# Patient Record
Sex: FEMALE | Race: BLACK OR AFRICAN AMERICAN | NOT HISPANIC OR LATINO | Employment: OTHER | URBAN - METROPOLITAN AREA
[De-identification: names, ages, dates, MRNs, and addresses within clinical notes are randomized per-mention and may not be internally consistent; named-entity substitution may affect disease eponyms.]

---

## 2018-01-18 ENCOUNTER — HOSPITAL ENCOUNTER (EMERGENCY)
Facility: HOSPITAL | Age: 68
Discharge: HOME | End: 2018-01-18
Payer: MEDICARE

## 2018-01-18 VITALS
HEIGHT: 64 IN | DIASTOLIC BLOOD PRESSURE: 99 MMHG | BODY MASS INDEX: 30.07 KG/M2 | SYSTOLIC BLOOD PRESSURE: 209 MMHG | WEIGHT: 176.13 LBS | HEART RATE: 87 BPM | TEMPERATURE: 98 F | OXYGEN SATURATION: 100 % | RESPIRATION RATE: 17 BRPM

## 2018-01-18 DIAGNOSIS — N18.9 CHRONIC KIDNEY FAILURE, UNSPECIFIED STAGE: Primary | ICD-10-CM

## 2018-01-18 DIAGNOSIS — R51.9 FRONTAL HEADACHE: ICD-10-CM

## 2018-01-18 DIAGNOSIS — R53.1 WEAKNESS: ICD-10-CM

## 2018-01-18 LAB
ALBUMIN SERPL BCP-MCNC: 4 G/DL
ALP SERPL-CCNC: 99 U/L
ALT SERPL W/O P-5'-P-CCNC: 13 U/L
ANION GAP SERPL CALC-SCNC: 16 MMOL/L
AST SERPL-CCNC: 16 U/L
BASOPHILS # BLD AUTO: 0.01 K/UL
BASOPHILS NFR BLD: 0.1 %
BILIRUB SERPL-MCNC: 0.5 MG/DL
BUN SERPL-MCNC: 40 MG/DL
CALCIUM SERPL-MCNC: 9.6 MG/DL
CHLORIDE SERPL-SCNC: 99 MMOL/L
CO2 SERPL-SCNC: 25 MMOL/L
CREAT SERPL-MCNC: 5.3 MG/DL
DIFFERENTIAL METHOD: ABNORMAL
EOSINOPHIL # BLD AUTO: 0.1 K/UL
EOSINOPHIL NFR BLD: 1.3 %
ERYTHROCYTE [DISTWIDTH] IN BLOOD BY AUTOMATED COUNT: 16.1 %
EST. GFR  (AFRICAN AMERICAN): 9 ML/MIN/1.73 M^2
EST. GFR  (NON AFRICAN AMERICAN): 8 ML/MIN/1.73 M^2
GLUCOSE SERPL-MCNC: 88 MG/DL
HCT VFR BLD AUTO: 37.9 %
HGB BLD-MCNC: 12 G/DL
LYMPHOCYTES # BLD AUTO: 1.6 K/UL
LYMPHOCYTES NFR BLD: 17.8 %
MCH RBC QN AUTO: 29.9 PG
MCHC RBC AUTO-ENTMCNC: 31.7 G/DL
MCV RBC AUTO: 95 FL
MONOCYTES # BLD AUTO: 0.7 K/UL
MONOCYTES NFR BLD: 7.6 %
NEUTROPHILS # BLD AUTO: 6.4 K/UL
NEUTROPHILS NFR BLD: 73.2 %
PHOSPHATE SERPL-MCNC: 4 MG/DL
PLATELET # BLD AUTO: 284 K/UL
PMV BLD AUTO: 9.8 FL
POTASSIUM SERPL-SCNC: 4 MMOL/L
PROT SERPL-MCNC: 8.2 G/DL
RBC # BLD AUTO: 4.01 M/UL
SODIUM SERPL-SCNC: 140 MMOL/L
WBC # BLD AUTO: 8.69 K/UL

## 2018-01-18 PROCEDURE — 63600175 PHARM REV CODE 636 W HCPCS: Performed by: NURSE PRACTITIONER

## 2018-01-18 PROCEDURE — 85025 COMPLETE CBC W/AUTO DIFF WBC: CPT

## 2018-01-18 PROCEDURE — 96372 THER/PROPH/DIAG INJ SC/IM: CPT

## 2018-01-18 PROCEDURE — 80053 COMPREHEN METABOLIC PANEL: CPT

## 2018-01-18 PROCEDURE — 84100 ASSAY OF PHOSPHORUS: CPT

## 2018-01-18 PROCEDURE — 99284 EMERGENCY DEPT VISIT MOD MDM: CPT | Mod: 25

## 2018-01-18 PROCEDURE — 25000003 PHARM REV CODE 250: Performed by: NURSE PRACTITIONER

## 2018-01-18 RX ORDER — DEXAMETHASONE SODIUM PHOSPHATE 4 MG/ML
8 INJECTION, SOLUTION INTRA-ARTICULAR; INTRALESIONAL; INTRAMUSCULAR; INTRAVENOUS; SOFT TISSUE
Status: COMPLETED | OUTPATIENT
Start: 2018-01-18 | End: 2018-01-18

## 2018-01-18 RX ORDER — ONDANSETRON 8 MG/1
8 TABLET, ORALLY DISINTEGRATING ORAL
Status: COMPLETED | OUTPATIENT
Start: 2018-01-18 | End: 2018-01-18

## 2018-01-18 RX ORDER — OXYCODONE AND ACETAMINOPHEN 5; 325 MG/1; MG/1
1 TABLET ORAL ONCE
Status: COMPLETED | OUTPATIENT
Start: 2018-01-18 | End: 2018-01-18

## 2018-01-18 RX ADMIN — ONDANSETRON 8 MG: 8 TABLET, ORALLY DISINTEGRATING ORAL at 04:01

## 2018-01-18 RX ADMIN — DEXAMETHASONE SODIUM PHOSPHATE 8 MG: 4 INJECTION, SOLUTION INTRAMUSCULAR; INTRAVENOUS at 04:01

## 2018-01-18 RX ADMIN — OXYCODONE HYDROCHLORIDE AND ACETAMINOPHEN 1 TABLET: 5; 325 TABLET ORAL at 04:01

## 2018-01-18 NOTE — ED PROVIDER NOTES
SCRIBE #1 NOTE: I, Shagufta Hodge, am scribing for, and in the presence of, Asaf Aaron NP. I have scribed the entire note.      History      Chief Complaint   Patient presents with    Dialysis     pt in from out of town, does M, W, F hemodialysis, last HD Monday    Emesis     pt c/o n/v x1 day    Sinusitis     pt c/o sinus pressure x3 days       Review of patient's allergies indicates:   Allergen Reactions    Hydrocodone     Iodinated contrast- oral and iv dye     Nsaids (non-steroidal anti-inflammatory drug)         HPI   HPI    1/18/2018, 4:33 PM   History obtained from the patient      History of Present Illness: Gillian Pitts is a 67 y.o. female patient with PMHx of HTN who presents to the Emergency Department for nausea which onset gradually last night. Patient states she is from [place] and is a hemodialysis patient. Patient reports receiving dialysis on Mondays, Wednesdays, and Fridays. Patient last had dialysis on Monday and missed appointment yesterday secondary to being out of town. Patient states she has no dialysis arranged in [place]. Patient states her dry weight is 77kg. Symptoms are constant and moderate in severity. No mitigating or exacerbating factors reported. Associated sxs include episode of vomitting en route to ED. Pt also c/o ear pain, congestion, and HA onset 3 days ago. Patient denies any fever, chills, dizziness, sinus pain, CP, SOB, leg swelling, abd pain, and all other sxs at this time. No further complaints or concerns at this time.     Arrival mode: Personal vehicle      PCP: Primary Doctor No       Past Medical History:  Past Medical History:   Diagnosis Date    Ovarian cyst     Polycystic kidney disease        Past Surgical History:  Past Surgical History:   Procedure Laterality Date    BACK SURGERY      BLADDER SURGERY      HYSTERECTOMY      PERITONEAL SHUNT           Family History:  Family History   Problem Relation Age of Onset    Hypertension Mother      Hypertension Father        Social History:  Social History     Social History Main Topics    Smoking status: Never Smoker    Smokeless tobacco: Never Used    Alcohol use No    Drug use: No    Sexual activity: Unknown       ROS   Review of Systems   Constitutional: Negative for chills and fever.   HENT: Positive for congestion and ear pain. Negative for sinus pain and sore throat.    Respiratory: Negative for shortness of breath.    Cardiovascular: Negative for chest pain and leg swelling.   Gastrointestinal: Positive for nausea and vomiting. Negative for abdominal pain.   Genitourinary: Negative for dysuria.   Musculoskeletal: Negative for back pain.   Skin: Negative for rash.   Neurological: Positive for headaches. Negative for dizziness and weakness.   Hematological: Does not bruise/bleed easily.   All other systems reviewed and are negative.      Physical Exam      Initial Vitals [01/18/18 1614]   BP Pulse Resp Temp SpO2   (!) 200/99 81 18 98.3 °F (36.8 °C) 100 %      MAP       132.67          Physical Exam  Nursing Notes and Vital Signs Reviewed.  Constitutional: Patient is in no acute distress. Well-developed and well-nourished.  Head: Atraumatic. Normocephalic.  Eyes: PERRL. EOM intact. Conjunctivae are not pale. No scleral icterus.  Ears: Right TM normal. Left TM normal. No erythema. No bulging. No effusion or air-fluid levels. No perforation.   Nose: Patent nares. Turbinates are normal. No drainage.   Throat: Moist mucous membranes. Posterior oropharynx is symmetric without erythema. Tonsillar exudate is not present. No trismus. Normal handling of secretions. No stridor.   Neck: Supple. Full ROM. No lymphadenopathy.  Cardiovascular: Regular rate. Regular rhythm. No murmurs, rubs, or gallops. Distal pulses are 2+ and symmetric.  Pulmonary/Chest: No respiratory distress. Clear to auscultation bilaterally. No wheezing or rales.  Abdominal: Soft and non-distended.  There is no tenderness.  No rebound,  "guarding, or rigidity. Good bowel sounds.  Genitourinary: No CVA tenderness  Musculoskeletal: Moves all extremities. No obvious deformities. No edema. No calf tenderness.  Skin: Warm and dry.  Neurological:  Alert, awake, and appropriate.  Normal speech.  No acute focal neurological deficits are appreciated.  Psychiatric: Normal affect. Good eye contact. Appropriate in content.    ED Course    Procedures  ED Vital Signs:  Vitals:    01/18/18 1614 01/18/18 1904   BP: (!) 200/99 (!) 209/99   Pulse: 81 87   Resp: 18 17   Temp: 98.3 °F (36.8 °C)    TempSrc: Oral    SpO2: 100%    Weight: 79.9 kg (176 lb 2.4 oz)    Height: 5' 4" (1.626 m)        Abnormal Lab Results:  Labs Reviewed   CBC W/ AUTO DIFFERENTIAL - Abnormal; Notable for the following:        Result Value    MCHC 31.7 (*)     RDW 16.1 (*)     Gran% 73.2 (*)     Lymph% 17.8 (*)     All other components within normal limits   COMPREHENSIVE METABOLIC PANEL - Abnormal; Notable for the following:     BUN, Bld 40 (*)     Creatinine 5.3 (*)     eGFR if  9 (*)     eGFR if non  8 (*)     All other components within normal limits   PHOSPHORUS        All Lab Results:  Results for orders placed or performed during the hospital encounter of 01/18/18   CBC auto differential   Result Value Ref Range    WBC 8.69 3.90 - 12.70 K/uL    RBC 4.01 4.00 - 5.40 M/uL    Hemoglobin 12.0 12.0 - 16.0 g/dL    Hematocrit 37.9 37.0 - 48.5 %    MCV 95 82 - 98 fL    MCH 29.9 27.0 - 31.0 pg    MCHC 31.7 (L) 32.0 - 36.0 g/dL    RDW 16.1 (H) 11.5 - 14.5 %    Platelets 284 150 - 350 K/uL    MPV 9.8 9.2 - 12.9 fL    Gran # 6.4 1.8 - 7.7 K/uL    Lymph # 1.6 1.0 - 4.8 K/uL    Mono # 0.7 0.3 - 1.0 K/uL    Eos # 0.1 0.0 - 0.5 K/uL    Baso # 0.01 0.00 - 0.20 K/uL    Gran% 73.2 (H) 38.0 - 73.0 %    Lymph% 17.8 (L) 18.0 - 48.0 %    Mono% 7.6 4.0 - 15.0 %    Eosinophil% 1.3 0.0 - 8.0 %    Basophil% 0.1 0.0 - 1.9 %    Differential Method Automated    Comprehensive metabolic " panel   Result Value Ref Range    Sodium 140 136 - 145 mmol/L    Potassium 4.0 3.5 - 5.1 mmol/L    Chloride 99 95 - 110 mmol/L    CO2 25 23 - 29 mmol/L    Glucose 88 70 - 110 mg/dL    BUN, Bld 40 (H) 8 - 23 mg/dL    Creatinine 5.3 (H) 0.5 - 1.4 mg/dL    Calcium 9.6 8.7 - 10.5 mg/dL    Total Protein 8.2 6.0 - 8.4 g/dL    Albumin 4.0 3.5 - 5.2 g/dL    Total Bilirubin 0.5 0.1 - 1.0 mg/dL    Alkaline Phosphatase 99 55 - 135 U/L    AST 16 10 - 40 U/L    ALT 13 10 - 44 U/L    Anion Gap 16 8 - 16 mmol/L    eGFR if African American 9 (A) >60 mL/min/1.73 m^2    eGFR if non African American 8 (A) >60 mL/min/1.73 m^2   Phosphorus   Result Value Ref Range    Phosphorus 4.0 2.7 - 4.5 mg/dL       Imaging Results:  Imaging Results          X-Ray Chest PA And Lateral (Final result)  Result time 01/18/18 18:07:48    Final result by Delon Calhoun MD (Timothy) (01/18/18 18:07:48)                 Impression:         Normal sized heart. Clear lungs.This process the thoracic aorta.  Intravascular stents are identified in the left rectus cephalic vein and left axillary and brachial vein.  Comparison 10/05/2015.      Electronically signed by: DELON CALHOUN MD  Date:     01/18/18  Time:    18:07              Narrative:    CXR, 2 views    Clinical History:    Weakness                                      The Emergency Provider reviewed the vital signs and test results, which are outlined above.    ED Discussion     4:46 PM: Asaf Aaron NP discussed the pt's case with Dr. Kitchen (Nephrology) who recommends basic blood work and chest x-ray. Dr. Kitchen states he will see patient in clinic tomorrow at 9am.    4:55 PM: Reassessed pt at this time. Discussed with pt all pertinent ED information and results. Discussed pt dx and plan of tx. Gave pt all f/u and return to the ED instructions. All questions and concerns were addressed at this time. Pt expresses understanding of information and instructions, and is comfortable with plan to  discharge. Pt is stable for discharge.  Discussed with patient to f/u with Dr. Kitchen, nephrologist, tomorrow at 9am.     I discussed with patient and/or family/caretaker that evaluation in the ED does not suggest any emergent or life threatening medical conditions requiring immediate intervention beyond what was provided in the ED, and I believe patient is safe for discharge.  Regardless, an unremarkable evaluation in the ED does not preclude the development or presence of a serious of life threatening condition. As such, patient was instructed to return immediately for any worsening or change in current symptoms.      ED Medication(s):  Medications   dexamethasone injection 8 mg (8 mg Intramuscular Given 1/18/18 1651)   oxyCODONE-acetaminophen 5-325 mg per tablet 1 tablet (1 tablet Oral Given 1/18/18 1651)   ondansetron disintegrating tablet 8 mg (8 mg Oral Given 1/18/18 1651)       Discharge Medication List as of 1/18/2018  4:54 PM          Follow-up Information     Ayo Kitchen MD.    Specialty:  Nephrology  Why:  You have an appointment tomorrow at 9 am with Dr. Kitchen  Contact information:  [address]  [facility] [id number] [phone]                     Medical Decision Making    Medical Decision Making:   Clinical Tests:   Lab Tests: Ordered and Reviewed  Radiological Study: Reviewed and Ordered           Scribe Attestation:   Scribe #1: I performed the above scribed service and the documentation accurately describes the services I performed. I attest to the accuracy of the note.    Attending:   Physician Attestation Statement for Scribe #1: I, Asaf Aaron NP, personally performed the services described in this documentation, as scribed by Shagufta Hodge, in my presence, and it is both accurate and complete.          Clinical Impression       ICD-10-CM ICD-9-CM   1. Chronic kidney failure, unspecified stage N18.9 585.9   2. Weakness R53.1 780.79   3. Frontal headache R51 784.0       Disposition:    Disposition: Discharged  Condition: Stable         Asaf Aaron NP  01/20/18 0811

## 2018-01-19 ENCOUNTER — LAB VISIT (OUTPATIENT)
Dept: LAB | Facility: HOSPITAL | Age: 68
End: 2018-01-19
Attending: INTERNAL MEDICINE
Payer: MEDICARE

## 2018-01-19 ENCOUNTER — OFFICE VISIT (OUTPATIENT)
Dept: NEPHROLOGY | Facility: CLINIC | Age: 68
End: 2018-01-19
Payer: MEDICARE

## 2018-01-19 ENCOUNTER — OFFICE VISIT (OUTPATIENT)
Dept: INTERNAL MEDICINE | Facility: CLINIC | Age: 68
End: 2018-01-19
Payer: MEDICARE

## 2018-01-19 VITALS
SYSTOLIC BLOOD PRESSURE: 104 MMHG | WEIGHT: 178 LBS | HEART RATE: 70 BPM | OXYGEN SATURATION: 95 % | BODY MASS INDEX: 30.39 KG/M2 | TEMPERATURE: 98 F | DIASTOLIC BLOOD PRESSURE: 78 MMHG | RESPIRATION RATE: 16 BRPM | HEIGHT: 64 IN

## 2018-01-19 VITALS
HEIGHT: 64 IN | HEART RATE: 70 BPM | BODY MASS INDEX: 30.48 KG/M2 | WEIGHT: 178.56 LBS | DIASTOLIC BLOOD PRESSURE: 78 MMHG | SYSTOLIC BLOOD PRESSURE: 116 MMHG

## 2018-01-19 DIAGNOSIS — J31.0 CHRONIC PURULENT RHINITIS: Primary | ICD-10-CM

## 2018-01-19 DIAGNOSIS — N18.6 ESRD (END STAGE RENAL DISEASE): ICD-10-CM

## 2018-01-19 DIAGNOSIS — I10 ESSENTIAL HYPERTENSION: ICD-10-CM

## 2018-01-19 DIAGNOSIS — N18.6 ESRD (END STAGE RENAL DISEASE): Primary | ICD-10-CM

## 2018-01-19 DIAGNOSIS — E28.2 POLYCYSTIC OVARY: ICD-10-CM

## 2018-01-19 DIAGNOSIS — Q61.3 POLYCYSTIC KIDNEY DISEASE: ICD-10-CM

## 2018-01-19 PROBLEM — Z99.2 ESRD (END STAGE RENAL DISEASE) ON DIALYSIS: Chronic | Status: ACTIVE | Noted: 2018-01-19

## 2018-01-19 PROBLEM — Z99.2 ESRD (END STAGE RENAL DISEASE) ON DIALYSIS: Status: ACTIVE | Noted: 2018-01-19

## 2018-01-19 PROCEDURE — 96372 THER/PROPH/DIAG INJ SC/IM: CPT | Mod: PBBFAC,PO

## 2018-01-19 PROCEDURE — 99999 PR PBB SHADOW E&M-EST. PATIENT-LVL IV: CPT | Mod: PBBFAC,,, | Performed by: INTERNAL MEDICINE

## 2018-01-19 PROCEDURE — 99214 OFFICE O/P EST MOD 30 MIN: CPT | Mod: PBBFAC,27,PO,25 | Performed by: PHYSICIAN ASSISTANT

## 2018-01-19 PROCEDURE — 99205 OFFICE O/P NEW HI 60 MIN: CPT | Mod: S$PBB,,, | Performed by: INTERNAL MEDICINE

## 2018-01-19 PROCEDURE — 86803 HEPATITIS C AB TEST: CPT

## 2018-01-19 PROCEDURE — 99999 PR PBB SHADOW E&M-EST. PATIENT-LVL IV: CPT | Mod: PBBFAC,,, | Performed by: PHYSICIAN ASSISTANT

## 2018-01-19 PROCEDURE — 36415 COLL VENOUS BLD VENIPUNCTURE: CPT | Mod: PO

## 2018-01-19 PROCEDURE — 86706 HEP B SURFACE ANTIBODY: CPT

## 2018-01-19 PROCEDURE — 99214 OFFICE O/P EST MOD 30 MIN: CPT | Mod: PBBFAC,PO,25 | Performed by: INTERNAL MEDICINE

## 2018-01-19 PROCEDURE — 99213 OFFICE O/P EST LOW 20 MIN: CPT | Mod: S$PBB,,, | Performed by: PHYSICIAN ASSISTANT

## 2018-01-19 PROCEDURE — 87340 HEPATITIS B SURFACE AG IA: CPT

## 2018-01-19 PROCEDURE — 86704 HEP B CORE ANTIBODY TOTAL: CPT

## 2018-01-19 RX ORDER — MOMETASONE FUROATE 50 UG/1
2 SPRAY, METERED NASAL DAILY
Qty: 1 EACH | Refills: 2 | Status: SHIPPED | OUTPATIENT
Start: 2018-01-19 | End: 2018-04-30

## 2018-01-19 RX ORDER — AMOXICILLIN 500 MG/1
500 TABLET, FILM COATED ORAL EVERY 12 HOURS
Qty: 20 TABLET | Refills: 0 | Status: SHIPPED | OUTPATIENT
Start: 2018-01-19 | End: 2018-01-29

## 2018-01-19 RX ORDER — SEVELAMER CARBONATE 800 MG/1
2400 TABLET, FILM COATED ORAL 3 TIMES DAILY
COMMUNITY
End: 2019-01-09

## 2018-01-19 RX ORDER — METHYLPREDNISOLONE ACETATE 80 MG/ML
80 INJECTION, SUSPENSION INTRA-ARTICULAR; INTRALESIONAL; INTRAMUSCULAR; SOFT TISSUE
Status: COMPLETED | OUTPATIENT
Start: 2018-01-19 | End: 2018-01-19

## 2018-01-19 RX ORDER — CLONIDINE HYDROCHLORIDE 0.3 MG/1
0.3 TABLET ORAL 3 TIMES DAILY
Qty: 90 TABLET | Refills: 2 | Status: SHIPPED | OUTPATIENT
Start: 2018-01-19 | End: 2018-02-16 | Stop reason: SDUPTHER

## 2018-01-19 RX ADMIN — METHYLPREDNISOLONE ACETATE 80 MG: 80 INJECTION, SUSPENSION INTRALESIONAL; INTRAMUSCULAR; INTRASYNOVIAL; SOFT TISSUE at 12:01

## 2018-01-19 NOTE — PROGRESS NOTES
Subjective:       Patient ID: Gillian Pitts is a 67 y.o.B/ female.    Chief Complaint: Nasal Congestion; Otalgia; and Medication Refill    HPI         She comes in by herself today and has the above problem.  She has been on dialysis now for about 4 years and [place].  She is living and in one of her children's houses in the mother-in-law suite.  She's been having lots of problems with chronic sinusitis and she has been taken Mucinex D for this problem and has at times in the past taken Coricidin HBP.  She's not taken that product in the past couple weeks.  She gets very little rhinorrhea coming from her nose because it's locked and won't move.  She also has been coughing just a little bit.  So far she's had no problems with fever, chills, rigors, dyspnea of any modality, sweats, diaphoresis, CP, pleurisy, night sweats, or near syncope.  She has no GI problems with anorexia, nausea, or vomiting.        She says the only time she gets better is a somebody gives her an injection of a steroid and also put her on amoxicillin.  She has been using Nasonex 3 or 4 times per day which is above and beyond the usual dosage of that medicine.  She needs a refill of it.  She also needs a refill of her Catapres 0.3 mg which she has been taking 3 times a day.  It's originally written to be taken twice a day.    Review of Systems    Otherwise negative concerning the ENT, RESPIRATORY, PULMONARY, CV, VASCULAR, and RENAL system review.    Objective:      Physical Exam    ENT: All essentially WNL without any rhinorrhea or mucopurulence seen inside the nose.  There is also no evidence of PND in the throat.  Throat is non-red and has no swelling in the posterior pharynx or the soft palate.  She has no tender glands or adenopathy present.  CHEST: Clear BS anterior to posterior.  She is not in any respiratory distress.  There is no wheeze, rhonchi, or  rales.    Assessment:       1. Chronic purulent rhinitis        Plan:     1.  She requested and was given a Depo-Medrol 80 mg IM injection.  2.  She needs to start taking Coricidin HBP.  Her Nasonex was refilled but it was cautioned to her that she should only use it once per day and proper instruction was given on how to use this product.  3.  Start Amoxil 500 mg twice a day ×10 days.  4.  Take Mucinex DM 1200 mg as needed for any chest congestion that may develop.  5.  Recheck in 7-14 days a symptoms increase or persist.
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
## 2018-01-19 NOTE — ED NOTES
Pt examined by Asaf Aaron NP and medically cleared for discharge by provider.  Patient has been given discharge instructions and discharged to [facility]. See provider notes for exam.

## 2018-01-21 NOTE — PROGRESS NOTES
Renal on call note:  Responded to pts call regarding her SOB and spoke to Pt directly. Chart was reviewed. Pt is a 68y/o F c ESRD on chronic HD in [place] who just recently moved to  without making preparations to continue chronic HD. Pt was recently seen at [facility] BR clinic and currently out Pt HD is being arranged for her and is supposed to start (resume) chronic HD at [facility] on Mon 1/22/18    Pt, however, started to feel SOB today. She had no other sxs, no CP, no fever. Pt was advised to go to ER and not wait until Mon if the discomfort persists.

## 2018-01-22 LAB
HBV CORE AB SERPL QL IA: POSITIVE
HBV SURFACE AB SER-ACNC: POSITIVE M[IU]/ML
HBV SURFACE AG SERPL QL IA: NEGATIVE
HCV AB SERPL QL IA: POSITIVE

## 2018-01-30 ENCOUNTER — TELEPHONE (OUTPATIENT)
Dept: TRANSPLANT | Facility: CLINIC | Age: 68
End: 2018-01-30

## 2018-02-01 ENCOUNTER — LAB VISIT (OUTPATIENT)
Dept: LAB | Facility: HOSPITAL | Age: 68
End: 2018-02-01
Attending: PHYSICIAN ASSISTANT
Payer: MEDICARE

## 2018-02-01 ENCOUNTER — OFFICE VISIT (OUTPATIENT)
Dept: GASTROENTEROLOGY | Facility: CLINIC | Age: 68
End: 2018-02-01
Payer: MEDICARE

## 2018-02-01 VITALS
HEART RATE: 62 BPM | SYSTOLIC BLOOD PRESSURE: 118 MMHG | HEIGHT: 64 IN | BODY MASS INDEX: 29.99 KG/M2 | WEIGHT: 175.69 LBS | DIASTOLIC BLOOD PRESSURE: 80 MMHG

## 2018-02-01 DIAGNOSIS — R76.8 HEPATITIS B CORE ANTIBODY POSITIVE: ICD-10-CM

## 2018-02-01 DIAGNOSIS — R76.8 HEPATITIS C ANTIBODY TEST POSITIVE: ICD-10-CM

## 2018-02-01 DIAGNOSIS — Z86.010 HISTORY OF COLON POLYPS: ICD-10-CM

## 2018-02-01 DIAGNOSIS — R76.8 HEPATITIS C ANTIBODY TEST POSITIVE: Primary | ICD-10-CM

## 2018-02-01 PROCEDURE — 99213 OFFICE O/P EST LOW 20 MIN: CPT | Mod: PBBFAC,NTX | Performed by: PHYSICIAN ASSISTANT

## 2018-02-01 PROCEDURE — 99214 OFFICE O/P EST MOD 30 MIN: CPT | Mod: S$PBB,NTX,, | Performed by: PHYSICIAN ASSISTANT

## 2018-02-01 PROCEDURE — 1159F MED LIST DOCD IN RCRD: CPT | Mod: NTX,,, | Performed by: PHYSICIAN ASSISTANT

## 2018-02-01 PROCEDURE — 1126F AMNT PAIN NOTED NONE PRSNT: CPT | Mod: NTX,,, | Performed by: PHYSICIAN ASSISTANT

## 2018-02-01 PROCEDURE — 36415 COLL VENOUS BLD VENIPUNCTURE: CPT | Mod: NTX

## 2018-02-01 PROCEDURE — 87522 HEPATITIS C REVRS TRNSCRPJ: CPT | Mod: TXP

## 2018-02-01 PROCEDURE — 99999 PR PBB SHADOW E&M-EST. PATIENT-LVL III: CPT | Mod: PBBFAC,TXP,, | Performed by: PHYSICIAN ASSISTANT

## 2018-02-01 RX ORDER — POLYETHYLENE GLYCOL 3350, SODIUM SULFATE ANHYDROUS, SODIUM BICARBONATE, SODIUM CHLORIDE, POTASSIUM CHLORIDE 236; 22.74; 6.74; 5.86; 2.97 G/4L; G/4L; G/4L; G/4L; G/4L
POWDER, FOR SOLUTION ORAL
Qty: 4000 ML | Refills: 0 | Status: SHIPPED | OUTPATIENT
Start: 2018-02-01 | End: 2018-06-27

## 2018-02-01 NOTE — PATIENT INSTRUCTIONS
Colonoscopy     A camera attached to a flexible tube with a viewing lens is used to take video pictures.     Colonoscopy is a test to view the inside of your lower digestive tract (colon and rectum). Sometimes it can show the last part of the small intestine (ileum). During the test, small pieces of tissue may be removed for testing. This is called a biopsy. Small growths, such as polyps, may also be removed.   Why is colonoscopy done?  The test is done to help look for colon cancer. And it can help find the source of abdominal pain, bleeding, and changes in bowel habits. It may be needed once a year, depending on factors such as your:  · Age  · Health history  · Family health history  · Symptoms  · Results from any prior colonoscopy  Risks and possible complications  These include:  · Bleeding               · A puncture or tear in the colon   · Risks of anesthesia  · A cancer lesion not being seen  Getting ready   To prepare for the test:  · Talk with your healthcare provider about the risks of the test (see below). Also ask your healthcare provider about alternatives to the test.  · Tell your healthcare provider about any medicines you take. Also tell him or her about any health conditions you may have.  · Make sure your rectum and colon are empty for the test. Follow the diet and bowel prep instructions exactly. If you dont, the test may need to be rescheduled.  · Plan for a friend or family member to drive you home after the test.     Colonoscopy provides an inside view of the entire colon.     You may discuss the results with your doctor right away or at a future visit.  During the test   The test is usually done in the hospital on an outpatient basis. This means you go home the same day. The procedure takes about 30 minutes. During that time:  · You are given relaxing (sedating) medicine through an IV line. You may be drowsy, or fully asleep.  · The healthcare provider will first give you a physical exam to  check for anal and rectal problems.  · Then the anus is lubricated and the scope inserted.  · If you are awake, you may have a feeling similar to needing to have a bowel movement. You may also feel pressure as air is pumped into the colon. Its OK to pass gas during the procedure.  · Biopsy, polyp removal, or other treatments may be done during the test.  After the test   You may have gas right after the test. It can help to try to pass it to help prevent later bloating. Your healthcare provider may discuss the results with you right away. Or you may need to schedule a follow-up visit to talk about the results. After the test, you can go back to your normal eating and other activities. You may be tired from the sedation and need to rest for a few hours.  Date Last Reviewed: 11/1/2016 © 2000-2017 [employer]. [address] [place] [address], [place] [address]. All rights reserved. This information is not intended as a substitute for professional medical care. Always follow your healthcare professional's instructions.
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
## 2018-02-05 LAB
HCV GENTYP SERPL NAA+PROBE: NORMAL
HCV QUALITATIVE RESULT: NOT DETECTED
HCV QUANTITATIVE LOG: <1.08 LOG (10) IU/ML
HCV RNA SPEC NAA+PROBE-ACNC: <12 IU/ML

## 2018-02-16 DIAGNOSIS — J31.0 CHRONIC PURULENT RHINITIS: ICD-10-CM

## 2018-02-16 RX ORDER — CLONIDINE HYDROCHLORIDE 0.3 MG/1
0.3 TABLET ORAL 3 TIMES DAILY
Qty: 90 TABLET | Refills: 6 | Status: SHIPPED | OUTPATIENT
Start: 2018-02-16 | End: 2018-08-20 | Stop reason: SDUPTHER

## 2018-02-21 DIAGNOSIS — N83.209 CYST OF OVARY, UNSPECIFIED LATERALITY: ICD-10-CM

## 2018-02-21 DIAGNOSIS — Z76.89 ENCOUNTER TO ESTABLISH CARE: Primary | ICD-10-CM

## 2018-02-27 DIAGNOSIS — Z76.82 ORGAN TRANSPLANT CANDIDATE: ICD-10-CM

## 2018-02-28 ENCOUNTER — TELEPHONE (OUTPATIENT)
Dept: NEPHROLOGY | Facility: CLINIC | Age: 68
End: 2018-02-28

## 2018-02-28 NOTE — TELEPHONE ENCOUNTER
Called patient to schedule her appointment with Ob/gyn. She states that she would like to know who you recommend before she choose a doctor? Please advise

## 2018-03-02 DIAGNOSIS — Z76.89 ENCOUNTER TO ESTABLISH CARE: Primary | ICD-10-CM

## 2018-03-14 ENCOUNTER — HOSPITAL ENCOUNTER (EMERGENCY)
Facility: HOSPITAL | Age: 68
Discharge: HOME | End: 2018-03-14
Attending: EMERGENCY MEDICINE
Payer: MEDICARE

## 2018-03-14 VITALS
SYSTOLIC BLOOD PRESSURE: 197 MMHG | TEMPERATURE: 97 F | WEIGHT: 170 LBS | HEART RATE: 72 BPM | BODY MASS INDEX: 29.02 KG/M2 | HEIGHT: 64 IN | OXYGEN SATURATION: 100 % | DIASTOLIC BLOOD PRESSURE: 97 MMHG | RESPIRATION RATE: 18 BRPM

## 2018-03-14 DIAGNOSIS — R10.9 ACUTE LEFT FLANK PAIN: Primary | ICD-10-CM

## 2018-03-14 DIAGNOSIS — H92.01 RIGHT EAR PAIN: ICD-10-CM

## 2018-03-14 PROCEDURE — 63600175 PHARM REV CODE 636 W HCPCS: Mod: NTX | Performed by: NURSE PRACTITIONER

## 2018-03-14 PROCEDURE — 25000003 PHARM REV CODE 250: Mod: NTX | Performed by: NURSE PRACTITIONER

## 2018-03-14 PROCEDURE — 99284 EMERGENCY DEPT VISIT MOD MDM: CPT | Mod: 25,NTX

## 2018-03-14 PROCEDURE — 96372 THER/PROPH/DIAG INJ SC/IM: CPT | Mod: NTX

## 2018-03-14 RX ORDER — OXYCODONE AND ACETAMINOPHEN 10; 325 MG/1; MG/1
1 TABLET ORAL
Status: COMPLETED | OUTPATIENT
Start: 2018-03-14 | End: 2018-03-14

## 2018-03-14 RX ORDER — MORPHINE SULFATE 4 MG/ML
4 INJECTION, SOLUTION INTRAMUSCULAR; INTRAVENOUS
Status: COMPLETED | OUTPATIENT
Start: 2018-03-14 | End: 2018-03-14

## 2018-03-14 RX ORDER — ONDANSETRON 8 MG/1
8 TABLET, ORALLY DISINTEGRATING ORAL
Status: COMPLETED | OUTPATIENT
Start: 2018-03-14 | End: 2018-03-14

## 2018-03-14 RX ORDER — TRAMADOL HYDROCHLORIDE 50 MG/1
50 TABLET ORAL EVERY 6 HOURS PRN
Qty: 14 TABLET | Refills: 0 | Status: SHIPPED | OUTPATIENT
Start: 2018-03-14 | End: 2018-03-22

## 2018-03-14 RX ADMIN — OXYCODONE HYDROCHLORIDE AND ACETAMINOPHEN 1 TABLET: 10; 325 TABLET ORAL at 11:03

## 2018-03-14 RX ADMIN — ONDANSETRON 8 MG: 8 TABLET, ORALLY DISINTEGRATING ORAL at 01:03

## 2018-03-14 RX ADMIN — MORPHINE SULFATE 4 MG: 4 INJECTION INTRAVENOUS at 01:03
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
## 2018-03-22 ENCOUNTER — OFFICE VISIT (OUTPATIENT)
Dept: TRANSPLANT | Facility: CLINIC | Age: 68
End: 2018-03-22
Payer: MEDICARE

## 2018-03-22 ENCOUNTER — DOCUMENTATION ONLY (OUTPATIENT)
Dept: TRANSPLANT | Facility: CLINIC | Age: 68
End: 2018-03-22

## 2018-03-22 ENCOUNTER — HOSPITAL ENCOUNTER (EMERGENCY)
Facility: HOSPITAL | Age: 68
Discharge: HOME | End: 2018-03-22
Attending: EMERGENCY MEDICINE
Payer: MEDICARE

## 2018-03-22 ENCOUNTER — TELEPHONE (OUTPATIENT)
Dept: TRANSPLANT | Facility: CLINIC | Age: 68
End: 2018-03-22

## 2018-03-22 VITALS
TEMPERATURE: 98 F | RESPIRATION RATE: 17 BRPM | HEIGHT: 65 IN | BODY MASS INDEX: 28.83 KG/M2 | DIASTOLIC BLOOD PRESSURE: 68 MMHG | HEART RATE: 96 BPM | WEIGHT: 173.06 LBS | OXYGEN SATURATION: 100 % | SYSTOLIC BLOOD PRESSURE: 122 MMHG

## 2018-03-22 VITALS
RESPIRATION RATE: 16 BRPM | HEART RATE: 72 BPM | DIASTOLIC BLOOD PRESSURE: 79 MMHG | TEMPERATURE: 97 F | SYSTOLIC BLOOD PRESSURE: 165 MMHG

## 2018-03-22 DIAGNOSIS — N25.81 SECONDARY HYPERPARATHYROIDISM OF RENAL ORIGIN: Chronic | ICD-10-CM

## 2018-03-22 DIAGNOSIS — N18.6 ESRD (END STAGE RENAL DISEASE) ON DIALYSIS: Primary | Chronic | ICD-10-CM

## 2018-03-22 DIAGNOSIS — T30.0 BURN: ICD-10-CM

## 2018-03-22 DIAGNOSIS — Z99.2 ANEMIA IN CHRONIC KIDNEY DISEASE, ON CHRONIC DIALYSIS: ICD-10-CM

## 2018-03-22 DIAGNOSIS — Z99.2 ESRD (END STAGE RENAL DISEASE) ON DIALYSIS: Primary | Chronic | ICD-10-CM

## 2018-03-22 DIAGNOSIS — D63.1 ANEMIA IN CHRONIC KIDNEY DISEASE, ON CHRONIC DIALYSIS: ICD-10-CM

## 2018-03-22 DIAGNOSIS — E87.5 HYPERKALEMIA: Primary | ICD-10-CM

## 2018-03-22 DIAGNOSIS — Q61.3 POLYCYSTIC KIDNEY DISEASE: ICD-10-CM

## 2018-03-22 DIAGNOSIS — N18.6 ANEMIA IN CHRONIC KIDNEY DISEASE, ON CHRONIC DIALYSIS: ICD-10-CM

## 2018-03-22 DIAGNOSIS — Z01.818 PRE-TRANSPLANT EVALUATION FOR CKD (CHRONIC KIDNEY DISEASE): ICD-10-CM

## 2018-03-22 DIAGNOSIS — I10 ESSENTIAL HYPERTENSION: Chronic | ICD-10-CM

## 2018-03-22 LAB
ALBUMIN SERPL BCP-MCNC: 4.4 G/DL
ALP SERPL-CCNC: 158 U/L
ALT SERPL W/O P-5'-P-CCNC: 18 U/L
ANION GAP SERPL CALC-SCNC: 13 MMOL/L
ANION GAP SERPL CALC-SCNC: 15 MMOL/L
AST SERPL-CCNC: 25 U/L
BASOPHILS # BLD AUTO: 0.06 K/UL
BASOPHILS NFR BLD: 0.8 %
BILIRUB SERPL-MCNC: 0.5 MG/DL
BNP SERPL-MCNC: 30 PG/ML
BUN SERPL-MCNC: 51 MG/DL
BUN SERPL-MCNC: 52 MG/DL
BUN SERPL-MCNC: 53 MG/DL (ref 6–30)
CA-I BLDV-SCNC: 1.1 MMOL/L
CALCIUM SERPL-MCNC: 10.2 MG/DL
CALCIUM SERPL-MCNC: ABNORMAL MG/DL
CHLORIDE SERPL-SCNC: 100 MMOL/L
CHLORIDE SERPL-SCNC: 97 MMOL/L
CHLORIDE SERPL-SCNC: 98 MMOL/L (ref 95–110)
CO2 SERPL-SCNC: 21 MMOL/L
CO2 SERPL-SCNC: 22 MMOL/L
CREAT SERPL-MCNC: 8 MG/DL
CREAT SERPL-MCNC: 8.6 MG/DL
CREAT SERPL-MCNC: 9.4 MG/DL (ref 0.5–1.4)
DIFFERENTIAL METHOD: ABNORMAL
EOSINOPHIL # BLD AUTO: 0.1 K/UL
EOSINOPHIL NFR BLD: 1.3 %
ERYTHROCYTE [DISTWIDTH] IN BLOOD BY AUTOMATED COUNT: 17.1 %
EST. GFR  (AFRICAN AMERICAN): 5 ML/MIN/1.73 M^2
EST. GFR  (AFRICAN AMERICAN): 5.5 ML/MIN/1.73 M^2
EST. GFR  (NON AFRICAN AMERICAN): 4.4 ML/MIN/1.73 M^2
EST. GFR  (NON AFRICAN AMERICAN): 4.8 ML/MIN/1.73 M^2
GLUCOSE SERPL-MCNC: 165 MG/DL (ref 70–110)
GLUCOSE SERPL-MCNC: 178 MG/DL
GLUCOSE SERPL-MCNC: 72 MG/DL
HCT VFR BLD AUTO: 44.3 %
HCT VFR BLD CALC: 43 %PCV (ref 36–54)
HGB BLD-MCNC: 13.4 G/DL
IMM GRANULOCYTES # BLD AUTO: 0.02 K/UL
IMM GRANULOCYTES NFR BLD AUTO: 0.3 %
LYMPHOCYTES # BLD AUTO: 4.1 K/UL
LYMPHOCYTES NFR BLD: 52.1 %
MCH RBC QN AUTO: 30.6 PG
MCHC RBC AUTO-ENTMCNC: 30.2 G/DL
MCV RBC AUTO: 101 FL
MONOCYTES # BLD AUTO: 0.7 K/UL
MONOCYTES NFR BLD: 9 %
NEUTROPHILS # BLD AUTO: 2.9 K/UL
NEUTROPHILS NFR BLD: 36.5 %
NRBC BLD-RTO: 0 /100 WBC
PLATELET # BLD AUTO: 485 K/UL
PMV BLD AUTO: 8.8 FL
POC IONIZED CALCIUM: 1.3 MMOL/L (ref 1.06–1.42)
POC TCO2 (MEASURED): 25 MMOL/L (ref 23–29)
POCT GLUCOSE: 98 MG/DL (ref 70–110)
POTASSIUM BLD-SCNC: 5.1 MMOL/L (ref 3.5–5.1)
POTASSIUM SERPL-SCNC: 5.7 MMOL/L
POTASSIUM SERPL-SCNC: 6.1 MMOL/L
PROT SERPL-MCNC: 8.7 G/DL
RBC # BLD AUTO: 4.38 M/UL
SAMPLE: ABNORMAL
SODIUM BLD-SCNC: 132 MMOL/L (ref 136–145)
SODIUM SERPL-SCNC: 134 MMOL/L
SODIUM SERPL-SCNC: 134 MMOL/L
TROPONIN I SERPL DL<=0.01 NG/ML-MCNC: 0.07 NG/ML
TSH SERPL DL<=0.005 MIU/L-ACNC: 1.5 UIU/ML
WBC # BLD AUTO: 7.81 K/UL

## 2018-03-22 PROCEDURE — 82962 GLUCOSE BLOOD TEST: CPT | Mod: NTX

## 2018-03-22 PROCEDURE — 80053 COMPREHEN METABOLIC PANEL: CPT | Mod: NTX

## 2018-03-22 PROCEDURE — 99214 OFFICE O/P EST MOD 30 MIN: CPT | Mod: PBBFAC,25,27,TXP | Performed by: INTERNAL MEDICINE

## 2018-03-22 PROCEDURE — 96365 THER/PROPH/DIAG IV INF INIT: CPT | Mod: NTX

## 2018-03-22 PROCEDURE — 99284 EMERGENCY DEPT VISIT MOD MDM: CPT | Mod: NTX,,, | Performed by: EMERGENCY MEDICINE

## 2018-03-22 PROCEDURE — 82565 ASSAY OF CREATININE: CPT | Mod: NTX

## 2018-03-22 PROCEDURE — 84443 ASSAY THYROID STIM HORMONE: CPT | Mod: NTX

## 2018-03-22 PROCEDURE — 93010 ELECTROCARDIOGRAM REPORT: CPT | Mod: NTX,,, | Performed by: INTERNAL MEDICINE

## 2018-03-22 PROCEDURE — 63600175 PHARM REV CODE 636 W HCPCS: Mod: NTX | Performed by: EMERGENCY MEDICINE

## 2018-03-22 PROCEDURE — 99999 PR PBB SHADOW E&M-EST. PATIENT-LVL IV: CPT | Mod: PBBFAC,TXP,, | Performed by: INTERNAL MEDICINE

## 2018-03-22 PROCEDURE — 99284 EMERGENCY DEPT VISIT MOD MDM: CPT | Mod: 25,NTX

## 2018-03-22 PROCEDURE — 99205 OFFICE O/P NEW HI 60 MIN: CPT | Mod: S$PBB,TXP,, | Performed by: INTERNAL MEDICINE

## 2018-03-22 PROCEDURE — 96375 TX/PRO/DX INJ NEW DRUG ADDON: CPT | Mod: NTX

## 2018-03-22 PROCEDURE — 99244 OFF/OP CNSLTJ NEW/EST MOD 40: CPT | Mod: S$PBB,TXP,, | Performed by: TRANSPLANT SURGERY

## 2018-03-22 PROCEDURE — 25000242 PHARM REV CODE 250 ALT 637 W/ HCPCS: Mod: NTX | Performed by: EMERGENCY MEDICINE

## 2018-03-22 PROCEDURE — 99204 OFFICE O/P NEW MOD 45 MIN: CPT | Mod: S$PBB,TXP,, | Performed by: PHYSICIAN ASSISTANT

## 2018-03-22 PROCEDURE — 94640 AIRWAY INHALATION TREATMENT: CPT | Mod: NTX

## 2018-03-22 PROCEDURE — 82435 ASSAY OF BLOOD CHLORIDE: CPT | Mod: NTX

## 2018-03-22 PROCEDURE — 25000003 PHARM REV CODE 250: Mod: NTX | Performed by: EMERGENCY MEDICINE

## 2018-03-22 PROCEDURE — 85025 COMPLETE CBC W/AUTO DIFF WBC: CPT | Mod: NTX

## 2018-03-22 PROCEDURE — 83880 ASSAY OF NATRIURETIC PEPTIDE: CPT | Mod: NTX

## 2018-03-22 PROCEDURE — 84484 ASSAY OF TROPONIN QUANT: CPT | Mod: NTX

## 2018-03-22 RX ORDER — ALBUTEROL SULFATE 0.83 MG/ML
10 SOLUTION RESPIRATORY (INHALATION)
Status: COMPLETED | OUTPATIENT
Start: 2018-03-22 | End: 2018-03-22

## 2018-03-22 RX ORDER — HYDRALAZINE HYDROCHLORIDE 50 MG/1
50 TABLET, FILM COATED ORAL 3 TIMES DAILY
COMMUNITY
End: 2018-07-02

## 2018-03-22 RX ORDER — DEXTROSE 50 % IN WATER (D50W) INTRAVENOUS SYRINGE
25
Status: COMPLETED | OUTPATIENT
Start: 2018-03-22 | End: 2018-03-22

## 2018-03-22 RX ADMIN — DEXTROSE MONOHYDRATE 25 G: 25 INJECTION, SOLUTION INTRAVENOUS at 03:03

## 2018-03-22 RX ADMIN — CALCIUM GLUCONATE 1 G: 94 INJECTION, SOLUTION INTRAVENOUS at 03:03

## 2018-03-22 RX ADMIN — SODIUM POLYSTYRENE SULFONATE 15 G: 15 SUSPENSION ORAL; RECTAL at 03:03

## 2018-03-22 RX ADMIN — INSULIN HUMAN 5 UNITS: 100 INJECTION, SOLUTION PARENTERAL at 03:03

## 2018-03-22 RX ADMIN — ALBUTEROL SULFATE 10 MG: 2.5 SOLUTION RESPIRATORY (INHALATION) at 02:03

## 2018-03-22 NOTE — ED NOTES
Patient on stretcher, Bed placed in low/locked position, side rails up x 2, call light is within reach of patient or family, Patient placed into position of comfort. Patient in NAD and offers no complaints at this time. Will continue to monitor.

## 2018-03-22 NOTE — ED NOTES
Patient identifiers verified and correct for Gillian Pitts.   LOC: The patient is awake, alert and aware of environment with an appropriate affect, the patient is oriented x 3 and speaking appropriately.   APPEARANCE: Patient appears comfortable and in no acute distress, patient is clean and well groomed.  SKIN: The skin is warm and dry, color consistent with ethnicity, patient has normal skin turgor and moist mucus membranes, skin intact, no breakdown or bruising noted.   MUSCULOSKELETAL: Patient moving all extremities spontaneously, no swelling noted.  RESPIRATORY: Airway is open and patent, respirations are spontaneous, patient has a normal effort and rate, no accessory muscle use noted, pt placed on continuous pulse ox with O2 sats noted at 97% on room air.  CARDIAC: Pt placed on cardiac monitor. Patient has a normal rate and regular rhythm, no edema noted, capillary refill < 3 seconds.   GASTRO: Soft and non tender to palpation, no distention noted, normoactive bowel sounds present in all four quadrants. Pt states bowel movements have been regular.  : Pt denies any pain or frequency with urination.  NEURO: Pt opens eyes spontaneously, behavior appropriate to situation, follows commands, facial expression symmetrical, bilateral hand grasp equal and even, purposeful motor response noted, normal sensation in all extremities when touched with a finger.

## 2018-03-22 NOTE — PROGRESS NOTES
INITIAL PATIENT EDUCATION NOTE    Ms. Gillian Pitts was seen in pre-kidney transplant clinic for evaluation for kidney, kidney/pancreas or pancreas only transplant.  The patient attended a group education session that discussed/reviewed the following aspects of transplantation: evaluation and selection committee process, UNOS waitlist management/multiple listings, types of organs offered (KDPI < 85%, KDPI > 85%, PHS increased risk, DCD), financial aspects, surgical procedures, dietary instruction pre- and post-transplant, health maintenance pre- and post-transplant, post-transplant hospitalization and outpatient follow-up, potential to participate in a research protocol, and medication management and side effects.  A question and answer session was provided after the presentation.    The patient was seen by all members of the multi-disciplinary team to include: Nephrologist/PA, Surgeon, , Transplant Coordinator, , Pharmacist and Dietician (if applicable).    The patient reviewed and signed all consents for evaluation which were witnessed and sent to scanning into the EPIC chart.    The patient was given an education book and plan for further evaluation based on her individual assessment.      The patient was encouraged to call with any questions or concerns.

## 2018-03-22 NOTE — TELEPHONE ENCOUNTER
Reviewed pt transplant labs.  Notified dialysis unit dietitian of the following abnormal labs via fax.     K 6.0

## 2018-03-22 NOTE — NURSING
Labs faxed to patients referring Nephrologist and dialysis unit.    Notes recorded by Virgie Spears NP on 3/22/2018 at 11:45 AM CDT  Results reviewed, action needed.  K+ level elevated- was recommended for pt to go to the ED  ------  Notes recorded by Virgie Spears NP on 3/22/2018 at 11:44 AM CDT  Results reviewed, action needed.  Fax labs to dialysis/nephrologist concerning  Electrolyte mgmt  K+ elevated--addressed in clinic   Macrocytosis noted , elevated PLT count. Get repeat labs from dialysis center. Previously levels were wnl.

## 2018-03-22 NOTE — LETTER
March 25, 2018        Ayo Kitchen  [address]  [place] [address]  Phone: [phone]  Fax: [phone]             [facility]  [address]  [facility] [address]  Phone: [phone]   Patient: Gillian Pitts   MR Number: [id number]   YOB: 1950   Date of Visit: 3/22/2018       Dear Dr. Ayo Kitchen    Thank you for referring Gillian Pitts to me for evaluation. Attached you will find relevant portions of my assessment and plan of care.    If you have questions, please do not hesitate to call me. I look forward to following Gillian Pitts along with you.    Sincerely,    Livier Masterson MD    Enclosure    If you would like to receive this communication electronically, please contact [email or web address] or [phone] to request [employer] access.    [employer] is a tool which provides read-only access to select patient information with whom you have a relationship. Its easy to use and provides real time access to review your patients record including encounter summaries, notes, results, and demographic information.    If you feel you have received this communication in error or would no longer like to receive these types of communications, please e-mail [email or web address]
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
## 2018-03-22 NOTE — PROGRESS NOTES
PHARM.D. PRE-TRANSPLANT NOTE:    This patient's medication therapy was evaluated as part of her pre-transplant evaluation.    The following pharmacologic concerns were noted: Patient is taking prn oxycodone       Current Outpatient Prescriptions   Medication Sig Dispense Refill    cloNIDine (CATAPRES) 0.3 MG tablet Take 1 tablet (0.3 mg total) by mouth 3 (three) times daily. 90 tablet 6    fluticasone (FLONASE) 50 mcg/actuation nasal spray 1 spray by Each Nare route 2 (two) times daily as needed. 15 g 0    furosemide (LASIX) 80 MG tablet Take 80 mg by mouth 2 (two) times daily.      mometasone (NASONEX) 50 mcg/actuation nasal spray 2 sprays by Nasal route once daily. 1 each 2    oxycodone (OXYCONTIN) 30 MG Tb12 Take 30 mg by mouth every 6 (six) hours as needed.      pantoprazole (PROTONIX) 40 MG tablet Take 40 mg by mouth once daily.      polyethylene glycol (GOLYTELY,NULYTELY) 236-22.74-6.74 -5.86 gram suspension Use as directed. 4000 mL 0    sevelamer carbonate (RENVELA) 800 mg Tab Take 2,400 mg by mouth 3 (three) times daily.        No current facility-administered medications for this visit.          Currently she is responsible for preparing / administering this patient's medications on a daily basis.  I am available for consultation and can be contacted, as needed by the other members of the Kidney Transplant team.

## 2018-03-22 NOTE — PATIENT INSTRUCTIONS
1. Try to increase your activity and build up your strength   2. Given your high potassium you should go to the ED
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
## 2018-03-22 NOTE — ED TRIAGE NOTES
Pt went for labs this morning to be placed on kidney transplant list. Pt was advised her potassium was 6.0 and to come to the ED. Pt denies any complaints. Pt on dialysis M, W, F. With successful dialysis on Wednesday.
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
## 2018-04-04 ENCOUNTER — TELEPHONE (OUTPATIENT)
Dept: TRANSPLANT | Facility: CLINIC | Age: 68
End: 2018-04-04

## 2018-04-04 NOTE — TELEPHONE ENCOUNTER
Nutritional assessment from dialysis unit received and reviewed--no nutritional changes to plan needed at this time.  Pt to continue to follow-up with renal dietitians recommendations.      Virgie Leon MS RD LDN

## 2018-04-06 ENCOUNTER — COMMITTEE REVIEW (OUTPATIENT)
Dept: TRANSPLANT | Facility: CLINIC | Age: 68
End: 2018-04-06

## 2018-04-06 NOTE — COMMITTEE REVIEW
Native Organ Dx: Polycystic Kidneys      Not approved for LRD/CAD transplant due to non-compliance with dialysis prescription. Patient may be re-referred after 6 mos of adherence with treatment regimen.    Nurse spoke with patient and informed her of the committee's decision. Patient verbalized understanding of the above information.       Note written by:Yumiko Rich RN    ===============================================    I was present at the meeting and attest to the decision of the committee.    Livier Masterson MD

## 2018-04-06 NOTE — LETTER
April 6, 2018    Gillian Pitts  [address]  [place] [address]      Dear Gillian Pitts:  MRN: [id number]    It is the duty of the [facility] Selection Committee to determine which patients are candidates for a transplant. For this reason, our committee has the difficult task of evaluating patients to determine which ones have the greatest chance of having a successful transplant. We are aware of the magnitude of this responsibility, and we approach it with reverence and humility.    It is with regret I inform you that you are not approved as a transplant candidate due to non-compliance with dialysis treatment. You will need 6 months compliance before being re referred for kidney transplant.  Based on this review, we have determined that at this time, you are not a candidate for a transplant at [facility].      The selection committee carefully considers each patient's transplant candidacy and determines whether it is safe to proceed with transplantation on a case-by-case basis using established selection criteria.  At present, the risk of proceeding with an elective transplant surgery has become too high.                                                                               Although the selection committee believes you are not a suitable transplant candidate, you have the option to be evaluated at other transplant centers who may have different selection criteria.  You may request your [facility] records be sent to any center of your choice by contacting our Medical Records Department at [phone].                                                                               Attached is a letter from the United Network for Organ Sharing (UNOS).  It describes the services and information offered to patients by UNOS and the Organ Procurement and Transplant Network.    The [facility] Selection Committee sincerely wishes you the best and remains available to answer any  questions.  Please do not hesitate to contact our pre-transplant office if we can assist you in any other way.                                                                               Sincerely,      Lauren Tee MD  Medical Director, Kidney & Kidney/Pancreas Transplantation    CC: MD LORETTA Méndez    Encl: UNOS Letter          OPTN/UNOS: Your Resource for Organ Transplant Information        If you have a question regarding your own medical care, you always should call your transplant center first. However, for general organ transplant-related information, you can call the United Network for Organ Sharing (UNOS) toll-free patient services line at 1-994.678.1901.    Anyone, including potential transplant candidates, recipients, family members/friends, living donors, and/or donor family members can call this number to:    · talk about organ donation, living donation, how transplant and donation work, the donation process, transplant policies, and transplant/donor information;  · get a free patient information kit with helpful booklets, waiting list and transplant information, and a list of all transplant centers;  · ask questions about the Organ Procurement and Transplantation Network (OPTN) web site (www.optn.transplant.hrsa.gov); the UNOS Web site (www.unos.org); or the UNOS web site for living donors and transplant recipients (www.transplantliving.org);  · learn how UNOS and the OPTN can help you;  · talk about any concerns that you may have with a transplant center and how they perform    UNOS is a not-for-profit organization that provides all of the administrative services for the national OPTN under federal contract to the Health Resources and Services Administration (HRSA), an agency under the U.S. Department of Health and Human Services (HHS).     UNOS and OPTN responsibilities include:    · writing educational material for patients, the public and  professionals;  · helping to make people aware of the need for donated organs and tissue;  · writing organ transplant policy with help from doctors, nurses, transplant patients/candidates, donor families, living donors, and the public;  · coordinating the organ matching and placement process;  · collecting information about every organ transplant and donation that occurs in the [place].    Remember, you should contact your transplant center directly if you have questions or concerns about your own medical care including medical records, work-up progress and test reports. [facility] is not your transplant center, and staff at [facility] will not be able to transfer you to your transplant center, so keep your transplant centers phone number handy. But, while you research your transplant needs and learn as much as you can about transplantation and donation, we welcome your call to our toll-free patient services line at [phone].

## 2018-04-20 DIAGNOSIS — Z76.89 ENCOUNTER TO ESTABLISH CARE: Primary | ICD-10-CM

## 2018-04-24 ENCOUNTER — TELEPHONE (OUTPATIENT)
Dept: NEPHROLOGY | Facility: CLINIC | Age: 68
End: 2018-04-24

## 2018-04-24 ENCOUNTER — OFFICE VISIT (OUTPATIENT)
Dept: OTOLARYNGOLOGY | Facility: CLINIC | Age: 68
End: 2018-04-24
Payer: MEDICARE

## 2018-04-24 VITALS
TEMPERATURE: 100 F | DIASTOLIC BLOOD PRESSURE: 76 MMHG | BODY MASS INDEX: 29.68 KG/M2 | WEIGHT: 178.13 LBS | HEART RATE: 78 BPM | SYSTOLIC BLOOD PRESSURE: 120 MMHG | HEIGHT: 65 IN

## 2018-04-24 DIAGNOSIS — H66.001 ACUTE SUPPURATIVE OTITIS MEDIA OF RIGHT EAR WITHOUT SPONTANEOUS RUPTURE OF TYMPANIC MEMBRANE, RECURRENCE NOT SPECIFIED: Primary | ICD-10-CM

## 2018-04-24 DIAGNOSIS — R59.0 CERVICAL ADENOPATHY: ICD-10-CM

## 2018-04-24 PROCEDURE — 99213 OFFICE O/P EST LOW 20 MIN: CPT | Mod: PBBFAC,PO | Performed by: PHYSICIAN ASSISTANT

## 2018-04-24 PROCEDURE — 99999 PR PBB SHADOW E&M-EST. PATIENT-LVL III: CPT | Mod: PBBFAC,,, | Performed by: PHYSICIAN ASSISTANT

## 2018-04-24 PROCEDURE — 99214 OFFICE O/P EST MOD 30 MIN: CPT | Mod: S$PBB,ICN,, | Performed by: PHYSICIAN ASSISTANT

## 2018-04-24 RX ORDER — AMOXICILLIN AND CLAVULANATE POTASSIUM 875; 125 MG/1; MG/1
1 TABLET, FILM COATED ORAL 2 TIMES DAILY
Qty: 20 TABLET | Refills: 0 | Status: SHIPPED | OUTPATIENT
Start: 2018-04-24 | End: 2018-05-04

## 2018-04-24 RX ORDER — HYDRALAZINE HYDROCHLORIDE 25 MG/1
25 TABLET, FILM COATED ORAL 3 TIMES DAILY
Refills: 6 | COMMUNITY
Start: 2018-02-15 | End: 2018-07-02

## 2018-04-24 NOTE — PROGRESS NOTES
Subjective:       Patient ID: Gillian Pitts is a 67 y.o. female.    Chief Complaint: Sinus Problem (Pt states that her right ear has been hurting her, glands have been swollen,runny nose, low grade fever)    Ms. Pitts is a very pleasant 66 yo female here to see me today for the first time with complaints of nasal congestion, neck pain, right ear pain, hearing loss, and dry cough. She has been in and out of ER and has been on multiple antibiotics and steroids. She is getting dialysis 3 times a week. She has been having fever as well. She has been using mucinex regularly for several days with no relief.       Review of Systems   Constitutional: Positive for fever. Negative for chills, fatigue and unexpected weight change.   HENT: Positive for congestion, ear pain, hearing loss, sinus pain and sinus pressure. Negative for dental problem, ear discharge, facial swelling, nosebleeds, postnasal drip, rhinorrhea, sneezing, sore throat, tinnitus, trouble swallowing and voice change.         Jaw pain right sided   Eyes: Negative for redness, itching and visual disturbance.   Respiratory: Negative for cough, choking, shortness of breath and wheezing.    Cardiovascular: Negative for chest pain and palpitations.   Gastrointestinal: Negative for abdominal pain.        No reflux.   Musculoskeletal: Negative for gait problem.   Skin: Negative for rash.   Neurological: Negative for dizziness, light-headedness and headaches.       Objective:      Physical Exam   Constitutional: She is oriented to person, place, and time. She appears well-developed and well-nourished. No distress.   HENT:   Head: Normocephalic and atraumatic.   Right Ear: External ear and ear canal normal. There is tenderness. Tympanic membrane is erythematous and retracted. A middle ear effusion is present. Decreased hearing is noted.   Left Ear: Tympanic membrane, external ear and ear canal normal.   Nose: No mucosal edema, rhinorrhea, nasal deformity  or septal deviation. No epistaxis. Right sinus exhibits maxillary sinus tenderness and frontal sinus tenderness. Left sinus exhibits maxillary sinus tenderness and frontal sinus tenderness.   Mouth/Throat: Uvula is midline, oropharynx is clear and moist and mucous membranes are normal. Mucous membranes are not pale and not dry. No dental caries. No oropharyngeal exudate or posterior oropharyngeal erythema.   Eyes: Conjunctivae, EOM and lids are normal. Pupils are equal, round, and reactive to light. Right eye exhibits no chemosis. Left eye exhibits no chemosis. Right conjunctiva is not injected. Left conjunctiva is not injected. No scleral icterus. Right eye exhibits normal extraocular motion and no nystagmus. Left eye exhibits normal extraocular motion and no nystagmus.   Neck: Trachea normal and phonation normal. No tracheal tenderness present. No tracheal deviation present. No thyroid mass and no thyromegaly present.   Cardiovascular: Intact distal pulses.    Pulmonary/Chest: Effort normal. No stridor. No respiratory distress.   Abdominal: She exhibits no distension.   Lymphadenopathy:        Head (right side): No submental, no submandibular, no preauricular, no posterior auricular and no occipital adenopathy present.        Head (left side): No submental, no submandibular, no preauricular, no posterior auricular and no occipital adenopathy present.     She has cervical adenopathy.   Neurological: She is alert and oriented to person, place, and time. No cranial nerve deficit.   Skin: Skin is warm and dry. No rash noted. No erythema.   Psychiatric: She has a normal mood and affect. Her behavior is normal.       Assessment:       1. Acute suppurative otitis media of right ear without spontaneous rupture of tympanic membrane, recurrence not specified    2. Cervical adenopathy        Plan:       AOM: will order levaquin vs augmentin (will contact nephrology to see which they prefer) They will RTC in 2 week. Discussed  that we may need CT of sinuses if not improved. She is currently on flonase.

## 2018-04-24 NOTE — Clinical Note
Dr. Kitchen, This patient is here to see me now for right ear pain with adenopathy. She receives dialysis 3 times a week. I would like to treat her for her acute otitis media but wanted to clear it with you first. I was thinking levaquin or augmentin. Which do you prefer? I was going to have her return for follow up in 2 weeks and she may need imaging of her sinuses. Will you please let me know what you recommend.  Thanks, Yuni Manuel PA-C

## 2018-04-30 ENCOUNTER — OFFICE VISIT (OUTPATIENT)
Dept: INTERNAL MEDICINE | Facility: CLINIC | Age: 68
End: 2018-04-30
Payer: MEDICARE

## 2018-04-30 VITALS
HEIGHT: 65 IN | BODY MASS INDEX: 30.85 KG/M2 | HEART RATE: 81 BPM | DIASTOLIC BLOOD PRESSURE: 58 MMHG | TEMPERATURE: 97 F | OXYGEN SATURATION: 98 % | WEIGHT: 185.19 LBS | SYSTOLIC BLOOD PRESSURE: 120 MMHG

## 2018-04-30 DIAGNOSIS — D63.1 ANEMIA IN CHRONIC KIDNEY DISEASE, ON CHRONIC DIALYSIS: Chronic | ICD-10-CM

## 2018-04-30 DIAGNOSIS — N18.6 ESRD (END STAGE RENAL DISEASE) ON DIALYSIS: Chronic | ICD-10-CM

## 2018-04-30 DIAGNOSIS — Q61.3 POLYCYSTIC KIDNEY DISEASE: Chronic | ICD-10-CM

## 2018-04-30 DIAGNOSIS — I10 ESSENTIAL HYPERTENSION: Chronic | ICD-10-CM

## 2018-04-30 DIAGNOSIS — H66.001 ACUTE SUPPURATIVE OTITIS MEDIA OF RIGHT EAR WITHOUT SPONTANEOUS RUPTURE OF TYMPANIC MEMBRANE, RECURRENCE NOT SPECIFIED: Primary | ICD-10-CM

## 2018-04-30 DIAGNOSIS — N18.6 ANEMIA IN CHRONIC KIDNEY DISEASE, ON CHRONIC DIALYSIS: Chronic | ICD-10-CM

## 2018-04-30 DIAGNOSIS — Z99.2 ESRD (END STAGE RENAL DISEASE) ON DIALYSIS: Chronic | ICD-10-CM

## 2018-04-30 DIAGNOSIS — J32.9 CHRONIC RECURRENT SINUSITIS: ICD-10-CM

## 2018-04-30 DIAGNOSIS — N25.81 SECONDARY HYPERPARATHYROIDISM OF RENAL ORIGIN: Chronic | ICD-10-CM

## 2018-04-30 DIAGNOSIS — Z99.2 ANEMIA IN CHRONIC KIDNEY DISEASE, ON CHRONIC DIALYSIS: Chronic | ICD-10-CM

## 2018-04-30 PROCEDURE — 99214 OFFICE O/P EST MOD 30 MIN: CPT | Mod: S$PBB,,, | Performed by: FAMILY MEDICINE

## 2018-04-30 PROCEDURE — 99213 OFFICE O/P EST LOW 20 MIN: CPT | Mod: PBBFAC,PO | Performed by: FAMILY MEDICINE

## 2018-04-30 PROCEDURE — 99999 PR PBB SHADOW E&M-EST. PATIENT-LVL III: CPT | Mod: PBBFAC,,, | Performed by: FAMILY MEDICINE

## 2018-04-30 NOTE — LETTER
May 1, 2018      Ayo Kitchen MD  [address] [place] [address]           [facility]  [address] [place]  [place] [address]  Phone: [phone]  Fax: [phone]          Patient: Gillian Pitts   MR Number: [id number]   YOB: 1950   Date of Visit: 4/30/2018       Dear Dr. Ayo Kitchen:    Thank you for referring Gillian Pitts to me for evaluation. Attached you will find relevant portions of my assessment and plan of care.    If you have questions, please do not hesitate to call me. I look forward to following Gillian Pitts along with you.    Sincerely,    Mickey Mckee MD    Enclosure  CC:  No Recipients    If you would like to receive this communication electronically, please contact [email or web address] or [phone] to request more information on [employer] Link access.    For providers and/or their staff who would like to refer a patient to [facility], please contact us through our one-stop-shop provider referral line, [facility], [place], at [phone].    If you feel you have received this communication in error or would no longer like to receive these types of communications, please e-mail [email or web address]

## 2018-05-01 NOTE — PROGRESS NOTES
Subjective:   Patient ID: Gillian Pitts is a 67 y.o. female.  Chief Complaint:  Establish Care      No PCP.  Established with renal for end stage renal disease.  Presents requesting earlier appointments regarding her ENT follow-up and GYN visit.  States they need to be done sooner.  Review of chart shows recent ENT visit.  Diagnosed acute suppurative otitis media.  Prescribed Augmentin.  Patient states years better but sinuses still persistent.  Has follow-up on Monday.  Overall poor historian.  CT with abnormal ovarian change.  Appointment scheduled end of May.  No significant or dominant complaint identified.  BP controlled.  Reports compliance medication.  On dialysis.        Current Outpatient Prescriptions:     amoxicillin-clavulanate 875-125mg (AUGMENTIN) 875-125 mg per tablet, Take 1 tablet by mouth 2 (two) times daily., Disp: 20 tablet, Rfl: 0    cloNIDine (CATAPRES) 0.3 MG tablet, Take 1 tablet (0.3 mg total) by mouth 3 (three) times daily., Disp: 90 tablet, Rfl: 6    fluticasone (FLONASE) 50 mcg/actuation nasal spray, 1 spray by Each Nare route 2 (two) times daily as needed., Disp: 15 g, Rfl: 0    hydrALAZINE (APRESOLINE) 50 MG tablet, Take 50 mg by mouth 3 (three) times daily., Disp: , Rfl:     oxycodone (OXYCONTIN) 30 MG Tb12, Take 30 mg by mouth every 6 (six) hours as needed., Disp: , Rfl:     pantoprazole (PROTONIX) 40 MG tablet, Take 40 mg by mouth once daily., Disp: , Rfl:     sevelamer carbonate (RENVELA) 800 mg Tab, Take 2,400 mg by mouth 3 (three) times daily. , Disp: , Rfl:     hydrALAZINE (APRESOLINE) 25 MG tablet, Take 25 mg by mouth 3 (three) times daily., Disp: , Rfl: 6    polyethylene glycol (GOLYTELY,NULYTELY) 236-22.74-6.74 -5.86 gram suspension, Use as directed., Disp: 4000 mL, Rfl: 0     Review of Systems   Constitutional: Negative for chills, fatigue, fever and unexpected weight change.   HENT: Positive for congestion, ear pain, hearing loss, postnasal drip,  "rhinorrhea, sinus pain and sinus pressure. Negative for dental problem, ear discharge, facial swelling, nosebleeds, sneezing, sore throat, tinnitus, trouble swallowing and voice change.    Eyes: Negative for redness, itching and visual disturbance.   Respiratory: Positive for cough. Negative for choking, chest tightness, shortness of breath and wheezing.    Cardiovascular: Negative for chest pain, palpitations and leg swelling.   Gastrointestinal: Positive for abdominal distention and abdominal pain. Negative for constipation, diarrhea, nausea and vomiting.   Genitourinary: Positive for flank pain.   Musculoskeletal: Negative for gait problem and myalgias.   Skin: Negative for rash.   Neurological: Negative for dizziness, syncope, weakness, light-headedness and headaches.   Psychiatric/Behavioral: Negative for sleep disturbance. The patient is not nervous/anxious.      Objective:   BP (!) 120/58 (BP Location: Right arm, Patient Position: Sitting, BP Method: Large (Manual))   Pulse 81   Temp 96.5 °F (35.8 °C) (Tympanic)   Ht 5' 5" (1.651 m)   Wt 84 kg (185 lb 3 oz)   SpO2 98%   BMI 30.82 kg/m²     Physical Exam   Constitutional: Vital signs are normal. She appears well-developed and well-nourished. She appears ill.   HENT:   Right Ear: Hearing, tympanic membrane, external ear and ear canal normal.   Left Ear: Hearing, tympanic membrane, external ear and ear canal normal.   Nose: Mucosal edema and rhinorrhea present. Right sinus exhibits frontal sinus tenderness. Right sinus exhibits no maxillary sinus tenderness. Left sinus exhibits frontal sinus tenderness. Left sinus exhibits no maxillary sinus tenderness.   Mouth/Throat: Uvula is midline and mucous membranes are normal. Posterior oropharyngeal erythema present. No oropharyngeal exudate, posterior oropharyngeal edema or tonsillar abscesses. No tonsillar exudate.   Eyes: Conjunctivae are normal. Right conjunctiva is not injected. Left conjunctiva is not " injected.   Cardiovascular: Normal rate, regular rhythm and normal heart sounds.  Exam reveals no gallop and no friction rub.    No murmur heard.  Pulmonary/Chest: Effort normal and breath sounds normal. She has no wheezes. She has no rhonchi. She has no rales.   Abdominal: Soft. She exhibits no distension. There is no tenderness.   Lymphadenopathy:     She has no cervical adenopathy.   Skin: Skin is warm and dry. No rash noted.   Psychiatric: Thought content normal. Her mood appears anxious. Her affect is inappropriate. Her affect is not angry, not blunt and not labile. Her speech is rapid and/or pressured and tangential. Her speech is not delayed and not slurred. She is agitated. She is not aggressive, not hyperactive, not slowed, not withdrawn, not actively hallucinating and not combative. Thought content is not paranoid and not delusional. Cognition and memory are normal. She expresses impulsivity and inappropriate judgment. She does not exhibit a depressed mood. She expresses no homicidal and no suicidal ideation. She is communicative. She is inattentive.   Nursing note and vitals reviewed.     Recent CT with:  The uterus is absent. The ovaries are not well seen.   There are several hypodense areas in the expected location of the left ovary.   One of the larger ones measures 26 mm and has a Hounsfield measurement of -1.   If additional imaging evaluation is clinically indicated, I recommend consideration of an ultrasound examination of the pelvis.     Assessment:     1. Acute suppurative otitis media of right ear without spontaneous rupture of tympanic membrane, recurrence not specified    2. Chronic recurrent sinusitis    3. ESRD on dialysis since 2/24/16    4. Anemia in chronic kidney disease, on chronic dialysis    5. Polycystic kidney disease    6. Essential hypertension    7. Secondary hyperparathyroidism of renal origin      Plan:   Acute suppurative otitis media of right ear without spontaneous rupture  of tympanic membrane, recurrence not specified  Chronic recurrent sinusitis  -     CT Sinuses without Contrast; Future; Expected date: 04/30/2018  Follow-up with ENT next week as scheduled.  Patient reports no significant improvement despite recent treatment Augmentin.  ENT records say if no improvement, would check CT.  CT ordered today.    ESRD on dialysis since 2/24/16  Anemia in chronic kidney disease, on chronic dialysis  Polycystic kidney disease  Essential hypertension  Secondary hyperparathyroidism of renal origin  Hemodynamically stable.  No acute decompensation today.  Blood pressure controlled.  Continue per nephrology.    Abnormal CT pelvis  Patient has appointment scheduled for end of May with GYN.  Request appointment to be made sooner.  Today reports previously told to have ovary removed in [place].  No old records available.  Overall poor historian with sporadic care between 2 locations.  Contact GYN to see if can be seen sooner.    Return to clinic as needed.

## 2018-05-02 ENCOUNTER — HOSPITAL ENCOUNTER (OUTPATIENT)
Dept: RADIOLOGY | Facility: HOSPITAL | Age: 68
Discharge: HOME | End: 2018-05-02
Attending: FAMILY MEDICINE
Payer: MEDICARE

## 2018-05-02 DIAGNOSIS — J32.9 CHRONIC RECURRENT SINUSITIS: ICD-10-CM

## 2018-05-02 PROCEDURE — 70486 CT MAXILLOFACIAL W/O DYE: CPT | Mod: 26,,, | Performed by: RADIOLOGY

## 2018-05-02 PROCEDURE — 70486 CT MAXILLOFACIAL W/O DYE: CPT | Mod: TC,PO

## 2018-05-03 ENCOUNTER — TELEPHONE (OUTPATIENT)
Dept: INTERNAL MEDICINE | Facility: CLINIC | Age: 68
End: 2018-05-03

## 2018-05-03 NOTE — TELEPHONE ENCOUNTER
----- Message from Mickey Mckee MD sent at 5/3/2018 11:20 AM CDT -----  Mucosal thickening.  No true sinusitis.  No additional antibiotics needed.  Multiple chronic anatomical changes including possible polyp.  Follow-up with ENT as scheduled.

## 2018-05-07 ENCOUNTER — OFFICE VISIT (OUTPATIENT)
Dept: OTOLARYNGOLOGY | Facility: CLINIC | Age: 68
End: 2018-05-07
Payer: MEDICARE

## 2018-05-07 ENCOUNTER — TELEPHONE (OUTPATIENT)
Dept: NEPHROLOGY | Facility: CLINIC | Age: 68
End: 2018-05-07

## 2018-05-07 VITALS
HEART RATE: 79 BPM | DIASTOLIC BLOOD PRESSURE: 63 MMHG | BODY MASS INDEX: 31.22 KG/M2 | SYSTOLIC BLOOD PRESSURE: 113 MMHG | WEIGHT: 187.63 LBS

## 2018-05-07 DIAGNOSIS — G89.29 CHRONIC RIGHT HIP PAIN: Primary | ICD-10-CM

## 2018-05-07 DIAGNOSIS — J30.89 NON-SEASONAL ALLERGIC RHINITIS, UNSPECIFIED TRIGGER: Primary | ICD-10-CM

## 2018-05-07 DIAGNOSIS — M25.551 CHRONIC RIGHT HIP PAIN: Primary | ICD-10-CM

## 2018-05-07 DIAGNOSIS — R09.82 POSTNASAL DRIP: ICD-10-CM

## 2018-05-07 DIAGNOSIS — R93.7 ABNORMAL X-RAY OF LUMBAR SPINE: ICD-10-CM

## 2018-05-07 DIAGNOSIS — G89.29 CHRONIC PAIN OF RIGHT KNEE: ICD-10-CM

## 2018-05-07 DIAGNOSIS — M25.561 CHRONIC PAIN OF RIGHT KNEE: ICD-10-CM

## 2018-05-07 PROCEDURE — 99213 OFFICE O/P EST LOW 20 MIN: CPT | Mod: PBBFAC | Performed by: PHYSICIAN ASSISTANT

## 2018-05-07 PROCEDURE — 99999 PR PBB SHADOW E&M-EST. PATIENT-LVL III: CPT | Mod: PBBFAC,,, | Performed by: PHYSICIAN ASSISTANT

## 2018-05-07 PROCEDURE — 99213 OFFICE O/P EST LOW 20 MIN: CPT | Mod: S$PBB,,, | Performed by: PHYSICIAN ASSISTANT

## 2018-05-07 RX ORDER — AZELASTINE 1 MG/ML
1 SPRAY, METERED NASAL 2 TIMES DAILY
Qty: 7.5 ML | Refills: 0 | Status: SHIPPED | OUTPATIENT
Start: 2018-05-07 | End: 2018-06-27 | Stop reason: SINTOL

## 2018-05-07 RX ORDER — LORATADINE 10 MG/1
10 TABLET ORAL DAILY
Refills: 0 | COMMUNITY
Start: 2018-05-07 | End: 2019-03-06 | Stop reason: SDUPTHER

## 2018-05-07 NOTE — PROGRESS NOTES
Subjective:       Patient ID: Gillian Pitts is a 67 y.o. female.    Chief Complaint: Follow-up    Ms. Pitts is a very pleasant 68 yo female here to see me today for follow up of nasal congestion, neck pain, right ear pain, hearing loss, and dry cough. She has been in and out of ER and has been on multiple antibiotics and steroids. She is getting dialysis 3 times a week. I initially saw her on 4/24/18 and treated her with a course of augmentin for right OM with effusion. She had a CT of her sinuses without evidence of sinusitis. She continues to complain of  Right sided facial pressure, nasal congestion, post nasal drip . She is currently taking flonase and claritin with minimal relief.       Review of Systems   Constitutional: Negative for chills, fatigue, fever and unexpected weight change.   HENT: Positive for congestion, ear pain, facial swelling, postnasal drip and rhinorrhea. Negative for dental problem, ear discharge, hearing loss, nosebleeds, sinus pressure, sneezing, sore throat, tinnitus, trouble swallowing and voice change.    Eyes: Negative for redness, itching and visual disturbance.   Respiratory: Negative for cough, choking, shortness of breath and wheezing.    Cardiovascular: Negative for chest pain and palpitations.   Gastrointestinal: Negative for abdominal pain.        No reflux.   Musculoskeletal: Negative for gait problem.   Skin: Negative for rash.   Neurological: Negative for dizziness, light-headedness and headaches.       Objective:      Physical Exam   Constitutional: She is oriented to person, place, and time. She appears well-developed and well-nourished. No distress.   HENT:   Head: Normocephalic and atraumatic.   Right Ear: Tympanic membrane, external ear and ear canal normal.   Left Ear: Tympanic membrane, external ear and ear canal normal.   Nose: Nose normal. No mucosal edema, rhinorrhea, nasal deformity or septal deviation. No epistaxis. Right sinus exhibits no  maxillary sinus tenderness and no frontal sinus tenderness. Left sinus exhibits no maxillary sinus tenderness and no frontal sinus tenderness.   Mouth/Throat: Uvula is midline, oropharynx is clear and moist and mucous membranes are normal. Mucous membranes are not pale and not dry. No dental caries. No oropharyngeal exudate or posterior oropharyngeal erythema.   Eyes: Conjunctivae, EOM and lids are normal. Pupils are equal, round, and reactive to light. Right eye exhibits no chemosis. Left eye exhibits no chemosis. Right conjunctiva is not injected. Left conjunctiva is not injected. No scleral icterus. Right eye exhibits normal extraocular motion and no nystagmus. Left eye exhibits normal extraocular motion and no nystagmus.   Neck: Trachea normal and phonation normal. No tracheal tenderness present. No tracheal deviation present. No thyroid mass and no thyromegaly present.   Tenderness to postauricular nodes but no enlargement   Cardiovascular: Intact distal pulses.    Pulmonary/Chest: Effort normal. No stridor. No respiratory distress.   Abdominal: She exhibits no distension.   Lymphadenopathy:        Head (right side): No submental, no submandibular, no preauricular, no posterior auricular and no occipital adenopathy present.        Head (left side): No submental, no submandibular, no preauricular, no posterior auricular and no occipital adenopathy present.     She has no cervical adenopathy.   Neurological: She is alert and oriented to person, place, and time. No cranial nerve deficit.   Skin: Skin is warm and dry. No rash noted. No erythema.   Psychiatric: She has a normal mood and affect. Her behavior is normal.       FINDINGS:  There is mild mucosal thickening in the maxillary sinuses bilaterally.  Moderate mucosal thickening present in both sets of ethmoid air cells.  Focal mucosal thickening versus small polyp or retention cyst in the right sphenoid sinus.  Frontal sinuses are clear.  No sinus fluid levels.   Ostiomeatal units are occluded.  There are bilateral jesus bullosa containing viscous fluid and/or debris.  Nasal septum is mildly deviated to the right.   Impression       1. Paranasal sinus mucosal disease as detailed.  2. Bilateral jesus bullosa containing viscous fluid and/or debris.         Assessment:       1. Non-seasonal allergic rhinitis, unspecified trigger    2. Postnasal drip        Plan:       1. AR: discussed that antibiotics will not treat allergy symptoms. I have rec that she continue flonase and I will add astelin and daily claritin. She will rtc in 1 month or sooner if needed.

## 2018-05-07 NOTE — TELEPHONE ENCOUNTER
Returned call to patient who states that she needs to see someone asap to begin steroid injections in her back and knee. She has been having a lot of pain. She also states that her dry weight at the unit has been staying 82. Please give patient a call to discuss.

## 2018-05-07 NOTE — TELEPHONE ENCOUNTER
----- Message from Regina Manning sent at 5/7/2018  9:37 AM CDT -----  Patient state she have questions/concerns regarding her dialysis. Please adv/call [phone].[email or web address]

## 2018-05-09 ENCOUNTER — OFFICE VISIT (OUTPATIENT)
Dept: ORTHOPEDICS | Facility: CLINIC | Age: 68
End: 2018-05-09
Payer: MEDICARE

## 2018-05-09 ENCOUNTER — HOSPITAL ENCOUNTER (OUTPATIENT)
Dept: RADIOLOGY | Facility: HOSPITAL | Age: 68
Discharge: HOME | End: 2018-05-09
Attending: PHYSICIAN ASSISTANT
Payer: MEDICARE

## 2018-05-09 VITALS
BODY MASS INDEX: 30.27 KG/M2 | WEIGHT: 181.69 LBS | HEIGHT: 65 IN | RESPIRATION RATE: 12 BRPM | SYSTOLIC BLOOD PRESSURE: 123 MMHG | HEART RATE: 64 BPM | DIASTOLIC BLOOD PRESSURE: 77 MMHG

## 2018-05-09 DIAGNOSIS — G89.29 CHRONIC RIGHT HIP PAIN: ICD-10-CM

## 2018-05-09 DIAGNOSIS — M25.551 CHRONIC RIGHT HIP PAIN: ICD-10-CM

## 2018-05-09 DIAGNOSIS — M17.11 PRIMARY OSTEOARTHRITIS OF RIGHT KNEE: ICD-10-CM

## 2018-05-09 DIAGNOSIS — M25.561 BILATERAL CHRONIC KNEE PAIN: ICD-10-CM

## 2018-05-09 DIAGNOSIS — M25.561 CHRONIC PAIN OF RIGHT KNEE: ICD-10-CM

## 2018-05-09 DIAGNOSIS — M17.12 PRIMARY OSTEOARTHRITIS OF LEFT KNEE: ICD-10-CM

## 2018-05-09 DIAGNOSIS — R93.7 ABNORMAL X-RAY OF LUMBAR SPINE: ICD-10-CM

## 2018-05-09 DIAGNOSIS — G89.29 BILATERAL CHRONIC KNEE PAIN: ICD-10-CM

## 2018-05-09 DIAGNOSIS — M47.816 LUMBAR FACET ARTHROPATHY: ICD-10-CM

## 2018-05-09 DIAGNOSIS — M21.061 ACQUIRED GENU VALGUM OF RIGHT KNEE: ICD-10-CM

## 2018-05-09 DIAGNOSIS — M25.551 RIGHT HIP PAIN: Primary | ICD-10-CM

## 2018-05-09 DIAGNOSIS — N18.6 ESRD (END STAGE RENAL DISEASE) ON DIALYSIS: Chronic | ICD-10-CM

## 2018-05-09 DIAGNOSIS — Z99.2 ESRD (END STAGE RENAL DISEASE) ON DIALYSIS: Chronic | ICD-10-CM

## 2018-05-09 DIAGNOSIS — M70.61 GREATER TROCHANTERIC BURSITIS OF RIGHT HIP: ICD-10-CM

## 2018-05-09 DIAGNOSIS — M25.562 BILATERAL CHRONIC KNEE PAIN: ICD-10-CM

## 2018-05-09 DIAGNOSIS — G89.29 CHRONIC PAIN OF RIGHT KNEE: ICD-10-CM

## 2018-05-09 DIAGNOSIS — M21.062 GENU VALGUM, LEFT: ICD-10-CM

## 2018-05-09 PROCEDURE — 73502 X-RAY EXAM HIP UNI 2-3 VIEWS: CPT | Mod: 26,RT,, | Performed by: RADIOLOGY

## 2018-05-09 PROCEDURE — 99214 OFFICE O/P EST MOD 30 MIN: CPT | Mod: 25,S$PBB,, | Performed by: PHYSICIAN ASSISTANT

## 2018-05-09 PROCEDURE — 73562 X-RAY EXAM OF KNEE 3: CPT | Mod: TC,FY,PO,LT

## 2018-05-09 PROCEDURE — 20610 DRAIN/INJ JOINT/BURSA W/O US: CPT | Mod: 51,PBBFAC,PO | Performed by: PHYSICIAN ASSISTANT

## 2018-05-09 PROCEDURE — 99214 OFFICE O/P EST MOD 30 MIN: CPT | Mod: PBBFAC,25,PO | Performed by: PHYSICIAN ASSISTANT

## 2018-05-09 PROCEDURE — 73502 X-RAY EXAM HIP UNI 2-3 VIEWS: CPT | Mod: TC,FY,PO,RT

## 2018-05-09 PROCEDURE — 72110 X-RAY EXAM L-2 SPINE 4/>VWS: CPT | Mod: 26,,, | Performed by: RADIOLOGY

## 2018-05-09 PROCEDURE — 99999 PR PBB SHADOW E&M-EST. PATIENT-LVL IV: CPT | Mod: PBBFAC,,, | Performed by: PHYSICIAN ASSISTANT

## 2018-05-09 PROCEDURE — 20610 DRAIN/INJ JOINT/BURSA W/O US: CPT | Mod: S$PBB,RT,, | Performed by: PHYSICIAN ASSISTANT

## 2018-05-09 PROCEDURE — 73564 X-RAY EXAM KNEE 4 OR MORE: CPT | Mod: 26,RT,, | Performed by: RADIOLOGY

## 2018-05-09 PROCEDURE — 72110 X-RAY EXAM L-2 SPINE 4/>VWS: CPT | Mod: TC,FY,PO

## 2018-05-09 PROCEDURE — 73562 X-RAY EXAM OF KNEE 3: CPT | Mod: 26,LT,, | Performed by: RADIOLOGY

## 2018-05-09 RX ORDER — DICLOFENAC SODIUM 10 MG/G
2 GEL TOPICAL 3 TIMES DAILY
Qty: 2 TUBE | Refills: 1 | Status: ON HOLD | OUTPATIENT
Start: 2018-05-09 | End: 2018-07-20 | Stop reason: HOSPADM

## 2018-05-09 RX ORDER — METHYLPREDNISOLONE ACETATE 80 MG/ML
80 INJECTION, SUSPENSION INTRA-ARTICULAR; INTRALESIONAL; INTRAMUSCULAR; SOFT TISSUE ONCE
Status: COMPLETED | OUTPATIENT
Start: 2018-05-09 | End: 2018-05-09

## 2018-05-09 RX ADMIN — METHYLPREDNISOLONE ACETATE 80 MG: 80 INJECTION, SUSPENSION INTRALESIONAL; INTRAMUSCULAR; INTRASYNOVIAL; SOFT TISSUE at 10:05
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
## 2018-05-09 NOTE — LETTER
May 9, 2018      Ayo Kitchen MD  [address] [facility] [address] [place] [address]           [facility]  [address] [place] [address]  Phone: [phone]  Fax: [phone]          Patient: Gillian Pitts   MR Number: [id number]   YOB: 1950   Date of Visit: 5/9/2018       Dear Dr. Ayo Kitchen:    Thank you for referring iGllian Pitts to me for evaluation. Attached you will find relevant portions of my assessment and plan of care.    If you have questions, please do not hesitate to call me. I look forward to following Gillian Pitts along with you.    Sincerely,    Elle Baltazar PA-C    Enclosure  CC:  No Recipients    If you would like to receive this communication electronically, please contact [email or web address] or [phone] to request more information on [facility] access.    For providers and/or their staff who would like to refer a patient to [facility], please contact us through our one-stop-shop provider referral line, [facility] , at [phone].    If you feel you have received this communication in error or would no longer like to receive these types of communications, please e-mail [email or web address]

## 2018-05-11 ENCOUNTER — TELEPHONE (OUTPATIENT)
Dept: ORTHOPEDICS | Facility: CLINIC | Age: 68
End: 2018-05-11

## 2018-05-11 NOTE — TELEPHONE ENCOUNTER
----- Message from Yancy Manning sent at 5/11/2018 12:00 PM CDT -----  Contact: pt  The pt request a return call concerning a cast for her leg, no additional info given, can be reached at [phone]///[email or web address]

## 2018-05-11 NOTE — TELEPHONE ENCOUNTER
Pt inquired about the knee brace. I explained to the patient that they have the order for this special  knee brace and they will be in contact with her. She verbalized understanding.

## 2018-05-14 ENCOUNTER — OFFICE VISIT (OUTPATIENT)
Dept: OPHTHALMOLOGY | Facility: CLINIC | Age: 68
End: 2018-05-14
Payer: MEDICARE

## 2018-05-14 DIAGNOSIS — H52.7 REFRACTIVE ERROR: ICD-10-CM

## 2018-05-14 DIAGNOSIS — H25.013 CATARACT CORTICAL, SENILE, BILATERAL: ICD-10-CM

## 2018-05-14 DIAGNOSIS — Z13.5 GLAUCOMA SCREENING: ICD-10-CM

## 2018-05-14 DIAGNOSIS — H25.13 CATARACT, NUCLEAR SCLEROTIC SENILE, BILATERAL: Primary | ICD-10-CM

## 2018-05-14 PROCEDURE — 99211 OFF/OP EST MAY X REQ PHY/QHP: CPT | Mod: PBBFAC,PO | Performed by: OPTOMETRIST

## 2018-05-14 PROCEDURE — 92015 DETERMINE REFRACTIVE STATE: CPT | Mod: ,,, | Performed by: OPTOMETRIST

## 2018-05-14 PROCEDURE — 92004 COMPRE OPH EXAM NEW PT 1/>: CPT | Mod: S$PBB,,, | Performed by: OPTOMETRIST

## 2018-05-14 PROCEDURE — 99999 PR PBB SHADOW E&M-EST. PATIENT-LVL I: CPT | Mod: PBBFAC,,, | Performed by: OPTOMETRIST

## 2018-06-05 ENCOUNTER — TELEPHONE (OUTPATIENT)
Dept: NEPHROLOGY | Facility: CLINIC | Age: 68
End: 2018-06-05

## 2018-06-05 NOTE — TELEPHONE ENCOUNTER
----- Message from Karen Morejon sent at 6/5/2018  4:18 PM CDT -----  Contact: self/[phone]  Would like to consult with nurse regarding pains in her chest, please call back at [phone]. Thanks/ar

## 2018-06-06 ENCOUNTER — LAB VISIT (OUTPATIENT)
Dept: LAB | Facility: HOSPITAL | Age: 68
End: 2018-06-06
Attending: ORTHOPAEDIC SURGERY
Payer: MEDICARE

## 2018-06-06 ENCOUNTER — OFFICE VISIT (OUTPATIENT)
Dept: OTOLARYNGOLOGY | Facility: CLINIC | Age: 68
End: 2018-06-06
Payer: MEDICARE

## 2018-06-06 ENCOUNTER — OFFICE VISIT (OUTPATIENT)
Dept: ORTHOPEDICS | Facility: CLINIC | Age: 68
End: 2018-06-06
Payer: MEDICARE

## 2018-06-06 VITALS
TEMPERATURE: 97 F | DIASTOLIC BLOOD PRESSURE: 78 MMHG | BODY MASS INDEX: 31.55 KG/M2 | SYSTOLIC BLOOD PRESSURE: 122 MMHG | HEART RATE: 70 BPM | WEIGHT: 189.63 LBS

## 2018-06-06 VITALS — SYSTOLIC BLOOD PRESSURE: 122 MMHG | HEART RATE: 70 BPM | RESPIRATION RATE: 12 BRPM | DIASTOLIC BLOOD PRESSURE: 78 MMHG

## 2018-06-06 DIAGNOSIS — M17.12 PRIMARY OSTEOARTHRITIS OF LEFT KNEE: Primary | ICD-10-CM

## 2018-06-06 DIAGNOSIS — H91.90 PERCEIVED HEARING LOSS: ICD-10-CM

## 2018-06-06 DIAGNOSIS — J30.89 NON-SEASONAL ALLERGIC RHINITIS, UNSPECIFIED TRIGGER: Primary | ICD-10-CM

## 2018-06-06 DIAGNOSIS — M17.11 PRIMARY OSTEOARTHRITIS OF RIGHT KNEE: ICD-10-CM

## 2018-06-06 DIAGNOSIS — J30.89 NON-SEASONAL ALLERGIC RHINITIS, UNSPECIFIED TRIGGER: ICD-10-CM

## 2018-06-06 PROCEDURE — 20610 DRAIN/INJ JOINT/BURSA W/O US: CPT | Mod: 50,PBBFAC,PO | Performed by: PHYSICIAN ASSISTANT

## 2018-06-06 PROCEDURE — 99999 PR PBB SHADOW E&M-EST. PATIENT-LVL III: CPT | Mod: PBBFAC,,, | Performed by: PHYSICIAN ASSISTANT

## 2018-06-06 PROCEDURE — 99999 PR PBB SHADOW E&M-EST. PATIENT-LVL IV: CPT | Mod: PBBFAC,,, | Performed by: ORTHOPAEDIC SURGERY

## 2018-06-06 PROCEDURE — 20610 DRAIN/INJ JOINT/BURSA W/O US: CPT | Mod: 50,S$PBB,, | Performed by: PHYSICIAN ASSISTANT

## 2018-06-06 PROCEDURE — 99214 OFFICE O/P EST MOD 30 MIN: CPT | Mod: PBBFAC,27,PO,25 | Performed by: ORTHOPAEDIC SURGERY

## 2018-06-06 PROCEDURE — 86003 ALLG SPEC IGE CRUDE XTRC EA: CPT

## 2018-06-06 PROCEDURE — 86003 ALLG SPEC IGE CRUDE XTRC EA: CPT | Mod: 59

## 2018-06-06 PROCEDURE — 99214 OFFICE O/P EST MOD 30 MIN: CPT | Mod: S$PBB,,, | Performed by: ORTHOPAEDIC SURGERY

## 2018-06-06 PROCEDURE — 99213 OFFICE O/P EST LOW 20 MIN: CPT | Mod: PBBFAC,PO | Performed by: PHYSICIAN ASSISTANT

## 2018-06-06 PROCEDURE — 99499 UNLISTED E&M SERVICE: CPT | Mod: S$PBB,,, | Performed by: PHYSICIAN ASSISTANT

## 2018-06-06 RX ORDER — FUROSEMIDE 80 MG/1
80 TABLET ORAL
COMMUNITY
End: 2018-06-27

## 2018-06-06 RX ORDER — ACETAMINOPHEN 325 MG/1
650 TABLET ORAL EVERY 4 HOURS PRN
COMMUNITY

## 2018-06-06 RX ORDER — OXYCODONE AND ACETAMINOPHEN 7.5; 325 MG/1; MG/1
TABLET ORAL
COMMUNITY
Start: 2018-06-04 | End: 2018-06-14

## 2018-06-06 RX ADMIN — Medication 20 MG: at 09:06
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
## 2018-06-06 NOTE — PROGRESS NOTES
Ms. French comes in today for her 1st Euflexxa injection in the bilateral knees.  I saw her about a month ago and did a steroid injection in the right knee as well as the right trochanteric bursa.  Her hip is much improved.  She is still having significant pain in the bilateral knees right greater than left.  She has not yet started physical therapy.  She wanted to 1st get her brace.  We ordered an  brace for that right side. Pain level at worst is 10/10 in severity.  Quality is a constant ache.  Alleviating factors include rest.  Aggravating factors include getting up after prolonged inactivity.  We have again discussed risks and benefits of the Euflexxa injections.  She wishes to proceed.  I will see her back next week for her 2nd injection. I have encouraged her to go ahead and get started with physical therapy.  She you will likely not need the brace or use the brace while in therapy and they can still help her with strengthening exercises as well as some manual modalities to improve her pain. She verbalizes understanding and agrees.    Right Knee Euflexxa #1 Injection Report:  After verbal consent was obtained for right knee injection, patient ID, site, and side were verified.  The  right  knee was sterilly prepped in the standard fashion.  A 22-gauge needle was introduced into right knee joint from an amie-lateral site without complication and was then injected with one pre filled syringe of Euflexxa.  A sterile bandaid was applied.  The patient was informed to apply an ice pack approximately 10min once arriving home and not to do anything strenuous for 24hours.  She was instructed to call if there were any problems. The patient was discharged in stable condition.    Left Knee Euflexxa #1 Injection Report:  After verbal consent was obtained for left knee injection, patient ID, site, and side were verified.  The left  knee was sterilly prepped in the standard fashion.  A 22-gauge needle was  introduced into left knee joint from an amie-lateral site without complication and was then injected with one pre filled syringe of Euflexxa.  A sterile bandaid was applied.  The patient was informed to apply an ice pack approximately 10min once arriving home and not to do anything strenuous for 24hours.  She was instructed to call if there were any problems. The patient was discharged in stable condition.

## 2018-06-07 LAB
AMER SYCAMORE IGE QN: <0.35 KU/L
FEATHER PANEL #2: <0.35 KU/L

## 2018-06-08 LAB
A ALTERNATA IGE QN: <0.35 KU/L
A FUMIGATUS IGE QN: <0.35 KU/L
ALLERGEN BOXELDER MAPLE TREE IGE: <0.35 KU/L
ALLERGEN MAPLE (BOX ELDER) CLASS: NORMAL
ALLERGEN MULBERRY CLASS: NORMAL
ALLERGEN MULBERRY TREE IGE: <0.35 KU/L
ALLERGEN PENICILLIUM IGE: <0.35 KU/L
ALLERGEN WHITE ASH TREE IGE: <0.35 KU/L
BALD CYPRESS IGE QN: <0.35 KU/L
BERMUDA GRASS IGE QN: <0.35 KU/L
C HERBARUM IGE QN: <0.35 KU/L
CAT DANDER IGE QN: <0.35 KU/L
CEDAR IGE QN: <0.35 KU/L
CMN PIGWEED IGE QN: <0.35 KU/L
COCKLEBUR IGE QN: <0.35 KU/L
COMMON RAGWEED IGE QN: <0.35 KU/L
D FARINAE IGE QN: <0.35 KU/L
D PTERONYSS IGE QN: <0.35 KU/L
DEPRECATED A ALTERNATA IGE RAST QL: NORMAL
DEPRECATED A FUMIGATUS IGE RAST QL: NORMAL
DEPRECATED BALD CYPRESS IGE RAST QL: NORMAL
DEPRECATED BERMUDA GRASS IGE RAST QL: NORMAL
DEPRECATED C HERBARUM IGE RAST QL: NORMAL
DEPRECATED CAT DANDER IGE RAST QL: NORMAL
DEPRECATED CEDAR IGE RAST QL: NORMAL
DEPRECATED COCKLEBUR IGE RAST QL: NORMAL
DEPRECATED COMMON PIGWEED IGE RAST QL: NORMAL
DEPRECATED COMMON RAGWEED IGE RAST QL: NORMAL
DEPRECATED D FARINAE IGE RAST QL: NORMAL
DEPRECATED D PTERONYSS IGE RAST QL: NORMAL
DEPRECATED DOG DANDER IGE RAST QL: NORMAL
DEPRECATED ENGL PLANTAIN IGE RAST QL: NORMAL
DEPRECATED GOOSEFOOT IGE RAST QL: NORMAL
DEPRECATED JOHNSON GRASS IGE RAST QL: NORMAL
DEPRECATED KENT BLUE GRASS IGE RAST QL: NORMAL
DEPRECATED M RACEMOSUS IGE RAST QL: NORMAL
DEPRECATED MARSH ELDER IGE RAST QL: NORMAL
DEPRECATED MUGWORT IGE RAST QL: NORMAL
DEPRECATED NETTLE IGE RAST QL: NORMAL
DEPRECATED PECAN/HICK TREE IGE RAST QL: NORMAL
DEPRECATED ROACH IGE RAST QL: NORMAL
DEPRECATED SHEEP SORREL IGE RAST QL: NORMAL
DEPRECATED SILVER BIRCH IGE RAST QL: NORMAL
DEPRECATED TIMOTHY IGE RAST QL: NORMAL
DEPRECATED WHITE OAK IGE RAST QL: NORMAL
DOG DANDER IGE QN: <0.35 KU/L
ELM CEDAR CLASS: NORMAL
ELM CEDAR, IGE: <0.35 KU/L
ENGL PLANTAIN IGE QN: <0.35 KU/L
GOOSEFOOT IGE QN: <0.35 KU/L
JOHNSON GRASS IGE QN: <0.35 KU/L
KENT BLUE GRASS IGE QN: <0.35 KU/L
M RACEMOSUS IGE QN: <0.35 KU/L
MARSH ELDER IGE QN: <0.35 KU/L
MUGWORT IGE QN: <0.35 KU/L
NETTLE IGE QN: <0.35 KU/L
PECAN/HICK TREE IGE QN: <0.35 KU/L
PENICILLIUM CLASS: NORMAL
ROACH IGE QN: <0.35 KU/L
SHEEP SORREL IGE QN: <0.35 KU/L
SILVER BIRCH IGE QN: <0.35 KU/L
TIMOTHY IGE QN: <0.35 KU/L
WHITE ASH CLASS: NORMAL
WHITE OAK IGE QN: <0.35 KU/L

## 2018-06-11 ENCOUNTER — TELEPHONE (OUTPATIENT)
Dept: OTOLARYNGOLOGY | Facility: CLINIC | Age: 68
End: 2018-06-11

## 2018-06-11 ENCOUNTER — TELEPHONE (OUTPATIENT)
Dept: NEPHROLOGY | Facility: CLINIC | Age: 68
End: 2018-06-11

## 2018-06-11 NOTE — TELEPHONE ENCOUNTER
----- Message from Jose Huitron sent at 6/11/2018  3:22 PM CDT -----  Contact: pt   Pt is requesting a call back from the nurse in regards to getting more days for her to go to dialysis because she still have a lot of fluid on her   [phone] (home)

## 2018-06-11 NOTE — TELEPHONE ENCOUNTER
Returned call to pt., advised that Dr. Kitchen is doing dialysis rounds this week, advised pt. To speak with him at that time or to the dialysis nurse at the unit. Pt. Verbalized understanding.

## 2018-06-13 ENCOUNTER — TELEPHONE (OUTPATIENT)
Dept: ORTHOPEDICS | Facility: CLINIC | Age: 68
End: 2018-06-13

## 2018-06-13 NOTE — TELEPHONE ENCOUNTER
----- Message from Iker Goldstein sent at 6/13/2018  9:57 AM CDT -----  Contact: pt  Call pt at [phone] regarding rescheduling appt

## 2018-06-19 ENCOUNTER — TELEPHONE (OUTPATIENT)
Dept: RADIOLOGY | Facility: HOSPITAL | Age: 68
End: 2018-06-19

## 2018-06-20 ENCOUNTER — OFFICE VISIT (OUTPATIENT)
Dept: ORTHOPEDICS | Facility: CLINIC | Age: 68
End: 2018-06-20
Payer: MEDICARE

## 2018-06-20 ENCOUNTER — CLINICAL SUPPORT (OUTPATIENT)
Dept: AUDIOLOGY | Facility: CLINIC | Age: 68
End: 2018-06-20
Payer: MEDICARE

## 2018-06-20 ENCOUNTER — HOSPITAL ENCOUNTER (OUTPATIENT)
Dept: RADIOLOGY | Facility: HOSPITAL | Age: 68
Discharge: HOME | End: 2018-06-20
Attending: OBSTETRICS & GYNECOLOGY
Payer: MEDICARE

## 2018-06-20 VITALS — DIASTOLIC BLOOD PRESSURE: 59 MMHG | RESPIRATION RATE: 12 BRPM | SYSTOLIC BLOOD PRESSURE: 88 MMHG | HEART RATE: 88 BPM

## 2018-06-20 VITALS — HEIGHT: 65 IN | BODY MASS INDEX: 31.49 KG/M2 | WEIGHT: 189 LBS

## 2018-06-20 DIAGNOSIS — M17.11 PRIMARY OSTEOARTHRITIS OF RIGHT KNEE: ICD-10-CM

## 2018-06-20 DIAGNOSIS — Z12.39 SCREENING FOR BREAST CANCER: ICD-10-CM

## 2018-06-20 DIAGNOSIS — Z87.42 HISTORY OF POLYCYSTIC OVARIES: ICD-10-CM

## 2018-06-20 DIAGNOSIS — M17.12 PRIMARY OSTEOARTHRITIS OF LEFT KNEE: ICD-10-CM

## 2018-06-20 DIAGNOSIS — H90.8 HEARING LOSS, MIXED, UNILATERAL: Primary | ICD-10-CM

## 2018-06-20 PROCEDURE — 92557 COMPREHENSIVE HEARING TEST: CPT | Mod: PBBFAC,PO | Performed by: AUDIOLOGIST

## 2018-06-20 PROCEDURE — 92567 TYMPANOMETRY: CPT | Mod: PBBFAC,PO | Performed by: AUDIOLOGIST

## 2018-06-20 PROCEDURE — 20610 DRAIN/INJ JOINT/BURSA W/O US: CPT | Mod: 50,PBBFAC,PO | Performed by: PHYSICIAN ASSISTANT

## 2018-06-20 PROCEDURE — 20610 DRAIN/INJ JOINT/BURSA W/O US: CPT | Mod: 50,S$PBB,, | Performed by: PHYSICIAN ASSISTANT

## 2018-06-20 PROCEDURE — 76856 US EXAM PELVIC COMPLETE: CPT | Mod: 26,,, | Performed by: RADIOLOGY

## 2018-06-20 PROCEDURE — 99999 PR PBB SHADOW E&M-EST. PATIENT-LVL III: CPT | Mod: PBBFAC,,, | Performed by: PHYSICIAN ASSISTANT

## 2018-06-20 PROCEDURE — 99499 UNLISTED E&M SERVICE: CPT | Mod: S$PBB,,, | Performed by: PHYSICIAN ASSISTANT

## 2018-06-20 PROCEDURE — 76830 TRANSVAGINAL US NON-OB: CPT | Mod: TC,PO

## 2018-06-20 PROCEDURE — 99213 OFFICE O/P EST LOW 20 MIN: CPT | Mod: PBBFAC,PO | Performed by: PHYSICIAN ASSISTANT

## 2018-06-20 PROCEDURE — 77067 SCR MAMMO BI INCL CAD: CPT | Mod: TC,PO

## 2018-06-20 PROCEDURE — 76830 TRANSVAGINAL US NON-OB: CPT | Mod: 26,,, | Performed by: RADIOLOGY

## 2018-06-20 PROCEDURE — 77067 SCR MAMMO BI INCL CAD: CPT | Mod: 26,,, | Performed by: RADIOLOGY

## 2018-06-20 RX ADMIN — Medication 20 MG: at 12:06

## 2018-06-20 NOTE — PROGRESS NOTES
Ms. Pitts comes in today for her 2nd Euflexxa injection in the bilateral knees.  She tolerated last week's injections well.  Pain is improved from 10/10 to 8/10 today.  She states the cracking and grinding has improved as well.  Quality is aching and sharp pain. She wishes to proceed with the 2nd injection today.  I will see her back in the office next week as scheduled for her 3rd and final injection.  If she has any problems before then, she will notify the office.  She verbalizes understanding and agrees.    Right Knee Euflexxa #2 Injection Report:  After verbal consent was obtained for right knee injection, patient ID, site, and side were verified.  The  right  knee was sterilly prepped in the standard fashion.  A 22-gauge needle was introduced into right knee joint from an amie-lateral site without complication and was then injected with one pre filled syringe of Euflexxa.  A sterile bandaid was applied.  The patient was informed to apply an ice pack approximately 10min once arriving home and not to do anything strenuous for 24hours.  She was instructed to call if there were any problems. The patient was discharged in stable condition.    Left Knee Euflexxa #2 Injection Report:  After verbal consent was obtained for left knee injection, patient ID, site, and side were verified.  The left  knee was sterilly prepped in the standard fashion.  A 22-gauge needle was introduced into left knee joint from an amie-lateral site without complication and was then injected with one pre filled syringe of Euflexxa.  A sterile bandaid was applied.  The patient was informed to apply an ice pack approximately 10min once arriving home and not to do anything strenuous for 24hours.  She was instructed to call if there were any problems. The patient was discharged in stable condition.

## 2018-06-20 NOTE — PROGRESS NOTES
Gillian Pitts was seen 06/20/2018 for an audiological evaluation.  Patient complains of feeling stopped up, constant tinnitus and decreased hearing in the RIGHT ear only.  Onset of symptoms was 3 months ago following a URI.    Results reveal a mild-to-moderately severe mixed hearing loss 250-8000 Hz for the right ear, and  mild sensorineural hearing loss 750-3000 Hz for the left ear.   Speech Reception Thresholds were  40 dBHL for the right ear and 30 dBHL for the left ear.   Word recognition scores were excellent for the right ear and excellent for the left ear.   Tympanograms were Type B, abnormal, flat for the right ear and Type Ad, hypermobile for the left ear.    Patient was counseled on the above findings.    Recommendations include:    1.  ENT followup  2.  Recheck per ENT  3.  Wear hearing protective devices around loud noise  4.  Annual audiograms

## 2018-06-21 ENCOUNTER — TELEPHONE (OUTPATIENT)
Dept: OBSTETRICS AND GYNECOLOGY | Facility: CLINIC | Age: 68
End: 2018-06-21

## 2018-06-21 DIAGNOSIS — N83.201 CYST OF RIGHT OVARY: Primary | ICD-10-CM

## 2018-06-21 DIAGNOSIS — D39.10 NEOPLASM OF UNCERTAIN BEHAVIOR OF OVARY, UNSPECIFIED LATERALITY: ICD-10-CM

## 2018-06-21 NOTE — TELEPHONE ENCOUNTER
Spoke w pt regarding u/s findings. She has pelvic pains - none different from baseline over the years.   On dialysis 3x/wk. Reviewed rx options. Desires to investigate further w ca125. Please schedule this. I will discuss w pt after results.

## 2018-06-27 ENCOUNTER — LAB VISIT (OUTPATIENT)
Dept: LAB | Facility: HOSPITAL | Age: 68
End: 2018-06-27
Attending: OBSTETRICS & GYNECOLOGY
Payer: MEDICARE

## 2018-06-27 ENCOUNTER — OFFICE VISIT (OUTPATIENT)
Dept: OTOLARYNGOLOGY | Facility: CLINIC | Age: 68
End: 2018-06-27
Payer: MEDICARE

## 2018-06-27 ENCOUNTER — OFFICE VISIT (OUTPATIENT)
Dept: ORTHOPEDICS | Facility: CLINIC | Age: 68
End: 2018-06-27
Payer: MEDICARE

## 2018-06-27 VITALS
SYSTOLIC BLOOD PRESSURE: 99 MMHG | HEART RATE: 76 BPM | TEMPERATURE: 98 F | DIASTOLIC BLOOD PRESSURE: 61 MMHG | BODY MASS INDEX: 31.77 KG/M2 | WEIGHT: 190.94 LBS | RESPIRATION RATE: 12 BRPM

## 2018-06-27 DIAGNOSIS — D39.10 NEOPLASM OF UNCERTAIN BEHAVIOR OF OVARY, UNSPECIFIED LATERALITY: ICD-10-CM

## 2018-06-27 DIAGNOSIS — J30.89 NON-SEASONAL ALLERGIC RHINITIS, UNSPECIFIED TRIGGER: ICD-10-CM

## 2018-06-27 DIAGNOSIS — R04.0 EPISTAXIS: ICD-10-CM

## 2018-06-27 DIAGNOSIS — M17.12 PRIMARY OSTEOARTHRITIS OF LEFT KNEE: ICD-10-CM

## 2018-06-27 DIAGNOSIS — H90.A31 MIXED CONDUCTIVE AND SENSORINEURAL HEARING LOSS OF RIGHT EAR WITH RESTRICTED HEARING OF LEFT EAR: ICD-10-CM

## 2018-06-27 DIAGNOSIS — H65.91 FLUID LEVEL BEHIND TYMPANIC MEMBRANE OF RIGHT EAR: Primary | ICD-10-CM

## 2018-06-27 DIAGNOSIS — M17.11 PRIMARY OSTEOARTHRITIS OF RIGHT KNEE: ICD-10-CM

## 2018-06-27 DIAGNOSIS — N83.201 CYST OF RIGHT OVARY: ICD-10-CM

## 2018-06-27 LAB — CANCER AG125 SERPL-ACNC: 24 U/ML

## 2018-06-27 PROCEDURE — 36415 COLL VENOUS BLD VENIPUNCTURE: CPT | Mod: PO

## 2018-06-27 PROCEDURE — 99214 OFFICE O/P EST MOD 30 MIN: CPT | Mod: S$PBB,,, | Performed by: PHYSICIAN ASSISTANT

## 2018-06-27 PROCEDURE — 99213 OFFICE O/P EST LOW 20 MIN: CPT | Mod: PBBFAC,PO,25 | Performed by: PHYSICIAN ASSISTANT

## 2018-06-27 PROCEDURE — 86304 IMMUNOASSAY TUMOR CA 125: CPT

## 2018-06-27 PROCEDURE — 99999 PR PBB SHADOW E&M-EST. PATIENT-LVL III: CPT | Mod: PBBFAC,,, | Performed by: PHYSICIAN ASSISTANT

## 2018-06-27 PROCEDURE — 20610 DRAIN/INJ JOINT/BURSA W/O US: CPT | Mod: 50,PBBFAC,PO | Performed by: PHYSICIAN ASSISTANT

## 2018-06-27 PROCEDURE — 20610 DRAIN/INJ JOINT/BURSA W/O US: CPT | Mod: 50,S$PBB,, | Performed by: PHYSICIAN ASSISTANT

## 2018-06-27 PROCEDURE — 99213 OFFICE O/P EST LOW 20 MIN: CPT | Mod: PBBFAC,27,PO,25 | Performed by: PHYSICIAN ASSISTANT

## 2018-06-27 PROCEDURE — 99499 UNLISTED E&M SERVICE: CPT | Mod: S$PBB,,, | Performed by: PHYSICIAN ASSISTANT

## 2018-06-27 RX ORDER — MUPIROCIN 20 MG/G
OINTMENT TOPICAL 2 TIMES DAILY
Qty: 15 G | Refills: 3 | Status: SHIPPED | OUTPATIENT
Start: 2018-06-27 | End: 2018-07-02

## 2018-06-27 RX ORDER — AMOXICILLIN 875 MG/1
875 TABLET, FILM COATED ORAL 2 TIMES DAILY
Qty: 20 TABLET | Refills: 0 | Status: SHIPPED | OUTPATIENT
Start: 2018-06-27 | End: 2018-07-07

## 2018-06-27 RX ADMIN — Medication 20 MG: at 01:06

## 2018-06-27 NOTE — PROGRESS NOTES
Subjective:       Patient ID: Gillian Pitts is a 67 y.o. female.    Chief Complaint: Consult (rev audio) and Otalgia (right)    Patient is a very pleasant 67 year old female here to see me today for the first time to review recent audiogram.  She was last here with Dr. Arana on 6/6/18 and noted to have scant fluid in her right ear.  She complained of hearing loss at that time.  She continues to have poorer hearing in her right ear.  She says it aches; worse when lying down.  Denies ear drainage.  Denies dizziness.  She had RAST testing after her last visit which was all negative.  She has continued to use Astelin and Claritin.  She's out of Flonase.  Yesterday she had nasal burning and this morning noted blood on tissues when blowing her nose.  No brisk anterior bleeding and denies any posterior bleeding.       Review of Systems   Constitutional: Negative for fever.   HENT: Positive for ear pain, hearing loss, nosebleeds (this AM) and postnasal drip (worse in AM). Negative for ear discharge.    Neurological: Negative for dizziness.       Objective:      Physical Exam   Constitutional: She is oriented to person, place, and time. She appears well-developed and well-nourished. She is cooperative. No distress.   HENT:   Head: Normocephalic and atraumatic.   Right Ear: External ear and ear canal normal. Tympanic membrane is not erythematous and not bulging. A middle ear effusion (serous, air fluid level seen) is present.   Left Ear: Tympanic membrane, external ear and ear canal normal.   Nose: Mucosal edema present. No rhinorrhea, nasal deformity or septal deviation. Epistaxis (scant amt of dried blood noted on right inferior turbinate ) is observed. Right sinus exhibits no maxillary sinus tenderness and no frontal sinus tenderness. Left sinus exhibits no maxillary sinus tenderness and no frontal sinus tenderness.   Mouth/Throat: Uvula is midline, oropharynx is clear and moist and mucous membranes are  normal. Mucous membranes are not pale and not dry. No trismus in the jaw. No uvula swelling or dental caries. No oropharyngeal exudate or posterior oropharyngeal erythema.   Eyes: Conjunctivae, EOM and lids are normal. Pupils are equal, round, and reactive to light. Right eye exhibits no chemosis. Left eye exhibits no chemosis. Right conjunctiva is not injected. Left conjunctiva is not injected. No scleral icterus. Right eye exhibits normal extraocular motion and no nystagmus. Left eye exhibits normal extraocular motion and no nystagmus.   Neck: Trachea normal and phonation normal. No tracheal tenderness present. No tracheal deviation present. No thyroid mass and no thyromegaly present.   Tenderness to postauricular nodes but no enlargement   Cardiovascular: Intact distal pulses.    Pulmonary/Chest: Effort normal. No stridor. No respiratory distress.   Abdominal: She exhibits no distension.   Lymphadenopathy:        Head (right side): No submental, no submandibular, no preauricular, no posterior auricular and no occipital adenopathy present.        Head (left side): No submental, no submandibular, no preauricular, no posterior auricular and no occipital adenopathy present.     She has no cervical adenopathy.   Neurological: She is alert and oriented to person, place, and time. No cranial nerve deficit.   Skin: Skin is warm and dry. No rash noted. No erythema.   Psychiatric: She has a normal mood and affect. Her behavior is normal.         AUDIOGRAM 6/20/18:  Results reveal a mild-to-moderately severe mixed hearing loss 250-8000 Hz for the right ear, and  mild sensorineural hearing loss 750-3000 Hz for the left ear.   Speech Reception Thresholds were  40 dBHL for the right ear and 30 dBHL for the left ear.   Word recognition scores were excellent for the right ear and excellent for the left ear.   Tympanograms were Type B, abnormal, flat for the right ear and Type Ad, hypermobile for the left  ear.             Assessment:       1. Fluid level behind tympanic membrane of right ear    2. Mixed conductive and sensorineural hearing loss of right ear with restricted hearing of left ear    3. Epistaxis    4. Non-seasonal allergic rhinitis, unspecified trigger        Plan:         We discussed that the patient has a unilateral middle ear effusion in the the right ear.  We reviewed the patient's recent audiogram and hearing loss in detail which shows a mixed hearing loss on that side with SNHL on left.   I would recommend treatment with an oral antibiotic as well as a steroid taper.  The patient was given a prescription for amoxicillin for 10 days.  She does not want to use steroids at this time after discussing possible side effects.  I will have her return in 6 weeks with a repeat set of tympanograms.  We discussed that if the fluid does not clear, then I would recommend consideration of tube placement in that ear.  We discussed that tube placement could be done either in clinic or in the OR, and that most adults tolerate tube placement in clinic without difficulty.  Risks and benefits were discussed.  Will schedule as a possible procedure, and if the fluid is persistent at that point will proceed with ear tube placement at that visit.  She will also need scope examination to evaluate for any masses or lesions in the nasopharynx as the cause of the middle ear effusion if it does not clear.    Epistaxis: We discussed different strategies to help increase her nasal moisture, including the use of saline spray throughout the day and a humidifier at night. In addition, I gave the patient a prescription for Bactroban to be used twice daily for two weeks, and she may use Afrin as needed for active bleeding.  Recommend she discontinue Astelin for now as it can worsen nasal dryness and epistaxis.  Would also avoid Flonase until no longer seeing blood on tissues when blowing her nose.  OK to continue Claritin for her  allergy symptoms.

## 2018-06-27 NOTE — LETTER
June 27, 2018      Tarah Koenig, [facility]  [address]  [place] [address]           [facility]  [address]  [place] [address]  Phone: [phone]  Fax: [phone]          Patient: Gillian Pitts   MR Number: [id number]   YOB: 1950   Date of Visit: 6/27/2018       Dear Tarah Koenig:    Thank you for referring Gillian Pitts to me for evaluation. Attached you will find relevant portions of my assessment and plan of care.    If you have questions, please do not hesitate to call me. I look forward to following Gillian Pitts along with you.    Sincerely,    Florecita Manning PA-C    Enclosure  CC:  No Recipients    If you would like to receive this communication electronically, please contact [email or web address] or [phone] to request more information on [employer] Link access.    For providers and/or their staff who would like to refer a patient to [facility], please contact us through our one-stop-shop provider referral line, [facility], at [phone].    If you feel you have received this communication in error or would no longer like to receive these types of communications, please e-mail [email or web address]

## 2018-06-27 NOTE — PATIENT INSTRUCTIONS
Stop Astelin nasal spray (too drying, causing nosebleed).    Start frequent nasal saline spray to help lubricate in your nose.  Topical Bactroban ointment twice a day for next 10 days.  You may use Afrin nasal spray for active nosebleed (get over the counter).

## 2018-06-27 NOTE — PROGRESS NOTES
Ms. French comes in today for her 3rd Euflexxa injection in the bilateral knees.  She tolerated the 1st 2 injections well.  Her pain is improving.  It is 5/10 in severity.  Quality is aching with occasional sharp pain. She wishes to proceed as planned today.  I will see her back in the office in approximately 3 months to re-evaluate her progress.  If she has problems before then, I will be happy to see her sooner.  She verbalizes understanding and agrees with the above plan.    Right Knee Euflexxa #3 Injection Report:  After verbal consent was obtained for right knee injection, patient ID, site, and side were verified.  The  right  knee was sterilly prepped in the standard fashion.  A 22-gauge needle was introduced into right knee joint from an amie-lateral site without complication and was then injected with one pre filled syringe of Euflexxa.  A sterile bandaid was applied.  The patient was informed to apply an ice pack approximately 10min once arriving home and not to do anything strenuous for 24hours.  She was instructed to call if there were any problems. The patient was discharged in stable condition.    Left Knee Euflexxa #3 Injection Report:  After verbal consent was obtained for left knee injection, patient ID, site, and side were verified.  The left  knee was sterilly prepped in the standard fashion.  A 22-gauge needle was introduced into left knee joint from an amie-lateral site without complication and was then injected with one pre filled syringe of Euflexxa.  A sterile bandaid was applied.  The patient was informed to apply an ice pack approximately 10min once arriving home and not to do anything strenuous for 24hours.  She was instructed to call if there were any problems. The patient was discharged in stable condition.

## 2018-07-02 ENCOUNTER — TELEPHONE (OUTPATIENT)
Dept: OTOLARYNGOLOGY | Facility: CLINIC | Age: 68
End: 2018-07-02

## 2018-07-02 ENCOUNTER — OFFICE VISIT (OUTPATIENT)
Dept: OTOLARYNGOLOGY | Facility: CLINIC | Age: 68
End: 2018-07-02
Payer: MEDICARE

## 2018-07-02 ENCOUNTER — HOSPITAL ENCOUNTER (OUTPATIENT)
Dept: RADIOLOGY | Facility: HOSPITAL | Age: 68
Discharge: HOME | End: 2018-07-02
Attending: ORTHOPAEDIC SURGERY
Payer: MEDICARE

## 2018-07-02 ENCOUNTER — OFFICE VISIT (OUTPATIENT)
Dept: OBSTETRICS AND GYNECOLOGY | Facility: CLINIC | Age: 68
End: 2018-07-02
Payer: MEDICARE

## 2018-07-02 VITALS
HEIGHT: 65 IN | WEIGHT: 189.81 LBS | BODY MASS INDEX: 31.63 KG/M2 | TEMPERATURE: 98 F | SYSTOLIC BLOOD PRESSURE: 97 MMHG | DIASTOLIC BLOOD PRESSURE: 53 MMHG | HEART RATE: 78 BPM

## 2018-07-02 VITALS
DIASTOLIC BLOOD PRESSURE: 62 MMHG | WEIGHT: 191.56 LBS | HEIGHT: 65 IN | SYSTOLIC BLOOD PRESSURE: 108 MMHG | BODY MASS INDEX: 31.92 KG/M2

## 2018-07-02 DIAGNOSIS — R10.2 PELVIC PAIN IN FEMALE: ICD-10-CM

## 2018-07-02 DIAGNOSIS — N83.201 CYST OF RIGHT OVARY: Primary | ICD-10-CM

## 2018-07-02 DIAGNOSIS — J32.9 CHRONIC SINUSITIS, UNSPECIFIED LOCATION: ICD-10-CM

## 2018-07-02 DIAGNOSIS — R51.9 WORSENING HEADACHES: ICD-10-CM

## 2018-07-02 DIAGNOSIS — R50.9 FEVER, UNSPECIFIED FEVER CAUSE: ICD-10-CM

## 2018-07-02 DIAGNOSIS — J32.9 CHRONIC SINUSITIS, UNSPECIFIED LOCATION: Primary | ICD-10-CM

## 2018-07-02 PROCEDURE — 70486 CT MAXILLOFACIAL W/O DYE: CPT | Mod: TC

## 2018-07-02 PROCEDURE — 99202 OFFICE O/P NEW SF 15 MIN: CPT | Mod: S$PBB,,, | Performed by: OBSTETRICS & GYNECOLOGY

## 2018-07-02 PROCEDURE — 99999 PR PBB SHADOW E&M-EST. PATIENT-LVL II: CPT | Mod: PBBFAC,,, | Performed by: OBSTETRICS & GYNECOLOGY

## 2018-07-02 PROCEDURE — 99214 OFFICE O/P EST MOD 30 MIN: CPT | Mod: S$PBB,,, | Performed by: ORTHOPAEDIC SURGERY

## 2018-07-02 PROCEDURE — 99212 OFFICE O/P EST SF 10 MIN: CPT | Mod: PBBFAC,25,27 | Performed by: OBSTETRICS & GYNECOLOGY

## 2018-07-02 PROCEDURE — 99999 PR PBB SHADOW E&M-EST. PATIENT-LVL III: CPT | Mod: PBBFAC,,, | Performed by: ORTHOPAEDIC SURGERY

## 2018-07-02 PROCEDURE — 99213 OFFICE O/P EST LOW 20 MIN: CPT | Mod: PBBFAC,25 | Performed by: ORTHOPAEDIC SURGERY

## 2018-07-02 RX ORDER — FLUTICASONE PROPIONATE 50 MCG
2 SPRAY, SUSPENSION (ML) NASAL DAILY
Qty: 1 BOTTLE | Refills: 12 | Status: SHIPPED | OUTPATIENT
Start: 2018-07-02 | End: 2019-01-04 | Stop reason: SDUPTHER

## 2018-07-02 NOTE — LETTER
July 3, 2018      No Recipients           [facility]  [address] [facility] [address]  Phone: [phone]  Fax: [phone]          Patient: Gillian Pitts   MR Number: [id number]   YOB: 1950   Date of Visit: 7/2/2018       Dear :    Thank you for referring Gillian Pitts to me for evaluation. Attached you will find relevant portions of my assessment and plan of care.    If you have questions, please do not hesitate to call me. I look forward to following Gillian Pitts along with you.    Sincerely,    Nohemi Mccann MD    Enclosure  CC:  No Recipients    If you would like to receive this communication electronically, please contact [email or web address] or [phone] to request more information on [employer] Link access.    For providers and/or their staff who would like to refer a patient to [facility], please contact us through our one-stop-shop provider referral line, [facility], at [phone].    If you feel you have received this communication in error or would no longer like to receive these types of communications, please e-mail [email or web address]

## 2018-07-02 NOTE — TELEPHONE ENCOUNTER
1st attempt to contact.  After 5min waiting on hold I terminated call.  Will call back at a later time.
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
## 2018-07-02 NOTE — PROGRESS NOTES
Subjective:       Patient ID: Gillian Pitts is a 67 y.o. female.    Chief Complaint: Sinus Problem    Patient is a very pleasant 67 year old female here to see me today in followup for evaluation of sinusitis and allergies.  She was last seen here last week with our PA, Florecita.  She complained of hearing loss at that time.  She continues to have poorer hearing in her right ear.  She says it aches; worse when lying down.  Denies ear drainage.  Denies dizziness.  She had RAST testing after her last visit which was all negative.  She is out of Flonase, but remains on Claritin.  Yesterday she had nasal burning and this morning noted blood on tissues when blowing her nose.  No brisk anterior bleeding and denies any posterior bleeding.  She says that in the past, she has required IV antibiotics.  She was given a prescription for Amoxil, but she has not yet started.  She sees Dr. Kitchen for her nephrology, and is on dialysis T-Th-Sat.  Her main concern today is headache and pressure in her head and face.  She says she has noted a fever at night, 101 to 102.  She also says she has previously had a splenectomy.      Review of Systems   Constitutional: Negative for chills, fatigue, fever and unexpected weight change.   HENT: Positive for congestion, ear pain, hearing loss, postnasal drip, rhinorrhea and sinus pressure. Negative for dental problem, ear discharge, facial swelling, nosebleeds, sneezing, sore throat, tinnitus, trouble swallowing and voice change.    Eyes: Positive for discharge and redness. Negative for itching and visual disturbance.   Respiratory: Negative for cough, choking, shortness of breath and wheezing.    Cardiovascular: Negative for chest pain and palpitations.   Gastrointestinal: Negative for abdominal pain.        No reflux.   Musculoskeletal: Negative for gait problem.   Skin: Negative for rash.   Allergic/Immunologic: Positive for environmental allergies.   Neurological: Positive for  light-headedness and headaches. Negative for dizziness.       Objective:      Physical Exam   Constitutional: She is oriented to person, place, and time. She appears well-developed and well-nourished. She is cooperative. No distress.   HENT:   Head: Normocephalic and atraumatic.   Right Ear: External ear and ear canal normal. Tympanic membrane is not erythematous and not bulging. No middle ear effusion.   Left Ear: Tympanic membrane, external ear and ear canal normal.  No middle ear effusion.   Nose: Mucosal edema present. No rhinorrhea, nasal deformity or septal deviation. No epistaxis. Right sinus exhibits no maxillary sinus tenderness and no frontal sinus tenderness. Left sinus exhibits no maxillary sinus tenderness and no frontal sinus tenderness.   Mouth/Throat: Uvula is midline, oropharynx is clear and moist and mucous membranes are normal. Mucous membranes are not pale and not dry. No trismus in the jaw. No uvula swelling or dental caries. No oropharyngeal exudate or posterior oropharyngeal erythema.   Eyes: Conjunctivae, EOM and lids are normal. Pupils are equal, round, and reactive to light. Right eye exhibits no chemosis. Left eye exhibits no chemosis. Right conjunctiva is not injected. Left conjunctiva is not injected. No scleral icterus. Right eye exhibits normal extraocular motion and no nystagmus. Left eye exhibits normal extraocular motion and no nystagmus.   Mild scleral icterus bilaterally   Neck: Trachea normal and phonation normal. No tracheal tenderness present. No tracheal deviation present. No thyroid mass and no thyromegaly present.   Tenderness to postauricular nodes but no enlargement   Cardiovascular: Intact distal pulses.    Pulmonary/Chest: Effort normal. No stridor. No respiratory distress.   Abdominal: She exhibits no distension.   Lymphadenopathy:        Head (right side): No submental, no submandibular, no preauricular, no posterior auricular and no occipital adenopathy present.         Head (left side): No submental, no submandibular, no preauricular, no posterior auricular and no occipital adenopathy present.     She has no cervical adenopathy.   Neurological: She is alert and oriented to person, place, and time. No cranial nerve deficit.   Skin: Skin is warm and dry. No rash noted. No erythema.   Psychiatric: She has a normal mood and affect. Her behavior is normal.       Assessment:       1. Chronic sinusitis, unspecified location    2. Worsening headaches    3. Fever, unspecified fever cause        Plan:       1.  Chronic sinusitis:  She does have a history of previous sinusitis, and is in a somewhat immunocompromised state due to her history of splenectomy as well as renal disease. I would recommend proceeding with a CT scan of the sinuses, as she certainly has had signs and symptoms of infection with her recent worsening headaches and fevers.  However, if her CT scan is negative, then she may need further evaluation as to the source of her current symptoms.  We will schedule CT scan, and will call her with results.  Previously, she has noted that she did not respond well to oral antibiotics, though she has not yet started oral antibiotics with this episode.  She is scheduled for dialysis Tuesday Thursday Saturday, and would need to coordinate any potential IV antibiotics with them.   Her right middle ear effusion is improved, and she also feels that her hearing has returned to normal.        Dialysis phone number- [phone] (fax)  [phone] (phone)    Dr. Kitchen

## 2018-07-02 NOTE — TELEPHONE ENCOUNTER
----- Message from Anna Nowak sent at 7/2/2018 11:37 AM CDT -----  Contact: Gisella Vo-[phone]#[id number]  Would like to consult with nurse regarding antibiotic medication. Please call back at [phone]#[id number].  Md Valerie

## 2018-07-03 ENCOUNTER — TELEPHONE (OUTPATIENT)
Dept: OTOLARYNGOLOGY | Facility: CLINIC | Age: 68
End: 2018-07-03

## 2018-07-03 NOTE — TELEPHONE ENCOUNTER
I conveyed the information below to German with [employer].  He verbalized understanding and will cancel the prescription for the antibiotic you had sent in.

## 2018-07-03 NOTE — TELEPHONE ENCOUNTER
Questions about strength of antibiotic.  Based on CT results does pt still need to be on the abx?  Please advise before I call kia joshi back.

## 2018-07-03 NOTE — TELEPHONE ENCOUNTER
"I called the patient and conveyed the message below.  The patient verbalized understanding but is concerned with her eye.  She wants to know "how is my eye going to get better if she doesn't prescribe an antibiotic which is not as strong as the one that was called in?"  I conveyed to patient that you recommended that she see her PCP.  She states that you and she discussed her eye and she wants to know how you plan on treating it.  I told patient that I would send a message to you and we could call her back when it is answered.  Please advise.  Thanks.  "

## 2018-07-03 NOTE — TELEPHONE ENCOUNTER
CT is perfectly clear, and does not show any sinusitis.  From an ENT standpoint, there is no sinus infection and she does not need any additional antibiotic therapy.    I would recommend she discuss her fevers and headaches with her PCP or nephrologist to see if any further workup of those symptoms is needed at this time, as she does not have a sinus infection as the cause.

## 2018-07-03 NOTE — TELEPHONE ENCOUNTER
----- Message from Benjamin Fontenot sent at 7/3/2018 11:04 AM CDT -----  Contact: Karin from [facility]   States she's calling rg pt amoxicillan 875mg / states it's high dose for kidney dialysis pt and a direct line [phone] ref # [id number]//thanks/dbmiah

## 2018-07-03 NOTE — TELEPHONE ENCOUNTER
Sometimes, eye swelling is related to underlying sinus disease, and if that is the case then we can certainly treat it.  However, in her case, there is no sinus infection to cause her eye pain and swelling.  She follows with optometry here, and she last saw them in May.  At that time she was not having any swelling or pain of her eye.  I would like for her to see someone in optometry/ophthalmology- we need to figure out why her eye is swollen and painful before it can be treated.

## 2018-07-09 ENCOUNTER — TELEPHONE (OUTPATIENT)
Dept: OBSTETRICS AND GYNECOLOGY | Facility: CLINIC | Age: 68
End: 2018-07-09

## 2018-07-09 NOTE — TELEPHONE ENCOUNTER
Spoke with pt. Notified pt that we have not received records from Dr. Helms. Pt states she will call his office.

## 2018-07-09 NOTE — TELEPHONE ENCOUNTER
----- Message from Regina Manning sent at 7/9/2018 10:52 AM CDT -----  Patient state she is in a lot of pain and would like to discuss her surgery with the nurse. Please adv/call [phone].//cw

## 2018-07-19 ENCOUNTER — HOSPITAL ENCOUNTER (OUTPATIENT)
Facility: HOSPITAL | Age: 68
Discharge: HOME | End: 2018-07-20
Attending: EMERGENCY MEDICINE | Admitting: INTERNAL MEDICINE
Payer: MEDICARE

## 2018-07-19 DIAGNOSIS — R51.9 HEADACHE: ICD-10-CM

## 2018-07-19 DIAGNOSIS — R11.10 VOMITING, INTRACTABILITY OF VOMITING NOT SPECIFIED, PRESENCE OF NAUSEA NOT SPECIFIED, UNSPECIFIED VOMITING TYPE: Primary | ICD-10-CM

## 2018-07-19 DIAGNOSIS — B34.9 VIRAL SYNDROME: ICD-10-CM

## 2018-07-19 DIAGNOSIS — K52.9 ACUTE GASTROENTERITIS: ICD-10-CM

## 2018-07-19 DIAGNOSIS — R11.10 VOMITING: ICD-10-CM

## 2018-07-19 DIAGNOSIS — N18.6 ESRD (END STAGE RENAL DISEASE) ON DIALYSIS: Chronic | ICD-10-CM

## 2018-07-19 DIAGNOSIS — N18.6 ESRD (END STAGE RENAL DISEASE): ICD-10-CM

## 2018-07-19 DIAGNOSIS — Z99.2 ESRD (END STAGE RENAL DISEASE) ON DIALYSIS: Chronic | ICD-10-CM

## 2018-07-19 DIAGNOSIS — R06.00 DYSPNEA: ICD-10-CM

## 2018-07-19 LAB
ALBUMIN SERPL BCP-MCNC: 4 G/DL
ALP SERPL-CCNC: 147 U/L
ALT SERPL W/O P-5'-P-CCNC: 17 U/L
ANION GAP SERPL CALC-SCNC: 14 MMOL/L
AST SERPL-CCNC: 20 U/L
BASOPHILS # BLD AUTO: 0.02 K/UL
BASOPHILS NFR BLD: 0.4 %
BILIRUB SERPL-MCNC: 0.6 MG/DL
BUN SERPL-MCNC: 38 MG/DL
CALCIUM SERPL-MCNC: 9.4 MG/DL
CHLORIDE SERPL-SCNC: 99 MMOL/L
CO2 SERPL-SCNC: 17 MMOL/L
CREAT SERPL-MCNC: 6 MG/DL
DIFFERENTIAL METHOD: ABNORMAL
EOSINOPHIL # BLD AUTO: 0 K/UL
EOSINOPHIL NFR BLD: 0.5 %
ERYTHROCYTE [DISTWIDTH] IN BLOOD BY AUTOMATED COUNT: 16 %
EST. GFR  (AFRICAN AMERICAN): 8 ML/MIN/1.73 M^2
EST. GFR  (NON AFRICAN AMERICAN): 7 ML/MIN/1.73 M^2
GLUCOSE SERPL-MCNC: 108 MG/DL
HCT VFR BLD AUTO: 45.3 %
HGB BLD-MCNC: 14.7 G/DL
LYMPHOCYTES # BLD AUTO: 1.4 K/UL
LYMPHOCYTES NFR BLD: 24.7 %
MCH RBC QN AUTO: 31.7 PG
MCHC RBC AUTO-ENTMCNC: 32.5 G/DL
MCV RBC AUTO: 98 FL
MONOCYTES # BLD AUTO: 0.4 K/UL
MONOCYTES NFR BLD: 6.4 %
NEUTROPHILS # BLD AUTO: 3.7 K/UL
NEUTROPHILS NFR BLD: 68 %
PLATELET # BLD AUTO: 201 K/UL
PMV BLD AUTO: 9.4 FL
POCT GLUCOSE: 136 MG/DL (ref 70–110)
POTASSIUM SERPL-SCNC: 4.9 MMOL/L
PROT SERPL-MCNC: 8.2 G/DL
RBC # BLD AUTO: 4.64 M/UL
SODIUM SERPL-SCNC: 130 MMOL/L
TROPONIN I SERPL DL<=0.01 NG/ML-MCNC: 0.03 NG/ML
TROPONIN I SERPL DL<=0.01 NG/ML-MCNC: 0.03 NG/ML
WBC # BLD AUTO: 5.46 K/UL

## 2018-07-19 PROCEDURE — 25000003 PHARM REV CODE 250: Performed by: NURSE PRACTITIONER

## 2018-07-19 PROCEDURE — 85025 COMPLETE CBC W/AUTO DIFF WBC: CPT

## 2018-07-19 PROCEDURE — 63600175 PHARM REV CODE 636 W HCPCS: Performed by: EMERGENCY MEDICINE

## 2018-07-19 PROCEDURE — G0378 HOSPITAL OBSERVATION PER HR: HCPCS

## 2018-07-19 PROCEDURE — 99285 EMERGENCY DEPT VISIT HI MDM: CPT | Mod: 25

## 2018-07-19 PROCEDURE — 93010 ELECTROCARDIOGRAM REPORT: CPT | Mod: ,,, | Performed by: INTERNAL MEDICINE

## 2018-07-19 PROCEDURE — 96374 THER/PROPH/DIAG INJ IV PUSH: CPT

## 2018-07-19 PROCEDURE — 99214 OFFICE O/P EST MOD 30 MIN: CPT | Mod: ,,, | Performed by: INTERNAL MEDICINE

## 2018-07-19 PROCEDURE — 25000003 PHARM REV CODE 250: Performed by: EMERGENCY MEDICINE

## 2018-07-19 PROCEDURE — 84484 ASSAY OF TROPONIN QUANT: CPT

## 2018-07-19 PROCEDURE — 96376 TX/PRO/DX INJ SAME DRUG ADON: CPT

## 2018-07-19 PROCEDURE — 82962 GLUCOSE BLOOD TEST: CPT

## 2018-07-19 PROCEDURE — 80053 COMPREHEN METABOLIC PANEL: CPT

## 2018-07-19 PROCEDURE — 25000003 PHARM REV CODE 250: Performed by: INTERNAL MEDICINE

## 2018-07-19 RX ORDER — FAMOTIDINE 20 MG/1
20 TABLET, FILM COATED ORAL DAILY
Status: DISCONTINUED | OUTPATIENT
Start: 2018-07-20 | End: 2018-07-20 | Stop reason: HOSPADM

## 2018-07-19 RX ORDER — FAMOTIDINE 20 MG/1
20 TABLET, FILM COATED ORAL 2 TIMES DAILY
Status: DISCONTINUED | OUTPATIENT
Start: 2018-07-19 | End: 2018-07-19

## 2018-07-19 RX ORDER — SODIUM CHLORIDE 9 MG/ML
INJECTION, SOLUTION INTRAVENOUS CONTINUOUS
Status: DISCONTINUED | OUTPATIENT
Start: 2018-07-19 | End: 2018-07-20

## 2018-07-19 RX ORDER — ONDANSETRON 2 MG/ML
4 INJECTION INTRAMUSCULAR; INTRAVENOUS
Status: COMPLETED | OUTPATIENT
Start: 2018-07-19 | End: 2018-07-19

## 2018-07-19 RX ORDER — ACETAMINOPHEN 500 MG
500 TABLET ORAL
Status: COMPLETED | OUTPATIENT
Start: 2018-07-19 | End: 2018-07-19

## 2018-07-19 RX ORDER — ACETAMINOPHEN 325 MG/1
650 TABLET ORAL EVERY 6 HOURS PRN
Status: DISCONTINUED | OUTPATIENT
Start: 2018-07-19 | End: 2018-07-20 | Stop reason: HOSPADM

## 2018-07-19 RX ORDER — ONDANSETRON 2 MG/ML
4 INJECTION INTRAMUSCULAR; INTRAVENOUS EVERY 8 HOURS PRN
Status: DISCONTINUED | OUTPATIENT
Start: 2018-07-19 | End: 2018-07-20 | Stop reason: HOSPADM

## 2018-07-19 RX ADMIN — FAMOTIDINE 20 MG: 20 TABLET ORAL at 08:07

## 2018-07-19 RX ADMIN — ONDANSETRON 4 MG: 2 INJECTION, SOLUTION INTRAMUSCULAR; INTRAVENOUS at 12:07

## 2018-07-19 RX ADMIN — ACETAMINOPHEN 500 MG: 500 TABLET, FILM COATED ORAL at 01:07

## 2018-07-19 RX ADMIN — SODIUM CHLORIDE: 0.9 INJECTION, SOLUTION INTRAVENOUS at 05:07

## 2018-07-19 RX ADMIN — ACETAMINOPHEN 650 MG: 325 TABLET, FILM COATED ORAL at 08:07
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
## 2018-07-19 NOTE — ED NOTES
Crackers and apple juice given to pt, pt was offered water and said water makes her throw up, and asked for apple juice and crackers.

## 2018-07-19 NOTE — HPI
Gillian Pitts is a 68 yo female with PMhx of ESRD on HD and HTN who experienced hypotension with several episodes of vomiting and tremors while on dialysis today.  During dialysis, a new potassium dialysate was used and pt's blood pressure dropped to 60/40, she vomited multiple times over an hour and pt described a frontal headache with body tremors. She was given 1 L saline at the kidney center and antiemetics. V/S 97.5, pulse 70, B/P 179/84. CBC is unremarkable, CMP shows sodium 130, BUN 38, creatinine 6.0 & potassium 4.9. Nephrology recommended gentle IV fluids overnight with antiemetics.
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
## 2018-07-19 NOTE — ASSESSMENT & PLAN NOTE
Patient on TTS schedule as outpatient. She developed hypotension at end of dialysis today. Hypotension resolved after saline infusion. However, patient continues to complain of intermittent nausea/vomiting and generalized malaise.    Will admit patient for observation. Initiation of mild hydration with IVF at 50 cc/hr and will start patient on dialysis diet.    Next scheduled HD Sat. 7/21/18.    Access: Left arm AVF.

## 2018-07-19 NOTE — ED NOTES
Patient identifiers verified and correct for Gillian Pitts.    Patient states they changes her dialysis medication and during her dialysis treatment she became dizzy, with N/V.     LOC: The patient is awake, alert and aware of environment with an appropriate affect, the patient is oriented x 3 and speaking appropriately, dizzy/light headed  APPEARANCE: Patient resting comfortably and in no acute distress, patient is clean and well groomed, patient's clothing is properly fastened.  SKIN: The skin is warm and dry, color consistent with ethnicity, patient has normal skin turgor and moist mucus membranes, skin intact, no breakdown or bruising noted.  MUSCULOSKELETAL: Patient moving all extremities spontaneously.  RESPIRATORY: Airway is open and patent, respirations are spontaneous.  CARDIAC: Patient has a normal rate, no periphreal edema noted, capillary refill < 3 seconds, elevated BP  ABDOMEN: Soft and non tender to palpation.

## 2018-07-19 NOTE — ASSESSMENT & PLAN NOTE
Pt on dialysis T, Th, Sat  Nephrology saw the patient and recommended gentle IV hydration and antiemetic  Fluid and food challenge

## 2018-07-19 NOTE — ASSESSMENT & PLAN NOTE
Pt has tolerated crackers and apple juice in the ED  Renal diet  Prn antiemetics  Gentle IV fluids

## 2018-07-19 NOTE — HPI
"67 year old female with ESRD anemia, HTN, PCKD, secondary hyperparathyroidism presents to [facility] ER with hypotension, nausea/vomiting.    Nephrology was contacted to help with patient's renal care while she is admitted at [facility]. I saw and examined patient in her hospital room. Patient was sent today to [facility] ER from HD unit after developing hypotension at the end of dialysis. SBP recovered from 70s/80s to 130s after saline infusion and patient was transferred to [facility] for further evaluation/treatment. Patient still feeling not right. Reports intermittent nausea and vomiting and having pain "all over". No other complaints.    Patient dialyzes at [facility] on TTS schedule under my care. Access is left AVF.   "
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
## 2018-07-20 VITALS
SYSTOLIC BLOOD PRESSURE: 134 MMHG | HEART RATE: 79 BPM | DIASTOLIC BLOOD PRESSURE: 83 MMHG | TEMPERATURE: 98 F | WEIGHT: 191.81 LBS | HEIGHT: 64 IN | BODY MASS INDEX: 32.74 KG/M2 | OXYGEN SATURATION: 97 % | RESPIRATION RATE: 20 BRPM

## 2018-07-20 LAB
ALBUMIN SERPL BCP-MCNC: 3.2 G/DL
ANION GAP SERPL CALC-SCNC: 13 MMOL/L
ANION GAP SERPL CALC-SCNC: 15 MMOL/L
BUN SERPL-MCNC: 52 MG/DL
BUN SERPL-MCNC: 55 MG/DL
CALCIUM SERPL-MCNC: 8.8 MG/DL
CALCIUM SERPL-MCNC: 9.2 MG/DL
CHLORIDE SERPL-SCNC: 98 MMOL/L
CHLORIDE SERPL-SCNC: 98 MMOL/L
CO2 SERPL-SCNC: 18 MMOL/L
CO2 SERPL-SCNC: 18 MMOL/L
CREAT SERPL-MCNC: 7.5 MG/DL
CREAT SERPL-MCNC: 7.9 MG/DL
EST. GFR  (AFRICAN AMERICAN): 6 ML/MIN/1.73 M^2
EST. GFR  (AFRICAN AMERICAN): 6 ML/MIN/1.73 M^2
EST. GFR  (NON AFRICAN AMERICAN): 5 ML/MIN/1.73 M^2
EST. GFR  (NON AFRICAN AMERICAN): 5 ML/MIN/1.73 M^2
GLUCOSE SERPL-MCNC: 70 MG/DL
GLUCOSE SERPL-MCNC: 92 MG/DL
PHOSPHATE SERPL-MCNC: 6.8 MG/DL
POTASSIUM SERPL-SCNC: 6 MMOL/L
POTASSIUM SERPL-SCNC: 6.1 MMOL/L
SODIUM SERPL-SCNC: 129 MMOL/L
SODIUM SERPL-SCNC: 131 MMOL/L

## 2018-07-20 PROCEDURE — 36415 COLL VENOUS BLD VENIPUNCTURE: CPT

## 2018-07-20 PROCEDURE — 25000003 PHARM REV CODE 250: Performed by: INTERNAL MEDICINE

## 2018-07-20 PROCEDURE — 25000003 PHARM REV CODE 250: Performed by: NURSE PRACTITIONER

## 2018-07-20 PROCEDURE — 99214 OFFICE O/P EST MOD 30 MIN: CPT | Mod: ,,, | Performed by: INTERNAL MEDICINE

## 2018-07-20 PROCEDURE — G0378 HOSPITAL OBSERVATION PER HR: HCPCS

## 2018-07-20 PROCEDURE — 63600175 PHARM REV CODE 636 W HCPCS: Performed by: NURSE PRACTITIONER

## 2018-07-20 PROCEDURE — 80069 RENAL FUNCTION PANEL: CPT

## 2018-07-20 PROCEDURE — G0257 UNSCHED DIALYSIS ESRD PT HOS: HCPCS

## 2018-07-20 PROCEDURE — 80048 BASIC METABOLIC PNL TOTAL CA: CPT

## 2018-07-20 RX ORDER — ONDANSETRON 4 MG/1
4 TABLET, ORALLY DISINTEGRATING ORAL EVERY 8 HOURS PRN
Qty: 15 TABLET | Refills: 0 | Status: ON HOLD | OUTPATIENT
Start: 2018-07-20 | End: 2019-01-26 | Stop reason: SDUPTHER

## 2018-07-20 RX ORDER — SEVELAMER CARBONATE 800 MG/1
2400 TABLET, FILM COATED ORAL 3 TIMES DAILY
Status: DISCONTINUED | OUTPATIENT
Start: 2018-07-20 | End: 2018-07-20 | Stop reason: HOSPADM

## 2018-07-20 RX ADMIN — CLONIDINE HYDROCHLORIDE 0.3 MG: 0.2 TABLET ORAL at 11:07

## 2018-07-20 RX ADMIN — SEVELAMER CARBONATE 2400 MG: 800 TABLET, FILM COATED ORAL at 11:07

## 2018-07-20 RX ADMIN — CLONIDINE HYDROCHLORIDE 0.3 MG: 0.2 TABLET ORAL at 02:07

## 2018-07-20 RX ADMIN — ONDANSETRON 4 MG: 2 INJECTION, SOLUTION INTRAMUSCULAR; INTRAVENOUS at 08:07

## 2018-07-20 RX ADMIN — FAMOTIDINE 20 MG: 20 TABLET ORAL at 08:07

## 2018-07-20 RX ADMIN — SODIUM POLYSTYRENE SULFONATE 30 G: 15 SUSPENSION ORAL; RECTAL at 11:07

## 2018-07-20 NOTE — DISCHARGE SUMMARY
[facility] Hospital Medicine  Discharge Summary      Patient Name: Gillian Pitts  MRN: [id number]  Admission Date: 7/19/2018  Hospital Length of Stay: 0 days  Discharge Date and Time:  07/20/2018 4:49 PM  Attending Physician: Dre Hayward, *   Discharging Provider: Florecita Lake NP  Primary Care Provider: Mickey Mckee MD      HPI:   Gillian Pitts is a 66 yo female with PMhx of ESRD on HD and HTN who experienced hypotension with several episodes of vomiting and tremors while on dialysis today.  During dialysis, a new potassium dialysate was used and pt's blood pressure dropped to 60/40, she vomited multiple times over an hour and pt described a frontal headache with body tremors. She was given 1 L saline at the kidney center and antiemetics. V/S 97.5, pulse 70, B/P 179/84. CBC is unremarkable, CMP shows sodium 130, BUN 38, creatinine 6.0 & potassium 4.9. Nephrology recommended gentle IV fluids overnight with antiemetics.     * No surgery found *      Hospital Course:   Pt was placed in observation after having an hour long vomiting episode at HD. VSS, afebrile. WBC normal. Pt reportedly was hypotensive at the HD clinic but B/P 179/84 on arrival to ED. Pt monitored overnight. Today, VSS. Serum K 6.1 despite kayexalate. Care discussed with Nephrology who recommended a short dialysis treatment today. Pt has mild nausea but tolerated diet. The pt likely had a gastroenteritis. Pt will be discharged on Zofran ODT.      Consults:   Consults         Status Ordering Provider     Inpatient consult to Nephrology  Once     Provider:  MD Roxanne Méndez LISA L.            Final Active Diagnoses:    Diagnosis Date Noted POA    PRINCIPAL PROBLEM:  Vomiting [R11.10] 07/19/2018 Yes    Acute gastroenteritis [K52.9]  Unknown    ESRD (end stage renal disease) [N18.6] 07/19/2018 Unknown    Viral syndrome [B34.9]  Unknown      Problems Resolved During this  Admission:    Diagnosis Date Noted Date Resolved POA       Discharged Condition: stable    Disposition: Home or Self Care    Follow Up:  Follow-up Information     Mickey Mckee MD In 3 days.    Specialty:  Family Medicine  Contact information:  [address]  [place] [address] [phone]             Please follow up.    Why:  HD as scheduled                Patient Instructions:     Diet Cardiac   Order Comments: 1500ml, 2 gm     Activity as tolerated         Significant Diagnostic Studies:   Imaging Results          X-Ray Chest AP Portable (Final result)  Result time 07/19/18 13:43:48    Final result by Junaid Decker MD (07/19/18 13:43:48)                 Impression:      No acute process seen.      Electronically signed by: Junaid Decker MD  Date:    07/19/2018  Time:    13:43             Narrative:    EXAMINATION:  XR CHEST AP PORTABLE    CLINICAL HISTORY:  Dyspnea, unspecified    FINDINGS:  Single view of the chest.  Aorta demonstrates atherosclerotic disease.  Left subclavian brachiocephalic stent.    Cardiac silhouette is normal.  The lungs demonstrate no evidence of active disease.  No evidence of pleural effusion or pneumothorax.  Bones demonstrate degenerative changes.                               CT Head Without Contrast (Final result)  Result time 07/19/18 13:04:53    Final result by FALGUNI Henderson Sr., MD (07/19/18 13:04:53)                 Impression:      1. There are chronic appearing ischemic changes in the deep white matter of the right cerebral hemisphere. There is no evidence of an acute ischemic event.  2. There is no intracranial hemorrhage.  3. There is nearly complete opacification of the right mastoid air cells.  4. There are large jesus bullosa in the middle nasal turbinates bilaterally.  All CT scans at this facility use dose modulation, iterative reconstruction, and/or weight base dosing when appropriate to reduce radiation dose when appropriate to reduce radiation dose to  as low as reasonably achievable.      Electronically signed by: Adilson Henderson MD  Date:    07/19/2018  Time:    13:04             Narrative:    EXAMINATION:  CT HEAD WITHOUT CONTRAST    CLINICAL HISTORY:  Headachegeneralized weakness followed by vomiting during dialysis;    TECHNIQUE:  Standard brain CT protocol without IV contrast was performed.    COMPARISON:  None    FINDINGS:  There are chronic appearing ischemic changes in the deep white matter of the right cerebral hemisphere.  There is no evidence of an acute ischemic event.  There is no intracranial hemorrhage.  There is no skull fracture. The paranasal sinuses are normal in appearance.  There is nearly complete opacification of the right mastoid air cells.  There are large jesus bullosa in the middle nasal turbinates bilaterally.                                Pending Diagnostic Studies:     None         Medications:  Reconciled Home Medications:      Medication List      START taking these medications    ondansetron 4 MG Tbdl  Commonly known as:  ZOFRAN-ODT  Take 1 tablet (4 mg total) by mouth every 8 (eight) hours as needed.        CONTINUE taking these medications    acetaminophen 325 MG tablet  Commonly known as:  TYLENOL  Take 650 mg by mouth.     cloNIDine 0.3 MG tablet  Commonly known as:  CATAPRES  Take 1 tablet (0.3 mg total) by mouth 3 (three) times daily.     * fluticasone 50 mcg/actuation nasal spray  Commonly known as:  FLONASE  1 spray by Each Nare route 2 (two) times daily as needed.     * fluticasone 50 mcg/actuation nasal spray  Commonly known as:  FLONASE  2 sprays (100 mcg total) by Each Nare route once daily.     loratadine 10 mg tablet  Commonly known as:  CLARITIN  Take 1 tablet (10 mg total) by mouth once daily.     sevelamer carbonate 800 mg Tab  Commonly known as:  RENVELA  Take 2,400 mg by mouth 3 (three) times daily.        * This list has 2 medication(s) that are the same as other medications prescribed for you. Read the  directions carefully, and ask your doctor or other care provider to review them with you.            STOP taking these medications    diclofenac sodium 1 % Gel            Indwelling Lines/Drains at time of discharge:   Lines/Drains/Airways          No matching active lines, drains, or airways          Time spent on the discharge of patient: 41 minutes  Patient was seen and examined on the date of discharge and determined to be suitable for discharge.         Florecita Lake NP  Department of Hospital Medicine  [facility]

## 2018-07-20 NOTE — PLAN OF CARE
Problem: Patient Care Overview  Goal: Plan of Care Review  Outcome: Outcome(s) achieved Date Met: 07/20/18  Goals adequately for discharge.

## 2018-07-20 NOTE — PLAN OF CARE
07/20/18 1313   NICOLE Message   Medicare Outpatient and Observation Notification regarding financial responsibility Given to patient/caregiver;Explained to patient/caregiver;Signed/date by patient/caregiver   Date NICOLE was signed 07/20/18   Time NICOLE was signed 1319

## 2018-07-20 NOTE — PLAN OF CARE
Problem: Patient Care Overview  Goal: Plan of Care Review  Outcome: Ongoing (interventions implemented as appropriate)  Patient remained free from injury. No c/o pain at this time. No nausea nor emesis. Resting with eyes closed.  No distress noted. Oriented x3. Respirations even and non labored. IV patent and infusing. Bed locked and low. Call light in reach. Safety measures in place. Will continue to monitor. Reviewed plan of care. Patient verbalized understanding and teach back.    12hr chart check complete.

## 2018-07-20 NOTE — PLAN OF CARE
Patient received hd as ordered. Net removal 2 liters. No access issues. Dr. Acñua visited during during hd.

## 2018-07-20 NOTE — PROGRESS NOTES
"[facility]  Nephrology  Progress Note    Patient Name: Gillian Pitts  MRN: [id number]  Admission Date: 7/19/2018  Hospital Length of Stay: 0 days  Attending Provider: Dre Hayward, *   Primary Care Physician: Mickey Mckee MD  Principal Problem:Vomiting. ESRD on HD    Subjective:     HPI: 67 year old female with ESRD anemia, HTN, PCKD, secondary hyperparathyroidism presents to [facility] ER with hypotension, nausea/vomiting.    Nephrology was contacted to help with patient's renal care while she is admitted at [facility]. I saw and examined patient in her hospital room. Patient was sent today to [facility] ER from HD unit after developing hypotension at the end of dialysis. SBP recovered from 70s/80s to 130s after saline infusion and patient was transferred to [facility] for further evaluation/treatment. Patient still feeling not right. Reports intermittent nausea and vomiting and having pain "all over". No other complaints.    Patient dialyzes at [facility] on TTS schedule under my care. Access is left AVF.     Interval History: Pt was seen and examined. H/o was reviewed. Pt is a 68 y/o female with ESRD on chronic HD q TTS who was admitted after having n/v during HD treatment yesterday and not receiving full HD> K is 6.0 today. Pt was given kayexelate and K did not improve. Pt has no palpitation, no CP, no SOB. Has some overall muscle pain and feels tired.    Review of patient's allergies indicates:  No Known Allergies  Current Facility-Administered Medications   Medication Frequency    acetaminophen tablet 650 mg Q6H PRN    cloNIDine tablet 0.3 mg TID    famotidine tablet 20 mg Daily    ondansetron injection 4 mg Q8H PRN    promethazine (PHENERGAN) 12.5 mg in dextrose 5 % 50 mL IVPB Q6H PRN    sevelamer carbonate tablet 2,400 mg TID       Objective:     Vital Signs (Most Recent):  Temp: 98.7 °F (37.1 °C) (07/20/18 1530)  Pulse: 66 (07/20/18 1530)  Resp: 18 (07/20/18 1530)  BP: (!) 126/58 " (07/20/18 1530)  SpO2: 97 % (07/20/18 1530)  O2 Device (Oxygen Therapy): room air (07/20/18 0820) Vital Signs (24h Range):  Temp:  [97.8 °F (36.6 °C)-98.9 °F (37.2 °C)] 98.7 °F (37.1 °C)  Pulse:  [60-76] 66  Resp:  [18-20] 18  SpO2:  [94 %-98 %] 97 %  BP: (126-188)/(58-95) 126/58     Weight: 87 kg (191 lb 12.8 oz) (07/19/18 1850)  Body mass index is 32.92 kg/m².  Body surface area is 1.98 meters squared.    I/O last 3 completed shifts:  In: 591.7 [I.V.:591.7]  Out: -     Physical Exam   Constitutional: She is oriented to person, place, and time. She appears well-developed and well-nourished. No distress.   HENT:   Head: Normocephalic and atraumatic.   Neck: No JVD present.   Cardiovascular: Normal rate, regular rhythm and normal heart sounds.  Exam reveals no friction rub.    Pulmonary/Chest: Effort normal and breath sounds normal. No respiratory distress. She has no rales.   Abdominal: Soft. There is no tenderness.   Musculoskeletal: Normal range of motion. She exhibits no edema.   Neurological: She is alert and oriented to person, place, and time.   Skin: Skin is warm and dry. She is not diaphoretic.   Psychiatric: She has a normal mood and affect. Her behavior is normal.   Nursing note and vitals reviewed.      Significant Labs: reviewed  BMP  Lab Results   Component Value Date     (L) 07/20/2018    K 6.1 (H) 07/20/2018    CL 98 07/20/2018    CO2 18 (L) 07/20/2018    BUN 55 (H) 07/20/2018    CREATININE 7.9 (H) 07/20/2018    CALCIUM 9.2 07/20/2018    ANIONGAP 15 07/20/2018    ESTGFRAFRICA 6 (A) 07/20/2018    EGFRNONAA 5 (A) 07/20/2018     Lab Results   Component Value Date    WBC 5.46 07/19/2018    HGB 14.7 07/19/2018    HCT 45.3 07/19/2018    MCV 98 07/19/2018     07/19/2018       Significant Imaging: reviewed    Assessment/Plan:   66 y/o female with ESRD on chronic HD presented with n/v and hypotension that occurred during out pt HD treatment:              ESRD (end stage renal disease)    Renal: HD  q TTS schedule   Hyperkalemia  Did not receive full HD yesterday due to having n/v  No further vomiting today  May have has acute gastroenteritis or other viral syndrome (generalized tiredness and myalgia)  Hypotension resolved after saline infusion. BP not somewhat high  Will provide HD today  Expect HD will correct K and high BP  Access: Left arm AVF.      * Vomiting    Resolved  Suspect viral syndrome, acute gastroenteritis          Plans and recommendations:  As discussed above.    Nikunj Acuña MD  Nephrology  [facility]

## 2018-07-20 NOTE — PROGRESS NOTES
Pharmacist Renal Dose Adjustment Note    Gillian Pitts is a 67 y.o. female being treated with the medication pepcid.    Patient Data:    Vital Signs (Most Recent):  Temp: 97.8 °F (36.6 °C) (07/19/18 2026)  Pulse: 60 (07/19/18 2026)  Resp: 18 (07/19/18 2026)  BP: (!) 142/67 (07/19/18 2026)  SpO2: 96 % (07/19/18 2026)   Vital Signs (72h Range):  Temp:  [97.5 °F (36.4 °C)-98 °F (36.7 °C)]   Pulse:  [60-72]   Resp:  [18-20]   BP: (142-179)/(67-95)   SpO2:  [95 %-98 %]        Recent Labs     Lab 07/19/18  1228   CREATININE 6.0*     Serum creatinine: 6 mg/dL (H) 07/19/18 1228  Estimated creatinine clearance: 9.8 mL/min (A)    Medication: pepcid 20 mg po bid will be changed to medication: pepcid 20 mg po daily for crcl of 9.8 ml/min    Pharmacist's Name: Audra Lemus formerly [facility]  Pharmacist's Extension: [phone]

## 2018-07-20 NOTE — PLAN OF CARE
CM met with patient at bedside. Patient was independent prior to admit and has no needs at this time. DC plan: home. Pt agrees with d/c plan. CM assessment and MOON completed, placed in chart, and copy given to patient.        07/20/18 1313   Discharge Assessment   Assessment Type Discharge Planning Assessment   Confirmed/corrected address and phone number on facesheet? Yes   Assessment information obtained from? Patient   Prior to hospitilization cognitive status: Alert/Oriented   Prior to hospitalization functional status: Independent   Current cognitive status: Alert/Oriented   Current Functional Status: Independent   Lives With child(clifford), adult  (Daughter, Kentrell Salazar [phone])   Able to Return to Prior Arrangements yes   Is patient able to care for self after discharge? Yes   Who are your caregiver(s) and their phone number(s)? (Kentrell Salazar, daughter, [phone] and Anna Reynolds, daughter, [phone])   Patient's perception of discharge disposition home or selfcare   Readmission Within The Last 30 Days no previous admission in last 30 days   Patient currently being followed by outpatient case management? No   Patient currently receives any other outside agency services? No   Equipment Currently Used at Home none   Do you have any problems affording any of your prescribed medications? No   Is the patient taking medications as prescribed? yes   Does the patient have transportation home? Yes   Transportation Available family or friend will provide   Dialysis Name and Scheduled days ([facility])   Does the patient receive services at the Coumadin Clinic? No   Discharge Plan A Home   Patient/Family In Agreement With Plan yes

## 2018-07-20 NOTE — SUBJECTIVE & OBJECTIVE
Interval History: Pt was seen and examined. H/o was reviewed. Pt is a 68 y/o female with ESRD on chronic HD q TTS who was admitted after having n/v during HD treatment yesterday and not receiving full HD> K is 6.0 today. Pt was given kayexelate and K did not improve. Pt has no palpitation, no CP, no SOB. Has some overall muscle pain and feels tired.    Review of patient's allergies indicates:  No Known Allergies  Current Facility-Administered Medications   Medication Frequency    acetaminophen tablet 650 mg Q6H PRN    cloNIDine tablet 0.3 mg TID    famotidine tablet 20 mg Daily    ondansetron injection 4 mg Q8H PRN    promethazine (PHENERGAN) 12.5 mg in dextrose 5 % 50 mL IVPB Q6H PRN    sevelamer carbonate tablet 2,400 mg TID       Objective:     Vital Signs (Most Recent):  Temp: 98.7 °F (37.1 °C) (07/20/18 1530)  Pulse: 66 (07/20/18 1530)  Resp: 18 (07/20/18 1530)  BP: (!) 126/58 (07/20/18 1530)  SpO2: 97 % (07/20/18 1530)  O2 Device (Oxygen Therapy): room air (07/20/18 0820) Vital Signs (24h Range):  Temp:  [97.8 °F (36.6 °C)-98.9 °F (37.2 °C)] 98.7 °F (37.1 °C)  Pulse:  [60-76] 66  Resp:  [18-20] 18  SpO2:  [94 %-98 %] 97 %  BP: (126-188)/(58-95) 126/58     Weight: 87 kg (191 lb 12.8 oz) (07/19/18 1850)  Body mass index is 32.92 kg/m².  Body surface area is 1.98 meters squared.    I/O last 3 completed shifts:  In: 591.7 [I.V.:591.7]  Out: -     Physical Exam   Constitutional: She is oriented to person, place, and time. She appears well-developed and well-nourished. No distress.   HENT:   Head: Normocephalic and atraumatic.   Neck: No JVD present.   Cardiovascular: Normal rate, regular rhythm and normal heart sounds.  Exam reveals no friction rub.    Pulmonary/Chest: Effort normal and breath sounds normal. No respiratory distress. She has no rales.   Abdominal: Soft. There is no tenderness.   Musculoskeletal: Normal range of motion. She exhibits no edema.   Neurological: She is alert and oriented to person,  place, and time.   Skin: Skin is warm and dry. She is not diaphoretic.   Psychiatric: She has a normal mood and affect. Her behavior is normal.   Nursing note and vitals reviewed.      Significant Labs: reviewed  BMP  Lab Results   Component Value Date     (L) 07/20/2018    K 6.1 (H) 07/20/2018    CL 98 07/20/2018    CO2 18 (L) 07/20/2018    BUN 55 (H) 07/20/2018    CREATININE 7.9 (H) 07/20/2018    CALCIUM 9.2 07/20/2018    ANIONGAP 15 07/20/2018    ESTGFRAFRICA 6 (A) 07/20/2018    EGFRNONAA 5 (A) 07/20/2018     Lab Results   Component Value Date    WBC 5.46 07/19/2018    HGB 14.7 07/19/2018    HCT 45.3 07/19/2018    MCV 98 07/19/2018     07/19/2018       Significant Imaging: reviewed

## 2018-07-20 NOTE — HOSPITAL COURSE
Pt was placed in observation after having an hour long vomiting episode at HD. VSS, afebrile. WBC normal. Pt reportedly was hypotensive at the HD clinic but B/P 179/84 on arrival to ED. Pt monitored overnight. Today, VSS. Serum K 6.1 despite kayexalate. Care discussed with Nephrology who recommended a short dialysis treatment today. Pt has mild nausea but tolerated diet. The pt likely had a gastroenteritis. Pt will be discharged on Zofran ODT.

## 2018-07-20 NOTE — ASSESSMENT & PLAN NOTE
Renal: HD q TTS schedule   Hyperkalemia  Did not receive full HD yesterday due to having n/v  No further vomiting today  May have has acute gastroenteritis or other viral syndrome (generalized tiredness and myalgia)  Hypotension resolved after saline infusion. BP not somewhat high  Will provide HD today  Expect HD will correct K and high BP  Access: Left arm AVF.

## 2018-07-21 PROBLEM — N18.6 ESRD (END STAGE RENAL DISEASE): Status: ACTIVE | Noted: 2018-07-21

## 2018-07-21 NOTE — NURSING
Pt given discharge instruction and paperwork and verbalized understanding. IV removed and in stable condition. Pt will be wheeled down in wheel chair.

## 2018-07-23 ENCOUNTER — TELEPHONE (OUTPATIENT)
Dept: OBSTETRICS AND GYNECOLOGY | Facility: CLINIC | Age: 68
End: 2018-07-23

## 2018-07-23 ENCOUNTER — TELEPHONE (OUTPATIENT)
Dept: PHARMACY | Facility: CLINIC | Age: 68
End: 2018-07-23

## 2018-07-23 NOTE — TELEPHONE ENCOUNTER
----- Message from Alia Chu sent at 7/23/2018  9:46 AM CDT -----  Contact: patient  Calling concerning scheduling her procedure. Please call patient today @ [phone]. Thanks, lorna

## 2018-07-23 NOTE — TELEPHONE ENCOUNTER
Spoke with pt. Notified pt she would be notified once Dr. Mccann reviews her records. Pt verbalized understanding.

## 2018-07-30 NOTE — TELEPHONE ENCOUNTER
Documentation Only:  Faxed prior authorization for Veltassa to insurance company for review on 07.30.2018 [place] 9:55am

## 2018-07-30 NOTE — TELEPHONE ENCOUNTER
Documentation Only:    Prior Authorization for Veltassa has been approved until further notice    Approval dates: 06.12.2018 until further notice    Case ID#: [id number]    Patient co-pay: $3.70    Patient Assistance IS NOT required.    Forwarding to clinical pharmacist for consult and shipment.    LATIA 3:47pm

## 2018-08-20 DIAGNOSIS — J31.0 CHRONIC PURULENT RHINITIS: ICD-10-CM

## 2018-08-20 RX ORDER — CLONIDINE HYDROCHLORIDE 0.3 MG/1
0.3 TABLET ORAL 3 TIMES DAILY
Qty: 90 TABLET | Refills: 11 | Status: SHIPPED | OUTPATIENT
Start: 2018-08-20 | End: 2018-11-14

## 2018-08-21 ENCOUNTER — HOSPITAL ENCOUNTER (EMERGENCY)
Facility: HOSPITAL | Age: 68
Discharge: HOME | End: 2018-08-21
Attending: EMERGENCY MEDICINE
Payer: MEDICARE

## 2018-08-21 VITALS
HEART RATE: 60 BPM | RESPIRATION RATE: 23 BRPM | OXYGEN SATURATION: 99 % | WEIGHT: 213.19 LBS | DIASTOLIC BLOOD PRESSURE: 73 MMHG | HEIGHT: 64 IN | SYSTOLIC BLOOD PRESSURE: 156 MMHG | TEMPERATURE: 98 F | BODY MASS INDEX: 36.4 KG/M2

## 2018-08-21 DIAGNOSIS — N18.6 ESRD (END STAGE RENAL DISEASE) ON DIALYSIS: ICD-10-CM

## 2018-08-21 DIAGNOSIS — Z99.2 ESRD (END STAGE RENAL DISEASE) ON DIALYSIS: ICD-10-CM

## 2018-08-21 DIAGNOSIS — I10 ESSENTIAL HYPERTENSION: Primary | ICD-10-CM

## 2018-08-21 LAB
ALBUMIN SERPL BCP-MCNC: 3.7 G/DL
ALP SERPL-CCNC: 107 U/L
ALT SERPL W/O P-5'-P-CCNC: 11 U/L
ANION GAP SERPL CALC-SCNC: 16 MMOL/L
AST SERPL-CCNC: 23 U/L
BASOPHILS # BLD AUTO: 0.03 K/UL
BASOPHILS NFR BLD: 0.4 %
BILIRUB SERPL-MCNC: 0.4 MG/DL
BUN SERPL-MCNC: 55 MG/DL
CALCIUM SERPL-MCNC: 9.7 MG/DL
CHLORIDE SERPL-SCNC: 103 MMOL/L
CO2 SERPL-SCNC: 12 MMOL/L
CREAT SERPL-MCNC: 7.8 MG/DL
DIFFERENTIAL METHOD: ABNORMAL
EOSINOPHIL # BLD AUTO: 0.1 K/UL
EOSINOPHIL NFR BLD: 1.5 %
ERYTHROCYTE [DISTWIDTH] IN BLOOD BY AUTOMATED COUNT: 15.4 %
EST. GFR  (AFRICAN AMERICAN): 6 ML/MIN/1.73 M^2
EST. GFR  (NON AFRICAN AMERICAN): 5 ML/MIN/1.73 M^2
GLUCOSE SERPL-MCNC: 92 MG/DL
HCT VFR BLD AUTO: 35 %
HGB BLD-MCNC: 11.6 G/DL
LYMPHOCYTES # BLD AUTO: 1.8 K/UL
LYMPHOCYTES NFR BLD: 26 %
MCH RBC QN AUTO: 31.5 PG
MCHC RBC AUTO-ENTMCNC: 33.1 G/DL
MCV RBC AUTO: 95 FL
MONOCYTES # BLD AUTO: 0.8 K/UL
MONOCYTES NFR BLD: 12 %
NEUTROPHILS # BLD AUTO: 4.1 K/UL
NEUTROPHILS NFR BLD: 60.1 %
PLATELET # BLD AUTO: 244 K/UL
PMV BLD AUTO: 9 FL
POTASSIUM SERPL-SCNC: 5.2 MMOL/L
PROT SERPL-MCNC: 7.7 G/DL
RBC # BLD AUTO: 3.68 M/UL
SODIUM SERPL-SCNC: 131 MMOL/L
WBC # BLD AUTO: 6.74 K/UL

## 2018-08-21 PROCEDURE — 99284 EMERGENCY DEPT VISIT MOD MDM: CPT | Mod: 25

## 2018-08-21 PROCEDURE — 63600175 PHARM REV CODE 636 W HCPCS: Performed by: EMERGENCY MEDICINE

## 2018-08-21 PROCEDURE — 96374 THER/PROPH/DIAG INJ IV PUSH: CPT

## 2018-08-21 PROCEDURE — 80053 COMPREHEN METABOLIC PANEL: CPT

## 2018-08-21 PROCEDURE — 85025 COMPLETE CBC W/AUTO DIFF WBC: CPT

## 2018-08-21 PROCEDURE — 25000003 PHARM REV CODE 250: Performed by: EMERGENCY MEDICINE

## 2018-08-21 RX ORDER — CLONIDINE HYDROCHLORIDE 0.3 MG/1
0.3 TABLET ORAL
Status: COMPLETED | OUTPATIENT
Start: 2018-08-21 | End: 2018-08-21

## 2018-08-21 RX ORDER — CLONIDINE HYDROCHLORIDE 0.3 MG/1
0.3 TABLET ORAL 3 TIMES DAILY
Qty: 90 TABLET | Refills: 1 | Status: SHIPPED | OUTPATIENT
Start: 2018-08-21 | End: 2019-01-08 | Stop reason: SDUPTHER

## 2018-08-21 RX ORDER — HYDRALAZINE HYDROCHLORIDE 20 MG/ML
20 INJECTION INTRAMUSCULAR; INTRAVENOUS
Status: COMPLETED | OUTPATIENT
Start: 2018-08-21 | End: 2018-08-21

## 2018-08-21 RX ADMIN — CLONIDINE HYDROCHLORIDE 0.3 MG: 0.3 TABLET ORAL at 12:08

## 2018-08-21 RX ADMIN — HYDRALAZINE HYDROCHLORIDE 20 MG: 20 INJECTION INTRAMUSCULAR; INTRAVENOUS at 11:08
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
## 2018-08-21 NOTE — ED NOTES
Pt c/o not feeling well all over d/t having to stop taking her bp med before dialysis. Ibuprofen usually works
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
## 2018-08-22 ENCOUNTER — TELEPHONE (OUTPATIENT)
Dept: PHARMACY | Facility: CLINIC | Age: 68
End: 2018-08-22

## 2018-08-22 NOTE — TELEPHONE ENCOUNTER
Initial Veltassa consult completed on . Veltassa will be shipped on  to arrive at patient's home on  via [employer]. $3.70 copay. Patient will start Veltassa on . Address confirmed, CC on file. Confirmed 2 patient identifiers - name and . Therapy Appropriate.    Veltassa 8.4 grams:  Take 1 packet by mouth daily     -VELTASSA should be taken once daily mixed with a  Minimum of 1/3 cup of water, preferably at the same time each day. Do NOT heat or mix with warm or hot drinks.   -VELTASSA should be taken with food.  - Store refrigerated for best stability. If stored at room temperature, must be discarded after 90 days.     DDIs: Medication list reviewed. No DDIs or allergies.  Separate other oral medications by 3 hours.   Common side effects:  Diarrhea/constipation, nausea, upset stomach, gas.   More serious side effects should be reported to MD: signs of allergic reaction, like rash; hives; itching; red, swollen, blistered, or peeling skin with or without fever; wheezing; tightness in the chest or throat; trouble breathing or talking; unusual hoarseness; or swelling of the mouth, face, lips, tongue, or throat OR Signs of low magnesium levels like mood changes, muscle pain or weakness, muscle cramps or spasms, seizures, shakiness, not hungry, very bad upset stomach or throwing up, or a heartbeat that does not feel normal    Discussed the importance of staying well hydrated while on therapy. Compliance stressed - patient to take missed doses as soon as remembered, but NOT to take 2 doses in one day. Patient will report questions or concerns to myself or practitioner. Patient verbalizes understanding. Patient plans to start Veltassa on . Consultation included: indication; goals of treatment; administration; storage and handling; side effects; how to handle side effects; the importance of compliance; how to handle missed doses; the importance of laboratory monitoring; the importance of keeping all follow  up appointments.  Patient understands to report any medication changes to [facility] and provider. All questions answered and addressed to patients satisfaction.  I will f/u with patient in 1-2 weeks from start, and [facility] will contact patient in 3 weeks to coordinate next refill.    MAURICE Porter.Ph.  Clinical Pharmacist  [facility]  Phone: [phone]

## 2018-08-23 ENCOUNTER — HOSPITAL ENCOUNTER (EMERGENCY)
Facility: HOSPITAL | Age: 68
Discharge: HOME | End: 2018-08-23
Attending: FAMILY MEDICINE
Payer: MEDICARE

## 2018-08-23 VITALS
TEMPERATURE: 99 F | WEIGHT: 213.19 LBS | BODY MASS INDEX: 36.4 KG/M2 | RESPIRATION RATE: 20 BRPM | DIASTOLIC BLOOD PRESSURE: 67 MMHG | HEART RATE: 46 BPM | HEIGHT: 64 IN | OXYGEN SATURATION: 99 % | SYSTOLIC BLOOD PRESSURE: 136 MMHG

## 2018-08-23 DIAGNOSIS — I10 HYPERTENSION, UNSPECIFIED TYPE: Primary | ICD-10-CM

## 2018-08-23 LAB
ALBUMIN SERPL BCP-MCNC: 3.3 G/DL
ALP SERPL-CCNC: 122 U/L
ALT SERPL W/O P-5'-P-CCNC: 7 U/L
ANION GAP SERPL CALC-SCNC: 11 MMOL/L
AST SERPL-CCNC: 12 U/L
BASOPHILS # BLD AUTO: 0.03 K/UL
BASOPHILS NFR BLD: 0.4 %
BILIRUB SERPL-MCNC: 0.3 MG/DL
BUN SERPL-MCNC: 48 MG/DL
CALCIUM SERPL-MCNC: 8.8 MG/DL
CHLORIDE SERPL-SCNC: 102 MMOL/L
CO2 SERPL-SCNC: 15 MMOL/L
CREAT SERPL-MCNC: 7.3 MG/DL
DIFFERENTIAL METHOD: ABNORMAL
EOSINOPHIL # BLD AUTO: 0.1 K/UL
EOSINOPHIL NFR BLD: 1.1 %
ERYTHROCYTE [DISTWIDTH] IN BLOOD BY AUTOMATED COUNT: 15 %
EST. GFR  (AFRICAN AMERICAN): 6 ML/MIN/1.73 M^2
EST. GFR  (NON AFRICAN AMERICAN): 5 ML/MIN/1.73 M^2
GLUCOSE SERPL-MCNC: 97 MG/DL
HCT VFR BLD AUTO: 33.9 %
HGB BLD-MCNC: 10.8 G/DL
LACTATE SERPL-SCNC: 0.7 MMOL/L
LYMPHOCYTES # BLD AUTO: 3.2 K/UL
LYMPHOCYTES NFR BLD: 44.1 %
MCH RBC QN AUTO: 30.4 PG
MCHC RBC AUTO-ENTMCNC: 31.9 G/DL
MCV RBC AUTO: 96 FL
MONOCYTES # BLD AUTO: 0.6 K/UL
MONOCYTES NFR BLD: 7.8 %
NEUTROPHILS # BLD AUTO: 3.3 K/UL
NEUTROPHILS NFR BLD: 46.6 %
PLATELET # BLD AUTO: 229 K/UL
PMV BLD AUTO: 9 FL
POTASSIUM SERPL-SCNC: 4.7 MMOL/L
PROT SERPL-MCNC: 6.6 G/DL
RBC # BLD AUTO: 3.55 M/UL
SODIUM SERPL-SCNC: 128 MMOL/L
WBC # BLD AUTO: 7.17 K/UL

## 2018-08-23 PROCEDURE — 80053 COMPREHEN METABOLIC PANEL: CPT

## 2018-08-23 PROCEDURE — 87040 BLOOD CULTURE FOR BACTERIA: CPT

## 2018-08-23 PROCEDURE — 85025 COMPLETE CBC W/AUTO DIFF WBC: CPT

## 2018-08-23 PROCEDURE — 99284 EMERGENCY DEPT VISIT MOD MDM: CPT | Mod: 25

## 2018-08-23 PROCEDURE — 83605 ASSAY OF LACTIC ACID: CPT
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
## 2018-08-23 NOTE — ED NOTES
Was sent from dialysis. States had approx 1 hour left to infuse, but began having a HA and generalized pain everywhere, as well as C/C/C. Pt afebrile, denies CP, breath sounds clear and equal bilat. Pt also states was here two days ago for same symptoms. Pt placed in gown, on bedside cardiac monitor. Pt calm and cooperative. Resting in bed.

## 2018-08-28 LAB — BACTERIA BLD CULT: NORMAL

## 2018-09-14 ENCOUNTER — TELEPHONE (OUTPATIENT)
Dept: PHARMACY | Facility: CLINIC | Age: 68
End: 2018-09-14

## 2018-09-25 ENCOUNTER — TELEPHONE (OUTPATIENT)
Dept: PHARMACY | Facility: CLINIC | Age: 68
End: 2018-09-25

## 2018-09-27 NOTE — TELEPHONE ENCOUNTER
Veltassa follow up:  Patient reached for follow up of Veltassa. She called for refill on 9/25/18 with 0 doses remaining.  Medication should be arriving at her home today, so she will miss 1-2 doses due to delay in reaching patient for refill.  She mentioned beginning Ibuprofen 800mg doses for pain as needed, but says she is taking it mostly at night and remembers to separate it from the Veltassa by at least 3 hours.  She feels pretty well, despite having some pain.  She says the high dose ibuprofen is helpful and she is looking forward to starting PT on Monday 10/1/18 to help with her pain and mobility.  She does experience some constipation, but confirms her provider warned her of this.  Discussed using the stool softeners and increasing her fluid intake as much as possible to help relieve constipation.  She reports appropriate storage of the medication in the refrigerator and does not have any trouble remembering to take each dose out daily for administration.  She appreciated the call and us checking up on her.  OSP will reach back out in a few months for follow up and in 3 weeks for refill.

## 2018-09-28 ENCOUNTER — TELEPHONE (OUTPATIENT)
Dept: OTOLARYNGOLOGY | Facility: CLINIC | Age: 68
End: 2018-09-28

## 2018-09-28 NOTE — TELEPHONE ENCOUNTER
Spoke with pt and the pharmacy and informed that I would be calling in flonase nasal spray to [facility]. Pt verbalized understanding. Pharmacy received order and will be filling the medication.      ----- Message from Maday Driver sent at 9/28/2018  2:40 PM CDT -----  Contact: [facility]  Requesting a refill on Astelin nasal spray-[phone]. Thank you

## 2018-10-09 ENCOUNTER — HOSPITAL ENCOUNTER (EMERGENCY)
Facility: HOSPITAL | Age: 68
Discharge: HOME | End: 2018-10-09
Attending: EMERGENCY MEDICINE
Payer: MEDICARE

## 2018-10-09 VITALS
RESPIRATION RATE: 18 BRPM | DIASTOLIC BLOOD PRESSURE: 63 MMHG | HEART RATE: 70 BPM | SYSTOLIC BLOOD PRESSURE: 148 MMHG | BODY MASS INDEX: 37.62 KG/M2 | OXYGEN SATURATION: 100 % | TEMPERATURE: 98 F | HEIGHT: 64 IN | WEIGHT: 220.38 LBS

## 2018-10-09 DIAGNOSIS — T82.868A THROMBOSIS OF DIALYSIS VASCULAR ACCESS, INITIAL ENCOUNTER: Primary | ICD-10-CM

## 2018-10-09 LAB
ALBUMIN SERPL BCP-MCNC: 3.3 G/DL
ALP SERPL-CCNC: 121 U/L
ALT SERPL W/O P-5'-P-CCNC: 13 U/L
ANION GAP SERPL CALC-SCNC: 21 MMOL/L
AST SERPL-CCNC: 16 U/L
BASOPHILS # BLD AUTO: 0.02 K/UL
BASOPHILS NFR BLD: 0.2 %
BILIRUB SERPL-MCNC: 0.3 MG/DL
BNP SERPL-MCNC: 1991 PG/ML
BUN SERPL-MCNC: 111 MG/DL
CALCIUM SERPL-MCNC: 7.7 MG/DL
CHLORIDE SERPL-SCNC: 105 MMOL/L
CK SERPL-CCNC: 119 U/L
CO2 SERPL-SCNC: 13 MMOL/L
CREAT SERPL-MCNC: 11.2 MG/DL
DIFFERENTIAL METHOD: ABNORMAL
EOSINOPHIL # BLD AUTO: 0.2 K/UL
EOSINOPHIL NFR BLD: 1.8 %
ERYTHROCYTE [DISTWIDTH] IN BLOOD BY AUTOMATED COUNT: 15.7 %
EST. GFR  (AFRICAN AMERICAN): 4 ML/MIN/1.73 M^2
EST. GFR  (NON AFRICAN AMERICAN): 3 ML/MIN/1.73 M^2
GLUCOSE SERPL-MCNC: 93 MG/DL
HCT VFR BLD AUTO: 32.6 %
HGB BLD-MCNC: 10.7 G/DL
LYMPHOCYTES # BLD AUTO: 2.5 K/UL
LYMPHOCYTES NFR BLD: 30.6 %
MCH RBC QN AUTO: 30.7 PG
MCHC RBC AUTO-ENTMCNC: 32.8 G/DL
MCV RBC AUTO: 94 FL
MONOCYTES # BLD AUTO: 0.6 K/UL
MONOCYTES NFR BLD: 7.6 %
NEUTROPHILS # BLD AUTO: 4.9 K/UL
NEUTROPHILS NFR BLD: 59.8 %
PLATELET # BLD AUTO: 280 K/UL
PMV BLD AUTO: 9.9 FL
POTASSIUM SERPL-SCNC: 4.9 MMOL/L
PROT SERPL-MCNC: 6.3 G/DL
RBC # BLD AUTO: 3.48 M/UL
SODIUM SERPL-SCNC: 139 MMOL/L
TROPONIN I SERPL DL<=0.01 NG/ML-MCNC: 0.03 NG/ML
WBC # BLD AUTO: 8.14 K/UL

## 2018-10-09 PROCEDURE — 99285 EMERGENCY DEPT VISIT HI MDM: CPT | Mod: 25

## 2018-10-09 PROCEDURE — 93010 ELECTROCARDIOGRAM REPORT: CPT | Mod: ,,, | Performed by: INTERNAL MEDICINE

## 2018-10-09 PROCEDURE — 36000 PLACE NEEDLE IN VEIN: CPT

## 2018-10-09 PROCEDURE — 80053 COMPREHEN METABOLIC PANEL: CPT

## 2018-10-09 PROCEDURE — 82550 ASSAY OF CK (CPK): CPT

## 2018-10-09 PROCEDURE — 93005 ELECTROCARDIOGRAM TRACING: CPT

## 2018-10-09 PROCEDURE — 84484 ASSAY OF TROPONIN QUANT: CPT

## 2018-10-09 PROCEDURE — 85025 COMPLETE CBC W/AUTO DIFF WBC: CPT

## 2018-10-09 PROCEDURE — 83880 ASSAY OF NATRIURETIC PEPTIDE: CPT

## 2018-10-09 RX ORDER — HYDRALAZINE HYDROCHLORIDE 50 MG/1
50 TABLET, FILM COATED ORAL 3 TIMES DAILY
Status: ON HOLD | COMMUNITY
End: 2019-01-26 | Stop reason: SDUPTHER

## 2018-10-09 NOTE — ED NOTES
Pt c/o SOB and racing heart this morning at 0300 - Pt states she sat at the side of her bed and took a clondine tablet. Pt went to her dialysis this morning and shunt is clotted, therefore they are not able to remove the fluid she has removed Tues, Thurs ,and Sat.    Patient moved to ED room 10, patient assisted onto stretcher and changed into a gown. Patient placed on cardiac monitor, continuous pulse oximetry and automatic blood pressure cuff. Bed placed in low locked position, side rails up x 2, call light is within reach of patient or family, orientation to room and explanation of wait provided to family and patient, alarms set and turned on for monitor and pulse ox, awaiting MD evaluation and orders, will continue to monitor.    Patient identifies self as Gillian Rich      Pt dialysis shunt located to left upper arm - thrill not felt, bruit not heard.    LOC: The patient is awake, alert and aware of environment with an appropriate affect, the patient is oriented x 3 and speaking appropriately.  APPEARANCE: Patient resting comfortably and in no acute distress, patient is clean and well groomed, patient's clothing is properly fastened.  SKIN: The skin is warm and dry, color consistent with ethnicity, patient has normal skin turgor and moist mucus membranes, skin intact, no breakdown or bruising noted.  MUSCULOSKELETAL: Patient moving all extremities well, no obvious swelling or deformities noted.  RESPIRATORY: Airway is open and patent, respirations are spontaneous, patient has a normal effort and rate, no accessory muscle use noted. Lung sounds are diminished in all lobes  CARDIAC: Patient has a normal rate and rhythm, +1 bilateral periphreal edema noted, capillary refill < 3 seconds.  ABDOMEN: Soft and non tender to palpation, no distention noted.  NEUROLOGIC: PERRL, eyes open spontaneously, behavior appropriate to situation, follows commands, facial expression symmetrical, bilateral hand grasp equal  and even, purposeful motor response noted, normal sensation in all extremities when touched with a finger.
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
## 2018-10-17 ENCOUNTER — TELEPHONE (OUTPATIENT)
Dept: PHARMACY | Facility: CLINIC | Age: 68
End: 2018-10-17

## 2018-10-17 NOTE — TELEPHONE ENCOUNTER
Documentation Only:    Prior Authorization for [employer] has been approved for 1 year    Approval dates: 10.17.18 until 10.17.19    Case ID#: [id number]    Patient co-pay: $0    Patient Assistance IS NOT required.    Forwarding to clinical pharmacist for consult and shipment.    LATIA 6:02pm

## 2018-10-24 ENCOUNTER — TELEPHONE (OUTPATIENT)
Dept: PHARMACY | Facility: CLINIC | Age: 68
End: 2018-10-24

## 2018-11-02 ENCOUNTER — HOSPITAL ENCOUNTER (EMERGENCY)
Facility: HOSPITAL | Age: 68
Discharge: HOME | End: 2018-11-02
Attending: FAMILY MEDICINE
Payer: MEDICARE

## 2018-11-02 VITALS
TEMPERATURE: 98 F | WEIGHT: 204 LBS | RESPIRATION RATE: 16 BRPM | HEART RATE: 89 BPM | SYSTOLIC BLOOD PRESSURE: 137 MMHG | DIASTOLIC BLOOD PRESSURE: 88 MMHG | OXYGEN SATURATION: 97 % | BODY MASS INDEX: 35.02 KG/M2

## 2018-11-02 DIAGNOSIS — I10 HYPERTENSION, UNSPECIFIED TYPE: ICD-10-CM

## 2018-11-02 DIAGNOSIS — S39.012A LUMBOSACRAL STRAIN, INITIAL ENCOUNTER: Primary | ICD-10-CM

## 2018-11-02 DIAGNOSIS — M54.9 BACK PAIN: ICD-10-CM

## 2018-11-02 DIAGNOSIS — M54.30 SCIATICA, UNSPECIFIED LATERALITY: ICD-10-CM

## 2018-11-02 PROCEDURE — 99283 EMERGENCY DEPT VISIT LOW MDM: CPT

## 2018-11-02 PROCEDURE — 25000003 PHARM REV CODE 250: Performed by: PHYSICIAN ASSISTANT

## 2018-11-02 RX ORDER — HYDROCODONE BITARTRATE AND ACETAMINOPHEN 5; 325 MG/1; MG/1
1 TABLET ORAL
Status: COMPLETED | OUTPATIENT
Start: 2018-11-02 | End: 2018-11-02

## 2018-11-02 RX ORDER — HYDROCODONE BITARTRATE AND ACETAMINOPHEN 5; 325 MG/1; MG/1
1 TABLET ORAL EVERY 4 HOURS PRN
Qty: 12 TABLET | Refills: 0 | OUTPATIENT
Start: 2018-11-02 | End: 2018-11-05

## 2018-11-02 RX ADMIN — HYDROCODONE BITARTRATE AND ACETAMINOPHEN 1 TABLET: 5; 325 TABLET ORAL at 03:11
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
## 2018-11-03 ENCOUNTER — TELEPHONE (OUTPATIENT)
Dept: OBSTETRICS AND GYNECOLOGY | Facility: HOSPITAL | Age: 68
End: 2018-11-03

## 2018-11-03 DIAGNOSIS — N83.209 CYST OF OVARY, UNSPECIFIED LATERALITY: Primary | ICD-10-CM

## 2018-11-03 NOTE — TELEPHONE ENCOUNTER
Please schedule a repeat u/s to look at her ov cyst again.   Please tell pt we got some records but they did not include her surgical operative note with Devan. Please have her go to request a paper copy to bring to her f/u appt w me (Pls make one for a week or 2 after u/s)

## 2018-11-05 ENCOUNTER — HOSPITAL ENCOUNTER (EMERGENCY)
Facility: HOSPITAL | Age: 68
Discharge: HOME | End: 2018-11-05
Attending: EMERGENCY MEDICINE
Payer: MEDICARE

## 2018-11-05 ENCOUNTER — TELEPHONE (OUTPATIENT)
Dept: NEPHROLOGY | Facility: CLINIC | Age: 68
End: 2018-11-05

## 2018-11-05 VITALS
RESPIRATION RATE: 16 BRPM | TEMPERATURE: 98 F | WEIGHT: 212.5 LBS | BODY MASS INDEX: 36.28 KG/M2 | OXYGEN SATURATION: 98 % | HEART RATE: 59 BPM | DIASTOLIC BLOOD PRESSURE: 67 MMHG | SYSTOLIC BLOOD PRESSURE: 163 MMHG | HEIGHT: 64 IN

## 2018-11-05 DIAGNOSIS — N18.6 ESRD (END STAGE RENAL DISEASE) ON DIALYSIS: ICD-10-CM

## 2018-11-05 DIAGNOSIS — M47.26 OSTEOARTHRITIS OF SPINE WITH RADICULOPATHY, LUMBAR REGION: Primary | ICD-10-CM

## 2018-11-05 DIAGNOSIS — Z99.2 ESRD (END STAGE RENAL DISEASE) ON DIALYSIS: ICD-10-CM

## 2018-11-05 DIAGNOSIS — M25.551 RIGHT HIP PAIN: ICD-10-CM

## 2018-11-05 PROCEDURE — 96372 THER/PROPH/DIAG INJ SC/IM: CPT

## 2018-11-05 PROCEDURE — 63600175 PHARM REV CODE 636 W HCPCS: Performed by: EMERGENCY MEDICINE

## 2018-11-05 PROCEDURE — 99284 EMERGENCY DEPT VISIT MOD MDM: CPT | Mod: 25

## 2018-11-05 RX ORDER — MORPHINE SULFATE 4 MG/ML
6 INJECTION, SOLUTION INTRAMUSCULAR; INTRAVENOUS
Status: COMPLETED | OUTPATIENT
Start: 2018-11-05 | End: 2018-11-05

## 2018-11-05 RX ORDER — HYDROCODONE BITARTRATE AND ACETAMINOPHEN 10; 325 MG/1; MG/1
1 TABLET ORAL EVERY 4 HOURS PRN
Qty: 18 TABLET | Refills: 0 | Status: SHIPPED | OUTPATIENT
Start: 2018-11-05 | End: 2018-11-14

## 2018-11-05 RX ORDER — METHYLPREDNISOLONE 4 MG/1
TABLET ORAL
Qty: 1 PACKAGE | Refills: 0 | Status: SHIPPED | OUTPATIENT
Start: 2018-11-05 | End: 2018-11-26

## 2018-11-05 RX ORDER — ORPHENADRINE CITRATE 30 MG/ML
30 INJECTION INTRAMUSCULAR; INTRAVENOUS
Status: COMPLETED | OUTPATIENT
Start: 2018-11-05 | End: 2018-11-05

## 2018-11-05 RX ADMIN — ORPHENADRINE CITRATE 30 MG: 30 INJECTION INTRAMUSCULAR; INTRAVENOUS at 09:11

## 2018-11-05 RX ADMIN — MORPHINE SULFATE 6 MG: 4 INJECTION, SOLUTION INTRAMUSCULAR; INTRAVENOUS at 09:11

## 2018-11-05 NOTE — TELEPHONE ENCOUNTER
----- Message from Zenaida Pineda sent at 11/5/2018  2:13 PM CST -----  Patient needs call back, will elaborate..[phone] (home)
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
## 2018-11-06 ENCOUNTER — TELEPHONE (OUTPATIENT)
Dept: OBSTETRICS AND GYNECOLOGY | Facility: CLINIC | Age: 68
End: 2018-11-06

## 2018-11-07 ENCOUNTER — TELEPHONE (OUTPATIENT)
Dept: OBSTETRICS AND GYNECOLOGY | Facility: CLINIC | Age: 68
End: 2018-11-07

## 2018-11-07 NOTE — LETTER
November 7, 2018      [facility] - OB/ GYN  [address]  [place] [address]  Phone: [phone]  Fax: [phone]       Patient: Gillian Rich   YOB: 1950  Date of Visit: 11/07/2018    To Whom It May Concern:    We have been trying to reach you unsuccessfully by phone. Dr. Mccann would like for you to give us a call to schedule a repeat ultrasound. Please give our clinic a call at your earliest convenience at [phone].    Sincerely,    Eloisa Coto LPN

## 2018-11-09 ENCOUNTER — TELEPHONE (OUTPATIENT)
Dept: NEPHROLOGY | Facility: CLINIC | Age: 68
End: 2018-11-09

## 2018-11-09 NOTE — TELEPHONE ENCOUNTER
S/W pt states she had a fall at [facility] a week ago.  Pt states she needs an order for MRI; advised pt to contact PCP; pt states Dr Kitchen serves as her PCP as well.  Please advise.

## 2018-11-12 ENCOUNTER — TELEPHONE (OUTPATIENT)
Dept: ORTHOPEDICS | Facility: CLINIC | Age: 68
End: 2018-11-12

## 2018-11-12 DIAGNOSIS — M25.561 ACUTE PAIN OF RIGHT KNEE: Primary | ICD-10-CM

## 2018-11-14 ENCOUNTER — HOSPITAL ENCOUNTER (OUTPATIENT)
Dept: RADIOLOGY | Facility: HOSPITAL | Age: 68
Discharge: HOME | End: 2018-11-14
Attending: PHYSICIAN ASSISTANT
Payer: MEDICARE

## 2018-11-14 ENCOUNTER — OFFICE VISIT (OUTPATIENT)
Dept: ORTHOPEDICS | Facility: CLINIC | Age: 68
End: 2018-11-14
Payer: MEDICARE

## 2018-11-14 VITALS
BODY MASS INDEX: 36.28 KG/M2 | HEART RATE: 58 BPM | SYSTOLIC BLOOD PRESSURE: 177 MMHG | WEIGHT: 212.5 LBS | HEIGHT: 64 IN | RESPIRATION RATE: 12 BRPM | DIASTOLIC BLOOD PRESSURE: 83 MMHG

## 2018-11-14 DIAGNOSIS — M17.11 PRIMARY OSTEOARTHRITIS OF RIGHT KNEE: Primary | ICD-10-CM

## 2018-11-14 DIAGNOSIS — M25.551 RIGHT HIP PAIN: ICD-10-CM

## 2018-11-14 DIAGNOSIS — R93.7 ABNORMAL X-RAY OF LUMBAR SPINE: ICD-10-CM

## 2018-11-14 DIAGNOSIS — N18.6 ESRD (END STAGE RENAL DISEASE): ICD-10-CM

## 2018-11-14 DIAGNOSIS — M70.61 GREATER TROCHANTERIC BURSITIS OF RIGHT HIP: ICD-10-CM

## 2018-11-14 DIAGNOSIS — I73.9 PERIPHERAL VASCULAR DISEASE: ICD-10-CM

## 2018-11-14 DIAGNOSIS — M47.816 LUMBAR FACET ARTHROPATHY: ICD-10-CM

## 2018-11-14 DIAGNOSIS — M51.36 DDD (DEGENERATIVE DISC DISEASE), LUMBAR: ICD-10-CM

## 2018-11-14 DIAGNOSIS — M25.561 ACUTE PAIN OF RIGHT KNEE: ICD-10-CM

## 2018-11-14 PROCEDURE — 73564 X-RAY EXAM KNEE 4 OR MORE: CPT | Mod: 26,RT,, | Performed by: RADIOLOGY

## 2018-11-14 PROCEDURE — 73562 X-RAY EXAM OF KNEE 3: CPT | Mod: 26,XS,LT, | Performed by: RADIOLOGY

## 2018-11-14 PROCEDURE — 99999 PR PBB SHADOW E&M-EST. PATIENT-LVL V: CPT | Mod: PBBFAC,,, | Performed by: PHYSICIAN ASSISTANT

## 2018-11-14 PROCEDURE — 73564 X-RAY EXAM KNEE 4 OR MORE: CPT | Mod: TC,FY,PO,RT

## 2018-11-14 PROCEDURE — 99215 OFFICE O/P EST HI 40 MIN: CPT | Mod: PBBFAC,25,PO | Performed by: PHYSICIAN ASSISTANT

## 2018-11-14 PROCEDURE — 99214 OFFICE O/P EST MOD 30 MIN: CPT | Mod: S$PBB,,, | Performed by: PHYSICIAN ASSISTANT

## 2018-11-14 RX ORDER — CYCLOBENZAPRINE HCL 5 MG
5 TABLET ORAL NIGHTLY PRN
Qty: 10 TABLET | Refills: 0 | Status: SHIPPED | OUTPATIENT
Start: 2018-11-14 | End: 2020-12-29 | Stop reason: SDUPTHER

## 2018-11-14 RX ORDER — METHOCARBAMOL 500 MG/1
500 TABLET, FILM COATED ORAL 3 TIMES DAILY PRN
Qty: 30 TABLET | Refills: 0 | Status: ON HOLD | OUTPATIENT
Start: 2018-11-14 | End: 2020-07-26 | Stop reason: HOSPADM

## 2018-11-14 RX ORDER — METOPROLOL SUCCINATE 25 MG/1
TABLET, EXTENDED RELEASE ORAL
Refills: 3 | Status: ON HOLD | COMMUNITY
Start: 2018-10-12 | End: 2019-01-26 | Stop reason: SDUPTHER

## 2018-11-14 NOTE — Clinical Note
This lady not looking for pain meds but likely needs L5 NAWAF.  I had staff schedule her with you so Misty could get her injected.  THanks!
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
## 2018-11-15 PROBLEM — I73.9 PERIPHERAL VASCULAR DISEASE: Status: ACTIVE | Noted: 2018-11-15

## 2018-11-21 ENCOUNTER — OFFICE VISIT (OUTPATIENT)
Dept: PAIN MEDICINE | Facility: CLINIC | Age: 68
End: 2018-11-21
Payer: MEDICARE

## 2018-11-21 VITALS
HEART RATE: 70 BPM | WEIGHT: 212 LBS | RESPIRATION RATE: 18 BRPM | BODY MASS INDEX: 36.19 KG/M2 | HEIGHT: 64 IN | DIASTOLIC BLOOD PRESSURE: 84 MMHG | SYSTOLIC BLOOD PRESSURE: 158 MMHG

## 2018-11-21 DIAGNOSIS — M51.36 BULGING OF LUMBAR INTERVERTEBRAL DISC: ICD-10-CM

## 2018-11-21 DIAGNOSIS — M43.10 RETROLISTHESIS OF VERTEBRAE: ICD-10-CM

## 2018-11-21 DIAGNOSIS — M51.36 DDD (DEGENERATIVE DISC DISEASE), LUMBAR: ICD-10-CM

## 2018-11-21 DIAGNOSIS — M47.817 LUMBOSACRAL SPONDYLOSIS WITHOUT MYELOPATHY: ICD-10-CM

## 2018-11-21 DIAGNOSIS — M54.16 LUMBAR RADICULOPATHY: Primary | ICD-10-CM

## 2018-11-21 PROCEDURE — 99999 PR PBB SHADOW E&M-EST. PATIENT-LVL IV: CPT | Mod: PBBFAC,,, | Performed by: PHYSICIAN ASSISTANT

## 2018-11-21 PROCEDURE — 99214 OFFICE O/P EST MOD 30 MIN: CPT | Mod: S$PBB,,, | Performed by: PHYSICIAN ASSISTANT

## 2018-11-21 PROCEDURE — 99214 OFFICE O/P EST MOD 30 MIN: CPT | Mod: PBBFAC | Performed by: PHYSICIAN ASSISTANT

## 2018-11-21 RX ORDER — GABAPENTIN 300 MG/1
CAPSULE ORAL
Qty: 60 CAPSULE | Refills: 0 | Status: SHIPPED | OUTPATIENT
Start: 2018-11-21 | End: 2021-02-25 | Stop reason: ALTCHOICE

## 2018-11-21 NOTE — LETTER
November 21, 2018      Elle Baltazar PA-C  [address] [facility] [address]           [facility]  [id number] [place] [id number]  Phone: [phone]  Fax: [phone]          Patient: Gillian Rich   MR Number: [id number]   YOB: 1950   Date of Visit: 11/21/2018       Dear Elle Baltazar:    Thank you for referring Gillian Rich to me for evaluation. Attached you will find relevant portions of my assessment and plan of care.    If you have questions, please do not hesitate to call me. I look forward to following Gillian Rich along with you.    Sincerely,    Triny Mejia PA-C    Enclosure  CC:  No Recipients    If you would like to receive this communication electronically, please contact [email or web address] or [phone] to request more information on [employer] access.    For providers and/or their staff who would like to refer a patient to [facility], please contact us through our one-stop-shop provider referral line, [facility], at [phone].    If you feel you have received this communication in error or would no longer like to receive these types of communications, please e-mail [email or web address]

## 2018-11-21 NOTE — PROGRESS NOTES
Chief Pain Complaint:  Low-back Pain        History of Present Illness:  This patient is a 68 y.o. female who presents today complaining of the above noted pain/s. The patient describes this pain as follows.    - duration of pain: ~ 4 weeks   - timing: constant   - character: aching, sharp  - radiating, dermatomal: extends into right leg posteriorly then wraps around laterally  - antecedent trauma, prior spinal surgery: patient reports prior trauma (fall at [facility] ~10/20/18), prior cervical surgery years ago  - pertinent negatives: No fever, No chills, No weight loss, No bladder dysfunction, No bowel dysfunction, No saddle anesthesia  - pertinent positives: right leg weakness    - medications, other therapies tried (physical therapy, injections):     >> Tylenol, flexeril, oxycodone    >> Has NOT previously undergone Physical Therapy    >> Has NOT previously undergone spinal injection/s    _________________________________________________________________________________________________________________________________________________________________________________________________________________________      IMAGING / Labs / Studies (reviewed on 11/21/2018):    Results for orders placed during the hospital encounter of 11/05/18   CT Lumbar Spine Without Contrast    Narrative TECHNIQUE:  Standard lumbar spine CT protocol was performed without IV contrast.    COMPARISON:  A plain film examination of the lumbar spine performed on 11/02/2018.    FINDINGS:  There are 5 lumbar-type vertebral bodies.  There is grade 1 retrolisthesis of L4 on L5.  There is grade 1 retrolisthesis of L5 on S1.  There are moderate degenerative changes between L1 and L2.  There are mild degenerative changes between L5 and S1.  There is mild concentric bulging of the intervertebral disc between L4 and L5.  There is no fracture or scoliosis.  There is normal lumbar lordosis.  There is a marked amount of atherosclerosis.  There are multiple cysts  scattered throughout the visualized portion of the kidneys.  The uterus is absent.  There is a cystic-appearing mass on the left side of the pelvis.      Impression 1. There is grade 1 retrolisthesis of L4 on L5. There is grade 1 retrolisthesis of L5 on S1.  2. There are moderate degenerative changes between L1 and L2. There are mild degenerative changes between L5 and S1.  3. There is mild concentric bulging of the intervertebral disc between L4 and L5.  4. There are multiple cysts scattered throughout the visualized portion of the kidneys.  5. There is a cystic-appearing mass on the left side of the pelvis.  This may be secondary to cysts or follicles associated with the left ovary.  6. There is a marked amount of atherosclerosis.  All CT scans at this facility use dose modulation, iterative reconstruction, and/or weight base dosing when appropriate to reduce radiation dose when appropriate to reduce radiation dose to as low as reasonably achievable.       Results for orders placed during the hospital encounter of 05/09/18   X-Ray Lumbar Spine Complete 5 View    Narrative EXAMINATION:  XR LUMBAR SPINE COMPLETE 5 VIEW    CLINICAL HISTORY:  Abnormal xray, lumbar spine, DJD;Abnormal findings on diagnostic imaging of other parts of musculoskeletal system    TECHNIQUE:  AP, lateral, spot and bilateral oblique views of the lumbar spine were performed.    COMPARISON:  None    FINDINGS:  There is Mild levoscoliosis of the thoracolumbar spine with the apex located at approximately the L1 level.  Single surgical clip noted in the right upper quadrant.  There is severe disc space narrowing spondylosis present at the L5-S1 level and moderate-to-severe disc space narrowing noted at the L1-2 level as well.  The remaining disc spaces are mildly to moderately narrowed.  Vascular calcification seen involving the aorta.  Facet arthropathy noted within the lower lumbar spine.         Results for orders placed during the hospital  encounter of 11/02/18   X-Ray Lumbar Spine Ap And Lateral    Narrative EXAMINATION:  XR LUMBAR SPINE AP AND LATERAL    CLINICAL HISTORY:  Dorsalgia, unspecifiedT/L-spine trauma, minor-mod, low back pain;    COMPARISON:  05/09/2018    FINDINGS:  There are 5 lumbar type vertebral bodies. There is no fracture, spondylolisthesis, or scoliosis. There is normal lumbar lordosis.  There are mild degenerative changes between L1 and L2.  There are mild degenerative changes between L5 and S1.  There is a moderate amount of atherosclerosis.  There is a surgical clip in the right upper quadrant of the abdomen.  There is a prominent amount of fecal material within the colon.      Impression 1. There are mild degenerative changes between L1 and L2. There are mild degenerative changes between L5 and S1.  2. There is a prominent amount of fecal material within the colon.  3. There is a moderate amount of atherosclerosis.  4. There is a surgical clip in the right upper quadrant of the abdomen.  This is characteristic of a prior cholecystectomy.     _________________________________________________________________________________________________________________________________________________________________________________________________________________________      Review of Systems:  CONSTITUTIONAL: patient reports fever  SKIN: patient denies any rash or itching  RESPIRATORY: patient reports dyspnea with exertion  GASTROINTESTINAL: patient reports constipation  GENITOURINARY: patient denies having any abnormal bladder function    MUSCULOSKELETAL:  - patient complains of the above noted pain/s (see chief pain complaint)    NEUROLOGICAL:   - pain as above  - strength in Lower extremities is decreased, on the RIGHT  - sensation in Lower extremities is abnormal, on the RIGHT  - patient denies any loss of bowel or bladder control      PSYCHIATRIC: patient denies any suicidal or homicidal  "ideations    _________________________________________________________________________________________________________________________________________________________________________________________________________________________      Physical Exam:  Ortho/SPM Exam    Vitals:  BP (!) 158/84 (BP Location: Right arm, Patient Position: Sitting, BP Method: Medium (Automatic))   Pulse 70   Resp 18   Ht 5' 4" (1.626 m)   Wt 96.2 kg (212 lb)   BMI 36.39 kg/m²   (reviewed on 11/21/2018)    General: alert and oriented, in no apparent distress.  Gait: normal gait.  Skin: no rashes, no discoloration, no obvious lesions  HEENT: normocephalic, atraumatic. Pupils equal and round.  Cardiovascular: no significant peripheral edema present.  Respiratory: without use of accessory muscles of respiration.    Musculoskeletal - Lumbar Spine:  - Pain on flexion of lumbar spine: Present  - Pain on extension of lumbar spine: Present  - Lumbar facet loading: Present  - TTP over the lumbar facet joints: Absent  - TTP over the lumbar paraspinals: Present on right  - TTP over the SI joints:  Present on right  - Straight Leg Raise: Positive on right    Right Knee:  - Crepitus present  - TTP over joint diffusely  - Pain with extension    Neuro - Lower Extremities:  - RLE Strength:     >> 5/5 strength in right ankle with plantar and dorsiflexion    >> 4/5 strength with right knee flexion/ extension    >> 5/5 strength with right hip flexion/ extension  - LLE Strength:     >> 5/5 strength in left ankle with plantar and dorsiflexion    >> 5/5 strength with knee flexion extension on the left     >> 5/5 strength with left hip flexion/ extension  - Extremity Reflexes: unable to obtain  - Sensory: Sensation to light touch intact bilaterally      Psych:  Mood and affect is appropriate "     _________________________________________________________________________________________________________________________________________________________________________________________________________________________      Assessment:  Gillian Rich is a 68 y.o. female who presents with    ICD-10-CM ICD-9-CM   1. Lumbar radiculopathy M54.16 724.4   2. DDD (degenerative disc disease), lumbar M51.36 722.52   3. Lumbosacral spondylosis without myelopathy M47.817 721.3   4. Bulging of lumbar intervertebral disc M51.26 722.10   5. Retrolisthesis of vertebrae M43.10 738.4         Plan:  1. Interventional: Consider NAWAF based on MRI.     2. Pharmacologic:   - Will start gabapentin 600mg QHS (with titration instructions, take 1 tablet QHS x 1 week, then increase to 2 capsules at night thereafter, increasing as tolerated).   - Continue Robaxin 500mg TID PRN and Tylenol 650mg PRN.     3. Rehabilitative: Encouraged regular exercise. Consider formal PT.     4. Diagnostic: Order open lumbar MRI. Sent to [facility].  (Previously scheduled at  Im[facility], but machines were down.)    5. Follow up: 3-4 weeks for MRI review    - I discussed the risks, benefits, and alternatives to potential treatment options. All questions and concerns were fully addressed today in clinic. Dr. Jay was consulted regarding the patient plan and agrees.

## 2018-11-24 RX ORDER — SEVELAMER HYDROCHLORIDE 800 MG/1
800 TABLET, FILM COATED ORAL
Qty: 390 TABLET | Refills: 5 | Status: SHIPPED | OUTPATIENT
Start: 2018-11-24 | End: 2019-01-09

## 2018-11-24 NOTE — PROGRESS NOTES
Patient does not like the Renvela that Lin switched her to. Will D/c renvela and restart RenaGel as patient reports she can tolerate that better.

## 2018-11-28 ENCOUNTER — TELEPHONE (OUTPATIENT)
Dept: ORTHOPEDICS | Facility: CLINIC | Age: 68
End: 2018-11-28

## 2018-11-28 NOTE — TELEPHONE ENCOUNTER
----- Message from Melvi Sims sent at 11/28/2018 10:20 AM CST -----  Contact: self  [phone]  Would like to consult with the nurse.  about  scheduling appt for MRI please call back. At [phone]. Thanks cherry.

## 2018-11-29 ENCOUNTER — TELEPHONE (OUTPATIENT)
Dept: PHARMACY | Facility: CLINIC | Age: 68
End: 2018-11-29

## 2018-12-03 ENCOUNTER — TELEPHONE (OUTPATIENT)
Dept: PAIN MEDICINE | Facility: CLINIC | Age: 68
End: 2018-12-03

## 2018-12-03 NOTE — TELEPHONE ENCOUNTER
----- Message from Adilson Llanos sent at 12/3/2018 12:23 PM CST -----  The pt will like to be seen for an f/u appt with Dr. Mejia.  The pt has pain in her back and side.  The pt would like to be seen on 12/7/18.  The pt can be reached at [phone]

## 2018-12-05 ENCOUNTER — DOCUMENTATION ONLY (OUTPATIENT)
Dept: NEPHROLOGY | Facility: CLINIC | Age: 68
End: 2018-12-05

## 2018-12-05 NOTE — H&P
History & Physical      Chief Complaint:  H&P    HPI:        68 y.o. AAF  ESRD on HD on TTS at [facility]. She is tolerating HD well. She receives HD via left AVF.          ROS:        Constitutional: Negative for fever, chills, weight loss, malaise/fatigue and diaphoresis.   HENT: Negative for hearing loss, ear pain, nosebleeds, congestion, sore throat, neck pain, tinnitus and ear discharge.    Eyes: Negative for blurred vision, double vision, photophobia, pain, discharge and redness.   Respiratory: Negative for cough, hemoptysis, sputum production, shortness of breath, wheezing and stridor.    Cardiovascular: Negative for chest pain, palpitations, orthopnea, claudication, leg swelling and PND.   Gastrointestinal: Negative for heartburn, nausea, vomiting, abdominal pain, diarrhea, constipation, blood in stool and melena.   Genitourinary: Negative for dysuria, urgency, frequency, hematuria and flank pain.   Musculoskeletal: Negative for myalgias, back pain, joint pain and falls.   Skin: Negative for itching and rash.   Neurological: Negative for dizziness, tingling, tremors, sensory change, speech change, focal weakness, seizures, loss of consciousness, weakness and headaches.   Endo/Heme/Allergies: Negative for environmental allergies and polydipsia. Does not bruise/bleed easily.   Psychiatric/Behavioral: Negative for depression, suicidal ideas, hallucinations, memory loss and substance abuse. The patient is not nervous/anxious and does not have insomnia.    All 14 systems reviewed and negative except as noted above.      PMHx:      Past Medical History:   Diagnosis Date    A-fib     On Xarelto 20 qd since 3/2017    Anxiety     CHF (congestive heart failure) 03/24/2018    Dx'd at [facility]    ESRD (end stage renal disease) on dialysis     HCV antibody positive 04/13/2018    Hypertension     Kidney disease     Pancytopenia     Recurrent pleural effusion on right     Started  following catheter placement c/b pneumothorax        PMSx:      Past Surgical History:   Procedure Laterality Date    ARTERIAL BYPASS SURGRY      bilateral breast cyst excisions      bilateral kidney cancer      s/p bilateral nephrectomy    CHOLECYSTECTOMY      COLONOSCOPY W/ POLYPECTOMY  02/04/2016    DR. KALIA ROMAN / [facility]. TUBALR ADENOMA MID TRANSVERSE AND DISTAL SIGMOID COLON, EARLY HYPERPLASTIC SPLENIC FLEXURE . REPEAT 3 YRS    left shoulder repair      NEPHRECTOMY Bilateral 2005    PARATHYROIDECTOMY          Social Hx:      Social History     Social History    Marital status: Single     Spouse name: N/A    Number of children: N/A    Years of education: N/A     Occupational History    Not on file.     Social History Main Topics    Smoking status: Light Tobacco Smoker    Smokeless tobacco: Never Used    Alcohol use No    Drug use: Unknown    Sexual activity: Not on file     Other Topics Concern    Not on file     Social History Narrative    No narrative on file        Family Hx:      No family history on file.     VITALS:          Physical Exam   Nursing Notes and Vital Signs Reviewed.     Constitutional: Well developed, well nourished. AAOx3, NAD, speech/ comprehension clear   Head: Atraumatic. Normocephalic.   Eyes: PERRL. EOMI. Conjunctivae are not pale. No scleral icterus.   ENT: Mucous membranes are dry. No tongue tremors. Throat clear.  Neck: Supple. No JVD or LN or Carotid Bruits noted B.  Cardiovascular: S1S2 RRR, no murmurs, rubs, or gallops. Distal pulses are 2+ and symmetric.   Pulmonary/Chest: No evidence of respiratory distress. Clear to auscultation bilaterally. No wheezing, rales or rhonchi. No chest wall TTP.   Abdominal: Soft and non-distended. There is no tenderness. No rebound, guarding, or rigidity. No organomegaly. No mass or viscera palpable  Musculoskeletal: FROM in all extremities. No deformities, no TTP, no edema. No midline spinal TTP. No step-offs. Pelvis is stable to  compression. No cyanosis. Moves all four extremities.   Skin: Skin is warm and dry.   Neurological: No gross neurological deficits, Strength 5/5 B, is equal in the upper and lower extremities bilaterally. No sensory deficits to light touch. No pronator drift.  DTRs are 2+ and equal throughout.   Psychiatric: Good eye contact. Normal Affect.      Laboratory Data:  Reviewed and noted in plan where applicable- Please see chart for full laboratory data.         Lab Results   Component Value Date    INR 1.1 01/02/2013    INR 1.1 11/06/2012       Lab Results   Component Value Date    WBC 3.83 (L) 04/11/2018    HGB 10.8 (L) 04/11/2018    HCT 32.8 (L) 04/11/2018     (H) 04/11/2018    PLT 90 (L) 04/11/2018       BMP  @[id number](GLU,NA,K,Cl,CO2,BUN,Creatinine,Calcium,MG)@      Radiology:  Reviewed and noted in plan where applicable- Please see chart for full radiology data.    Medications:  Current Outpatient Prescriptions   Medication Sig    atenolol (TENORMIN) 100 MG tablet take 1 tablet by mouth once daily    AURYXIA 210 mg iron Tab TAKE (2) TABLETS THREE TIMES DAILY WITH MEALS.    calcitRIOL (ROCALTROL) 0.5 MCG Cap TAKE  (1)  CAPSULE  TWICE DAILY.    clobetasol 0.05% (TEMOVATE) 0.05 % Oint Apply topically 2 (two) times daily.    cloNIDine (CATAPRES) 0.1 MG tablet TAKE ONE TABLET BY MOUTH THREE TIMES DAILY    hydrALAZINE (APRESOLINE) 100 MG tablet TAKE ONE TABLET BY MOUTH TWICE DAILY    hydrOXYzine pamoate (VISTARIL) 50 MG Cap Take 1 capsule (50 mg total) by mouth nightly as needed (insomnia, anxiety, itchiness).    morphine (MSIR) 15 MG tablet Take 15 mg by mouth 2 (two) times daily.    NIFEDIAC CC 90 mg TbSR TAKE ONE TABLET BY MOUTH TWICE DAILY    ondansetron (ZOFRAN) 4 MG tablet Take 1 tablet (4 mg total) by mouth every 8 (eight) hours as needed for Nausea.    oxymorphone (OPANA) 10 MG tablet Take 10 mg by mouth 4 (four) times daily.    RENVELA 800 mg Tab TAKE 4 TABLETS THREE TIMES DAILY WITH MEALS  AND 3 WITH SNACKS    XARELTO 20 mg Tab      No current facility-administered medications for this visit.          ASSESSMENT/PLAN:     ACTIVE PROBLEMS:    Patient Active Problem List   Diagnosis    Hypertension    Anemia in ESRD (end-stage renal disease)    Chronic hepatitis C without hepatic coma    Thrombocytopenia    History of colon polyps           PLAN:      Assessment and plan:    1.  ESRD: Doing very well on  dialysis    2.  Anemia continue Epogen and iron per protocol    3.  Hypertension much better controlled    4.  Hyperparathyroidism treat with vitamin D therapy.      Kaleigh Ragsdale, FNP-C

## 2018-12-07 ENCOUNTER — TELEPHONE (OUTPATIENT)
Dept: PHARMACY | Facility: CLINIC | Age: 68
End: 2018-12-07

## 2018-12-10 ENCOUNTER — OFFICE VISIT (OUTPATIENT)
Dept: PAIN MEDICINE | Facility: CLINIC | Age: 68
End: 2018-12-10
Payer: MEDICARE

## 2018-12-10 VITALS
DIASTOLIC BLOOD PRESSURE: 58 MMHG | HEIGHT: 64 IN | BODY MASS INDEX: 36.19 KG/M2 | WEIGHT: 212 LBS | RESPIRATION RATE: 18 BRPM | SYSTOLIC BLOOD PRESSURE: 120 MMHG | HEART RATE: 59 BPM

## 2018-12-10 DIAGNOSIS — M54.16 LUMBAR RADICULOPATHY: Primary | ICD-10-CM

## 2018-12-10 DIAGNOSIS — M48.061 LUMBAR FORAMINAL STENOSIS: ICD-10-CM

## 2018-12-10 DIAGNOSIS — M51.36 BULGING OF LUMBAR INTERVERTEBRAL DISC: ICD-10-CM

## 2018-12-10 DIAGNOSIS — M43.10 RETROLISTHESIS OF VERTEBRAE: ICD-10-CM

## 2018-12-10 DIAGNOSIS — M47.817 LUMBOSACRAL SPONDYLOSIS WITHOUT MYELOPATHY: ICD-10-CM

## 2018-12-10 DIAGNOSIS — M51.36 DDD (DEGENERATIVE DISC DISEASE), LUMBAR: ICD-10-CM

## 2018-12-10 PROCEDURE — 99214 OFFICE O/P EST MOD 30 MIN: CPT | Mod: S$PBB,,, | Performed by: PHYSICIAN ASSISTANT

## 2018-12-10 PROCEDURE — 99999 PR PBB SHADOW E&M-EST. PATIENT-LVL IV: CPT | Mod: PBBFAC,,, | Performed by: PHYSICIAN ASSISTANT

## 2018-12-10 PROCEDURE — 99214 OFFICE O/P EST MOD 30 MIN: CPT | Mod: PBBFAC,PO | Performed by: PHYSICIAN ASSISTANT

## 2018-12-10 RX ORDER — TRAMADOL HYDROCHLORIDE 50 MG/1
TABLET ORAL
Qty: 30 TABLET | Refills: 0 | Status: ON HOLD | OUTPATIENT
Start: 2018-12-10 | End: 2019-01-26 | Stop reason: SDUPTHER

## 2018-12-10 NOTE — PROGRESS NOTES
Chief Pain Complaint:  Low-back Pain        History of Present Illness:  This patient is a 68 y.o. female who presents today complaining of the above noted pain/s. The patient describes this pain as follows.    - duration of pain: since fall in 10/2018  - timing: constant   - character: aching, sharp  - radiating, dermatomal: extends into right leg posteriorly then wraps around laterally  - antecedent trauma, prior spinal surgery: patient reports prior trauma (fall at [facility] ~10/20/18), prior cervical surgery years ago  - pertinent negatives: No fever, No chills, No weight loss, No bladder dysfunction, No bowel dysfunction, No saddle anesthesia  - pertinent positives: right leg weakness    - medications, other therapies tried (physical therapy, injections):     >> Tylenol, flexeril, oxycodone    >> Has NOT previously undergone Physical Therapy    >> Has NOT previously undergone spinal injection/s    _________________________________________________________________________________________________________________________________________________________________________________________________________________________      IMAGING / Labs / Studies (reviewed on 12/10/2018):    12/2018 Lumbar MRI (scanned into media) - reviewed      Results for orders placed during the hospital encounter of 11/05/18   CT Lumbar Spine Without Contrast    Narrative TECHNIQUE:  Standard lumbar spine CT protocol was performed without IV contrast.    COMPARISON:  A plain film examination of the lumbar spine performed on 11/02/2018.    FINDINGS:  There are 5 lumbar-type vertebral bodies.  There is grade 1 retrolisthesis of L4 on L5.  There is grade 1 retrolisthesis of L5 on S1.  There are moderate degenerative changes between L1 and L2.  There are mild degenerative changes between L5 and S1.  There is mild concentric bulging of the intervertebral disc between L4 and L5.  There is no fracture or scoliosis.  There is normal lumbar lordosis.  There  is a marked amount of atherosclerosis.  There are multiple cysts scattered throughout the visualized portion of the kidneys.  The uterus is absent.  There is a cystic-appearing mass on the left side of the pelvis.      Impression 1. There is grade 1 retrolisthesis of L4 on L5. There is grade 1 retrolisthesis of L5 on S1.  2. There are moderate degenerative changes between L1 and L2. There are mild degenerative changes between L5 and S1.  3. There is mild concentric bulging of the intervertebral disc between L4 and L5.  4. There are multiple cysts scattered throughout the visualized portion of the kidneys.  5. There is a cystic-appearing mass on the left side of the pelvis.  This may be secondary to cysts or follicles associated with the left ovary.  6. There is a marked amount of atherosclerosis.  All CT scans at this facility use dose modulation, iterative reconstruction, and/or weight base dosing when appropriate to reduce radiation dose when appropriate to reduce radiation dose to as low as reasonably achievable.       Results for orders placed during the hospital encounter of 05/09/18   X-Ray Lumbar Spine Complete 5 View    Narrative EXAMINATION:  XR LUMBAR SPINE COMPLETE 5 VIEW    CLINICAL HISTORY:  Abnormal xray, lumbar spine, DJD;Abnormal findings on diagnostic imaging of other parts of musculoskeletal system    TECHNIQUE:  AP, lateral, spot and bilateral oblique views of the lumbar spine were performed.    COMPARISON:  None    FINDINGS:  There is Mild levoscoliosis of the thoracolumbar spine with the apex located at approximately the L1 level.  Single surgical clip noted in the right upper quadrant.  There is severe disc space narrowing spondylosis present at the L5-S1 level and moderate-to-severe disc space narrowing noted at the L1-2 level as well.  The remaining disc spaces are mildly to moderately narrowed.  Vascular calcification seen involving the aorta.  Facet arthropathy noted within the lower lumbar  spine.         Results for orders placed during the hospital encounter of 11/02/18   X-Ray Lumbar Spine Ap And Lateral    Narrative EXAMINATION:  XR LUMBAR SPINE AP AND LATERAL    CLINICAL HISTORY:  Dorsalgia, unspecifiedT/L-spine trauma, minor-mod, low back pain;    COMPARISON:  05/09/2018    FINDINGS:  There are 5 lumbar type vertebral bodies. There is no fracture, spondylolisthesis, or scoliosis. There is normal lumbar lordosis.  There are mild degenerative changes between L1 and L2.  There are mild degenerative changes between L5 and S1.  There is a moderate amount of atherosclerosis.  There is a surgical clip in the right upper quadrant of the abdomen.  There is a prominent amount of fecal material within the colon.      Impression 1. There are mild degenerative changes between L1 and L2. There are mild degenerative changes between L5 and S1.  2. There is a prominent amount of fecal material within the colon.  3. There is a moderate amount of atherosclerosis.  4. There is a surgical clip in the right upper quadrant of the abdomen.  This is characteristic of a prior cholecystectomy.     _________________________________________________________________________________________________________________________________________________________________________________________________________________________      Review of Systems:  CONSTITUTIONAL: patient reports fever  SKIN: patient denies any rash or itching  RESPIRATORY: patient reports dyspnea with exertion  GASTROINTESTINAL: patient reports constipation  GENITOURINARY: patient denies having any abnormal bladder function    MUSCULOSKELETAL:  - patient complains of the above noted pain/s (see chief pain complaint)    NEUROLOGICAL:   - pain as above  - strength in Lower extremities is decreased, on the RIGHT  - sensation in Lower extremities is abnormal, on the RIGHT  - patient denies any loss of bowel or bladder control      PSYCHIATRIC: patient denies any suicidal  "or homicidal ideations    _________________________________________________________________________________________________________________________________________________________________________________________________________________________      Physical Exam:  Vitals:  BP (!) 120/58 (BP Location: Right arm, Patient Position: Sitting, BP Method: Medium (Automatic))   Pulse (!) 59   Resp 18   Ht 5' 4" (1.626 m)   Wt 96.2 kg (212 lb)   BMI 36.39 kg/m²   (reviewed on 12/10/2018)    General: alert and oriented, in no apparent distress.  Gait: normal gait.  Skin: no rashes, no discoloration, no obvious lesions  HEENT: normocephalic, atraumatic. Pupils equal and round.  Cardiovascular: no significant peripheral edema present.  Respiratory: without use of accessory muscles of respiration.    Musculoskeletal - Lumbar Spine:  - Pain on flexion of lumbar spine: Present  - Pain on extension of lumbar spine: Present  - Lumbar facet loading: Present  - TTP over the lumbar facet joints: Absent  - TTP over the lumbar paraspinals: Present on right  - TTP over the SI joints:  Present on right  - Straight Leg Raise: Positive on right    Bilateral Knee:  - Crepitus present  - TTP over joint diffusely, worse on right  - Pain with extension    Neuro - Lower Extremities:  - RLE Strength:     >> 5/5 strength in right ankle with plantar and dorsiflexion    >> 4/5 strength with right knee flexion/ extension    >> 5/5 strength with right hip flexion/ extension  - LLE Strength:     >> 5/5 strength in left ankle with plantar and dorsiflexion    >> 5/5 strength with knee flexion extension on the left     >> 5/5 strength with left hip flexion/ extension  - Extremity Reflexes: unable to obtain  - Sensory: Sensation to light touch intact bilaterally      Psych:  Mood and affect is appropriate "     _________________________________________________________________________________________________________________________________________________________________________________________________________________________      Assessment:  Gillian Rich is a 68 y.o. female who presents with    ICD-10-CM ICD-9-CM   1. Lumbar radiculopathy M54.16 724.4   2. DDD (degenerative disc disease), lumbar M51.36 722.52   3. Lumbosacral spondylosis without myelopathy M47.817 721.3   4. Retrolisthesis of vertebrae M43.10 738.4   5. Bulging of lumbar intervertebral disc M51.26 722.10   6. Lumbar foraminal stenosis M99.83 724.02         Plan:  1. Interventional: Schedule right L2/3 + L3/4 TF NAWAF with local. Patient is not taking prescription blood thinners or ASA.     2. Pharmacologic:   - Will give tramadol 50mg #30 tablets (to take 1/2 to 1 tab up to BID PRN) as one time bridge to injection.   - Will d/c gabapentin 600mg QHS - as she states it causes weakness.  - Continue Tylenol 650mg PRN.     3. Rehabilitative: Encouraged regular exercise. Consider formal PT.     4. Diagnostic: Lumbar MRI reviewed, detailed above.    5. Follow up: 3-4 weeks for injection f/u    - I discussed the risks, benefits, and alternatives to potential treatment options. All questions and concerns were fully addressed today in clinic. Dr. Jay was consulted regarding the patient plan and agrees.

## 2018-12-11 ENCOUNTER — TELEPHONE (OUTPATIENT)
Dept: PAIN MEDICINE | Facility: CLINIC | Age: 68
End: 2018-12-11

## 2018-12-11 NOTE — TELEPHONE ENCOUNTER
Contacted patient; injection scheduled on 12/21/18 with Dr. Fierro. Instructions given verbally. Pt verbalized understanding.

## 2018-12-13 ENCOUNTER — TELEPHONE (OUTPATIENT)
Dept: PHARMACY | Facility: CLINIC | Age: 68
End: 2018-12-13

## 2018-12-21 ENCOUNTER — HOSPITAL ENCOUNTER (OUTPATIENT)
Facility: HOSPITAL | Age: 68
Discharge: HOME | End: 2018-12-21
Attending: PAIN MEDICINE | Admitting: PAIN MEDICINE
Payer: MEDICARE

## 2018-12-21 VITALS
HEART RATE: 90 BPM | DIASTOLIC BLOOD PRESSURE: 64 MMHG | TEMPERATURE: 98 F | SYSTOLIC BLOOD PRESSURE: 146 MMHG | OXYGEN SATURATION: 100 % | RESPIRATION RATE: 18 BRPM

## 2018-12-21 DIAGNOSIS — M54.16 LUMBAR RADICULOPATHY: ICD-10-CM

## 2018-12-21 PROCEDURE — 64483 NJX AA&/STRD TFRM EPI L/S 1: CPT | Mod: RT,,, | Performed by: PAIN MEDICINE

## 2018-12-21 PROCEDURE — 63600175 PHARM REV CODE 636 W HCPCS: Performed by: PAIN MEDICINE

## 2018-12-21 PROCEDURE — 64484 NJX AA&/STRD TFRM EPI L/S EA: CPT | Mod: RT,,, | Performed by: PAIN MEDICINE

## 2018-12-21 PROCEDURE — 25000003 PHARM REV CODE 250

## 2018-12-21 PROCEDURE — 63600175 PHARM REV CODE 636 W HCPCS

## 2018-12-21 RX ORDER — GADOBUTROL 604.72 MG/ML
4 INJECTION INTRAVENOUS
Status: COMPLETED | OUTPATIENT
Start: 2018-12-21 | End: 2018-12-21

## 2018-12-21 RX ORDER — DEXAMETHASONE SODIUM PHOSPHATE 10 MG/ML
INJECTION INTRAMUSCULAR; INTRAVENOUS
Status: DISCONTINUED | OUTPATIENT
Start: 2018-12-21 | End: 2018-12-21 | Stop reason: HOSPADM

## 2018-12-21 RX ADMIN — GADOBUTROL 4 ML: 604.72 INJECTION INTRAVENOUS at 01:12

## 2018-12-21 NOTE — OP NOTE
"Procedure: Lumbar Transforaminal Epidural Steroid Injection under Fluoroscopic Guidance (supraneural approach)    Level: L2/3, L3/4    Side: Right    PROCEDURE DATE: 12/21/2018    Pre-operative Diagnosis: Lumbar Radiculopathy  Post-operative Diagnosis: Lumbar Radiculopathy    Provider: KEENAN Fierro MD  Assistant(s): None    Anesthesia: Local, No Sedation    >> 0 mg of VERSED    >> 0 mcg of FENTANYL     Indication: Low back pain with radiculopathy consistent with distribution of targeted nerve. Symptoms unresponsive to conservative treatments. Fluoroscopy was used to optimize visualization of needle placement and to maximize safety.     Procedure Description / Technique:  The patient was seen and identified in the preoperative area. Risks, benefits, complications, and alternatives were discussed with the patient. The patient agreed to proceed with the procedure and signed the consent. The site and side of the procedure was identified and marked. An IV was not placed for this procedure. The patient was taken to the procedural suite.    The patient was positioned in prone orientation on procedure table and a pillow was placed under the abdomen to reduce lumbar lordosis. A time out was performed prior to any intervention. The procedure, site, side, and allergies were stated and agreed to by all present. The lumbosacral area was widely prepped with ChloraPrep. The procedural site was draped in usual sterile fashion. Vital signs were closely monitored throughout this procedure. Conscious sedation was not used for this procedure.    The target area was visualized under fluoroscopy. The cephalocaudal angle of the fluoroscope was adjusted as to align the vertebral end plates. The fluoroscopic arm was rotated ipsilaterally to an angle of approximately 30 degrees until the "charlotte dog" outline came into view and the tip of the inferior superior articular process pointed towards the midline, 6:00 position of the above " "pedicle. A 25 gauge 3.5 inch spinal needle was directed towards the "chin" of the "charlotte dog" (adjacent to the pars interarticularis and inferior to the pedicle). The needle was advanced until OS was met at the inferior border of the pedicle / pars interface. The needle was adjusted so that it would pass inferior to the osseous border. The fluoroscope was then placed in the lateral position and the needle was slowly advanced until it rested in the posterior 1/3rd of the vertebral foramen. AP fluoroscopy was checked and the needle tip rested at the 6:00 position under the pedicle. No paresthesia was elicited during needle placement. With the needle tip in its final position, gentle aspiration was negative for blood and CSF. MultiHance (gadobenate) 529 mg/mL (1 to 2 mL) was injected under live fluoroscopy. Microbore tubing was used for injection. There was no pain or paresthesia on injection. The contrast clearly delineated the targeted nerve root on AP fluoroscopy. No vascular uptake was seen. A solution containing 2 mL of 1% PF Lidocaine and 2 mL of Dexamethasone (10 mg/mL) was mixed and 2 mL was injected slowly at each level targeted. There was minimal resistance on injection. No pain or paresthesia was elicited on injection. The stylet was replaced and the needle was withdrawn intact. This procedure was performed for each of the above indicated levels.     Description of Findings: Not applicable    Prosthetic devices, grafts, tissues, or devices implanted: None    Specimen Removed: No    Estimated Blood Loss: minimal    COMPLICATIONS: None    DISPOSITION / PLANS: The patient was transferred to the recovery area in a stable condition for observation. The patient was reexamined prior to discharge. There was no evidence of acute neurologic injury following the procedure.  Patient was discharged from the recovery room after meeting discharge criteria. Home discharge instructions were given to the patient by the " staff.

## 2018-12-21 NOTE — PLAN OF CARE
Problem: Adult Inpatient Plan of Care  Goal: Plan of Care Review  Outcome: Outcome(s) achieved Date Met: 12/21/18  Patient discharged home in stable condition via wheelchair with ride. Verbalized understanding of discharge instructions. Patient voiced no complaints at this time. Patient stood at side of bed, walked steps with no new motor or sensory deficits. Neurologically intact.

## 2019-01-04 DIAGNOSIS — J32.9 OTHER SINUSITIS, UNSPECIFIED CHRONICITY: Primary | ICD-10-CM

## 2019-01-04 RX ORDER — AZITHROMYCIN 250 MG/1
250 TABLET, FILM COATED ORAL DAILY
Qty: 6 TABLET | Refills: 0 | Status: ON HOLD | OUTPATIENT
Start: 2019-01-04 | End: 2019-01-26 | Stop reason: HOSPADM

## 2019-01-04 RX ORDER — FLUTICASONE PROPIONATE 50 MCG
2 SPRAY, SUSPENSION (ML) NASAL DAILY
Qty: 1 BOTTLE | Refills: 5 | Status: SHIPPED | OUTPATIENT
Start: 2019-01-04 | End: 2019-03-06 | Stop reason: SDUPTHER

## 2019-01-08 ENCOUNTER — TELEPHONE (OUTPATIENT)
Dept: PHARMACY | Facility: CLINIC | Age: 69
End: 2019-01-08

## 2019-01-08 ENCOUNTER — OFFICE VISIT (OUTPATIENT)
Dept: PAIN MEDICINE | Facility: CLINIC | Age: 69
End: 2019-01-08
Payer: MEDICARE

## 2019-01-08 VITALS
DIASTOLIC BLOOD PRESSURE: 85 MMHG | SYSTOLIC BLOOD PRESSURE: 139 MMHG | BODY MASS INDEX: 36.19 KG/M2 | HEIGHT: 64 IN | RESPIRATION RATE: 18 BRPM | WEIGHT: 212 LBS | HEART RATE: 58 BPM

## 2019-01-08 DIAGNOSIS — M43.10 RETROLISTHESIS OF VERTEBRAE: ICD-10-CM

## 2019-01-08 DIAGNOSIS — M51.36 BULGING OF LUMBAR INTERVERTEBRAL DISC: ICD-10-CM

## 2019-01-08 DIAGNOSIS — M54.16 LUMBAR RADICULOPATHY: Primary | ICD-10-CM

## 2019-01-08 DIAGNOSIS — M47.817 LUMBOSACRAL SPONDYLOSIS WITHOUT MYELOPATHY: ICD-10-CM

## 2019-01-08 DIAGNOSIS — M51.36 DDD (DEGENERATIVE DISC DISEASE), LUMBAR: ICD-10-CM

## 2019-01-08 PROCEDURE — 99999 PR PBB SHADOW E&M-EST. PATIENT-LVL IV: ICD-10-PCS | Mod: PBBFAC,,, | Performed by: PHYSICIAN ASSISTANT

## 2019-01-08 PROCEDURE — 99214 OFFICE O/P EST MOD 30 MIN: CPT | Mod: S$PBB,,, | Performed by: PHYSICIAN ASSISTANT

## 2019-01-08 PROCEDURE — 99999 PR PBB SHADOW E&M-EST. PATIENT-LVL IV: CPT | Mod: PBBFAC,,, | Performed by: PHYSICIAN ASSISTANT

## 2019-01-08 PROCEDURE — 99214 PR OFFICE/OUTPT VISIT, EST, LEVL IV, 30-39 MIN: ICD-10-PCS | Mod: S$PBB,,, | Performed by: PHYSICIAN ASSISTANT

## 2019-01-08 PROCEDURE — 99214 OFFICE O/P EST MOD 30 MIN: CPT | Mod: PBBFAC | Performed by: PHYSICIAN ASSISTANT

## 2019-01-08 RX ORDER — CLONIDINE HYDROCHLORIDE 0.3 MG/1
0.3 TABLET ORAL 3 TIMES DAILY
Qty: 90 TABLET | Refills: 3 | Status: ON HOLD | OUTPATIENT
Start: 2019-01-08 | End: 2019-01-26 | Stop reason: SDUPTHER

## 2019-01-08 NOTE — TELEPHONE ENCOUNTER
[facility] confirmed with patient; name/ confirmed; no missed doses; no new medications; no side effects noted; address confirmed for  shipment and  delivery.  Patient states they have 0 doses remaining.     Patient was rushed to get off the phone and was unable to complete clinical f/u.  Will re-attempt next refill.     MAURICE Porter.Ph., AAHIVP  Clinical Pharmacist, HIV/HCV  [facility]  Phone: [phone]

## 2019-01-08 NOTE — TELEPHONE ENCOUNTER
----- Message from Anna Nowak sent at 1/8/2019  8:59 AM CST -----  Contact: self [phone]  Would like to consult with nurse regarding changing dosage on Clonidine medication. Please calll back at [phone].  Md Valerie

## 2019-01-08 NOTE — PROGRESS NOTES
Chief Pain Complaint:  Low-back Pain      Interval History: Patient was seen on 12/21/18. At that time she underwent right L2/3 + L3/4 TF NAWAF with local.  The patient reports that she is/was better following the procedure.  she reports 75% pain relief.  The changes lasted >2 weeks so far.  The changes have continued through this visit, but she reports the pain comes and goes.    She states that when she had the injection that Dr. Fierro mentioned to her that she needed a series of 3 injections for both sides.  She states that both of her legs bother her and that she mentioned this last visit, but this is the first I have heard about bilateral leg pain.  Also, she was referred to us for right leg pain by orthopedics.  It seems that her pain has improved, but she believes that both sides of her back need to be treated.       History of Present Illness:  This patient is a 68 y.o. female who presents today complaining of the above noted pain/s. The patient describes this pain as follows.    - duration of pain: since fall in 10/2018  - timing: constant   - character: aching, sharp  - radiating, dermatomal: historically only extends into right leg posteriorly then wraps around laterally, now reporting into BLE  - antecedent trauma, prior spinal surgery: patient reports prior trauma (fall at [facility] ~10/20/18), prior cervical surgery years ago  - pertinent negatives: No fever, No chills, No weight loss, No bladder dysfunction, No bowel dysfunction, No saddle anesthesia  - pertinent positives: right leg weakness    - medications, other therapies tried (physical therapy, injections):     >> Tylenol, flexeril, oxycodone    >> Has NOT previously undergone Physical Therapy    >> Has previously undergone spinal injection/s:   - right L2/3 + L3/4 TF NAWAF with local on 12/21/18 with 75% pain relief w/   Fierro    _________________________________________________________________________________________________________________________________________________________________________________________________________________________      IMAGING / Labs / Studies (reviewed on 1/8/2019):    12/2018 Lumbar MRI (scanned into media)         Results for orders placed during the hospital encounter of 11/05/18   CT Lumbar Spine Without Contrast    Narrative TECHNIQUE:  Standard lumbar spine CT protocol was performed without IV contrast.  COMPARISON:  A plain film examination of the lumbar spine performed on 11/02/2018.  FINDINGS:  There are 5 lumbar-type vertebral bodies.  There is grade 1 retrolisthesis of L4 on L5.  There is grade 1 retrolisthesis of L5 on S1.  There are moderate degenerative changes between L1 and L2.  There are mild degenerative changes between L5 and S1.  There is mild concentric bulging of the intervertebral disc between L4 and L5.  There is no fracture or scoliosis.  There is normal lumbar lordosis.  There is a marked amount of atherosclerosis.  There are multiple cysts scattered throughout the visualized portion of the kidneys.  The uterus is absent.  There is a cystic-appearing mass on the left side of the pelvis.      Impression 1. There is grade 1 retrolisthesis of L4 on L5. There is grade 1 retrolisthesis of L5 on S1.  2. There are moderate degenerative changes between L1 and L2. There are mild degenerative changes between L5 and S1.  3. There is mild concentric bulging of the intervertebral disc between L4 and L5.  4. There are multiple cysts scattered throughout the visualized portion of the kidneys.  5. There is a cystic-appearing mass on the left side of the pelvis.  This may be secondary to cysts or follicles associated with the left ovary.  6. There is a marked amount of atherosclerosis.  All CT scans at this facility use dose modulation, iterative reconstruction, and/or weight base dosing when  appropriate to reduce radiation dose when appropriate to reduce radiation dose to as low as reasonably achievable.       Results for orders placed during the hospital encounter of 05/09/18   X-Ray Lumbar Spine Complete 5 View    Narrative FINDINGS:  There is Mild levoscoliosis of the thoracolumbar spine with the apex located at approximately the L1 level.  Single surgical clip noted in the right upper quadrant.  There is severe disc space narrowing spondylosis present at the L5-S1 level and moderate-to-severe disc space narrowing noted at the L1-2 level as well.  The remaining disc spaces are mildly to moderately narrowed.  Vascular calcification seen involving the aorta.  Facet arthropathy noted within the lower lumbar spine.       Results for orders placed during the hospital encounter of 11/02/18   X-Ray Lumbar Spine Ap And Lateral    Narrative FINDINGS:  There are 5 lumbar type vertebral bodies. There is no fracture, spondylolisthesis, or scoliosis. There is normal lumbar lordosis.  There are mild degenerative changes between L1 and L2.  There are mild degenerative changes between L5 and S1.  There is a moderate amount of atherosclerosis.  There is a surgical clip in the right upper quadrant of the abdomen.  There is a prominent amount of fecal material within the colon.      Impression 1. There are mild degenerative changes between L1 and L2. There are mild degenerative changes between L5 and S1.  2. There is a prominent amount of fecal material within the colon.  3. There is a moderate amount of atherosclerosis.  4. There is a surgical clip in the right upper quadrant of the abdomen.  This is characteristic of a prior cholecystectomy.     _________________________________________________________________________________________________________________________________________________________________________________________________________________________      Review of Systems:  CONSTITUTIONAL: patient reports  "fever  SKIN: patient denies any rash or itching  RESPIRATORY: patient reports dyspnea with exertion  GASTROINTESTINAL: patient reports constipation  GENITOURINARY: patient denies having any abnormal bladder function    MUSCULOSKELETAL:  - patient complains of the above noted pain/s (see chief pain complaint)    NEUROLOGICAL:   - pain as above  - strength in Lower extremities is decreased, on the RIGHT  - sensation in Lower extremities is abnormal, on the RIGHT  - patient denies any loss of bowel or bladder control      PSYCHIATRIC: patient denies any suicidal or homicidal ideations    _________________________________________________________________________________________________________________________________________________________________________________________________________________________      Physical Exam:  Vitals:  /85 (BP Location: Right arm, Patient Position: Sitting, BP Method: Medium (Automatic))   Pulse (!) 58   Resp 18   Ht 5' 4" (1.626 m)   Wt 96.2 kg (212 lb)   BMI 36.39 kg/m²   (reviewed on 1/8/2019)    General: alert and oriented, in no apparent distress.  Gait: normal gait.  Skin: no rashes, no discoloration, no obvious lesions  HEENT: normocephalic, atraumatic. Pupils equal and round.  Cardiovascular: no significant peripheral edema present.  Respiratory: without use of accessory muscles of respiration.    Musculoskeletal - Lumbar Spine:  - Pain on flexion of lumbar spine: Present  - Pain on extension of lumbar spine: Present  - Lumbar facet loading: Present  - TTP over the lumbar facet joints: Absent  - TTP over the lumbar paraspinals: Present on right  - TTP over the SI joints:  Present on right  - Straight Leg Raise: Positive on right    Bilateral Knee:  - Crepitus present  - TTP over joint diffusely, worse on right  - Pain with extension    Neuro - Lower Extremities:  - RLE Strength:     >> 5/5 strength in right ankle with plantar and dorsiflexion    >> 4/5 strength with right " knee flexion/ extension    >> 5/5 strength with right hip flexion/ extension  - LLE Strength:     >> 5/5 strength in left ankle with plantar and dorsiflexion    >> 5/5 strength with knee flexion extension on the left     >> 5/5 strength with left hip flexion/ extension  - Extremity Reflexes: unable to obtain  - Sensory: Sensation to light touch intact bilaterally      Psych:  Mood and affect is appropriate     _________________________________________________________________________________________________________________________________________________________________________________________________________________________      Assessment:  Gillian Rich is a 68 y.o. female who presents with    ICD-10-CM ICD-9-CM   1. Lumbar radiculopathy M54.16 724.4   2. DDD (degenerative disc disease), lumbar M51.36 722.52   3. Lumbosacral spondylosis without myelopathy M47.817 721.3   4. Retrolisthesis of vertebrae M43.10 738.4   5. Bulging of lumbar intervertebral disc M51.26 722.10       Plan:  1. Interventional:   - S/p right L2/3 + L3/4 TF NAWAF with local on 12/21/18 with 75% pain relief w/ Dr. Fierro.  - Schedule bilateral L5/S1 TF NAWAF with local. Patient is not taking prescription blood thinners or ASA.     2. Pharmacologic:   - Continue tramadol 50mg #30 tablets (to take 1/2 to 1 tab up to BID PRN) as one time bridge to injection.   - She could not tolerate gabapentin 600mg QHS - as she states it causes weakness.  - Continue Tylenol 650mg PRN.     3. Rehabilitative: Encouraged regular exercise. Consider formal PT.     4. Diagnostic: None.    5. Follow up: 3-4 weeks for injection f/u    - I discussed the risks, benefits, and alternatives to potential treatment options. All questions and concerns were fully addressed today in clinic. Dr. Jay was consulted regarding the patient plan and agrees.

## 2019-01-09 ENCOUNTER — HOSPITAL ENCOUNTER (EMERGENCY)
Facility: HOSPITAL | Age: 69
Discharge: HOME | End: 2019-01-09
Attending: FAMILY MEDICINE
Payer: MEDICARE

## 2019-01-09 VITALS
SYSTOLIC BLOOD PRESSURE: 176 MMHG | HEART RATE: 78 BPM | DIASTOLIC BLOOD PRESSURE: 73 MMHG | HEIGHT: 64 IN | OXYGEN SATURATION: 99 % | RESPIRATION RATE: 15 BRPM | WEIGHT: 204.56 LBS | TEMPERATURE: 98 F | BODY MASS INDEX: 34.92 KG/M2

## 2019-01-09 DIAGNOSIS — Z99.2 ESRD (END STAGE RENAL DISEASE) ON DIALYSIS: ICD-10-CM

## 2019-01-09 DIAGNOSIS — E87.6 HYPOKALEMIA: Primary | ICD-10-CM

## 2019-01-09 DIAGNOSIS — R00.1 BRADYCARDIA: ICD-10-CM

## 2019-01-09 DIAGNOSIS — N18.6 ESRD (END STAGE RENAL DISEASE) ON DIALYSIS: ICD-10-CM

## 2019-01-09 DIAGNOSIS — I10 HYPERTENSION: ICD-10-CM

## 2019-01-09 DIAGNOSIS — N18.6 ESRD (END STAGE RENAL DISEASE): Primary | ICD-10-CM

## 2019-01-09 LAB
ALBUMIN SERPL BCP-MCNC: 3.5 G/DL
ALP SERPL-CCNC: 104 U/L
ALT SERPL W/O P-5'-P-CCNC: 11 U/L
ANION GAP SERPL CALC-SCNC: 12 MMOL/L
AST SERPL-CCNC: 19 U/L
BASOPHILS # BLD AUTO: 0.05 K/UL
BASOPHILS NFR BLD: 0.6 %
BILIRUB SERPL-MCNC: 0.3 MG/DL
BUN SERPL-MCNC: 20 MG/DL
CALCIUM SERPL-MCNC: 9.3 MG/DL
CHLORIDE SERPL-SCNC: 97 MMOL/L
CO2 SERPL-SCNC: 25 MMOL/L
CREAT SERPL-MCNC: 4.8 MG/DL
DIFFERENTIAL METHOD: ABNORMAL
EOSINOPHIL # BLD AUTO: 0.2 K/UL
EOSINOPHIL NFR BLD: 1.9 %
ERYTHROCYTE [DISTWIDTH] IN BLOOD BY AUTOMATED COUNT: 15.7 %
EST. GFR  (AFRICAN AMERICAN): 10 ML/MIN/1.73 M^2
EST. GFR  (NON AFRICAN AMERICAN): 9 ML/MIN/1.73 M^2
GLUCOSE SERPL-MCNC: 132 MG/DL
HCT VFR BLD AUTO: 36.1 %
HGB BLD-MCNC: 11.8 G/DL
INR PPP: 0.9
LYMPHOCYTES # BLD AUTO: 3.1 K/UL
LYMPHOCYTES NFR BLD: 36.7 %
MAGNESIUM SERPL-MCNC: 2.3 MG/DL
MCH RBC QN AUTO: 31.1 PG
MCHC RBC AUTO-ENTMCNC: 32.7 G/DL
MCV RBC AUTO: 95 FL
MONOCYTES # BLD AUTO: 0.9 K/UL
MONOCYTES NFR BLD: 10.8 %
NEUTROPHILS # BLD AUTO: 4.2 K/UL
NEUTROPHILS NFR BLD: 50 %
PLATELET # BLD AUTO: 314 K/UL
PMV BLD AUTO: 9.4 FL
POTASSIUM SERPL-SCNC: 2.5 MMOL/L
PROT SERPL-MCNC: 7.2 G/DL
PROTHROMBIN TIME: 9.8 SEC
RBC # BLD AUTO: 3.79 M/UL
SODIUM SERPL-SCNC: 134 MMOL/L
WBC # BLD AUTO: 8.43 K/UL

## 2019-01-09 PROCEDURE — 96375 TX/PRO/DX INJ NEW DRUG ADDON: CPT

## 2019-01-09 PROCEDURE — 85610 PROTHROMBIN TIME: CPT

## 2019-01-09 PROCEDURE — 25000003 PHARM REV CODE 250: Performed by: FAMILY MEDICINE

## 2019-01-09 PROCEDURE — 93010 ELECTROCARDIOGRAM REPORT: CPT | Mod: ,,, | Performed by: INTERNAL MEDICINE

## 2019-01-09 PROCEDURE — 99291 CRITICAL CARE FIRST HOUR: CPT | Mod: 25

## 2019-01-09 PROCEDURE — 63600175 PHARM REV CODE 636 W HCPCS: Performed by: EMERGENCY MEDICINE

## 2019-01-09 PROCEDURE — 25000003 PHARM REV CODE 250: Performed by: EMERGENCY MEDICINE

## 2019-01-09 PROCEDURE — 80053 COMPREHEN METABOLIC PANEL: CPT

## 2019-01-09 PROCEDURE — 85025 COMPLETE CBC W/AUTO DIFF WBC: CPT

## 2019-01-09 PROCEDURE — 63600175 PHARM REV CODE 636 W HCPCS: Performed by: FAMILY MEDICINE

## 2019-01-09 PROCEDURE — 96376 TX/PRO/DX INJ SAME DRUG ADON: CPT

## 2019-01-09 PROCEDURE — 96374 THER/PROPH/DIAG INJ IV PUSH: CPT

## 2019-01-09 PROCEDURE — 83735 ASSAY OF MAGNESIUM: CPT

## 2019-01-09 PROCEDURE — 93010 EKG 12-LEAD: ICD-10-PCS | Mod: ,,, | Performed by: INTERNAL MEDICINE

## 2019-01-09 RX ORDER — MORPHINE SULFATE 10 MG/ML
4 INJECTION INTRAMUSCULAR; INTRAVENOUS; SUBCUTANEOUS
Status: COMPLETED | OUTPATIENT
Start: 2019-01-09 | End: 2019-01-09

## 2019-01-09 RX ORDER — HYDROCODONE BITARTRATE AND ACETAMINOPHEN 5; 325 MG/1; MG/1
1 TABLET ORAL
Status: COMPLETED | OUTPATIENT
Start: 2019-01-09 | End: 2019-01-09

## 2019-01-09 RX ORDER — HYDRALAZINE HYDROCHLORIDE 20 MG/ML
20 INJECTION INTRAMUSCULAR; INTRAVENOUS
Status: COMPLETED | OUTPATIENT
Start: 2019-01-09 | End: 2019-01-09

## 2019-01-09 RX ORDER — POTASSIUM CHLORIDE 20 MEQ/1
40 TABLET, EXTENDED RELEASE ORAL ONCE
Status: COMPLETED | OUTPATIENT
Start: 2019-01-09 | End: 2019-01-09

## 2019-01-09 RX ORDER — CLONIDINE 0.2 MG/24H
1 PATCH, EXTENDED RELEASE TRANSDERMAL
Status: DISCONTINUED | OUTPATIENT
Start: 2019-01-09 | End: 2019-01-10 | Stop reason: HOSPADM

## 2019-01-09 RX ADMIN — HYDRALAZINE HYDROCHLORIDE 20 MG: 20 INJECTION INTRAMUSCULAR; INTRAVENOUS at 08:01

## 2019-01-09 RX ADMIN — HYDRALAZINE HYDROCHLORIDE 20 MG: 20 INJECTION INTRAMUSCULAR; INTRAVENOUS at 11:01

## 2019-01-09 RX ADMIN — MORPHINE SULFATE 4 MG: 10 INJECTION INTRAVENOUS at 06:01

## 2019-01-09 RX ADMIN — HYDROCODONE BITARTRATE AND ACETAMINOPHEN 1 TABLET: 5; 325 TABLET ORAL at 10:01

## 2019-01-09 RX ADMIN — POTASSIUM CHLORIDE 40 MEQ: 1500 TABLET, EXTENDED RELEASE ORAL at 07:01

## 2019-01-09 RX ADMIN — CLONIDINE 1 PATCH: 0.2 PATCH TRANSDERMAL at 10:01

## 2019-01-09 NOTE — ED PROVIDER NOTES
68 year old female compaining of increasing blood pressure. She was sent over here from Dialysis. Patient states she has been is severe pain after falling 1.5 months ago at [facility]. Blood pressure has been elevated ever since.    1724 Pt undersatands that a workup will begin in the treatment lounge/results waiting areas due to there being no available beds. Pt also undertsants they will be placed in the next available bed where they will be seen and dispositioned by a physician. I am removing myself from the care of pt. Pt will be assigned to next available physician.         William Jackson NP  01/09/19 6471

## 2019-01-10 ENCOUNTER — TELEPHONE (OUTPATIENT)
Dept: OBSTETRICS AND GYNECOLOGY | Facility: CLINIC | Age: 69
End: 2019-01-10

## 2019-01-10 ENCOUNTER — TELEPHONE (OUTPATIENT)
Dept: NEPHROLOGY | Facility: CLINIC | Age: 69
End: 2019-01-10

## 2019-01-10 RX ORDER — CLONIDINE 0.3 MG/24H
1 PATCH, EXTENDED RELEASE TRANSDERMAL
Qty: 4 PATCH | Refills: 11 | Status: ON HOLD | OUTPATIENT
Start: 2019-01-10 | End: 2019-01-26 | Stop reason: HOSPADM

## 2019-01-10 NOTE — TELEPHONE ENCOUNTER
----- Message from April Shaffer sent at 1/10/2019  2:01 PM CST -----  Contact: [phone]  Pt would like to schedule a consultation appt. Before march 2019. Please call back at [phone].  x-

## 2019-01-10 NOTE — TELEPHONE ENCOUNTER
----- Message from Peewee Joshi sent at 1/10/2019  1:23 PM CST -----  Contact: pt   Pt calling wants to roque procedure to remove cysts from ovaries. .         ..[phone] (home)

## 2019-01-10 NOTE — TELEPHONE ENCOUNTER
Returned call to patient. Advised that Dr. Kitchen is out of the office and wont return until the 16th. Advised her call back on the 16th to confirm with Dr. Kitchen if she can be seen. She expressed understanding.

## 2019-01-10 NOTE — TELEPHONE ENCOUNTER
Spoke with pt. Notified pt that Dr. Mccann wanted her to a have a pelvic ultrasound and follow up visit before deciding on surgery. Scheduled pelvic ultrasound for 1/17/19 and follow up for 2/7/19. Pt verbalized understanding.

## 2019-01-15 ENCOUNTER — TELEPHONE (OUTPATIENT)
Dept: PAIN MEDICINE | Facility: CLINIC | Age: 69
End: 2019-01-15

## 2019-01-15 NOTE — TELEPHONE ENCOUNTER
----- Message from Karen Morejon sent at 1/15/2019 11:27 AM CST -----  Contact: self/[phone]  Would like to consult with nurse regarding order for injections, please call back at [phone]. Thanks/ar

## 2019-01-15 NOTE — TELEPHONE ENCOUNTER
Contacted patient; procedure scheduled. Instructions given verbally and also mailed to listed address. Patient verbalized understanding.     ----- Message from Yancy Manning sent at 1/15/2019  1:38 PM CST -----  Contact: pt  The pt states she is returning a missed call, can be reached at [phone]///thxMW

## 2019-01-17 ENCOUNTER — HOSPITAL ENCOUNTER (OUTPATIENT)
Dept: RADIOLOGY | Facility: HOSPITAL | Age: 69
Discharge: HOME | End: 2019-01-17
Attending: OBSTETRICS & GYNECOLOGY
Payer: MEDICARE

## 2019-01-17 ENCOUNTER — TELEPHONE (OUTPATIENT)
Dept: NEUROLOGY | Facility: CLINIC | Age: 69
End: 2019-01-17

## 2019-01-17 DIAGNOSIS — N83.209 CYST OF OVARY, UNSPECIFIED LATERALITY: ICD-10-CM

## 2019-01-17 PROCEDURE — 76830 US PELVIS COMP WITH TRANSVAG NON-OB (XPD): ICD-10-PCS | Mod: 26,,, | Performed by: RADIOLOGY

## 2019-01-17 PROCEDURE — 76830 TRANSVAGINAL US NON-OB: CPT | Mod: TC

## 2019-01-17 PROCEDURE — 76856 US EXAM PELVIC COMPLETE: CPT | Mod: 26,,, | Performed by: RADIOLOGY

## 2019-01-17 PROCEDURE — 76830 TRANSVAGINAL US NON-OB: CPT | Mod: 26,,, | Performed by: RADIOLOGY

## 2019-01-17 PROCEDURE — 76856 US PELVIS COMP WITH TRANSVAG NON-OB (XPD): ICD-10-PCS | Mod: 26,,, | Performed by: RADIOLOGY

## 2019-01-17 NOTE — TELEPHONE ENCOUNTER
----- Message from Sharla Dai sent at 1/17/2019 10:10 AM CST -----  Contact: pt  She is calling in regards to being fit into the schedule for a earlier appt if possible,please advise [phone] (home)

## 2019-01-21 NOTE — PROGRESS NOTES
Notify pt her u/s shows no change - still same mass by the left ovary that has not changed any since June of last year.  We will discuss more at her f/u visit in Feb.

## 2019-01-25 ENCOUNTER — HOSPITAL ENCOUNTER (OUTPATIENT)
Facility: HOSPITAL | Age: 69
Discharge: HOME | End: 2019-01-26
Attending: FAMILY MEDICINE | Admitting: INTERNAL MEDICINE
Payer: MEDICARE

## 2019-01-25 DIAGNOSIS — M54.16 LUMBAR RADICULOPATHY: ICD-10-CM

## 2019-01-25 DIAGNOSIS — I10 HYPERTENSION, UNSPECIFIED TYPE: ICD-10-CM

## 2019-01-25 DIAGNOSIS — M54.9 OTHER CHRONIC BACK PAIN: ICD-10-CM

## 2019-01-25 DIAGNOSIS — I10 ACCELERATED HYPERTENSION: ICD-10-CM

## 2019-01-25 DIAGNOSIS — Z99.2 ESRD (END STAGE RENAL DISEASE) ON DIALYSIS: Chronic | ICD-10-CM

## 2019-01-25 DIAGNOSIS — G89.29 OTHER CHRONIC BACK PAIN: ICD-10-CM

## 2019-01-25 DIAGNOSIS — N18.6 ESRD (END STAGE RENAL DISEASE) ON DIALYSIS: Chronic | ICD-10-CM

## 2019-01-25 DIAGNOSIS — Z99.2 DIALYSIS PATIENT: Primary | ICD-10-CM

## 2019-01-25 LAB
ALBUMIN SERPL BCP-MCNC: 3.7 G/DL
ALP SERPL-CCNC: 114 U/L
ALT SERPL W/O P-5'-P-CCNC: 17 U/L
ANION GAP SERPL CALC-SCNC: 11 MMOL/L
AST SERPL-CCNC: 20 U/L
BASOPHILS # BLD AUTO: 0.03 K/UL
BASOPHILS NFR BLD: 0.3 %
BILIRUB SERPL-MCNC: 0.4 MG/DL
BUN SERPL-MCNC: 42 MG/DL
CALCIUM SERPL-MCNC: 9.8 MG/DL
CHLORIDE SERPL-SCNC: 103 MMOL/L
CO2 SERPL-SCNC: 23 MMOL/L
CREAT SERPL-MCNC: 6.7 MG/DL
DIFFERENTIAL METHOD: ABNORMAL
EOSINOPHIL # BLD AUTO: 0.1 K/UL
EOSINOPHIL NFR BLD: 1.3 %
ERYTHROCYTE [DISTWIDTH] IN BLOOD BY AUTOMATED COUNT: 16.3 %
EST. GFR  (AFRICAN AMERICAN): 7 ML/MIN/1.73 M^2
EST. GFR  (NON AFRICAN AMERICAN): 6 ML/MIN/1.73 M^2
GLUCOSE SERPL-MCNC: 120 MG/DL
HCT VFR BLD AUTO: 44.2 %
HGB BLD-MCNC: 14.1 G/DL
LYMPHOCYTES # BLD AUTO: 3.3 K/UL
LYMPHOCYTES NFR BLD: 38.9 %
MCH RBC QN AUTO: 31 PG
MCHC RBC AUTO-ENTMCNC: 31.9 G/DL
MCV RBC AUTO: 97 FL
MONOCYTES # BLD AUTO: 1 K/UL
MONOCYTES NFR BLD: 12 %
NEUTROPHILS # BLD AUTO: 4.1 K/UL
NEUTROPHILS NFR BLD: 47.5 %
PLATELET # BLD AUTO: 313 K/UL
PMV BLD AUTO: 9.9 FL
POTASSIUM SERPL-SCNC: 3.4 MMOL/L
PROT SERPL-MCNC: 7.6 G/DL
RBC # BLD AUTO: 4.55 M/UL
SODIUM SERPL-SCNC: 137 MMOL/L
WBC # BLD AUTO: 8.59 K/UL

## 2019-01-25 PROCEDURE — 93010 ELECTROCARDIOGRAM REPORT: CPT | Mod: ,,, | Performed by: INTERNAL MEDICINE

## 2019-01-25 PROCEDURE — 99285 EMERGENCY DEPT VISIT HI MDM: CPT | Mod: 25

## 2019-01-25 PROCEDURE — 25000003 PHARM REV CODE 250: Performed by: FAMILY MEDICINE

## 2019-01-25 PROCEDURE — 80053 COMPREHEN METABOLIC PANEL: CPT

## 2019-01-25 PROCEDURE — 93010 EKG 12-LEAD: ICD-10-PCS | Mod: ,,, | Performed by: INTERNAL MEDICINE

## 2019-01-25 PROCEDURE — 85025 COMPLETE CBC W/AUTO DIFF WBC: CPT

## 2019-01-25 PROCEDURE — 36415 COLL VENOUS BLD VENIPUNCTURE: CPT

## 2019-01-25 PROCEDURE — G0378 HOSPITAL OBSERVATION PER HR: HCPCS

## 2019-01-25 PROCEDURE — 63600175 PHARM REV CODE 636 W HCPCS: Performed by: FAMILY MEDICINE

## 2019-01-25 PROCEDURE — 93005 ELECTROCARDIOGRAM TRACING: CPT

## 2019-01-25 PROCEDURE — 96372 THER/PROPH/DIAG INJ SC/IM: CPT

## 2019-01-25 RX ORDER — KETOROLAC TROMETHAMINE 30 MG/ML
60 INJECTION, SOLUTION INTRAMUSCULAR; INTRAVENOUS
Status: COMPLETED | OUTPATIENT
Start: 2019-01-25 | End: 2019-01-25

## 2019-01-25 RX ORDER — POTASSIUM CHLORIDE 20 MEQ/15ML
40 SOLUTION ORAL ONCE
Status: DISCONTINUED | OUTPATIENT
Start: 2019-01-25 | End: 2019-01-25

## 2019-01-25 RX ORDER — HYDRALAZINE HYDROCHLORIDE 20 MG/ML
10 INJECTION INTRAMUSCULAR; INTRAVENOUS EVERY 6 HOURS PRN
Status: DISCONTINUED | OUTPATIENT
Start: 2019-01-25 | End: 2019-01-26 | Stop reason: HOSPADM

## 2019-01-25 RX ORDER — METOPROLOL TARTRATE 50 MG/1
100 TABLET ORAL
Status: DISCONTINUED | OUTPATIENT
Start: 2019-01-25 | End: 2019-01-26

## 2019-01-25 RX ORDER — CLONIDINE HYDROCHLORIDE 0.2 MG/1
0.2 TABLET ORAL
Status: COMPLETED | OUTPATIENT
Start: 2019-01-25 | End: 2019-01-25

## 2019-01-25 RX ADMIN — CLONIDINE HYDROCHLORIDE 0.2 MG: 0.2 TABLET ORAL at 04:01

## 2019-01-25 RX ADMIN — KETOROLAC TROMETHAMINE 60 MG: 30 INJECTION, SOLUTION INTRAMUSCULAR at 04:01
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
## 2019-01-25 NOTE — HPI
Gillian Rich is a 68 year old female with ESRD on dialysis. She left dialysis yesterday after only receiving a partial treatment due to complaints of left hip and back pain. The patient reports that these were making her blood pressure elevate. She is being seen by Pain Management for these issues and has an injection scheduled 1/29/19. The patient reports being out of her home medications of Clonidine and Hydralazine. She denies other symptoms including fever, chills, SOB, focal weakness, numbness and incontinence. In the ED, the patient's blood pressures were as high as 190/91. Labs were unremarkable including a potassium of 3.4.

## 2019-01-25 NOTE — ED NOTES
Patient updated on plan of care and admission as well as need fr IV access, patient verbalized understanding. Patient resting in bed, able to make needs known. No acute distress noted. Cart in low position, side rails up x 2 and call bell in reach

## 2019-01-25 NOTE — ED NOTES
Patient resting in bed, able to make needs known. No acute distress noted. Cart in low position, side rails up x 2 and call bell in reach, given pillow to reposition.

## 2019-01-25 NOTE — ED NOTES
Dr Mejia made aware of no cardiac monitor available at this time and patient's request for pain medication.

## 2019-01-26 VITALS
SYSTOLIC BLOOD PRESSURE: 124 MMHG | WEIGHT: 205 LBS | RESPIRATION RATE: 20 BRPM | TEMPERATURE: 97 F | DIASTOLIC BLOOD PRESSURE: 58 MMHG | HEART RATE: 70 BPM | OXYGEN SATURATION: 98 % | BODY MASS INDEX: 35.19 KG/M2

## 2019-01-26 PROBLEM — I10 ACCELERATED HYPERTENSION: Status: ACTIVE | Noted: 2019-01-26

## 2019-01-26 PROBLEM — R00.1 BRADYCARDIA: Status: ACTIVE | Noted: 2019-01-26

## 2019-01-26 PROBLEM — G89.29 CHRONIC BACK PAIN: Status: ACTIVE | Noted: 2019-01-26

## 2019-01-26 PROBLEM — M54.9 CHRONIC BACK PAIN: Status: ACTIVE | Noted: 2019-01-26

## 2019-01-26 PROBLEM — N18.6 ESRD ON DIALYSIS: Status: ACTIVE | Noted: 2019-01-25

## 2019-01-26 LAB
ANION GAP SERPL CALC-SCNC: 11 MMOL/L
BASOPHILS # BLD AUTO: 0.02 K/UL
BASOPHILS NFR BLD: 0.3 %
BUN SERPL-MCNC: 55 MG/DL
CALCIUM SERPL-MCNC: 8.8 MG/DL
CHLORIDE SERPL-SCNC: 105 MMOL/L
CO2 SERPL-SCNC: 22 MMOL/L
CREAT SERPL-MCNC: 8.1 MG/DL
DIFFERENTIAL METHOD: ABNORMAL
EOSINOPHIL # BLD AUTO: 0.2 K/UL
EOSINOPHIL NFR BLD: 2.2 %
ERYTHROCYTE [DISTWIDTH] IN BLOOD BY AUTOMATED COUNT: 17 %
EST. GFR  (AFRICAN AMERICAN): 5 ML/MIN/1.73 M^2
EST. GFR  (NON AFRICAN AMERICAN): 5 ML/MIN/1.73 M^2
GLUCOSE SERPL-MCNC: 94 MG/DL
HCT VFR BLD AUTO: 37.1 %
HGB BLD-MCNC: 11.5 G/DL
LYMPHOCYTES # BLD AUTO: 3.1 K/UL
LYMPHOCYTES NFR BLD: 43 %
MCH RBC QN AUTO: 30.3 PG
MCHC RBC AUTO-ENTMCNC: 31 G/DL
MCV RBC AUTO: 98 FL
MONOCYTES # BLD AUTO: 1 K/UL
MONOCYTES NFR BLD: 14.4 %
NEUTROPHILS # BLD AUTO: 2.9 K/UL
NEUTROPHILS NFR BLD: 40.1 %
PLATELET # BLD AUTO: 310 K/UL
PMV BLD AUTO: 9.8 FL
POTASSIUM SERPL-SCNC: 3.9 MMOL/L
RBC # BLD AUTO: 3.8 M/UL
SODIUM SERPL-SCNC: 138 MMOL/L
WBC # BLD AUTO: 7.16 K/UL

## 2019-01-26 PROCEDURE — 25000003 PHARM REV CODE 250: Performed by: NURSE PRACTITIONER

## 2019-01-26 PROCEDURE — G0378 HOSPITAL OBSERVATION PER HR: HCPCS

## 2019-01-26 PROCEDURE — 36415 COLL VENOUS BLD VENIPUNCTURE: CPT

## 2019-01-26 PROCEDURE — 99214 PR OFFICE/OUTPT VISIT, EST, LEVL IV, 30-39 MIN: ICD-10-PCS | Mod: ,,, | Performed by: INTERNAL MEDICINE

## 2019-01-26 PROCEDURE — 99214 OFFICE O/P EST MOD 30 MIN: CPT | Mod: ,,, | Performed by: INTERNAL MEDICINE

## 2019-01-26 PROCEDURE — 85025 COMPLETE CBC W/AUTO DIFF WBC: CPT

## 2019-01-26 PROCEDURE — 25000003 PHARM REV CODE 250: Performed by: INTERNAL MEDICINE

## 2019-01-26 PROCEDURE — 80048 BASIC METABOLIC PNL TOTAL CA: CPT

## 2019-01-26 PROCEDURE — 63600175 PHARM REV CODE 636 W HCPCS: Performed by: INTERNAL MEDICINE

## 2019-01-26 RX ORDER — CLONIDINE 0.3 MG/24H
1 PATCH, EXTENDED RELEASE TRANSDERMAL
Status: DISCONTINUED | OUTPATIENT
Start: 2019-01-26 | End: 2019-01-26

## 2019-01-26 RX ORDER — METOPROLOL SUCCINATE 25 MG/1
25 TABLET, EXTENDED RELEASE ORAL 2 TIMES DAILY
Status: DISCONTINUED | OUTPATIENT
Start: 2019-01-26 | End: 2019-01-26

## 2019-01-26 RX ORDER — CLONIDINE HYDROCHLORIDE 0.3 MG/1
0.3 TABLET ORAL 2 TIMES DAILY
Status: DISCONTINUED | OUTPATIENT
Start: 2019-01-26 | End: 2019-01-26

## 2019-01-26 RX ORDER — TRAMADOL HYDROCHLORIDE 50 MG/1
50 TABLET ORAL EVERY 6 HOURS PRN
Status: DISCONTINUED | OUTPATIENT
Start: 2019-01-26 | End: 2019-01-26 | Stop reason: HOSPADM

## 2019-01-26 RX ORDER — CLONIDINE HYDROCHLORIDE 0.3 MG/1
0.3 TABLET ORAL 3 TIMES DAILY
Qty: 90 TABLET | Refills: 11 | Status: SHIPPED | OUTPATIENT
Start: 2019-01-26 | End: 2019-01-26

## 2019-01-26 RX ORDER — CLONIDINE HYDROCHLORIDE 0.3 MG/1
0.3 TABLET ORAL 3 TIMES DAILY
Qty: 90 TABLET | Refills: 0 | Status: SHIPPED | OUTPATIENT
Start: 2019-01-26 | End: 2019-02-18 | Stop reason: SDUPTHER

## 2019-01-26 RX ORDER — ACETAMINOPHEN 325 MG/1
650 TABLET ORAL EVERY 6 HOURS PRN
Status: DISCONTINUED | OUTPATIENT
Start: 2019-01-26 | End: 2019-01-26 | Stop reason: HOSPADM

## 2019-01-26 RX ORDER — HYDRALAZINE HYDROCHLORIDE 50 MG/1
50 TABLET, FILM COATED ORAL 3 TIMES DAILY
Status: DISCONTINUED | OUTPATIENT
Start: 2019-01-26 | End: 2019-01-26 | Stop reason: HOSPADM

## 2019-01-26 RX ORDER — METOPROLOL SUCCINATE 25 MG/1
25 TABLET, EXTENDED RELEASE ORAL 2 TIMES DAILY
Refills: 3
Start: 2019-01-26 | End: 2019-12-31 | Stop reason: SDUPTHER

## 2019-01-26 RX ORDER — CLONIDINE HYDROCHLORIDE 0.3 MG/1
0.3 TABLET ORAL 3 TIMES DAILY
Status: DISCONTINUED | OUTPATIENT
Start: 2019-01-26 | End: 2019-01-26 | Stop reason: HOSPADM

## 2019-01-26 RX ORDER — GABAPENTIN 300 MG/1
300 CAPSULE ORAL NIGHTLY
Status: DISCONTINUED | OUTPATIENT
Start: 2019-01-26 | End: 2019-01-26 | Stop reason: HOSPADM

## 2019-01-26 RX ORDER — TRAMADOL HYDROCHLORIDE 50 MG/1
TABLET ORAL
Qty: 10 TABLET | Refills: 0 | Status: ON HOLD | OUTPATIENT
Start: 2019-01-26 | End: 2020-10-15 | Stop reason: HOSPADM

## 2019-01-26 RX ORDER — ONDANSETRON 4 MG/1
4 TABLET, ORALLY DISINTEGRATING ORAL EVERY 8 HOURS PRN
Status: DISCONTINUED | OUTPATIENT
Start: 2019-01-26 | End: 2019-01-26 | Stop reason: HOSPADM

## 2019-01-26 RX ORDER — CLONIDINE HYDROCHLORIDE 0.3 MG/1
0.3 TABLET ORAL 3 TIMES DAILY
Qty: 90 TABLET | Refills: 0 | Status: SHIPPED | OUTPATIENT
Start: 2019-01-26 | End: 2019-01-26 | Stop reason: HOSPADM

## 2019-01-26 RX ORDER — ONDANSETRON 4 MG/1
4 TABLET, ORALLY DISINTEGRATING ORAL EVERY 8 HOURS PRN
Qty: 15 TABLET | Refills: 0 | Status: ON HOLD | OUTPATIENT
Start: 2019-01-26 | End: 2023-05-20 | Stop reason: HOSPADM

## 2019-01-26 RX ORDER — HEPARIN SODIUM 5000 [USP'U]/ML
5000 INJECTION, SOLUTION INTRAVENOUS; SUBCUTANEOUS EVERY 8 HOURS
Status: DISCONTINUED | OUTPATIENT
Start: 2019-01-26 | End: 2019-01-26 | Stop reason: HOSPADM

## 2019-01-26 RX ORDER — FAMOTIDINE 20 MG/1
20 TABLET, FILM COATED ORAL DAILY
Status: DISCONTINUED | OUTPATIENT
Start: 2019-01-26 | End: 2019-01-26 | Stop reason: HOSPADM

## 2019-01-26 RX ORDER — HYDROCODONE BITARTRATE AND ACETAMINOPHEN 7.5; 325 MG/1; MG/1
1 TABLET ORAL EVERY 12 HOURS PRN
Qty: 10 TABLET | Refills: 0 | Status: SHIPPED | OUTPATIENT
Start: 2019-01-26 | End: 2019-01-26 | Stop reason: HOSPADM

## 2019-01-26 RX ORDER — HYDRALAZINE HYDROCHLORIDE 50 MG/1
50 TABLET, FILM COATED ORAL 3 TIMES DAILY
Qty: 90 TABLET | Refills: 0 | Status: SHIPPED | OUTPATIENT
Start: 2019-01-26 | End: 2019-03-04 | Stop reason: SDUPTHER

## 2019-01-26 RX ADMIN — METOPROLOL SUCCINATE 25 MG: 25 TABLET, EXTENDED RELEASE ORAL at 03:01

## 2019-01-26 RX ADMIN — GABAPENTIN 300 MG: 300 CAPSULE ORAL at 03:01

## 2019-01-26 RX ADMIN — HEPARIN SODIUM 5000 UNITS: 5000 INJECTION, SOLUTION INTRAVENOUS; SUBCUTANEOUS at 06:01

## 2019-01-26 RX ADMIN — HYDRALAZINE HYDROCHLORIDE 50 MG: 50 TABLET ORAL at 03:01

## 2019-01-26 RX ADMIN — TRAMADOL HYDROCHLORIDE 50 MG: 50 TABLET, COATED ORAL at 03:01

## 2019-01-26 RX ADMIN — TRAMADOL HYDROCHLORIDE 50 MG: 50 TABLET, COATED ORAL at 10:01

## 2019-01-26 RX ADMIN — FAMOTIDINE 20 MG: 20 TABLET, FILM COATED ORAL at 09:01

## 2019-01-26 RX ADMIN — CLONIDINE HYDROCHLORIDE 0.3 MG: 0.3 TABLET ORAL at 03:01

## 2019-01-26 RX ADMIN — CLONIDINE HYDROCHLORIDE 0.3 MG: 0.3 TABLET ORAL at 09:01

## 2019-01-26 RX ADMIN — HYDRALAZINE HYDROCHLORIDE 50 MG: 50 TABLET ORAL at 09:01

## 2019-01-26 NOTE — HPI
"Pt was seen and examined. H/o reviewed. Pt is a 67 y/o female who has ESRD on chronic HD at [facility] who was receiving HD uneventfully yesterday, who requested to be taken off HD half way through because of back pain. Pt requested to be sent to ER. I discussed the issues yesterday with Dr. Kitchen who was present in the HD out pt unit. Pt has a h/o of chronic back pain and narcotic seeking behavior. I spoke with pt today who came to the ER, was supposed to have been discharged form ER, but instead was admitted for observation. Pt has not has any more serious sx's. Denies SOB, fever, CP. Pt did not have any indications for repeat HD yesterday, nor does she have today. Specifically, denies SOB, has no leg swelling, and K is normal. Dr. Kitchen alerted us and the ER about pt's drug seeking behavior, and she did not receive any narcotics. She is feeling "OK", no new sx's since admission. She received tramadol and ketorolac IV. She has no back pain at this point. She feels ready to go home.  "
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
## 2019-01-26 NOTE — PLAN OF CARE
01/26/19 1041   NICOLE Message   Medicare Outpatient and Observation Notification regarding financial responsibility Given to patient/caregiver;Explained to patient/caregiver;Signed/date by patient/caregiver   Date NICOLE was signed 01/26/19   Time NICOLE was signed 1017

## 2019-01-26 NOTE — ASSESSMENT & PLAN NOTE
-Likely due to patient being out of medications at home, but pain is contributing.   -Resume home medications and hydralazine as needed.

## 2019-01-26 NOTE — PLAN OF CARE
Problem: Adult Inpatient Plan of Care  Goal: Plan of Care Review  Outcome: Ongoing (interventions implemented as appropriate)  POC reviewed, verbalized understanding. Pt remained free from falls, fall precautions in place. Pt is SR SB  on monitor,  VSS b/p elevated high as 190's  started on home antihypertensive meds Complaints of pin to hip and back . PIV intact. Call bell and personal belongings within reach. Hourly rounding complete. Reminded to call for assistance. Will continue to monitor.

## 2019-01-26 NOTE — SIGNIFICANT EVENT
"Case d/w Nephrologist Dr. Acuña who reviewed patient's labs and VS and stated that "patient does not require HD".  He is aware that her HD was terminated today after approximately 90 minutes and states that the patient can f/u with HD as routinely scheduled on Monday morning.  He also recommended that the patient take her BP meds as prescribed and to avoid giving patient narcotic pain medications.  BP noted to improve after Clonidine given in the ER.  Secure chat message sent to Dr. Mejia to DC patient from the ER with the above instructions as she will not be placed in OBS.    "

## 2019-01-26 NOTE — DISCHARGE SUMMARY
[facility] Hospital Medicine  Discharge Summary      Patient Name: Gillian Rich  MRN: [id number]  Admission Date: 1/25/2019  Hospital Length of Stay: 0 days  Discharge Date and Time:  01/26/2019 10:52 AM  Attending Physician: Vincent Alejandra MD   Discharging Provider: GIUSEPPE Santos  Primary Care Provider: Primary Doctor No      HPI:   Gillian Rich is a 68 year old female with ESRD on dialysis. She left dialysis yesterday after only receiving a partial treatment due to complaints of left hip and back pain. The patient reports that these were making her blood pressure elevate. She is being seen by Pain Management for these issues and has an injection scheduled 1/29/19. The patient reports being out of her home medications of Clonidine and Hydralazine. She denies other symptoms including fever, chills, SOB, focal weakness, numbness and incontinence. In the ED, the patient's blood pressures were as high as 190/91. Labs were unremarkable including a potassium of 3.4.               * No surgery found *      Hospital Course:   The patient was placed in Observation due to accelerated hypertension. She was out of her home medications of Clonidine and Hydralazine. Additionally, the patient reports that her chronic back pain was causing pain which she feels increased her blood pressure. The patient chose to come to the ED without completing her OP dialysis treatment Friday due to pain. Her blood pressure improved with resumption of her home medications and IV hydralazine. She will be given prescriptions for hydralazine and Clonidine. Her care was discussed with Nephrology who did not feel she needed dialysis at this time and can follow up with her OP dialysis on Monday. The patient was noted to have asymptomatic bradycardia. This was likely due to her BID dosing of Toprol XL and multiple doses of Clonidine. The patient's metoprolol dosing was changed to daily. She was given a short course of  Tramadol and agrees to follow up with Pain Management for continued management of her chronic back pain.      Final Active Diagnoses:    Diagnosis Date Noted POA    PRINCIPAL PROBLEM:  Accelerated hypertension [I10] 01/26/2019 Yes    Chronic back pain [M54.9, G89.29] 01/26/2019 Yes    Bradycardia [R00.1] 01/26/2019 No    ESRD on dialysis since 2/24/16 [N18.6, Z99.2]  Not Applicable     Chronic    Anemia in CKD (chronic kidney disease) [N18.9, D63.1]  Yes     Chronic      Problems Resolved During this Admission:       Discharged Condition: stable    Disposition: Home or Self Care    Follow Up:  Follow-up Information     Primary Doctor No In 3 days.           Dialysis On 1/28/2019.               Patient Instructions:      Diet renal     Activity as tolerated       Significant Diagnostic Studies: Labs:   CMP   Recent Labs   Lab 01/25/19  1606 01/26/19  0435    138   K 3.4* 3.9    105   CO2 23 22*   * 94   BUN 42* 55*   CREATININE 6.7* 8.1*   CALCIUM 9.8 8.8   PROT 7.6  --    ALBUMIN 3.7  --    BILITOT 0.4  --    ALKPHOS 114  --    AST 20  --    ALT 17  --    ANIONGAP 11 11   ESTGFRAFRICA 7* 5*   EGFRNONAA 6* 5*   , CBC   Recent Labs   Lab 01/25/19  1606 01/26/19  0435   WBC 8.59 7.16   HGB 14.1 11.5*   HCT 44.2 37.1    310    and All labs within the past 24 hours have been reviewed    Pending Diagnostic Studies:     None         Medications:  Reconciled Home Medications:      Medication List      START taking these medications    cloNIDine 0.3 MG tablet  Commonly known as:  CATAPRES  Take 1 tablet (0.3 mg total) by mouth 3 (three) times daily.        CHANGE how you take these medications    metoprolol succinate 25 MG 24 hr tablet  Commonly known as:  TOPROL-XL  Take 1 tablet (25 mg total) by mouth 2 (two) times daily.  What changed:  See the new instructions.        CONTINUE taking these medications    acetaminophen 325 MG tablet  Commonly known as:  TYLENOL  Take 650 mg by mouth.      cyclobenzaprine 5 MG tablet  Commonly known as:  FLEXERIL  Take 1 tablet (5 mg total) by mouth nightly as needed for Muscle spasms.     ferric citrate 210 mg iron Tab  Commonly known as:  AURYXIA  Take 2 tablets by mouth 3 (three) times daily. Take 2 tablets PO TID with meals and 1 tab PO with snacks     fluticasone 50 mcg/actuation nasal spray  Commonly known as:  FLONASE  2 sprays (100 mcg total) by Each Nare route once daily.     gabapentin 300 MG capsule  Commonly known as:  NEURONTIN  Take 1 capsule (300mg) by mouth every night X 1 week then increase to 2 capsules (600mg) QHS thereafter. Increase as tolerated.     hydrALAZINE 50 MG tablet  Commonly known as:  APRESOLINE  Take 1 tablet (50 mg total) by mouth 3 (three) times daily.     loratadine 10 mg tablet  Commonly known as:  CLARITIN  Take 1 tablet (10 mg total) by mouth once daily.     methocarbamol 500 MG Tab  Commonly known as:  ROBAXIN  Take 1 tablet (500 mg total) by mouth 3 (three) times daily as needed (muscle spasms).     ondansetron 4 MG Tbdl  Commonly known as:  ZOFRAN-ODT  Take 1 tablet (4 mg total) by mouth every 8 (eight) hours as needed.     traMADol 50 mg tablet  Commonly known as:  ULTRAM  Take 1/2 to 1 tab PO QD to BID PRN pain.     VELTASSA 8.4 gram Pwpk  Generic drug:  patiromer calcium sorbitex  Take 1 each (8.4 g total) by mouth once daily.        STOP taking these medications    azithromycin 250 MG tablet  Commonly known as:  Z-BLAYNE     cloNIDine 0.3 mg/24 hr td ptwk 0.3 mg/24 hr  Commonly known as:  CATAPRES            Indwelling Lines/Drains at time of discharge:   Lines/Drains/Airways     Drain                 Hemodialysis AV Fistula Left upper arm -- days                Time spent on the discharge of patient: Greater than 30 minutes. Patient was seen and examined on the date of discharge and determined to be suitable for discharge.         GIUSEPPE Santos  Department of Hospital Medicine  [facility]

## 2019-01-26 NOTE — ASSESSMENT & PLAN NOTE
66 y/o female with ESRD on chronic HD presented with n/v and hypotension that occurred during out pt HD treatment:                       ESRD (end stage renal disease)     Renal: HD q TTS schedule   Hyperkalemia  Did not receive full HD yesterday due to having n/v  No further vomiting today  May have has acute gastroenteritis or other viral syndrome (generalized tiredness and myalgia)  Hypotension resolved after saline infusion. BP not somewhat high  Will provide HD today  Expect HD will correct K and high BP  Access: Left arm AVF.           * Vomiting     Resolved  Suspect viral syndrome, acute gastroenteritis             Plans and recommendations:  As discussed above.

## 2019-01-26 NOTE — ED NOTES
Patient sleeping, easily aroused. Patient resting in bed, able to make needs known. No acute distress noted. Cart in low position, side rails up x 2 and call bell in reach
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
## 2019-01-26 NOTE — HOSPITAL COURSE
The patient was placed in Observation due to accelerated hypertension. She was out of her home medications of Clonidine and Hydralazine. Additionally, the patient reports that her chronic back pain was causing pain which she feels increased her blood pressure. The patient chose to come to the ED without completing her OP dialysis treatment Friday due to pain. Her blood pressure improved with resumption of her home medications and IV hydralazine. She will be given prescriptions for hydralazine and Clonidine. Her care was discussed with Nephrology who did not feel she needed dialysis at this time and can follow up with her OP dialysis on Monday. The patient was noted to have asymptomatic bradycardia. This was likely due to her BID dosing of Toprol XL and multiple doses of Clonidine. The patient's metoprolol dosing was changed to daily. She was given a short course of Tramadol and agrees to follow up with Pain Management for continued management of her chronic back pain.
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
## 2019-01-29 ENCOUNTER — TELEPHONE (OUTPATIENT)
Dept: PAIN MEDICINE | Facility: CLINIC | Age: 69
End: 2019-01-29

## 2019-01-29 NOTE — TELEPHONE ENCOUNTER
----- Message from Jose Huitron sent at 1/29/2019 10:16 AM CST -----  Contact: pt   Pt is requesting a call back from the nurse in regards to the pt rescheduling her apt for today because she is sick  [phone] (home)

## 2019-01-29 NOTE — TELEPHONE ENCOUNTER
Contacted patient; procedure rescheduled to Feb. 5th. Pt verbalized understanding.     ----- Message from Dori Man MA sent at 1/29/2019 11:40 AM CST -----  Contact: Patient      ----- Message -----  From: Maday Driver  Sent: 1/29/2019  10:50 AM  To: Roberto DOCKERY Staff    Patient would like to cancel and reschedule her injection, please call her back at [phone]. Thank you

## 2019-02-05 ENCOUNTER — HOSPITAL ENCOUNTER (OUTPATIENT)
Facility: HOSPITAL | Age: 69
Discharge: HOME | End: 2019-02-05
Attending: PAIN MEDICINE | Admitting: PAIN MEDICINE
Payer: MEDICARE

## 2019-02-05 VITALS
OXYGEN SATURATION: 100 % | SYSTOLIC BLOOD PRESSURE: 158 MMHG | TEMPERATURE: 99 F | DIASTOLIC BLOOD PRESSURE: 78 MMHG | RESPIRATION RATE: 16 BRPM | HEART RATE: 72 BPM

## 2019-02-05 DIAGNOSIS — M54.16 LUMBAR RADICULOPATHY: ICD-10-CM

## 2019-02-05 PROCEDURE — 64483 NJX AA&/STRD TFRM EPI L/S 1: CPT | Mod: 50,,, | Performed by: PAIN MEDICINE

## 2019-02-05 PROCEDURE — 25000003 PHARM REV CODE 250: Performed by: PAIN MEDICINE

## 2019-02-05 PROCEDURE — 63600175 PHARM REV CODE 636 W HCPCS

## 2019-02-05 PROCEDURE — 64483 PR EPIDURAL INJ, ANES/STEROID, TRANSFORAMINAL, LUMB/SACR, SNGL LEVL: ICD-10-PCS | Mod: 50,,, | Performed by: PAIN MEDICINE

## 2019-02-05 PROCEDURE — 63600175 PHARM REV CODE 636 W HCPCS: Performed by: PAIN MEDICINE

## 2019-02-05 RX ORDER — LIDOCAINE HYDROCHLORIDE 10 MG/ML
INJECTION, SOLUTION EPIDURAL; INFILTRATION; INTRACAUDAL; PERINEURAL
Status: DISCONTINUED | OUTPATIENT
Start: 2019-02-05 | End: 2019-02-05 | Stop reason: HOSPADM

## 2019-02-05 RX ORDER — GADOBUTROL 604.72 MG/ML
4 INJECTION INTRAVENOUS
Status: COMPLETED | OUTPATIENT
Start: 2019-02-05 | End: 2019-02-05

## 2019-02-05 RX ORDER — DEXAMETHASONE SODIUM PHOSPHATE 10 MG/ML
INJECTION INTRAMUSCULAR; INTRAVENOUS
Status: DISCONTINUED | OUTPATIENT
Start: 2019-02-05 | End: 2019-02-05 | Stop reason: HOSPADM

## 2019-02-05 RX ADMIN — GADOBUTROL 4 ML: 604.72 INJECTION INTRAVENOUS at 09:02

## 2019-02-05 NOTE — PLAN OF CARE
D/C'd home to car via W/C W/family. AA&OX3, VSS W/O S/S of distress nor cardiopulmonary instability. Dr. Fierro specific D/C instruction sheet given to Mrs. Rich who verbalizes understanding and can teach back.

## 2019-02-05 NOTE — OP NOTE
"Procedure: Lumbar Transforaminal Epidural Steroid Injection under Fluoroscopic Guidance (supraneural approach)    Level: L5/S1     Side: Bilateral    PROCEDURE DATE: 2/5/2019    Pre-operative Diagnosis: Lumbar Radiculopathy  Post-operative Diagnosis: Lumbar Radiculopathy    Provider: KEENAN Fierro MD  Assistant(s): None    Anesthesia: Local, No Sedation    >> 0 mg of VERSED    >> 0 mcg of FENTANYL     Indication: Low back pain with radiculopathy consistent with distribution of targeted nerve. Symptoms unresponsive to conservative treatments. Fluoroscopy was used to optimize visualization of needle placement and to maximize safety.     Procedure Description / Technique:  The patient was seen and identified in the preoperative area. Risks, benefits, complications, and alternatives were discussed with the patient. The patient agreed to proceed with the procedure and signed the consent. The site and side of the procedure was identified and marked. An IV was not placed for this procedure. The patient was taken to the procedural suite.    The patient was positioned in prone orientation on procedure table and a pillow was placed under the abdomen to reduce lumbar lordosis. A time out was performed prior to any intervention. The procedure, site, side, and allergies were stated and agreed to by all present. The lumbosacral area was widely prepped with ChloraPrep. The procedural site was draped in usual sterile fashion. Vital signs were closely monitored throughout this procedure. Conscious sedation was not used for this procedure.    The target area was visualized under fluoroscopy. The cephalocaudal angle of the fluoroscope was adjusted as to align the vertebral end plates. The fluoroscopic arm was rotated ipsilaterally to an angle of approximately 30 degrees until the "charlotte dog" outline came into view and the tip of the inferior superior articular process pointed towards the midline, 6:00 position of the above pedicle. " "A 25 gauge 3.5 inch spinal needle was directed towards the "chin" of the "charlotte dog" (adjacent to the pars interarticularis and inferior to the pedicle). The needle was advanced until OS was met at the inferior border of the pedicle / pars interface. The needle was adjusted so that it would pass inferior to the osseous border. The fluoroscope was then placed in the lateral position and the needle was slowly advanced until it rested in the posterior 1/3rd of the vertebral foramen. AP fluoroscopy was checked and the needle tip rested at the 6:00 position under the pedicle. No paresthesia was elicited during needle placement. With the needle tip in its final position, gentle aspiration was negative for blood and CSF. MultiHance (gadobenate) 529 mg/mL (1 to 2 mL) was injected under live fluoroscopy. Microbore tubing was used for injection. There was no pain or paresthesia on injection. The contrast clearly delineated the targeted nerve root on AP fluoroscopy. No vascular uptake was seen. A solution containing 2 mL of 1% PF Lidocaine and 2 mL of Dexamethasone (10 mg/mL) was mixed and 2 mL was injected slowly at each level targeted. There was minimal resistance on injection. No pain or paresthesia was elicited on injection. The stylet was replaced and the needle was withdrawn intact. This procedure was performed for each of the above indicated levels.     Description of Findings: Not applicable    Prosthetic devices, grafts, tissues, or devices implanted: None    Specimen Removed: No    Estimated Blood Loss: minimal    COMPLICATIONS: None    DISPOSITION / PLANS: The patient was transferred to the recovery area in a stable condition for observation. The patient was reexamined prior to discharge. There was no evidence of acute neurologic injury following the procedure.  Patient was discharged from the recovery room after meeting discharge criteria. Home discharge instructions were given to the patient by the staff.    "
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
## 2019-02-07 ENCOUNTER — OFFICE VISIT (OUTPATIENT)
Dept: OBSTETRICS AND GYNECOLOGY | Facility: CLINIC | Age: 69
End: 2019-02-07
Payer: MEDICARE

## 2019-02-07 VITALS
BODY MASS INDEX: 35.9 KG/M2 | DIASTOLIC BLOOD PRESSURE: 82 MMHG | SYSTOLIC BLOOD PRESSURE: 130 MMHG | WEIGHT: 210.31 LBS | HEIGHT: 64 IN

## 2019-02-07 DIAGNOSIS — N83.202 LEFT OVARIAN CYST: Primary | ICD-10-CM

## 2019-02-07 PROCEDURE — 99212 PR OFFICE/OUTPT VISIT, EST, LEVL II, 10-19 MIN: ICD-10-PCS | Mod: S$PBB,,, | Performed by: OBSTETRICS & GYNECOLOGY

## 2019-02-07 PROCEDURE — 99212 OFFICE O/P EST SF 10 MIN: CPT | Mod: S$PBB,,, | Performed by: OBSTETRICS & GYNECOLOGY

## 2019-02-07 PROCEDURE — 99999 PR PBB SHADOW E&M-EST. PATIENT-LVL III: CPT | Mod: PBBFAC,,, | Performed by: OBSTETRICS & GYNECOLOGY

## 2019-02-07 PROCEDURE — 99213 OFFICE O/P EST LOW 20 MIN: CPT | Mod: PBBFAC,PN | Performed by: OBSTETRICS & GYNECOLOGY

## 2019-02-07 PROCEDURE — 99999 PR PBB SHADOW E&M-EST. PATIENT-LVL III: ICD-10-PCS | Mod: PBBFAC,,, | Performed by: OBSTETRICS & GYNECOLOGY
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
## 2019-02-18 DIAGNOSIS — Z76.0 MEDICATION REFILL: ICD-10-CM

## 2019-02-18 DIAGNOSIS — N18.6 ESRD (END STAGE RENAL DISEASE): Primary | ICD-10-CM

## 2019-02-18 RX ORDER — CLONIDINE HYDROCHLORIDE 0.3 MG/1
0.3 TABLET ORAL 3 TIMES DAILY
Qty: 90 TABLET | Refills: 2 | Status: SHIPPED | OUTPATIENT
Start: 2019-02-18 | End: 2019-03-06 | Stop reason: SDUPTHER

## 2019-02-21 ENCOUNTER — HOSPITAL ENCOUNTER (EMERGENCY)
Facility: HOSPITAL | Age: 69
Discharge: HOME | End: 2019-02-21
Attending: EMERGENCY MEDICINE
Payer: MEDICARE

## 2019-02-21 ENCOUNTER — TELEPHONE (OUTPATIENT)
Dept: ORTHOPEDICS | Facility: CLINIC | Age: 69
End: 2019-02-21

## 2019-02-21 VITALS
WEIGHT: 207.25 LBS | BODY MASS INDEX: 35.38 KG/M2 | TEMPERATURE: 98 F | HEART RATE: 66 BPM | HEIGHT: 64 IN | SYSTOLIC BLOOD PRESSURE: 143 MMHG | RESPIRATION RATE: 18 BRPM | OXYGEN SATURATION: 97 % | DIASTOLIC BLOOD PRESSURE: 71 MMHG

## 2019-02-21 DIAGNOSIS — J01.11 ACUTE RECURRENT FRONTAL SINUSITIS: Primary | ICD-10-CM

## 2019-02-21 PROCEDURE — 99284 EMERGENCY DEPT VISIT MOD MDM: CPT | Mod: 25

## 2019-02-21 PROCEDURE — 63600175 PHARM REV CODE 636 W HCPCS: Performed by: EMERGENCY MEDICINE

## 2019-02-21 PROCEDURE — 96372 THER/PROPH/DIAG INJ SC/IM: CPT

## 2019-02-21 RX ORDER — AMOXICILLIN AND CLAVULANATE POTASSIUM 875; 125 MG/1; MG/1
1 TABLET, FILM COATED ORAL 2 TIMES DAILY
Qty: 14 TABLET | Refills: 0 | Status: SHIPPED | OUTPATIENT
Start: 2019-02-21 | End: 2019-02-28

## 2019-02-21 RX ORDER — DEXAMETHASONE SODIUM PHOSPHATE 4 MG/ML
8 INJECTION, SOLUTION INTRA-ARTICULAR; INTRALESIONAL; INTRAMUSCULAR; INTRAVENOUS; SOFT TISSUE
Status: COMPLETED | OUTPATIENT
Start: 2019-02-21 | End: 2019-02-21

## 2019-02-21 RX ADMIN — DEXAMETHASONE SODIUM PHOSPHATE 8 MG: 4 INJECTION, SOLUTION INTRAMUSCULAR; INTRAVENOUS at 03:02

## 2019-02-21 NOTE — TELEPHONE ENCOUNTER
----- Message from Daniela Nino sent at 2/21/2019 10:28 AM CST -----  Contact: Pt.   Pt is calling regarding requesting. .Type:  Patient Returning Call    Who Called:Pt Is requesting to have nurse call Pt back to reschedule appt. Pt states that she is sick and needs to be reschedule for next Thursday.    Would the patient rather a call back or a response via [facility]? Call Back   Best Call Back Number:[phone]         .Thank You  Sadia Nino

## 2019-02-21 NOTE — TELEPHONE ENCOUNTER
Pt just wanted to know where she can get medical records for the MDs treating her back. I informed her that she could get the records from the Medical Records dept at the hospital at [facility]. She is headed over to [facility] to be seen now because she has been sick for a week and thinks she has the flu. She did not need an appt. She only needed records.
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
## 2019-02-26 ENCOUNTER — OFFICE VISIT (OUTPATIENT)
Dept: PAIN MEDICINE | Facility: CLINIC | Age: 69
End: 2019-02-26
Payer: MEDICARE

## 2019-02-26 VITALS
BODY MASS INDEX: 35.34 KG/M2 | WEIGHT: 207 LBS | SYSTOLIC BLOOD PRESSURE: 91 MMHG | DIASTOLIC BLOOD PRESSURE: 56 MMHG | HEIGHT: 64 IN | HEART RATE: 66 BPM | RESPIRATION RATE: 18 BRPM

## 2019-02-26 DIAGNOSIS — M43.10 RETROLISTHESIS OF VERTEBRAE: ICD-10-CM

## 2019-02-26 DIAGNOSIS — M51.36 DDD (DEGENERATIVE DISC DISEASE), LUMBAR: ICD-10-CM

## 2019-02-26 DIAGNOSIS — M47.817 LUMBOSACRAL SPONDYLOSIS WITHOUT MYELOPATHY: ICD-10-CM

## 2019-02-26 DIAGNOSIS — M54.16 LUMBAR RADICULOPATHY: Primary | ICD-10-CM

## 2019-02-26 DIAGNOSIS — M48.061 LUMBAR FORAMINAL STENOSIS: ICD-10-CM

## 2019-02-26 DIAGNOSIS — M51.36 BULGING OF LUMBAR INTERVERTEBRAL DISC: ICD-10-CM

## 2019-02-26 PROCEDURE — 99999 PR PBB SHADOW E&M-EST. PATIENT-LVL IV: CPT | Mod: PBBFAC,,, | Performed by: PHYSICIAN ASSISTANT

## 2019-02-26 PROCEDURE — 99214 OFFICE O/P EST MOD 30 MIN: CPT | Mod: PBBFAC | Performed by: PHYSICIAN ASSISTANT

## 2019-02-26 PROCEDURE — 99214 OFFICE O/P EST MOD 30 MIN: CPT | Mod: S$PBB,,, | Performed by: PHYSICIAN ASSISTANT

## 2019-02-26 PROCEDURE — 99214 PR OFFICE/OUTPT VISIT, EST, LEVL IV, 30-39 MIN: ICD-10-PCS | Mod: S$PBB,,, | Performed by: PHYSICIAN ASSISTANT

## 2019-02-26 PROCEDURE — 99999 PR PBB SHADOW E&M-EST. PATIENT-LVL IV: ICD-10-PCS | Mod: PBBFAC,,, | Performed by: PHYSICIAN ASSISTANT
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
## 2019-03-04 DIAGNOSIS — Z76.0 MEDICATION REFILL: Primary | ICD-10-CM

## 2019-03-04 DIAGNOSIS — N18.6 ESRD (END STAGE RENAL DISEASE): ICD-10-CM

## 2019-03-04 RX ORDER — HYDRALAZINE HYDROCHLORIDE 50 MG/1
50 TABLET, FILM COATED ORAL 3 TIMES DAILY
Qty: 90 TABLET | Refills: 2 | Status: SHIPPED | OUTPATIENT
Start: 2019-03-04 | End: 2019-03-06 | Stop reason: SDUPTHER

## 2019-03-06 ENCOUNTER — TELEPHONE (OUTPATIENT)
Dept: PAIN MEDICINE | Facility: CLINIC | Age: 69
End: 2019-03-06

## 2019-03-06 DIAGNOSIS — J32.9 OTHER SINUSITIS, UNSPECIFIED CHRONICITY: ICD-10-CM

## 2019-03-06 DIAGNOSIS — Z76.0 MEDICATION REFILL: ICD-10-CM

## 2019-03-06 DIAGNOSIS — N18.6 ESRD (END STAGE RENAL DISEASE): ICD-10-CM

## 2019-03-06 RX ORDER — LORATADINE 10 MG/1
10 TABLET ORAL DAILY
Refills: 0 | Status: ON HOLD | COMMUNITY
Start: 2019-03-06 | End: 2019-03-29

## 2019-03-06 RX ORDER — HYDRALAZINE HYDROCHLORIDE 50 MG/1
50 TABLET, FILM COATED ORAL 3 TIMES DAILY
Qty: 90 TABLET | Refills: 2 | Status: SHIPPED | OUTPATIENT
Start: 2019-03-06 | End: 2019-05-28 | Stop reason: SDUPTHER

## 2019-03-06 RX ORDER — FLUTICASONE PROPIONATE 50 MCG
2 SPRAY, SUSPENSION (ML) NASAL DAILY
Qty: 1 BOTTLE | Refills: 5 | Status: SHIPPED | OUTPATIENT
Start: 2019-03-06 | End: 2020-04-14 | Stop reason: SDUPTHER

## 2019-03-06 RX ORDER — CLONIDINE HYDROCHLORIDE 0.3 MG/1
0.3 TABLET ORAL 3 TIMES DAILY
Qty: 90 TABLET | Refills: 2 | Status: SHIPPED | OUTPATIENT
Start: 2019-03-06 | End: 2020-07-27 | Stop reason: SDUPTHER

## 2019-03-06 NOTE — TELEPHONE ENCOUNTER
Patient was contacted to inform about location change for her upcoming appointment in Interventional Pain Management with Triny Mejia PA-C.  she was originally scheduled at [facility] due to construction at [facility].  Patient informed of location change that has been moved back to [facility] clinic. Patient understands and accepts location change.

## 2019-03-06 NOTE — TELEPHONE ENCOUNTER
Edit previous phone note:    Left message for patient with following info:  Patient was contacted to inform about location change for her upcoming appointment in Interventional Pain Management with Triny Mejia PA-C.  she was originally scheduled at [facility] due to construction at [facility].  Patient informed of location change that has been moved back to [facility] clinic. Will call patient again later to confirm.

## 2019-03-06 NOTE — TELEPHONE ENCOUNTER
----- Message from Yancy Manning sent at 3/6/2019 11:14 AM CST -----  Contact: pt  The pt states her pharmacy doesn't accept any script from a PA or NP, the pt want to be advised and can be reached at [phone]///thxMW

## 2019-03-06 NOTE — TELEPHONE ENCOUNTER
----- Message from Yancy Manning sent at 3/6/2019 11:14 AM CST -----  Contact: pt  The pt states her pharmacy doesn't accept any script from a PA or NP, the pt want to be advised and can be reached at [phone]///thxMW

## 2019-03-07 ENCOUNTER — TELEPHONE (OUTPATIENT)
Dept: NEPHROLOGY | Facility: CLINIC | Age: 69
End: 2019-03-07

## 2019-03-07 NOTE — TELEPHONE ENCOUNTER
----- Message from Sonia Li sent at 3/7/2019 11:23 AM CST -----  Contact: pt  Please call pt @ [phone], pt have some questions, will discuss with nurse.

## 2019-03-07 NOTE — TELEPHONE ENCOUNTER
Patient states that her Auryxia needs a PA and would like to know if there are samples at her dialysis unit. Advised patient to contact her unit as we don't have any samples here.

## 2019-03-10 ENCOUNTER — NURSE TRIAGE (OUTPATIENT)
Dept: ADMINISTRATIVE | Facility: CLINIC | Age: 69
End: 2019-03-10

## 2019-03-11 ENCOUNTER — HOSPITAL ENCOUNTER (EMERGENCY)
Facility: HOSPITAL | Age: 69
Discharge: HOME | End: 2019-03-11
Attending: EMERGENCY MEDICINE
Payer: MEDICARE

## 2019-03-11 ENCOUNTER — TELEPHONE (OUTPATIENT)
Dept: NEPHROLOGY | Facility: CLINIC | Age: 69
End: 2019-03-11

## 2019-03-11 VITALS
TEMPERATURE: 98 F | SYSTOLIC BLOOD PRESSURE: 156 MMHG | OXYGEN SATURATION: 98 % | HEART RATE: 65 BPM | WEIGHT: 216.06 LBS | DIASTOLIC BLOOD PRESSURE: 72 MMHG | BODY MASS INDEX: 36.89 KG/M2 | HEIGHT: 64 IN | RESPIRATION RATE: 18 BRPM

## 2019-03-11 DIAGNOSIS — N18.6 ESRD (END STAGE RENAL DISEASE): ICD-10-CM

## 2019-03-11 DIAGNOSIS — B37.31 CANDIDIASIS OF VULVA AND VAGINA: Primary | ICD-10-CM

## 2019-03-11 DIAGNOSIS — Z76.0 MEDICATION REFILL: ICD-10-CM

## 2019-03-11 PROCEDURE — 99284 EMERGENCY DEPT VISIT MOD MDM: CPT

## 2019-03-11 RX ORDER — NYSTATIN AND TRIAMCINOLONE ACETONIDE 100000; 1 [USP'U]/G; MG/G
CREAM TOPICAL 3 TIMES DAILY
Qty: 30 G | Refills: 0 | Status: ON HOLD | OUTPATIENT
Start: 2019-03-11 | End: 2020-10-15 | Stop reason: HOSPADM

## 2019-03-11 RX ORDER — FLUCONAZOLE 200 MG/1
200 TABLET ORAL DAILY
Qty: 3 TABLET | Refills: 0 | Status: SHIPPED | OUTPATIENT
Start: 2019-03-11 | End: 2019-03-14

## 2019-03-11 NOTE — ED NOTES
Urine specimen cup provided and informed need for sample. Pt reports she is on dialysis and is unsure if she can get a sample. Provider informed.
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
## 2019-03-11 NOTE — TELEPHONE ENCOUNTER
Reason for Disposition   MODERATE-SEVERE itching (i.e., interferes with school, work, or sleep)    Protocols used: ST VULVAR SYMPTOMS-A-AH    Patient has an itchy vulvar rash and upper thigh rash. She states it burns when she urinates or cleans it. She was advised to use lukewarm water and baking soda in a spray bottle to cleanse the area and dry gently. She verbalized understanding.

## 2019-03-11 NOTE — TELEPHONE ENCOUNTER
----- Message from Elizabeth Tolbert sent at 3/11/2019 12:16 PM CDT -----  Contact: pt   Pt states that she is currently at the pharmacy trying to get her med and been told that she need a waver. Pt states that she has been out of her med for a month and need her med. Pt can be reached at [phone] (asvr)

## 2019-03-12 ENCOUNTER — TELEPHONE (OUTPATIENT)
Dept: NEPHROLOGY | Facility: CLINIC | Age: 69
End: 2019-03-12

## 2019-03-12 DIAGNOSIS — E83.39 HYPERPHOSPHATEMIA: Primary | ICD-10-CM

## 2019-03-12 RX ORDER — SEVELAMER CARBONATE 800 MG/1
2400 TABLET, FILM COATED ORAL
Qty: 270 TABLET | Refills: 11 | Status: SHIPPED | OUTPATIENT
Start: 2019-03-12 | End: 2019-04-10 | Stop reason: SDUPTHER

## 2019-03-12 NOTE — TELEPHONE ENCOUNTER
Incoming call from patient's pharmacy. They are stating that patient needs a refill on Renvela. The other medication is not covered. Please advise

## 2019-03-14 ENCOUNTER — HOSPITAL ENCOUNTER (EMERGENCY)
Facility: HOSPITAL | Age: 69
Discharge: HOME | End: 2019-03-14
Attending: EMERGENCY MEDICINE
Payer: MEDICARE

## 2019-03-14 VITALS
TEMPERATURE: 98 F | DIASTOLIC BLOOD PRESSURE: 68 MMHG | SYSTOLIC BLOOD PRESSURE: 151 MMHG | BODY MASS INDEX: 35.26 KG/M2 | WEIGHT: 206.56 LBS | HEIGHT: 64 IN | HEART RATE: 67 BPM | OXYGEN SATURATION: 97 % | RESPIRATION RATE: 16 BRPM

## 2019-03-14 DIAGNOSIS — N93.9 VAGINAL BLEEDING: Primary | ICD-10-CM

## 2019-03-14 PROCEDURE — 99284 EMERGENCY DEPT VISIT MOD MDM: CPT
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
## 2019-03-15 NOTE — ED NOTES
Patient states she began vaginally bleeding 3 hours ago at home and she is currently being treated for a yeast infection. Patient states she finished her Diflucan yesterday and is still using topical ointment. Also noted, patient had a hysterectomy 30 years ago and is a dialysis patient who does not produce urine.

## 2019-03-15 NOTE — ED NOTES
Spoke with Dr. Noble who agreed patient could be moved to room 6 for pelvic examination after the current patient in 6 is discharged.

## 2019-03-25 ENCOUNTER — TELEPHONE (OUTPATIENT)
Dept: PAIN MEDICINE | Facility: CLINIC | Age: 69
End: 2019-03-25

## 2019-03-25 NOTE — TELEPHONE ENCOUNTER
Contacted patient; spoke to patient's grandchild. He stated that she was at dialysis and won't return until 2PM. Will try to reach out later to patient to confirm appointment for procedure.

## 2019-03-26 ENCOUNTER — TELEPHONE (OUTPATIENT)
Dept: PAIN MEDICINE | Facility: CLINIC | Age: 69
End: 2019-03-26

## 2019-03-26 ENCOUNTER — HOSPITAL ENCOUNTER (OUTPATIENT)
Facility: HOSPITAL | Age: 69
Discharge: HOME | End: 2019-03-26
Attending: PAIN MEDICINE | Admitting: PAIN MEDICINE
Payer: MEDICARE

## 2019-03-26 DIAGNOSIS — M54.16 LUMBAR RADICULOPATHY: ICD-10-CM

## 2019-03-26 PROCEDURE — 63600175 PHARM REV CODE 636 W HCPCS

## 2019-03-26 PROCEDURE — 25000003 PHARM REV CODE 250

## 2019-03-26 NOTE — PROGRESS NOTES
"Patient arrived without present family member. Patient stated her ride was out in the car, that they were running to [id number] to grab lunch. Patient was re-educated on MD pre-procedure instruction to have ride present. Nurse was given cell phone number to daughter (kristin) that would be providing transportation home for patient.( [phone]) 3 attempts were made, call was sent to [facility]. Patient was informed nurse was not able to reach the family member, another phone number was provided by patient. Again, multiple attempts were made to call the number [phone]. A young boy would answer, state he was "playing a game", and hang up. After 3 attempts nurse was able to ask for Kristin who began shouting "I am already here!!!!" I attempted to explain the requirement of a person being present with the patient. Kristin began shouting profanity and stated that if she came inside, she "would make a scene!"  Even asking where the nurse was located.   Kristin stated she would be staying at the front entrance of the hospital parked for the duration of our patients procedure even after being informed that was not a parking area.  Patient was informed of conversation with Kristin and the lack of cooperation with procedure instruction. Patient was spoken to by Nurse Manager Sabrina Cano RN and made aware that this procedure would have to be cancelled. Patient stated understanding and will reschedule with proper arrangements in place. Dr. Fierro notified.  "

## 2019-03-26 NOTE — TELEPHONE ENCOUNTER
Contacted patient; patient states that her grandson has an appt. Procedure time moved to 12:30PM, arrive at 11:30AM. Pt verbalized understanding.

## 2019-03-27 NOTE — H&P
Ms. Rich had an issue with her ride and did not have her procedure performed.  She left before I spoke with her regarding her procedure.  We will get her rescheduled for another day.    KEENAN Fierro MD  Interventional Pain Medicine  [facility] [place]

## 2019-03-28 ENCOUNTER — TELEPHONE (OUTPATIENT)
Dept: PAIN MEDICINE | Facility: CLINIC | Age: 69
End: 2019-03-28

## 2019-03-28 DIAGNOSIS — M54.16 LUMBAR RADICULOPATHY: Primary | ICD-10-CM

## 2019-03-29 ENCOUNTER — HOSPITAL ENCOUNTER (OUTPATIENT)
Facility: HOSPITAL | Age: 69
Discharge: HOME | End: 2019-03-29
Attending: PAIN MEDICINE | Admitting: PAIN MEDICINE
Payer: MEDICARE

## 2019-03-29 VITALS
BODY MASS INDEX: 35.17 KG/M2 | OXYGEN SATURATION: 100 % | SYSTOLIC BLOOD PRESSURE: 159 MMHG | HEART RATE: 71 BPM | RESPIRATION RATE: 18 BRPM | WEIGHT: 206 LBS | HEIGHT: 64 IN | DIASTOLIC BLOOD PRESSURE: 71 MMHG

## 2019-03-29 DIAGNOSIS — M54.16 LUMBAR RADICULOPATHY: ICD-10-CM

## 2019-03-29 PROCEDURE — 64483 NJX AA&/STRD TFRM EPI L/S 1: CPT | Mod: 50,,, | Performed by: PAIN MEDICINE

## 2019-03-29 PROCEDURE — 63600175 PHARM REV CODE 636 W HCPCS: Performed by: PAIN MEDICINE

## 2019-03-29 PROCEDURE — 64483 PR EPIDURAL INJ, ANES/STEROID, TRANSFORAMINAL, LUMB/SACR, SNGL LEVL: ICD-10-PCS | Mod: 50,,, | Performed by: PAIN MEDICINE

## 2019-03-29 PROCEDURE — 63600175 PHARM REV CODE 636 W HCPCS

## 2019-03-29 RX ORDER — DEXAMETHASONE SODIUM PHOSPHATE 10 MG/ML
INJECTION INTRAMUSCULAR; INTRAVENOUS
Status: DISCONTINUED | OUTPATIENT
Start: 2019-03-29 | End: 2019-03-29 | Stop reason: HOSPADM

## 2019-03-29 RX ORDER — GADOBUTROL 604.72 MG/ML
50 INJECTION INTRAVENOUS
Status: COMPLETED | OUTPATIENT
Start: 2019-03-29 | End: 2019-03-29

## 2019-03-29 RX ADMIN — GADOBUTROL 7.5 ML: 604.72 INJECTION INTRAVENOUS at 12:03

## 2019-03-29 NOTE — PLAN OF CARE
Problem: Adult Inpatient Plan of Care  Goal: Plan of Care Review  Outcome: Outcome(s) achieved Date Met: 03/29/19  Patient discharged home in stable condition via wheelchair with ride. Verbalized understanding of discharge instructions. Patient voiced no complaints at this time. Patient stood at side of bed, walked steps with no new motor or sensory deficits. Neurologically intact.

## 2019-04-10 ENCOUNTER — TELEPHONE (OUTPATIENT)
Dept: PAIN MEDICINE | Facility: CLINIC | Age: 69
End: 2019-04-10

## 2019-04-10 DIAGNOSIS — E83.39 HYPERPHOSPHATEMIA: ICD-10-CM

## 2019-04-10 RX ORDER — SEVELAMER CARBONATE 800 MG/1
2400 TABLET, FILM COATED ORAL
Qty: 270 TABLET | Refills: 11 | Status: SHIPPED | OUTPATIENT
Start: 2019-04-10 | End: 2020-01-06 | Stop reason: SDUPTHER

## 2019-04-10 NOTE — TELEPHONE ENCOUNTER
----- Message from Abdias Peacock sent at 4/10/2019  1:40 PM CDT -----  Contact: [phone]  Would like a nurse to contact her regarding her moving to [place], she will need assistance in getting new pain management in place, please contact her @ [phone]. Thanks

## 2019-05-20 ENCOUNTER — TELEPHONE (OUTPATIENT)
Dept: PHYSICAL MEDICINE AND REHAB | Facility: CLINIC | Age: 69
End: 2019-05-20

## 2019-05-20 NOTE — TELEPHONE ENCOUNTER
----- Message from Jose Huitron sent at 5/20/2019 10:12 AM CDT -----  Contact: [facility]  orthopedic  Type:  Needs Medical Advice    Who Called:Alexis   Symptoms (please be specific):   How long has patient had these symptoms:    Pharmacy name and phone #:    Would the patient rather a call back or a response via [facility]? Call   Best Call Back Number: [phone] or fax [phone]  Additional Information: caller is requesting a call back from the nurse in regards to them knowing if their request for the pt medical records was received

## 2019-05-24 ENCOUNTER — PATIENT OUTREACH (OUTPATIENT)
Dept: ADMINISTRATIVE | Facility: HOSPITAL | Age: 69
End: 2019-05-24

## 2019-05-24 NOTE — PROGRESS NOTES
Contacted patient to schedule annual pcp appointment. Left message requesting a call back.    Pt presents to ED with shortness of breath since yesterday. Pt states she has had shortness of breath and cough. Pt states she also has had nausea since last night. Pt has a PMH of hypertension, atrial fibrillation on coumadin and compliant with medications. Pt also has past surgical of two open heart procedures. Pt denies chest pain, lightheadedness and dizziness. Pt is in atrial fibrillation on the monitor. breathing even and labored. O2 saturation 97% on room air. Abdomen soft and nontender. Pt denies vomiting and diarrhea. Pt denies dysuria and hematuria. Skin clean dry and intact. MD at bedside evaluating patient. 20G IVl placed in the L AC. Labs drawn and sent. Will continue to monitor.

## 2019-05-28 DIAGNOSIS — Z76.0 MEDICATION REFILL: ICD-10-CM

## 2019-05-28 DIAGNOSIS — N18.6 ESRD (END STAGE RENAL DISEASE): ICD-10-CM

## 2019-05-28 RX ORDER — HYDRALAZINE HYDROCHLORIDE 50 MG/1
TABLET, FILM COATED ORAL
Qty: 90 TABLET | Refills: 2 | Status: SHIPPED | OUTPATIENT
Start: 2019-05-28 | End: 2019-10-04 | Stop reason: SDUPTHER

## 2019-08-27 DIAGNOSIS — J32.9 OTHER SINUSITIS, UNSPECIFIED CHRONICITY: ICD-10-CM

## 2019-10-04 DIAGNOSIS — N18.6 ESRD (END STAGE RENAL DISEASE): ICD-10-CM

## 2019-10-04 DIAGNOSIS — Z76.0 MEDICATION REFILL: ICD-10-CM

## 2019-10-05 RX ORDER — HYDRALAZINE HYDROCHLORIDE 50 MG/1
TABLET, FILM COATED ORAL
Qty: 270 TABLET | Refills: 0 | Status: SHIPPED | OUTPATIENT
Start: 2019-10-05 | End: 2021-02-25 | Stop reason: ALTCHOICE

## 2019-11-01 ENCOUNTER — HOSPITAL ENCOUNTER (EMERGENCY)
Facility: HOSPITAL | Age: 69
Discharge: HOME | End: 2019-11-01
Attending: EMERGENCY MEDICINE
Payer: MEDICARE

## 2019-11-01 VITALS
HEIGHT: 64 IN | SYSTOLIC BLOOD PRESSURE: 104 MMHG | DIASTOLIC BLOOD PRESSURE: 74 MMHG | HEART RATE: 65 BPM | BODY MASS INDEX: 35.36 KG/M2 | TEMPERATURE: 98 F | OXYGEN SATURATION: 95 % | RESPIRATION RATE: 16 BRPM

## 2019-11-01 DIAGNOSIS — R52 PAIN: ICD-10-CM

## 2019-11-01 DIAGNOSIS — M17.0 OSTEOARTHRITIS OF BOTH KNEES, UNSPECIFIED OSTEOARTHRITIS TYPE: Primary | ICD-10-CM

## 2019-11-01 PROCEDURE — 25000003 PHARM REV CODE 250: Performed by: EMERGENCY MEDICINE

## 2019-11-01 PROCEDURE — 99284 EMERGENCY DEPT VISIT MOD MDM: CPT | Mod: 25

## 2019-11-01 PROCEDURE — 63600175 PHARM REV CODE 636 W HCPCS: Performed by: EMERGENCY MEDICINE

## 2019-11-01 PROCEDURE — 96372 THER/PROPH/DIAG INJ SC/IM: CPT

## 2019-11-01 RX ORDER — ONDANSETRON 4 MG/1
4 TABLET, ORALLY DISINTEGRATING ORAL
Status: COMPLETED | OUTPATIENT
Start: 2019-11-01 | End: 2019-11-01

## 2019-11-01 RX ORDER — HYDROCODONE BITARTRATE AND ACETAMINOPHEN 10; 325 MG/1; MG/1
1 TABLET ORAL EVERY 6 HOURS PRN
Qty: 9 TABLET | Refills: 0 | OUTPATIENT
Start: 2019-11-01 | End: 2019-11-16

## 2019-11-01 RX ORDER — OXYCODONE AND ACETAMINOPHEN 10; 325 MG/1; MG/1
1 TABLET ORAL
Status: ON HOLD | COMMUNITY
End: 2020-10-15 | Stop reason: HOSPADM

## 2019-11-01 RX ORDER — HYDROMORPHONE HYDROCHLORIDE 2 MG/ML
1 INJECTION, SOLUTION INTRAMUSCULAR; INTRAVENOUS; SUBCUTANEOUS
Status: COMPLETED | OUTPATIENT
Start: 2019-11-01 | End: 2019-11-01

## 2019-11-01 RX ADMIN — HYDROMORPHONE HYDROCHLORIDE 1 MG: 2 INJECTION INTRAMUSCULAR; INTRAVENOUS; SUBCUTANEOUS at 03:11

## 2019-11-01 RX ADMIN — ONDANSETRON 4 MG: 4 TABLET, ORALLY DISINTEGRATING ORAL at 03:11
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
## 2019-11-01 NOTE — DISCHARGE INSTRUCTIONS
IbuprofenFor pain.  Norco for breakthrough pain.  Use a walker doses to with walking.  Follow up with your doctor or orthopedist in 1-2 days for re-evaluation.  Return as needed for any worsening symptoms, problems, questions or concerns.

## 2019-11-04 ENCOUNTER — TELEPHONE (OUTPATIENT)
Dept: SPORTS MEDICINE | Facility: CLINIC | Age: 69
End: 2019-11-04

## 2019-11-04 ENCOUNTER — TELEPHONE (OUTPATIENT)
Dept: ORTHOPEDICS | Facility: CLINIC | Age: 69
End: 2019-11-04

## 2019-11-04 DIAGNOSIS — M25.562 PAIN IN BOTH KNEES, UNSPECIFIED CHRONICITY: Primary | ICD-10-CM

## 2019-11-04 DIAGNOSIS — M25.561 PAIN IN BOTH KNEES, UNSPECIFIED CHRONICITY: Primary | ICD-10-CM

## 2019-11-04 NOTE — TELEPHONE ENCOUNTER
Spoke w/ patient. Informed them that they need to arrive approx. 30min prior to appt to have xrays done. Patient verbalized understanding and was grateful for the call-DD

## 2019-11-04 NOTE — TELEPHONE ENCOUNTER
Called pt in regards to apt request and scheduled apt. Pt was informed to arrive 30 min early for x-ray.

## 2019-11-05 ENCOUNTER — TELEPHONE (OUTPATIENT)
Dept: PAIN MEDICINE | Facility: CLINIC | Age: 69
End: 2019-11-05

## 2019-11-05 ENCOUNTER — HOSPITAL ENCOUNTER (OUTPATIENT)
Dept: RADIOLOGY | Facility: HOSPITAL | Age: 69
Discharge: HOME | End: 2019-11-05
Attending: PHYSICIAN ASSISTANT
Payer: MEDICARE

## 2019-11-05 ENCOUNTER — OFFICE VISIT (OUTPATIENT)
Dept: SPORTS MEDICINE | Facility: CLINIC | Age: 69
End: 2019-11-05
Payer: MEDICARE

## 2019-11-05 VITALS
SYSTOLIC BLOOD PRESSURE: 121 MMHG | WEIGHT: 206 LBS | RESPIRATION RATE: 18 BRPM | HEART RATE: 78 BPM | DIASTOLIC BLOOD PRESSURE: 89 MMHG | BODY MASS INDEX: 35.17 KG/M2 | HEIGHT: 64 IN

## 2019-11-05 DIAGNOSIS — M25.562 CHRONIC PAIN OF BOTH KNEES: ICD-10-CM

## 2019-11-05 DIAGNOSIS — M25.561 PAIN IN BOTH KNEES, UNSPECIFIED CHRONICITY: ICD-10-CM

## 2019-11-05 DIAGNOSIS — M17.11 PRIMARY OSTEOARTHRITIS OF RIGHT KNEE: Primary | ICD-10-CM

## 2019-11-05 DIAGNOSIS — G89.29 CHRONIC PAIN OF BOTH KNEES: ICD-10-CM

## 2019-11-05 DIAGNOSIS — M25.562 PAIN IN BOTH KNEES, UNSPECIFIED CHRONICITY: ICD-10-CM

## 2019-11-05 DIAGNOSIS — M17.12 PRIMARY OSTEOARTHRITIS OF LEFT KNEE: ICD-10-CM

## 2019-11-05 DIAGNOSIS — M25.561 CHRONIC PAIN OF BOTH KNEES: ICD-10-CM

## 2019-11-05 PROCEDURE — 73565 X-RAY EXAM OF KNEES: CPT | Mod: 26,,, | Performed by: RADIOLOGY

## 2019-11-05 PROCEDURE — 99999 PR PBB SHADOW E&M-EST. PATIENT-LVL IV: CPT | Mod: PBBFAC,,, | Performed by: PHYSICIAN ASSISTANT

## 2019-11-05 PROCEDURE — 99999 PR PBB SHADOW E&M-EST. PATIENT-LVL IV: ICD-10-PCS | Mod: PBBFAC,,, | Performed by: PHYSICIAN ASSISTANT

## 2019-11-05 PROCEDURE — 99214 OFFICE O/P EST MOD 30 MIN: CPT | Mod: 25,S$PBB,, | Performed by: PHYSICIAN ASSISTANT

## 2019-11-05 PROCEDURE — 73565 X-RAY EXAM OF KNEES: CPT | Mod: TC

## 2019-11-05 PROCEDURE — 73565 XR KNEE AP STANDING BILATERAL: ICD-10-PCS | Mod: 26,,, | Performed by: RADIOLOGY

## 2019-11-05 PROCEDURE — 99214 PR OFFICE/OUTPT VISIT, EST, LEVL IV, 30-39 MIN: ICD-10-PCS | Mod: 25,S$PBB,, | Performed by: PHYSICIAN ASSISTANT

## 2019-11-05 PROCEDURE — 20610 DRAIN/INJ JOINT/BURSA W/O US: CPT | Mod: 50,PBBFAC | Performed by: PHYSICIAN ASSISTANT

## 2019-11-05 PROCEDURE — 20610 PR DRAIN/INJECT LARGE JOINT/BURSA: ICD-10-PCS | Mod: 50,S$PBB,, | Performed by: PHYSICIAN ASSISTANT

## 2019-11-05 PROCEDURE — 20610 DRAIN/INJ JOINT/BURSA W/O US: CPT | Mod: 50,S$PBB,, | Performed by: PHYSICIAN ASSISTANT

## 2019-11-05 PROCEDURE — 99214 OFFICE O/P EST MOD 30 MIN: CPT | Mod: PBBFAC,25 | Performed by: PHYSICIAN ASSISTANT

## 2019-11-05 RX ORDER — METHYLPREDNISOLONE ACETATE 80 MG/ML
80 INJECTION, SUSPENSION INTRA-ARTICULAR; INTRALESIONAL; INTRAMUSCULAR; SOFT TISSUE ONCE
Status: COMPLETED | OUTPATIENT
Start: 2019-11-05 | End: 2019-11-05

## 2019-11-05 RX ORDER — FLECAINIDE ACETATE 50 MG/1
50 TABLET ORAL
COMMUNITY
Start: 2018-11-02 | End: 2020-03-13 | Stop reason: ALTCHOICE

## 2019-11-05 RX ADMIN — METHYLPREDNISOLONE ACETATE 80 MG: 80 INJECTION, SUSPENSION INTRALESIONAL; INTRAMUSCULAR; INTRASYNOVIAL; SOFT TISSUE at 03:11
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
## 2019-11-05 NOTE — TELEPHONE ENCOUNTER
----- Message from Court Munoz MA sent at 11/5/2019  2:17 PM CST -----  Contact: pt  Good Afternoon,     This patient was seen today by Elle CHIN.. Patient wanted me to contact your office regarding pain medication. I informed the patient that I will send the  Office a message, but I cant let wheel her to your office. Elle Baltazar didn't prescribe her any pain meds. So she is asking that your office please contact her..

## 2019-11-05 NOTE — TELEPHONE ENCOUNTER
Informed pt that  will not prescribe her narcotics that she needs to see a comprehensive pain doctor. Pt said she wanted to come in for injection consultation with . Set up appt for pt.

## 2019-11-06 ENCOUNTER — HOSPITAL ENCOUNTER (EMERGENCY)
Facility: HOSPITAL | Age: 69
Discharge: HOME | End: 2019-11-06
Attending: EMERGENCY MEDICINE
Payer: MEDICARE

## 2019-11-06 VITALS
TEMPERATURE: 98 F | RESPIRATION RATE: 20 BRPM | DIASTOLIC BLOOD PRESSURE: 80 MMHG | HEIGHT: 64 IN | HEART RATE: 70 BPM | OXYGEN SATURATION: 97 % | SYSTOLIC BLOOD PRESSURE: 168 MMHG | WEIGHT: 200 LBS | BODY MASS INDEX: 34.15 KG/M2

## 2019-11-06 DIAGNOSIS — M25.562 ACUTE PAIN OF BOTH KNEES: Primary | ICD-10-CM

## 2019-11-06 DIAGNOSIS — M25.561 ACUTE PAIN OF BOTH KNEES: Primary | ICD-10-CM

## 2019-11-06 PROCEDURE — 96372 THER/PROPH/DIAG INJ SC/IM: CPT

## 2019-11-06 PROCEDURE — 99284 EMERGENCY DEPT VISIT MOD MDM: CPT | Mod: 25

## 2019-11-06 PROCEDURE — 63600175 PHARM REV CODE 636 W HCPCS: Performed by: PHYSICIAN ASSISTANT

## 2019-11-06 PROCEDURE — 25000003 PHARM REV CODE 250: Performed by: PHYSICIAN ASSISTANT

## 2019-11-06 RX ORDER — MORPHINE SULFATE 4 MG/ML
4 INJECTION, SOLUTION INTRAMUSCULAR; INTRAVENOUS
Status: COMPLETED | OUTPATIENT
Start: 2019-11-06 | End: 2019-11-06

## 2019-11-06 RX ORDER — ONDANSETRON 4 MG/1
4 TABLET, ORALLY DISINTEGRATING ORAL ONCE
Status: COMPLETED | OUTPATIENT
Start: 2019-11-06 | End: 2019-11-06

## 2019-11-06 RX ORDER — HYDROCODONE BITARTRATE AND ACETAMINOPHEN 5; 325 MG/1; MG/1
1 TABLET ORAL EVERY 4 HOURS PRN
Qty: 12 TABLET | Refills: 0 | OUTPATIENT
Start: 2019-11-06 | End: 2019-11-16

## 2019-11-06 RX ORDER — LORATADINE 10 MG/1
10 TABLET ORAL DAILY
Qty: 30 TABLET | Refills: 11 | Status: ON HOLD | OUTPATIENT
Start: 2019-11-06 | End: 2020-10-15 | Stop reason: HOSPADM

## 2019-11-06 RX ADMIN — ONDANSETRON 4 MG: 4 TABLET, ORALLY DISINTEGRATING ORAL at 03:11

## 2019-11-06 RX ADMIN — MORPHINE SULFATE 4 MG: 4 INJECTION, SOLUTION INTRAMUSCULAR; INTRAVENOUS at 03:11
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
## 2019-11-06 NOTE — ED NOTES
Patient identifiers verified and correct for Gillian Rich.    Patient presented to the ED with c/o bilateral knee pain and swelling. Patient reports pain has worsened since being seen here on 11/1. Patient denies any other issues at this time.     LOC: The patient is awake, alert and aware of environment with an appropriate affect, the patient is oriented x 3 and speaking appropriately.  APPEARANCE: Patient resting comfortably and in no acute distress, patient is clean and well groomed, patient's clothing is properly fastened.  HEENT: WNL   SKIN: The skin is warm and dry, color consistent with ethnicity, patient has normal skin turgor and moist mucus membranes, skin intact, no breakdown or bruising noted.  MUSCULOSKELETAL: Patient moving all extremities spontaneously. + bilateral knee pain    RESPIRATORY: Airway is open and patent, respirations are spontaneous, and unlabored. Breath sounds are clear.   CARDIAC: Patient has a normal rate, no periphreal edema noted, capillary refill < 3 seconds.   ABDOMEN: Soft and non tender to palpation. No distention noted.   : Normal urinary patterns. Urine is yellow without foul odor.

## 2019-11-09 ENCOUNTER — HOSPITAL ENCOUNTER (EMERGENCY)
Facility: HOSPITAL | Age: 69
Discharge: HOME | End: 2019-11-09
Attending: EMERGENCY MEDICINE
Payer: MEDICARE

## 2019-11-09 VITALS
SYSTOLIC BLOOD PRESSURE: 156 MMHG | DIASTOLIC BLOOD PRESSURE: 75 MMHG | HEART RATE: 63 BPM | BODY MASS INDEX: 34.33 KG/M2 | HEIGHT: 64 IN | RESPIRATION RATE: 22 BRPM | TEMPERATURE: 98 F | OXYGEN SATURATION: 99 %

## 2019-11-09 DIAGNOSIS — R51.9 NONINTRACTABLE HEADACHE, UNSPECIFIED CHRONICITY PATTERN, UNSPECIFIED HEADACHE TYPE: ICD-10-CM

## 2019-11-09 DIAGNOSIS — R53.1 WEAKNESS: Primary | ICD-10-CM

## 2019-11-09 LAB
ALBUMIN SERPL BCP-MCNC: 3.4 G/DL (ref 3.5–5.2)
ALP SERPL-CCNC: 109 U/L (ref 55–135)
ALT SERPL W/O P-5'-P-CCNC: 12 U/L (ref 10–44)
ANION GAP SERPL CALC-SCNC: 14 MMOL/L (ref 8–16)
AST SERPL-CCNC: 18 U/L (ref 10–40)
BASOPHILS # BLD AUTO: 0.03 K/UL (ref 0–0.2)
BASOPHILS NFR BLD: 0.3 % (ref 0–1.9)
BILIRUB SERPL-MCNC: 0.3 MG/DL (ref 0.1–1)
BUN SERPL-MCNC: 26 MG/DL (ref 8–23)
CALCIUM SERPL-MCNC: 9.1 MG/DL (ref 8.7–10.5)
CHLORIDE SERPL-SCNC: 95 MMOL/L (ref 95–110)
CO2 SERPL-SCNC: 27 MMOL/L (ref 23–29)
CREAT SERPL-MCNC: 5.4 MG/DL (ref 0.5–1.4)
DIFFERENTIAL METHOD: ABNORMAL
EOSINOPHIL # BLD AUTO: 0.2 K/UL (ref 0–0.5)
EOSINOPHIL NFR BLD: 1.8 % (ref 0–8)
ERYTHROCYTE [DISTWIDTH] IN BLOOD BY AUTOMATED COUNT: 14.8 % (ref 11.5–14.5)
EST. GFR  (AFRICAN AMERICAN): 9 ML/MIN/1.73 M^2
EST. GFR  (NON AFRICAN AMERICAN): 8 ML/MIN/1.73 M^2
GLUCOSE SERPL-MCNC: 93 MG/DL (ref 70–110)
HCT VFR BLD AUTO: 38.4 % (ref 37–48.5)
HGB BLD-MCNC: 12.2 G/DL (ref 12–16)
IMM GRANULOCYTES # BLD AUTO: 0.05 K/UL (ref 0–0.04)
IMM GRANULOCYTES NFR BLD AUTO: 0.5 % (ref 0–0.5)
LYMPHOCYTES # BLD AUTO: 2.6 K/UL (ref 1–4.8)
LYMPHOCYTES NFR BLD: 26.3 % (ref 18–48)
MCH RBC QN AUTO: 31.3 PG (ref 27–31)
MCHC RBC AUTO-ENTMCNC: 31.8 G/DL (ref 32–36)
MCV RBC AUTO: 99 FL (ref 82–98)
MONOCYTES # BLD AUTO: 0.9 K/UL (ref 0.3–1)
MONOCYTES NFR BLD: 9.7 % (ref 4–15)
NEUTROPHILS # BLD AUTO: 5.9 K/UL (ref 1.8–7.7)
NEUTROPHILS NFR BLD: 61.4 % (ref 38–73)
NRBC BLD-RTO: 0 /100 WBC
PLATELET # BLD AUTO: 189 K/UL (ref 150–350)
PMV BLD AUTO: 10.8 FL (ref 9.2–12.9)
POTASSIUM SERPL-SCNC: 4.4 MMOL/L (ref 3.5–5.1)
PROT SERPL-MCNC: 6.7 G/DL (ref 6–8.4)
RBC # BLD AUTO: 3.9 M/UL (ref 4–5.4)
SODIUM SERPL-SCNC: 136 MMOL/L (ref 136–145)
WBC # BLD AUTO: 9.68 K/UL (ref 3.9–12.7)

## 2019-11-09 PROCEDURE — 93010 EKG 12-LEAD: ICD-10-PCS | Mod: ,,, | Performed by: INTERNAL MEDICINE

## 2019-11-09 PROCEDURE — 80053 COMPREHEN METABOLIC PANEL: CPT

## 2019-11-09 PROCEDURE — 96374 THER/PROPH/DIAG INJ IV PUSH: CPT

## 2019-11-09 PROCEDURE — 99284 EMERGENCY DEPT VISIT MOD MDM: CPT | Mod: 25

## 2019-11-09 PROCEDURE — 93010 ELECTROCARDIOGRAM REPORT: CPT | Mod: ,,, | Performed by: INTERNAL MEDICINE

## 2019-11-09 PROCEDURE — 25000003 PHARM REV CODE 250: Performed by: EMERGENCY MEDICINE

## 2019-11-09 PROCEDURE — 85025 COMPLETE CBC W/AUTO DIFF WBC: CPT

## 2019-11-09 PROCEDURE — 93005 ELECTROCARDIOGRAM TRACING: CPT

## 2019-11-09 PROCEDURE — 63600175 PHARM REV CODE 636 W HCPCS: Performed by: EMERGENCY MEDICINE

## 2019-11-09 PROCEDURE — 96361 HYDRATE IV INFUSION ADD-ON: CPT

## 2019-11-09 PROCEDURE — 96375 TX/PRO/DX INJ NEW DRUG ADDON: CPT

## 2019-11-09 RX ORDER — MORPHINE SULFATE 4 MG/ML
2 INJECTION, SOLUTION INTRAMUSCULAR; INTRAVENOUS
Status: COMPLETED | OUTPATIENT
Start: 2019-11-09 | End: 2019-11-09

## 2019-11-09 RX ORDER — ONDANSETRON 4 MG/1
4 TABLET, ORALLY DISINTEGRATING ORAL
Status: COMPLETED | OUTPATIENT
Start: 2019-11-09 | End: 2019-11-09

## 2019-11-09 RX ORDER — ONDANSETRON 2 MG/ML
8 INJECTION INTRAMUSCULAR; INTRAVENOUS
Status: COMPLETED | OUTPATIENT
Start: 2019-11-09 | End: 2019-11-09

## 2019-11-09 RX ADMIN — SODIUM CHLORIDE 100 ML: 0.9 INJECTION, SOLUTION INTRAVENOUS at 12:11

## 2019-11-09 RX ADMIN — MORPHINE SULFATE 2 MG: 4 INJECTION, SOLUTION INTRAMUSCULAR; INTRAVENOUS at 03:11

## 2019-11-09 RX ADMIN — ONDANSETRON 8 MG: 2 INJECTION INTRAMUSCULAR; INTRAVENOUS at 12:11

## 2019-11-09 RX ADMIN — ONDANSETRON 4 MG: 4 TABLET, ORALLY DISINTEGRATING ORAL at 03:11

## 2019-11-09 RX ADMIN — LORAZEPAM 0.5 MG: 2 INJECTION INTRAMUSCULAR; INTRAVENOUS at 12:11
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
## 2019-11-09 NOTE — ED NOTES
"Patient c/o pain rating "beyond 10/10" at this time. Dr. Zuniga notified, states "OK to give morphine from previous order." Patient reports she has no ride home at this time and drove herself to the ER. Patient informed she will not be able to leave ER until 4 hours after Morphine has been administered or until a ride is present in the ER. Patient states, "well, I came to [place] to visit my daughter, and now I'm stuck here. She left and went to [place], so I don't have anyone here to pick me up. Yall are going to have to keep me here until she can come to get me tomorrow". Patient informed she will not be admitted into the hospital because there is no clinical reason for the admission, patient informed that she can wait in treatment lounge/ internal waiting room for 4 hours to be completed. Patient assisted into wheelchair, morphine 2 mg administered via IV push.  "

## 2019-11-09 NOTE — ED NOTES
"Patient transferred to [facility] to wait for ride or 4 hours post administration of IV narcotic. Patient requesting phone to call her daughter, stating "I'm going to see if she can come pick me up" at this time.  "

## 2019-11-13 ENCOUNTER — HOSPITAL ENCOUNTER (EMERGENCY)
Facility: HOSPITAL | Age: 69
Discharge: HOME | End: 2019-11-13
Attending: EMERGENCY MEDICINE
Payer: MEDICARE

## 2019-11-13 ENCOUNTER — OFFICE VISIT (OUTPATIENT)
Dept: PAIN MEDICINE | Facility: CLINIC | Age: 69
End: 2019-11-13
Payer: MEDICARE

## 2019-11-13 ENCOUNTER — TELEPHONE (OUTPATIENT)
Dept: PAIN MEDICINE | Facility: CLINIC | Age: 69
End: 2019-11-13

## 2019-11-13 ENCOUNTER — TELEPHONE (OUTPATIENT)
Dept: ORTHOPEDICS | Facility: CLINIC | Age: 69
End: 2019-11-13

## 2019-11-13 VITALS
SYSTOLIC BLOOD PRESSURE: 130 MMHG | RESPIRATION RATE: 16 BRPM | HEIGHT: 64 IN | OXYGEN SATURATION: 98 % | HEART RATE: 82 BPM | DIASTOLIC BLOOD PRESSURE: 62 MMHG | BODY MASS INDEX: 35.85 KG/M2 | TEMPERATURE: 99 F | WEIGHT: 210 LBS

## 2019-11-13 VITALS
DIASTOLIC BLOOD PRESSURE: 80 MMHG | SYSTOLIC BLOOD PRESSURE: 150 MMHG | HEIGHT: 64 IN | WEIGHT: 199.94 LBS | HEART RATE: 180 BPM | BODY MASS INDEX: 34.13 KG/M2

## 2019-11-13 DIAGNOSIS — M54.16 LUMBAR RADICULOPATHY: Primary | ICD-10-CM

## 2019-11-13 DIAGNOSIS — M25.561 BILATERAL CHRONIC KNEE PAIN: ICD-10-CM

## 2019-11-13 DIAGNOSIS — M54.50 CHRONIC LOW BACK PAIN, UNSPECIFIED BACK PAIN LATERALITY, UNSPECIFIED WHETHER SCIATICA PRESENT: Primary | ICD-10-CM

## 2019-11-13 DIAGNOSIS — M25.562 BILATERAL CHRONIC KNEE PAIN: ICD-10-CM

## 2019-11-13 DIAGNOSIS — G89.29 BILATERAL CHRONIC KNEE PAIN: ICD-10-CM

## 2019-11-13 DIAGNOSIS — G89.29 CHRONIC LOW BACK PAIN, UNSPECIFIED BACK PAIN LATERALITY, UNSPECIFIED WHETHER SCIATICA PRESENT: Primary | ICD-10-CM

## 2019-11-13 DIAGNOSIS — M25.569 KNEE PAIN: ICD-10-CM

## 2019-11-13 PROCEDURE — 63600175 PHARM REV CODE 636 W HCPCS: Performed by: NURSE PRACTITIONER

## 2019-11-13 PROCEDURE — 99213 OFFICE O/P EST LOW 20 MIN: CPT | Mod: PBBFAC | Performed by: PAIN MEDICINE

## 2019-11-13 PROCEDURE — 99213 PR OFFICE/OUTPT VISIT, EST, LEVL III, 20-29 MIN: ICD-10-PCS | Mod: S$PBB,ICN,CMP, | Performed by: PAIN MEDICINE

## 2019-11-13 PROCEDURE — 99999 PR PBB SHADOW E&M-EST. PATIENT-LVL III: CPT | Mod: PBBFAC,,, | Performed by: PAIN MEDICINE

## 2019-11-13 PROCEDURE — 99999 PR PBB SHADOW E&M-EST. PATIENT-LVL III: ICD-10-PCS | Mod: PBBFAC,,, | Performed by: PAIN MEDICINE

## 2019-11-13 PROCEDURE — 99284 EMERGENCY DEPT VISIT MOD MDM: CPT | Mod: 25,27

## 2019-11-13 PROCEDURE — 99213 OFFICE O/P EST LOW 20 MIN: CPT | Mod: S$PBB,ICN,CMP, | Performed by: PAIN MEDICINE

## 2019-11-13 PROCEDURE — 96372 THER/PROPH/DIAG INJ SC/IM: CPT

## 2019-11-13 RX ORDER — MORPHINE SULFATE 4 MG/ML
4 INJECTION, SOLUTION INTRAMUSCULAR; INTRAVENOUS
Status: COMPLETED | OUTPATIENT
Start: 2019-11-13 | End: 2019-11-13

## 2019-11-13 RX ORDER — HYDROCODONE BITARTRATE AND ACETAMINOPHEN 5; 325 MG/1; MG/1
1 TABLET ORAL EVERY 6 HOURS PRN
Qty: 12 TABLET | Refills: 0 | Status: SHIPPED | OUTPATIENT
Start: 2019-11-13 | End: 2019-11-16 | Stop reason: SDUPTHER

## 2019-11-13 RX ADMIN — MORPHINE SULFATE 4 MG: 4 INJECTION INTRAVENOUS at 01:11

## 2019-11-13 NOTE — TELEPHONE ENCOUNTER
----- Message from Kristine Cary sent at 11/13/2019  9:18 AM CST -----  Contact: Patient  Patient is at the hospital and would like to know if the Gel for her knee injection is in. Please call to advise at Ph .[phone] (home)
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
## 2019-11-13 NOTE — PATIENT INSTRUCTIONS
-obtain records from outside pain clinic  -will schedule for lumbar epidural steroid injection based on the results either transforaminal or interlaminar  -continue all medications as prescribed  -follow up in clinic 4 weeks after the injection

## 2019-11-13 NOTE — TELEPHONE ENCOUNTER
Spoke with patient and rescheduled her Euflexxa injections to begin next week. Patient verbalized understanding.
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
## 2019-11-13 NOTE — TELEPHONE ENCOUNTER
Contacted patient; no answer. Left message to return call to schedule procedure with Dr. Fierro.    Spoke to patient prior to this call and she requested injection on 11/19/19.  Orders were placed by Dr. Fierro and case was added to [facility] however patient has not been given instructions.       ----- Message from [employer] sent at 11/13/2019 12:15 PM CST -----  Contact: Pt  .Type:  Patient Returning Call    Who Called: Pt  Who Left Message for Patient: Nurse   Does the patient know what this is regarding?: return call   Would the patient rather a call back or a response via [facility]? Call back   Best Call Back Number: .[phone] (home)   Additional Information:

## 2019-11-13 NOTE — ED NOTES
Patient identifiers verified and correct for Gillian Rich.    Patient presented to the ED with c/o chronic lower back pain from a past injury. Patient has been seen for the same complaint here in the past. Patient reports pain is worst with movement     LOC: The patient is awake, alert and aware of environment with an appropriate affect, the patient is oriented x 3 and speaking appropriately.  APPEARANCE: Patient resting comfortably and in no acute distress, patient is clean and well groomed, patient's clothing is properly fastened.  HEENT: WNL   SKIN: The skin is warm and dry, color consistent with ethnicity, patient has normal skin turgor and moist mucus membranes, skin intact, no breakdown or bruising noted.  MUSCULOSKELETAL: Patient moving all extremities spontaneously.   RESPIRATORY: Airway is open and patent, respirations are spontaneous, and unlabored. Breath sounds are clear.   CARDIAC: Patient has a normal rate, no periphreal edema noted, capillary refill < 3 seconds.   ABDOMEN: Soft and non tender to palpation. No distention noted.   : Normal urinary patterns. Urine is yellow without foul odor.
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
## 2019-11-15 ENCOUNTER — TELEPHONE (OUTPATIENT)
Dept: PAIN MEDICINE | Facility: CLINIC | Age: 69
End: 2019-11-15

## 2019-11-15 NOTE — PRE-PROCEDURE INSTRUCTIONS
Spoke with patient  regarding procedure scheduled on 11/19/19  Has patient been sick with fever or on antibiotics within the last 7 days?no  Has patient received a vaccination within the last 7 days?no  Has the patient stopped all medications as directed? Yes patient reported no longer taking iron for 2 weeks. Denies any other supplements or blood thinning medications.  Does patient have a pacemaker and or defibrillator? no  Does the patient have any chest pain or SOB? no  Does the patient have a ride to and from procedure and someone reliable to remain with patient? yes  Is the patient diabetic? no  Does the patient have sleep apnea? No Or use O2 at home? no  Is the patient receiving sedation? yes  Is the patient instructed to remain NPO beginning at midnight the night before their procedure? yes

## 2019-11-15 NOTE — TELEPHONE ENCOUNTER
----- Message from Benjamin Fontenot sent at 11/15/2019  9:55 AM CST -----  Contact: pt   States she's calling rg wanting to know the time of her procedure and can be reached at [phone]//[email or web address]

## 2019-11-16 ENCOUNTER — HOSPITAL ENCOUNTER (EMERGENCY)
Facility: HOSPITAL | Age: 69
Discharge: HOME | End: 2019-11-16
Attending: EMERGENCY MEDICINE
Payer: MEDICARE

## 2019-11-16 VITALS
TEMPERATURE: 98 F | DIASTOLIC BLOOD PRESSURE: 67 MMHG | BODY MASS INDEX: 16.39 KG/M2 | HEART RATE: 78 BPM | OXYGEN SATURATION: 94 % | HEIGHT: 64 IN | RESPIRATION RATE: 16 BRPM | WEIGHT: 96 LBS | SYSTOLIC BLOOD PRESSURE: 118 MMHG

## 2019-11-16 DIAGNOSIS — G89.29 ACUTE EXACERBATION OF CHRONIC LOW BACK PAIN: Primary | ICD-10-CM

## 2019-11-16 DIAGNOSIS — M54.32 BILATERAL SCIATICA: ICD-10-CM

## 2019-11-16 DIAGNOSIS — M54.50 ACUTE EXACERBATION OF CHRONIC LOW BACK PAIN: Primary | ICD-10-CM

## 2019-11-16 DIAGNOSIS — M54.31 BILATERAL SCIATICA: ICD-10-CM

## 2019-11-16 PROCEDURE — 25000003 PHARM REV CODE 250: Performed by: PHYSICIAN ASSISTANT

## 2019-11-16 PROCEDURE — 63600175 PHARM REV CODE 636 W HCPCS: Performed by: PHYSICIAN ASSISTANT

## 2019-11-16 PROCEDURE — 96372 THER/PROPH/DIAG INJ SC/IM: CPT

## 2019-11-16 PROCEDURE — 99284 EMERGENCY DEPT VISIT MOD MDM: CPT | Mod: 25

## 2019-11-16 RX ORDER — ONDANSETRON 4 MG/1
4 TABLET, ORALLY DISINTEGRATING ORAL
Status: COMPLETED | OUTPATIENT
Start: 2019-11-16 | End: 2019-11-16

## 2019-11-16 RX ORDER — HYDROCODONE BITARTRATE AND ACETAMINOPHEN 5; 325 MG/1; MG/1
1 TABLET ORAL EVERY 6 HOURS PRN
Qty: 12 TABLET | Refills: 0 | Status: SHIPPED | OUTPATIENT
Start: 2019-11-16 | End: 2020-01-29 | Stop reason: SDUPTHER

## 2019-11-16 RX ORDER — MORPHINE SULFATE 4 MG/ML
2 INJECTION, SOLUTION INTRAMUSCULAR; INTRAVENOUS
Status: COMPLETED | OUTPATIENT
Start: 2019-11-16 | End: 2019-11-16

## 2019-11-16 RX ADMIN — ONDANSETRON 4 MG: 4 TABLET, ORALLY DISINTEGRATING ORAL at 12:11

## 2019-11-16 RX ADMIN — MORPHINE SULFATE 2 MG: 4 INJECTION INTRAVENOUS at 12:11
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
## 2019-11-19 ENCOUNTER — HOSPITAL ENCOUNTER (OUTPATIENT)
Facility: HOSPITAL | Age: 69
Discharge: HOME | End: 2019-11-19
Attending: PAIN MEDICINE | Admitting: PAIN MEDICINE
Payer: MEDICARE

## 2019-11-19 ENCOUNTER — TELEPHONE (OUTPATIENT)
Dept: ORTHOPEDICS | Facility: CLINIC | Age: 69
End: 2019-11-19

## 2019-11-19 ENCOUNTER — OFFICE VISIT (OUTPATIENT)
Dept: SPORTS MEDICINE | Facility: CLINIC | Age: 69
End: 2019-11-19
Payer: MEDICARE

## 2019-11-19 VITALS
HEART RATE: 70 BPM | SYSTOLIC BLOOD PRESSURE: 117 MMHG | RESPIRATION RATE: 17 BRPM | TEMPERATURE: 98 F | OXYGEN SATURATION: 98 % | DIASTOLIC BLOOD PRESSURE: 63 MMHG

## 2019-11-19 VITALS
BODY MASS INDEX: 34.15 KG/M2 | HEIGHT: 64 IN | WEIGHT: 200 LBS | HEART RATE: 99 BPM | DIASTOLIC BLOOD PRESSURE: 67 MMHG | SYSTOLIC BLOOD PRESSURE: 138 MMHG

## 2019-11-19 DIAGNOSIS — M17.11 PRIMARY OSTEOARTHRITIS OF RIGHT KNEE: ICD-10-CM

## 2019-11-19 DIAGNOSIS — M17.12 PRIMARY OSTEOARTHRITIS OF LEFT KNEE: ICD-10-CM

## 2019-11-19 DIAGNOSIS — M54.16 LUMBAR RADICULOPATHY: Primary | ICD-10-CM

## 2019-11-19 PROCEDURE — 99499 UNLISTED E&M SERVICE: CPT | Mod: S$PBB,,, | Performed by: PHYSICIAN ASSISTANT

## 2019-11-19 PROCEDURE — 25500020 PHARM REV CODE 255: Performed by: PAIN MEDICINE

## 2019-11-19 PROCEDURE — A9585 GADOBUTROL INJECTION: HCPCS | Performed by: PAIN MEDICINE

## 2019-11-19 PROCEDURE — 99215 OFFICE O/P EST HI 40 MIN: CPT | Mod: PBBFAC,25 | Performed by: PHYSICIAN ASSISTANT

## 2019-11-19 PROCEDURE — 20610 DRAIN/INJ JOINT/BURSA W/O US: CPT | Mod: 50,S$PBB,, | Performed by: PHYSICIAN ASSISTANT

## 2019-11-19 PROCEDURE — 62323 PR INJ LUMBAR/SACRAL, W/IMAGING GUIDANCE: ICD-10-PCS | Mod: ,,, | Performed by: PAIN MEDICINE

## 2019-11-19 PROCEDURE — 63600175 PHARM REV CODE 636 W HCPCS: Performed by: PAIN MEDICINE

## 2019-11-19 PROCEDURE — 25000003 PHARM REV CODE 250: Performed by: PAIN MEDICINE

## 2019-11-19 PROCEDURE — 62323 NJX INTERLAMINAR LMBR/SAC: CPT | Performed by: PAIN MEDICINE

## 2019-11-19 PROCEDURE — 20610 PR DRAIN/INJECT LARGE JOINT/BURSA: ICD-10-PCS | Mod: 50,S$PBB,, | Performed by: PHYSICIAN ASSISTANT

## 2019-11-19 PROCEDURE — 20610 DRAIN/INJ JOINT/BURSA W/O US: CPT | Mod: 50,PBBFAC | Performed by: PHYSICIAN ASSISTANT

## 2019-11-19 PROCEDURE — 99999 PR PBB SHADOW E&M-EST. PATIENT-LVL V: ICD-10-PCS | Mod: PBBFAC,,, | Performed by: PHYSICIAN ASSISTANT

## 2019-11-19 PROCEDURE — 99999 PR PBB SHADOW E&M-EST. PATIENT-LVL V: CPT | Mod: PBBFAC,,, | Performed by: PHYSICIAN ASSISTANT

## 2019-11-19 PROCEDURE — 62323 NJX INTERLAMINAR LMBR/SAC: CPT | Mod: ,,, | Performed by: PAIN MEDICINE

## 2019-11-19 PROCEDURE — 99499 NO LOS: ICD-10-PCS | Mod: S$PBB,,, | Performed by: PHYSICIAN ASSISTANT

## 2019-11-19 RX ORDER — DIAZEPAM 5 MG/5ML
5 SOLUTION ORAL
Status: COMPLETED | OUTPATIENT
Start: 2019-11-19 | End: 2019-11-19

## 2019-11-19 RX ORDER — AZITHROMYCIN 250 MG/1
TABLET, FILM COATED ORAL
Refills: 0 | COMMUNITY
Start: 2019-11-12 | End: 2020-03-13

## 2019-11-19 RX ORDER — DEXAMETHASONE SODIUM PHOSPHATE 10 MG/ML
INJECTION INTRAMUSCULAR; INTRAVENOUS
Status: DISCONTINUED | OUTPATIENT
Start: 2019-11-19 | End: 2019-11-19 | Stop reason: HOSPADM

## 2019-11-19 RX ORDER — LIDOCAINE HYDROCHLORIDE 10 MG/ML
INJECTION, SOLUTION EPIDURAL; INFILTRATION; INTRACAUDAL; PERINEURAL
Status: DISCONTINUED | OUTPATIENT
Start: 2019-11-19 | End: 2019-11-19 | Stop reason: HOSPADM

## 2019-11-19 RX ORDER — DIAZEPAM 5 MG/5ML
5 SOLUTION ORAL
Status: DISCONTINUED | OUTPATIENT
Start: 2019-11-19 | End: 2019-11-19

## 2019-11-19 RX ORDER — ONDANSETRON 4 MG/1
4 TABLET, FILM COATED ORAL EVERY 6 HOURS
Refills: 6 | Status: ON HOLD | COMMUNITY
Start: 2019-11-12 | End: 2020-07-26 | Stop reason: HOSPADM

## 2019-11-19 RX ORDER — GADOBUTROL 604.72 MG/ML
INJECTION INTRAVENOUS
Status: DISCONTINUED | OUTPATIENT
Start: 2019-11-19 | End: 2019-11-19 | Stop reason: HOSPADM

## 2019-11-19 RX ADMIN — Medication 20 MG: at 02:11

## 2019-11-19 RX ADMIN — DIAZEPAM 5 MG: 5 SOLUTION ORAL at 10:11

## 2019-11-19 NOTE — PLAN OF CARE
Pt aaa o x's 4, denies any pain/discomfort, what to expect during recovery discussed with pt. Pt verbalized understanding of all post op instructions. All questions and concerns addressed and answered, will continue to monitor pt until discharged.

## 2019-11-19 NOTE — OP NOTE
PROCEDURE: Lumbar epidural steroid injection under fluoroscopic guidance    LEVEL: L5/S1  SIDE: Midline     PROCEDURE DATE: 11/19/2019    PREOPERATIVE DIAGNOSIS: Lumbar radiculopathy  POSTOPERATIVE DIAGNOSIS: Lumbar radiculopathy    PROVIDER: KEENAN Fierro MD  Assistant(s): None    ANESTHESIA: Local, Diazepam 5 mg po    >> 0 mg of VERSED    >> 0 mcg of FENTANYL    INDICATION: The patient has low back pain and radiculopathy symptoms unresponsive to conservative treatments. Fluoroscopy was used to optimize visualization of needle placement and to maximize safety.        PROCEDURE DESCRIPTION / TECHNIQUE:   The patient was seen and identified in the preoperative area. Risks, benefits, complications, and alternatives were discussed with the patient. The patient agreed to proceed with the procedure and signed the consent. The site and side of the procedure was identified and marked. An IV was not placed for this procedure.    The patient was taken to the procedural suite. The patient was positioned in prone orientation on procedure table and a pillow was placed under the abdomen to reduce lumbar lordosis. A time out was performed prior to any intervention. The procedure, site, side, and allergies were stated and agreed to by all present. The lumbosacral area was widely prepped with ChloraPrep. The procedural site was draped in usual sterile fashion. Vital signs were closely monitored throughout this procedure. Conscious sedation was used for this procedure to decrease patient anxiety.    Using anterior-posterior fluoroscopy, the above noted INTERLAMINAR SPACE was identified and the skin over this site of intended entry was marked and then infiltrated with 3-4 mL of PF 1% LIDOCAINE subcutaneously using a 1.5 inch 27 gauge needle. A 20-gauge Tuohy epidural needle was then inserted and advanced toward the epidural space incrementally under fluoroscopic guidance. A Loss of Resistance technique was used as the epidural  needle was advanced. Once loss of resistance was realized and after negative aspiration of blood and spinal fluid, correct needle position within the epidural space was verified with injection of 0.5 to 1 mL of contrast dye (gadivist). Appropriate epidural spread was seen on imaging. Again, after negative aspiration for blood and spinal fluid a 5 mL mixture containing 1 mL of Dexamethasone (10 mg/mL) and 2 mL of preservative free Normal Saline and 2ml of 1% preservative free lidocaine was then injected. No pain or paresthesias were noted with injection. There was low resistance during the injection. Washout of epidurogram was seen on fluoroscopy following injection of the above solution. The stylet was replaced and the Tuohy needle was withdrawn intact. The skin was cleansed, and bandages were applied.    Description of Findings: Not applicable    Prosthetic devices, grafts, tissues, or devices implanted: None    Specimen Removed: No    Estimated Blood Loss: minimal    COMPLICATIONS: None    DISPOSITION / PLANS: The patient was transferred to the recovery area in a stable condition for observation. The patient was reexamined prior to discharge. There was no evidence of acute neurologic injury following the procedure.  Patient was discharged from the recovery room after meeting discharge criteria. Home discharge instructions were given to the patient by the staff.

## 2019-11-19 NOTE — DISCHARGE SUMMARY
[facility]  Short Stay  Discharge Summary    Admit Date: 11/19/2019    Discharge Date and Time: 11/19/2019 11:36 AM      Discharge Attending Physician: KEENAN Fierro MD     Hospital Course (synopsis of major diagnoses, care, treatment, and services provided during the course of the hospital stay): Patient was admitted to Pre-op where informed consent was signed.  The patient was then taken to the procedure suite where the procedure was performed.  The patient was then return to the Pre-Op area and discharge was performed.     Final Diagnoses:    Principal Problem: <principal problem not specified>   Secondary Diagnoses:   Active Hospital Problems    Diagnosis  POA    Lumbar radiculopathy [M54.16]  Yes      Resolved Hospital Problems   No resolved problems to display.       Discharged Condition: good    Disposition: Home or Self Care    Follow up/Patient Instructions:    Medications:  Reconciled Home Medications:      Medication List      CONTINUE taking these medications    acetaminophen 325 MG tablet  Commonly known as:  TYLENOL  Take 650 mg by mouth.     cloNIDine 0.3 MG tablet  Commonly known as:  CATAPRES  Take 1 tablet (0.3 mg total) by mouth 3 (three) times daily.     fluticasone propionate 50 mcg/actuation nasal spray  Commonly known as:  FLONASE  2 sprays (100 mcg total) by Each Nare route once daily.     hydrALAZINE 50 MG tablet  Commonly known as:  APRESOLINE  TAKE 1 TABLET BY MOUTH THREE TIMES A DAY     HYDROcodone-acetaminophen 5-325 mg per tablet  Commonly known as:  NORCO  Take 1 tablet by mouth every 6 (six) hours as needed for Pain.     loratadine 10 mg tablet  Commonly known as:  CLARITIN  Take 1 tablet (10 mg total) by mouth once daily.     metoprolol succinate 25 MG 24 hr tablet  Commonly known as:  TOPROL-XL  Take 1 tablet (25 mg total) by mouth 2 (two) times daily.     nystatin-triamcinolone cream  Commonly known as:  MYCOLOG II  Apply topically 3 (three) times daily.      ondansetron 4 MG Tbdl  Commonly known as:  ZOFRAN-ODT  Take 1 tablet (4 mg total) by mouth every 8 (eight) hours as needed.     sevelamer carbonate 800 mg Tab  Commonly known as:  RENVELA  Take 3 tablets (2,400 mg total) by mouth 3 (three) times daily with meals.        ASK your doctor about these medications    cyclobenzaprine 5 MG tablet  Commonly known as:  FLEXERIL  Take 1 tablet (5 mg total) by mouth nightly as needed for Muscle spasms.     ferric citrate 210 mg iron Tab  Commonly known as:  Auryxia  Take 2 tablets by mouth 3 (three) times daily meals and 1 tablet with snacks     flecainide 50 MG Tab  Commonly known as:  TAMBOCOR  Take 50 mg by mouth.     gabapentin 300 MG capsule  Commonly known as:  NEURONTIN  Take 1 capsule (300mg) by mouth every night X 1 week then increase to 2 capsules (600mg) QHS thereafter. Increase as tolerated.     methocarbamol 500 MG Tab  Commonly known as:  ROBAXIN  Take 1 tablet (500 mg total) by mouth 3 (three) times daily as needed (muscle spasms).     oxyCODONE-acetaminophen  mg per tablet  Commonly known as:  PERCOCET  Take 1 tablet by mouth.     traMADol 50 mg tablet  Commonly known as:  ULTRAM  Take 1/2 to 1 tab PO QD to BID PRN pain.          Discharge Procedure Orders   Diet general     Call MD for:  severe uncontrolled pain     Call MD for:  difficulty breathing, headache or visual disturbances     Call MD for:  redness, tenderness, or signs of infection (pain, swelling, redness, odor or green/yellow discharge around incision site)     Activity as tolerated

## 2019-11-19 NOTE — TELEPHONE ENCOUNTER
Patient wanted to come now, the provider is at lunch. Patient will come at her scheduled appt time-DD              ----- Message from Jose Huitron sent at 11/19/2019 11:42 AM CST -----  Contact: pt   Type:  Same Day Appointment Request    Caller is requesting a same day appointment.  Caller declined first available appointment listed below.    Name of Caller:BLASCARMENYAHIR HENRY   When is the first available appointment? 11/19/2019  Symptoms  Best Call Back Number: [phone] (home)    Additional Information: pt is requesting a call back from the nurse in regards to the pt wanting to know if she can come to the office right now for her apt instead of 2 pm.Today because she is just leaving Dr [facility]andra office and [facility] now the pt just had and injection in her back

## 2019-11-19 NOTE — INTERVAL H&P NOTE
The patient has been examined and the H&P has been reviewed:    I concur with the findings and changes have been noted since the H&P was written: will proceed with L5/S1 IL NAWAF    Anesthesia/Surgery risks, benefits and alternative options discussed and understood by patient/family.          There are no hospital problems to display for this patient.

## 2019-11-19 NOTE — DISCHARGE INSTRUCTIONS

## 2019-11-19 NOTE — PROGRESS NOTES
Gillian Rich comes in today for the first Euflexxa injection in the bilateral knees.  We have again discussed risks and benefits of the injection.  She wishes to proceed and will notify the office with any questions or concerned.  I will see him/her back in the office next week as scheduled.  She verbalizes understanding and agrees.    Pain level 10/10.  Quality is aching, throbbing, sharp, burning.  Alleviating factors include prior Euflexxa injections in the past.  Aggravating factors include prolonged weight-bearing, getting up after prolonged inactivity, nighttime.  She cites an increase in her symptoms after a fall she experienced at [facility].  This occurred at the end of October in 2018.  Of note, she had some lumbosacral injections done today by Dr. Fierro.    Right Knee Euflexxa #1 Injection Report:  After verbal consent was obtained for right knee injection, patient ID, site, and side were verified.  The  right  knee was sterilly prepped in the standard fashion.  A 22-gauge needle was introduced into right knee joint from an amie-lateral site without complication and was then injected with one pre filled syringe of Euflexxa.  A sterile bandaid was applied.  The patient was informed to apply an ice pack approximately 10min once arriving home and not to do anything strenuous for 24hours.  She was instructed to call if there were any problems. The patient was discharged in stable condition.    Left Knee Euflexxa #1 Injection Report:  After verbal consent was obtained for left knee injection, patient ID, site, and side were verified.  The left  knee was sterilly prepped in the standard fashion.  A 22-gauge needle was introduced into left knee joint from an amie-lateral site without complication and was then injected with one pre filled syringe of Euflexxa.  A sterile bandaid was applied.  The patient was informed to apply an ice pack approximately 10min once arriving home and  not to do anything strenuous for 24hours.  She was instructed to call if there were any problems. The patient was discharged in stable condition.

## 2019-11-19 NOTE — PLAN OF CARE
Pt discharged home, awake, alert, oriented x's 4, denies any pain, no apparent distress noted. All questions and concerns addressed and answered, pt verbalizes understanding of discharge process, pt meets discharge criteria and is being discharged to car via wheelchair.

## 2019-11-25 ENCOUNTER — TELEPHONE (OUTPATIENT)
Dept: PAIN MEDICINE | Facility: CLINIC | Age: 69
End: 2019-11-25

## 2019-11-25 NOTE — TELEPHONE ENCOUNTER
----- Message from Sanjeev Blank sent at 11/25/2019 11:37 AM CST -----  Contact: self  Type:  Sooner Apoointment Request    Caller is requesting a sooner appointment.  Caller declined first available appointment listed below.  Caller will not accept being placed on the waitlist and is requesting a message be sent to doctor.  Name of Caller:Gillianyvonne Rich  When is the first available appointment? 01/08  Symptoms:n/a  Would the patient rather a call back or a response via [facility]? Call back  Best Call Back Number:[phone]  Additional Information: pt requesting appt to get injection in back    Thanks,  Sanjeev Blank

## 2019-11-25 NOTE — TELEPHONE ENCOUNTER
Contacted pt. Pt requesting appointment for injection.  consulted. Ok to schedule consult but no more injections until next year as pt has had 4 (Feb 2019, March 2019 #2, and Nov 2019) . Pt notified. Pt states she will call back to schedule appt at the end of December. All questions answered.//lp

## 2019-11-25 NOTE — TELEPHONE ENCOUNTER
----- Message from Zenaida Pineda sent at 11/25/2019  1:08 PM CST -----  .Type:  Sooner Apoointment Request    Caller is requesting a sooner appointment.  Caller declined first available appointment listed below.  Caller will not accept being placed on the waitlist and is requesting a message be sent to doctor.  Name of Caller:patient  When is the first available appointment?  Symptoms:injection  Would the patient rather a call back or a response via [facility]?   Best Call Back Number:.[phone] (home)     Additional Information:

## 2019-11-26 ENCOUNTER — OFFICE VISIT (OUTPATIENT)
Dept: SPORTS MEDICINE | Facility: CLINIC | Age: 69
End: 2019-11-26
Payer: MEDICARE

## 2019-11-26 VITALS
DIASTOLIC BLOOD PRESSURE: 69 MMHG | HEIGHT: 64 IN | WEIGHT: 200 LBS | SYSTOLIC BLOOD PRESSURE: 125 MMHG | BODY MASS INDEX: 34.15 KG/M2 | HEART RATE: 52 BPM

## 2019-11-26 DIAGNOSIS — G89.29 CHRONIC PAIN OF BOTH KNEES: ICD-10-CM

## 2019-11-26 DIAGNOSIS — M17.12 PRIMARY OSTEOARTHRITIS OF LEFT KNEE: Primary | ICD-10-CM

## 2019-11-26 DIAGNOSIS — Z99.2 ANEMIA IN CHRONIC KIDNEY DISEASE, ON CHRONIC DIALYSIS: Chronic | ICD-10-CM

## 2019-11-26 DIAGNOSIS — N18.6 ANEMIA IN CHRONIC KIDNEY DISEASE, ON CHRONIC DIALYSIS: Chronic | ICD-10-CM

## 2019-11-26 DIAGNOSIS — M25.562 CHRONIC PAIN OF BOTH KNEES: ICD-10-CM

## 2019-11-26 DIAGNOSIS — D63.1 ANEMIA IN CHRONIC KIDNEY DISEASE, ON CHRONIC DIALYSIS: Chronic | ICD-10-CM

## 2019-11-26 DIAGNOSIS — M25.561 CHRONIC PAIN OF BOTH KNEES: ICD-10-CM

## 2019-11-26 DIAGNOSIS — M17.11 PRIMARY OSTEOARTHRITIS OF RIGHT KNEE: ICD-10-CM

## 2019-11-26 PROCEDURE — 20610 DRAIN/INJ JOINT/BURSA W/O US: CPT | Mod: 50,S$PBB,, | Performed by: PHYSICIAN ASSISTANT

## 2019-11-26 PROCEDURE — 99999 PR PBB SHADOW E&M-EST. PATIENT-LVL IV: ICD-10-PCS | Mod: PBBFAC,,, | Performed by: PHYSICIAN ASSISTANT

## 2019-11-26 PROCEDURE — 20610 DRAIN/INJ JOINT/BURSA W/O US: CPT | Mod: 50,PBBFAC | Performed by: PHYSICIAN ASSISTANT

## 2019-11-26 PROCEDURE — 20610 PR DRAIN/INJECT LARGE JOINT/BURSA: ICD-10-PCS | Mod: 50,S$PBB,, | Performed by: PHYSICIAN ASSISTANT

## 2019-11-26 PROCEDURE — 99499 UNLISTED E&M SERVICE: CPT | Mod: S$PBB,,, | Performed by: PHYSICIAN ASSISTANT

## 2019-11-26 PROCEDURE — 99499 NO LOS: ICD-10-PCS | Mod: S$PBB,,, | Performed by: PHYSICIAN ASSISTANT

## 2019-11-26 PROCEDURE — 99999 PR PBB SHADOW E&M-EST. PATIENT-LVL IV: CPT | Mod: PBBFAC,,, | Performed by: PHYSICIAN ASSISTANT

## 2019-11-26 PROCEDURE — 99214 OFFICE O/P EST MOD 30 MIN: CPT | Mod: PBBFAC,25 | Performed by: PHYSICIAN ASSISTANT

## 2019-11-26 RX ADMIN — Medication 20 MG: at 03:11

## 2019-11-26 NOTE — PROGRESS NOTES
Gillian Rich comes in today for the second Euflexxa injection in the bilateral knees.  She denies complications from previous injection.  We have again discussed risks and benefits of the injection.  She wishes to proceed and will notify the office with any questions or concerned.  I will see him/her back in the office next week as scheduled.  She verbalizes understanding and agrees.    She states she had excellent pain relief after the injections for several days.  However, the pain has returned.  She states this is the same pattern that she had the last time she did the Euflexxa injections. Pain level 10/10.  Quality is aching, sharp, shooting.  Improved with Euflexxa previously.  Worsened with weight-bearing.  She has history of chronic kidney disease.  She is not a candidate for oral NSAIDs.  I will send a prescription for topical NSAID to [facility] for her.    Right Knee Euflexxa #2 Injection Report:  After verbal consent was obtained for right knee injection, patient ID, site, and side were verified.  The  right  knee was sterilly prepped in the standard fashion.  A 22-gauge needle was introduced into right knee joint from an amie-lateral site without complication and was then injected with one pre filled syringe of Euflexxa.  A sterile bandaid was applied.  The patient was informed to apply an ice pack approximately 10min once arriving home and not to do anything strenuous for 24hours.  She was instructed to call if there were any problems. The patient was discharged in stable condition.    Left Knee Euflexxa #2 Injection Report:  After verbal consent was obtained for left knee injection, patient ID, site, and side were verified.  The left  knee was sterilly prepped in the standard fashion.  A 22-gauge needle was introduced into left knee joint from an amie-lateral site without complication and was then injected with one pre filled syringe of Euflexxa.  A sterile bandaid was  applied.  The patient was informed to apply an ice pack approximately 10min once arriving home and not to do anything strenuous for 24hours.  She was instructed to call if there were any problems. The patient was discharged in stable condition.

## 2019-12-03 ENCOUNTER — HOSPITAL ENCOUNTER (EMERGENCY)
Facility: HOSPITAL | Age: 69
Discharge: HOME | End: 2019-12-03
Attending: EMERGENCY MEDICINE
Payer: MEDICARE

## 2019-12-03 ENCOUNTER — TELEPHONE (OUTPATIENT)
Dept: ORTHOPEDICS | Facility: CLINIC | Age: 69
End: 2019-12-03

## 2019-12-03 VITALS
SYSTOLIC BLOOD PRESSURE: 150 MMHG | HEART RATE: 70 BPM | TEMPERATURE: 98 F | OXYGEN SATURATION: 96 % | RESPIRATION RATE: 18 BRPM | DIASTOLIC BLOOD PRESSURE: 72 MMHG | BODY MASS INDEX: 38.41 KG/M2 | WEIGHT: 223.75 LBS

## 2019-12-03 DIAGNOSIS — R07.9 CHEST PAIN: ICD-10-CM

## 2019-12-03 DIAGNOSIS — R06.00 DYSPNEA, UNSPECIFIED TYPE: Primary | ICD-10-CM

## 2019-12-03 DIAGNOSIS — R06.02 SHORTNESS OF BREATH: ICD-10-CM

## 2019-12-03 LAB
ALBUMIN SERPL BCP-MCNC: 3.5 G/DL (ref 3.5–5.2)
ALP SERPL-CCNC: 135 U/L (ref 55–135)
ALT SERPL W/O P-5'-P-CCNC: 19 U/L (ref 10–44)
ANION GAP SERPL CALC-SCNC: 12 MMOL/L (ref 8–16)
AST SERPL-CCNC: 24 U/L (ref 10–40)
BASOPHILS # BLD AUTO: 0.03 K/UL (ref 0–0.2)
BASOPHILS NFR BLD: 0.4 % (ref 0–1.9)
BILIRUB SERPL-MCNC: 0.3 MG/DL (ref 0.1–1)
BNP SERPL-MCNC: 417 PG/ML (ref 0–99)
BUN SERPL-MCNC: 39 MG/DL (ref 8–23)
CALCIUM SERPL-MCNC: 9.3 MG/DL (ref 8.7–10.5)
CHLORIDE SERPL-SCNC: 93 MMOL/L (ref 95–110)
CO2 SERPL-SCNC: 33 MMOL/L (ref 23–29)
CREAT SERPL-MCNC: 7.7 MG/DL (ref 0.5–1.4)
DIFFERENTIAL METHOD: ABNORMAL
EOSINOPHIL # BLD AUTO: 0 K/UL (ref 0–0.5)
EOSINOPHIL NFR BLD: 0.5 % (ref 0–8)
ERYTHROCYTE [DISTWIDTH] IN BLOOD BY AUTOMATED COUNT: 15.1 % (ref 11.5–14.5)
EST. GFR  (AFRICAN AMERICAN): 6 ML/MIN/1.73 M^2
EST. GFR  (NON AFRICAN AMERICAN): 5 ML/MIN/1.73 M^2
GLUCOSE SERPL-MCNC: 93 MG/DL (ref 70–110)
HCT VFR BLD AUTO: 35.6 % (ref 37–48.5)
HGB BLD-MCNC: 10.8 G/DL (ref 12–16)
IMM GRANULOCYTES # BLD AUTO: 0.04 K/UL (ref 0–0.04)
IMM GRANULOCYTES NFR BLD AUTO: 0.5 % (ref 0–0.5)
INFLUENZA A, MOLECULAR: NEGATIVE
INFLUENZA B, MOLECULAR: NEGATIVE
LYMPHOCYTES # BLD AUTO: 2.5 K/UL (ref 1–4.8)
LYMPHOCYTES NFR BLD: 28.9 % (ref 18–48)
MCH RBC QN AUTO: 30.3 PG (ref 27–31)
MCHC RBC AUTO-ENTMCNC: 30.3 G/DL (ref 32–36)
MCV RBC AUTO: 100 FL (ref 82–98)
MONOCYTES # BLD AUTO: 0.9 K/UL (ref 0.3–1)
MONOCYTES NFR BLD: 10.9 % (ref 4–15)
NEUTROPHILS # BLD AUTO: 5 K/UL (ref 1.8–7.7)
NEUTROPHILS NFR BLD: 58.8 % (ref 38–73)
NRBC BLD-RTO: 1 /100 WBC
PLATELET # BLD AUTO: 308 K/UL (ref 150–350)
PMV BLD AUTO: 10.3 FL (ref 9.2–12.9)
POTASSIUM SERPL-SCNC: 4.6 MMOL/L (ref 3.5–5.1)
PROT SERPL-MCNC: 6.7 G/DL (ref 6–8.4)
RBC # BLD AUTO: 3.56 M/UL (ref 4–5.4)
SODIUM SERPL-SCNC: 138 MMOL/L (ref 136–145)
SPECIMEN SOURCE: NORMAL
TROPONIN I SERPL DL<=0.01 NG/ML-MCNC: 0.03 NG/ML (ref 0–0.03)
WBC # BLD AUTO: 8.52 K/UL (ref 3.9–12.7)

## 2019-12-03 PROCEDURE — 93010 EKG 12-LEAD: ICD-10-PCS | Mod: ,,, | Performed by: INTERNAL MEDICINE

## 2019-12-03 PROCEDURE — 93010 ELECTROCARDIOGRAM REPORT: CPT | Mod: ,,, | Performed by: INTERNAL MEDICINE

## 2019-12-03 PROCEDURE — 94640 AIRWAY INHALATION TREATMENT: CPT

## 2019-12-03 PROCEDURE — 96374 THER/PROPH/DIAG INJ IV PUSH: CPT

## 2019-12-03 PROCEDURE — 80053 COMPREHEN METABOLIC PANEL: CPT

## 2019-12-03 PROCEDURE — 25000242 PHARM REV CODE 250 ALT 637 W/ HCPCS: Performed by: EMERGENCY MEDICINE

## 2019-12-03 PROCEDURE — 27000221 HC OXYGEN, UP TO 24 HOURS

## 2019-12-03 PROCEDURE — 83880 ASSAY OF NATRIURETIC PEPTIDE: CPT

## 2019-12-03 PROCEDURE — 85025 COMPLETE CBC W/AUTO DIFF WBC: CPT

## 2019-12-03 PROCEDURE — 84484 ASSAY OF TROPONIN QUANT: CPT

## 2019-12-03 PROCEDURE — 99285 EMERGENCY DEPT VISIT HI MDM: CPT | Mod: 25

## 2019-12-03 PROCEDURE — 93005 ELECTROCARDIOGRAM TRACING: CPT

## 2019-12-03 PROCEDURE — 87502 INFLUENZA DNA AMP PROBE: CPT

## 2019-12-03 PROCEDURE — 63600175 PHARM REV CODE 636 W HCPCS: Performed by: EMERGENCY MEDICINE

## 2019-12-03 PROCEDURE — 96375 TX/PRO/DX INJ NEW DRUG ADDON: CPT

## 2019-12-03 RX ORDER — METHYLPREDNISOLONE SOD SUCC 125 MG
125 VIAL (EA) INJECTION
Status: COMPLETED | OUTPATIENT
Start: 2019-12-03 | End: 2019-12-03

## 2019-12-03 RX ORDER — MORPHINE SULFATE 4 MG/ML
4 INJECTION, SOLUTION INTRAMUSCULAR; INTRAVENOUS
Status: COMPLETED | OUTPATIENT
Start: 2019-12-03 | End: 2019-12-03

## 2019-12-03 RX ORDER — ONDANSETRON 2 MG/ML
4 INJECTION INTRAMUSCULAR; INTRAVENOUS
Status: COMPLETED | OUTPATIENT
Start: 2019-12-03 | End: 2019-12-03

## 2019-12-03 RX ORDER — IPRATROPIUM BROMIDE AND ALBUTEROL SULFATE 2.5; .5 MG/3ML; MG/3ML
3 SOLUTION RESPIRATORY (INHALATION)
Status: DISCONTINUED | OUTPATIENT
Start: 2019-12-03 | End: 2019-12-03 | Stop reason: HOSPADM

## 2019-12-03 RX ORDER — PREDNISONE 20 MG/1
40 TABLET ORAL DAILY
Qty: 10 TABLET | Refills: 0 | Status: SHIPPED | OUTPATIENT
Start: 2019-12-03 | End: 2019-12-08

## 2019-12-03 RX ORDER — IPRATROPIUM BROMIDE AND ALBUTEROL SULFATE 2.5; .5 MG/3ML; MG/3ML
3 SOLUTION RESPIRATORY (INHALATION)
Status: COMPLETED | OUTPATIENT
Start: 2019-12-03 | End: 2019-12-03

## 2019-12-03 RX ORDER — METHYLPREDNISOLONE SOD SUCC 125 MG
80 VIAL (EA) INJECTION
Status: DISCONTINUED | OUTPATIENT
Start: 2019-12-03 | End: 2019-12-03

## 2019-12-03 RX ORDER — CODEINE PHOSPHATE AND GUAIFENESIN 10; 100 MG/5ML; MG/5ML
5 SOLUTION ORAL EVERY 6 HOURS PRN
Qty: 180 ML | Refills: 0 | Status: ON HOLD | OUTPATIENT
Start: 2019-12-03 | End: 2020-10-15 | Stop reason: HOSPADM

## 2019-12-03 RX ADMIN — ONDANSETRON 4 MG: 2 INJECTION INTRAMUSCULAR; INTRAVENOUS at 06:12

## 2019-12-03 RX ADMIN — METHYLPREDNISOLONE SODIUM SUCCINATE 125 MG: 125 INJECTION, POWDER, FOR SOLUTION INTRAMUSCULAR; INTRAVENOUS at 06:12

## 2019-12-03 RX ADMIN — RACEPINEPHRINE HYDROCHLORIDE 0.5 ML: 11.25 SOLUTION RESPIRATORY (INHALATION) at 07:12

## 2019-12-03 RX ADMIN — MORPHINE SULFATE 4 MG: 4 INJECTION INTRAVENOUS at 06:12

## 2019-12-03 RX ADMIN — IPRATROPIUM BROMIDE AND ALBUTEROL SULFATE 3 ML: .5; 3 SOLUTION RESPIRATORY (INHALATION) at 06:12

## 2019-12-03 NOTE — TELEPHONE ENCOUNTER
Spoke to pt in regards to missed appt for Euflexxa today.  She is sick.  She told me she just called for an ambulance.  Obviously, we will hold off on the last Euflexxa injection until she recovers.  Will hope to do it next week.  She will call us later this week to get rescheduled.
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
## 2019-12-04 NOTE — PROGRESS NOTES
Patient upper air way audibly wheezes when she opens her mouth and breaths forcefully . While taking breathing treatment breath sounds are clear / diminished . No work of breathing at this time

## 2019-12-10 ENCOUNTER — TELEPHONE (OUTPATIENT)
Dept: ORTHOPEDICS | Facility: CLINIC | Age: 69
End: 2019-12-10

## 2019-12-10 NOTE — TELEPHONE ENCOUNTER
Spoke with patient and rescheduled her appointment for her 3rd Euflexxa Inj. Understanding verbalized.

## 2019-12-11 ENCOUNTER — TELEPHONE (OUTPATIENT)
Dept: ORTHOPEDICS | Facility: CLINIC | Age: 69
End: 2019-12-11

## 2019-12-11 NOTE — TELEPHONE ENCOUNTER
Spoke w/ Lauren at [facility] and informed her that I would be re-faxing the RX over for the patient. They did not receive. Lauren verbalized understanding -dd    ----- Message from Peri Owens sent at 12/11/2019 10:15 AM CST -----  Contact: Lauren/[facility] [phone] fax [phone]  Type:  Pharmacy Calling to Clarify an RX    Name of Caller:Lauren  Pharmacy Name:[facility]   Prescription Name:   What do they need to clarify?:   Best Call Back Number:[phone]  Additional Information: States that pt stated that a rx was to be called in for her. Did not know name of medication. States that pt said it was for her feet. States that pt has not had anything filled with them.

## 2019-12-31 RX ORDER — METOPROLOL SUCCINATE 25 MG/1
25 TABLET, EXTENDED RELEASE ORAL 2 TIMES DAILY
Refills: 3
Start: 2019-12-31 | End: 2020-07-22 | Stop reason: SDUPTHER

## 2019-12-31 NOTE — TELEPHONE ENCOUNTER
----- Message from Sadia Nino sent at 12/31/2019 10:54 AM CST -----  Contact: Pt   Pt is calling .Type:  RX Refill Request    Who Called: Pt   Refill or New Rx Refill   RX Name and Strength: metoprolol succinate (TOPROL-XL) 25 MG 24 hr tablet  How is the patient currently taking it? (ex. 1XDay) Take 1 tablet (25 mg total) by mouth 2 (two) times daily. -   Is this a 30 day or 90 day RX:   Preferred Pharmacy with phone number: .  [facility]/pharmacy in [place] [facility] [employer]   Local or Mail Order: Local   Ordering Provider: Dr. Kitchen   Would the patient rather a call back or a response via [facility]? Call back   Best Call Back Number: [phone]   Additional Information: Pt is requesting 3 times daily           .Thank You  Sadia Nino

## 2020-01-06 DIAGNOSIS — E83.39 HYPERPHOSPHATEMIA: ICD-10-CM

## 2020-01-06 RX ORDER — SEVELAMER CARBONATE 800 MG/1
2400 TABLET, FILM COATED ORAL
Qty: 270 TABLET | Refills: 11 | Status: SHIPPED | OUTPATIENT
Start: 2020-01-06 | End: 2020-08-10 | Stop reason: SDUPTHER

## 2020-01-06 RX ORDER — POLYMYXIN B SULFATE AND TRIMETHOPRIM 1; 10000 MG/ML; [USP'U]/ML
1 SOLUTION OPHTHALMIC EVERY 6 HOURS
Qty: 10 ML | Refills: 0 | Status: SHIPPED | OUTPATIENT
Start: 2020-01-06 | End: 2020-03-14

## 2020-01-06 RX ORDER — AMOXICILLIN AND CLAVULANATE POTASSIUM 500; 125 MG/1; MG/1
1 TABLET, FILM COATED ORAL DAILY
Qty: 10 TABLET | Refills: 0 | Status: SHIPPED | OUTPATIENT
Start: 2020-01-06 | End: 2020-01-16

## 2020-01-08 ENCOUNTER — TELEPHONE (OUTPATIENT)
Dept: ORTHOPEDICS | Facility: CLINIC | Age: 70
End: 2020-01-08

## 2020-01-08 NOTE — TELEPHONE ENCOUNTER
Returned call to patient and she wanted a sooner appointment and I told her I could add her to the waiting list if something came open sooner she can be moved up on the schedule. Patient verbalized wanted to be put on waiting list.

## 2020-01-27 DIAGNOSIS — M25.569 KNEE PAIN, UNSPECIFIED CHRONICITY, UNSPECIFIED LATERALITY: Primary | ICD-10-CM

## 2020-01-29 ENCOUNTER — OFFICE VISIT (OUTPATIENT)
Dept: RHEUMATOLOGY | Facility: CLINIC | Age: 70
End: 2020-01-29
Payer: MEDICARE

## 2020-01-29 VITALS
DIASTOLIC BLOOD PRESSURE: 78 MMHG | HEIGHT: 64 IN | SYSTOLIC BLOOD PRESSURE: 130 MMHG | BODY MASS INDEX: 35.95 KG/M2 | WEIGHT: 210.56 LBS | HEART RATE: 63 BPM

## 2020-01-29 DIAGNOSIS — M25.562 CHRONIC PAIN OF BOTH KNEES: Primary | ICD-10-CM

## 2020-01-29 DIAGNOSIS — G89.29 CHRONIC PAIN OF BOTH KNEES: Primary | ICD-10-CM

## 2020-01-29 DIAGNOSIS — M25.561 CHRONIC PAIN OF BOTH KNEES: Primary | ICD-10-CM

## 2020-01-29 PROCEDURE — 99204 OFFICE O/P NEW MOD 45 MIN: CPT | Mod: S$PBB,,, | Performed by: INTERNAL MEDICINE

## 2020-01-29 PROCEDURE — 99999 PR PBB SHADOW E&M-EST. PATIENT-LVL III: ICD-10-PCS | Mod: PBBFAC,,, | Performed by: INTERNAL MEDICINE

## 2020-01-29 PROCEDURE — 99213 OFFICE O/P EST LOW 20 MIN: CPT | Mod: PBBFAC | Performed by: INTERNAL MEDICINE

## 2020-01-29 PROCEDURE — 99204 PR OFFICE/OUTPT VISIT, NEW, LEVL IV, 45-59 MIN: ICD-10-PCS | Mod: S$PBB,,, | Performed by: INTERNAL MEDICINE

## 2020-01-29 PROCEDURE — 99999 PR PBB SHADOW E&M-EST. PATIENT-LVL III: CPT | Mod: PBBFAC,,, | Performed by: INTERNAL MEDICINE

## 2020-01-29 RX ORDER — HYDROCODONE BITARTRATE AND ACETAMINOPHEN 5; 325 MG/1; MG/1
1 TABLET ORAL EVERY 8 HOURS PRN
Qty: 15 TABLET | Refills: 0 | Status: SHIPPED | OUTPATIENT
Start: 2020-01-29 | End: 2020-02-03
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
## 2020-01-29 NOTE — LETTER
January 29, 2020      Ayo Kitchen MD  [address]  [place] [address]           [facility]  [address]  [place] [address]  Phone: [phone]  Fax: [phone]          Patient: Gillian Rich   MR Number: [id number]   YOB: 1950   Date of Visit: 1/29/2020       Dear Dr. Ayo Kitchen:    Thank you for referring Gillian Rich to me for evaluation. Attached you will find relevant portions of my assessment and plan of care.    If you have questions, please do not hesitate to call me. I look forward to following Gillian Rich along with you.    Sincerely,    Bryn Schilling MD    Enclosure  CC:  No Recipients    If you would like to receive this communication electronically, please contact [email or web address] or [phone] to request more information on [facility] access.    For providers and/or their staff who would like to refer a patient to [facility], please contact us through our one-stop-shop provider referral line, [facility], at [phone].    If you feel you have received this communication in error or would no longer like to receive these types of communications, please e-mail [email or web address]

## 2020-01-29 NOTE — ASSESSMENT & PLAN NOTE
Worsening pain secondary to deformity of bilateral knees .  Already scheduled to see  in 10 days.  Advised to keep that appointment.  P.r.n. pain medications given today.  She might not benefit from intra-articular corticosteroid or hyaluronic acid injections since she already failed both.

## 2020-02-10 ENCOUNTER — HOSPITAL ENCOUNTER (EMERGENCY)
Facility: HOSPITAL | Age: 70
Discharge: HOME | End: 2020-02-10
Attending: EMERGENCY MEDICINE
Payer: MEDICARE

## 2020-02-10 VITALS
SYSTOLIC BLOOD PRESSURE: 145 MMHG | HEIGHT: 64 IN | OXYGEN SATURATION: 98 % | WEIGHT: 210.56 LBS | DIASTOLIC BLOOD PRESSURE: 60 MMHG | RESPIRATION RATE: 20 BRPM | BODY MASS INDEX: 35.95 KG/M2 | TEMPERATURE: 98 F | HEART RATE: 57 BPM

## 2020-02-10 DIAGNOSIS — R07.9 CHEST PAIN: Primary | ICD-10-CM

## 2020-02-10 DIAGNOSIS — R09.81 SINUS CONGESTION: ICD-10-CM

## 2020-02-10 LAB
ALBUMIN SERPL BCP-MCNC: 3.6 G/DL (ref 3.5–5.2)
ALP SERPL-CCNC: 130 U/L (ref 55–135)
ALT SERPL W/O P-5'-P-CCNC: 18 U/L (ref 10–44)
ANION GAP SERPL CALC-SCNC: 13 MMOL/L (ref 8–16)
AST SERPL-CCNC: 20 U/L (ref 10–40)
BASOPHILS # BLD AUTO: 0.04 K/UL (ref 0–0.2)
BASOPHILS NFR BLD: 0.5 % (ref 0–1.9)
BILIRUB SERPL-MCNC: 0.3 MG/DL (ref 0.1–1)
BNP SERPL-MCNC: 239 PG/ML (ref 0–99)
BUN SERPL-MCNC: 25 MG/DL (ref 8–23)
CALCIUM SERPL-MCNC: 9 MG/DL (ref 8.7–10.5)
CHLORIDE SERPL-SCNC: 96 MMOL/L (ref 95–110)
CK SERPL-CCNC: 57 U/L (ref 20–180)
CO2 SERPL-SCNC: 30 MMOL/L (ref 23–29)
CREAT SERPL-MCNC: 4.8 MG/DL (ref 0.5–1.4)
DIFFERENTIAL METHOD: ABNORMAL
EOSINOPHIL # BLD AUTO: 0.1 K/UL (ref 0–0.5)
EOSINOPHIL NFR BLD: 1.2 % (ref 0–8)
ERYTHROCYTE [DISTWIDTH] IN BLOOD BY AUTOMATED COUNT: 14.5 % (ref 11.5–14.5)
EST. GFR  (AFRICAN AMERICAN): 10 ML/MIN/1.73 M^2
EST. GFR  (NON AFRICAN AMERICAN): 9 ML/MIN/1.73 M^2
GLUCOSE SERPL-MCNC: 128 MG/DL (ref 70–110)
HCT VFR BLD AUTO: 35.3 % (ref 37–48.5)
HGB BLD-MCNC: 11.1 G/DL (ref 12–16)
IMM GRANULOCYTES # BLD AUTO: 0.03 K/UL (ref 0–0.04)
IMM GRANULOCYTES NFR BLD AUTO: 0.4 % (ref 0–0.5)
LYMPHOCYTES # BLD AUTO: 2.6 K/UL (ref 1–4.8)
LYMPHOCYTES NFR BLD: 32.3 % (ref 18–48)
MCH RBC QN AUTO: 31 PG (ref 27–31)
MCHC RBC AUTO-ENTMCNC: 31.4 G/DL (ref 32–36)
MCV RBC AUTO: 99 FL (ref 82–98)
MONOCYTES # BLD AUTO: 0.6 K/UL (ref 0.3–1)
MONOCYTES NFR BLD: 7.1 % (ref 4–15)
NEUTROPHILS # BLD AUTO: 4.7 K/UL (ref 1.8–7.7)
NEUTROPHILS NFR BLD: 58.5 % (ref 38–73)
NRBC BLD-RTO: 0 /100 WBC
PLATELET # BLD AUTO: 285 K/UL (ref 150–350)
PMV BLD AUTO: 10.4 FL (ref 9.2–12.9)
POTASSIUM SERPL-SCNC: 4.6 MMOL/L (ref 3.5–5.1)
PROT SERPL-MCNC: 7.1 G/DL (ref 6–8.4)
RBC # BLD AUTO: 3.58 M/UL (ref 4–5.4)
SODIUM SERPL-SCNC: 139 MMOL/L (ref 136–145)
TROPONIN I SERPL DL<=0.01 NG/ML-MCNC: 0.02 NG/ML (ref 0–0.03)
WBC # BLD AUTO: 8.08 K/UL (ref 3.9–12.7)

## 2020-02-10 PROCEDURE — 93010 ELECTROCARDIOGRAM REPORT: CPT | Mod: ,,, | Performed by: INTERNAL MEDICINE

## 2020-02-10 PROCEDURE — 93005 ELECTROCARDIOGRAM TRACING: CPT

## 2020-02-10 PROCEDURE — 99285 EMERGENCY DEPT VISIT HI MDM: CPT | Mod: 25

## 2020-02-10 PROCEDURE — 93010 EKG 12-LEAD: ICD-10-PCS | Mod: ,,, | Performed by: INTERNAL MEDICINE

## 2020-02-10 PROCEDURE — 82550 ASSAY OF CK (CPK): CPT

## 2020-02-10 PROCEDURE — 83880 ASSAY OF NATRIURETIC PEPTIDE: CPT

## 2020-02-10 PROCEDURE — 80053 COMPREHEN METABOLIC PANEL: CPT

## 2020-02-10 PROCEDURE — 85025 COMPLETE CBC W/AUTO DIFF WBC: CPT

## 2020-02-10 PROCEDURE — 84484 ASSAY OF TROPONIN QUANT: CPT

## 2020-02-10 RX ORDER — PROMETHAZINE HYDROCHLORIDE AND DEXTROMETHORPHAN HYDROBROMIDE 6.25; 15 MG/5ML; MG/5ML
5 SYRUP ORAL EVERY 6 HOURS PRN
Qty: 120 ML | Refills: 0 | Status: SHIPPED | OUTPATIENT
Start: 2020-02-10 | End: 2020-02-20
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
## 2020-02-10 NOTE — ED NOTES
Patient identifiers verified and correct for Gillian Rich.    LOC: The patient is awake, alert and aware of environment with an appropriate affect, the patient is oriented x 3 and speaking appropriately.  APPEARANCE: Patient resting comfortably and in no acute distress, patient is clean and well groomed, patient's clothing is properly fastened.  SKIN: The skin is warm and dry, color consistent with ethnicity, patient has normal skin turgor and moist mucus membranes, skin intact, no breakdown or bruising noted. AV fistula noted to LUE, thrill and bruit noted  MUSCULOSKELETAL: Patient moving all extremities spontaneously, no obvious swelling or deformities noted.  RESPIRATORY: Airway is open and patent, respirations are spontaneous, patient has a normal effort and rate, no accessory muscle use noted, bilateral breath sounds clear.  CARDIAC: Patient has a normal rate and regular rhythm, no periphreal edema noted, capillary refill < 3 seconds. Pt reports chest pain  ABDOMEN: Soft and non tender to palpation, no distention noted, normoactive bowel sounds present in all four quadrants. Pt reports abd pain  NEUROLOGIC: PERRL, **mm bilaterally, eyes open spontaneously, behavior appropriate to situation, follows commands, facial expression symmetrical, bilateral hand grasp equal and even, purposeful motor response noted, normal sensation in all extremities when touched with a finger. Pt reports feelings of anxiety and HA

## 2020-02-14 ENCOUNTER — OFFICE VISIT (OUTPATIENT)
Dept: ORTHOPEDICS | Facility: CLINIC | Age: 70
End: 2020-02-14
Payer: MEDICARE

## 2020-02-14 VITALS
BODY MASS INDEX: 35.85 KG/M2 | HEIGHT: 64 IN | DIASTOLIC BLOOD PRESSURE: 78 MMHG | SYSTOLIC BLOOD PRESSURE: 138 MMHG | WEIGHT: 210 LBS | HEART RATE: 72 BPM

## 2020-02-14 DIAGNOSIS — M17.11 ARTHRITIS OF KNEE, RIGHT: Primary | ICD-10-CM

## 2020-02-14 DIAGNOSIS — M17.12 ARTHRITIS OF KNEE, LEFT: ICD-10-CM

## 2020-02-14 DIAGNOSIS — M51.36 DDD (DEGENERATIVE DISC DISEASE), LUMBAR: ICD-10-CM

## 2020-02-14 DIAGNOSIS — I73.9 PERIPHERAL ARTERIAL DISEASE: ICD-10-CM

## 2020-02-14 PROCEDURE — 99214 OFFICE O/P EST MOD 30 MIN: CPT | Mod: S$PBB,,, | Performed by: ORTHOPAEDIC SURGERY

## 2020-02-14 PROCEDURE — 99214 PR OFFICE/OUTPT VISIT, EST, LEVL IV, 30-39 MIN: ICD-10-PCS | Mod: S$PBB,,, | Performed by: ORTHOPAEDIC SURGERY

## 2020-02-14 PROCEDURE — 99213 OFFICE O/P EST LOW 20 MIN: CPT | Mod: PBBFAC | Performed by: ORTHOPAEDIC SURGERY

## 2020-02-14 PROCEDURE — 99999 PR PBB SHADOW E&M-EST. PATIENT-LVL III: CPT | Mod: PBBFAC,,, | Performed by: ORTHOPAEDIC SURGERY

## 2020-02-14 PROCEDURE — 99999 PR PBB SHADOW E&M-EST. PATIENT-LVL III: ICD-10-PCS | Mod: PBBFAC,,, | Performed by: ORTHOPAEDIC SURGERY

## 2020-02-14 RX ORDER — DICLOFENAC SODIUM 10 MG/G
4 GEL TOPICAL 3 TIMES DAILY
Qty: 1 TUBE | Refills: 3 | Status: SHIPPED | OUTPATIENT
Start: 2020-02-14 | End: 2020-04-30

## 2020-02-14 NOTE — PATIENT INSTRUCTIONS
Bilateral knee severe arthritis with valgus alignment  Degenerative disc disease of the lumbar spine  Peripheral arterial disease    Patient is a candidate for right total knee replacement. She needs to undergo extensive workup by Cardiology to assess the heart and peripheral arterial disease.  She does not have any palpable pulses.  Her sister  at 70 with heart attack.  X-ray of the lumbar spine showing calcified aorta and its severe degenerative disc disease at L2-3 level and facet arthropathy throughout.  X-ray of both of the knees showing peripheral arterial disease of the femoral artery and popliteal artery .  If patient gets cleared medically she is a candidate for total knee replacement  Will give brochure about total knee replacement  I would like to see her back in 3 weeks to further discuss
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
## 2020-02-21 ENCOUNTER — TELEPHONE (OUTPATIENT)
Dept: ORTHOPEDICS | Facility: CLINIC | Age: 70
End: 2020-02-21

## 2020-02-21 NOTE — TELEPHONE ENCOUNTER
Attempted to contact pt regarding message left below. No answer. Left voicemail, BEST JO.       ----- Message from Alia Chu sent at 2/21/2020  9:17 AM CST -----  Contact: patient  Calling regarding the surgery clearance for patient. Please call patient @ [phone]. Thanks.

## 2020-03-03 ENCOUNTER — TELEPHONE (OUTPATIENT)
Dept: ORTHOPEDICS | Facility: CLINIC | Age: 70
End: 2020-03-03

## 2020-03-03 NOTE — TELEPHONE ENCOUNTER
Scheduled pt for cardiac clearance. Pt confirmed date, time, location and provider. She was thankful for the returned call, AL, LPN.    ----- Message from Daria Tolbert sent at 3/3/2020 12:56 PM CST -----  Contact: Patient   Patient would like Rona to call her at [phone], Regards to the status of the Cardiology.    Thanks  Td

## 2020-03-13 ENCOUNTER — OFFICE VISIT (OUTPATIENT)
Dept: CARDIOLOGY | Facility: CLINIC | Age: 70
End: 2020-03-13
Payer: MEDICARE

## 2020-03-13 VITALS
BODY MASS INDEX: 36.43 KG/M2 | SYSTOLIC BLOOD PRESSURE: 110 MMHG | DIASTOLIC BLOOD PRESSURE: 60 MMHG | OXYGEN SATURATION: 97 % | HEART RATE: 52 BPM | WEIGHT: 213.38 LBS | HEIGHT: 64 IN

## 2020-03-13 DIAGNOSIS — Z99.2 DIALYSIS PATIENT: ICD-10-CM

## 2020-03-13 DIAGNOSIS — I10 HYPERTENSION, UNSPECIFIED TYPE: Chronic | ICD-10-CM

## 2020-03-13 DIAGNOSIS — I73.9 PERIPHERAL VASCULAR DISEASE: ICD-10-CM

## 2020-03-13 DIAGNOSIS — R94.31 ABNORMAL ECG: ICD-10-CM

## 2020-03-13 DIAGNOSIS — R07.89 ATYPICAL CHEST PAIN: ICD-10-CM

## 2020-03-13 DIAGNOSIS — I70.0 AORTIC ATHEROSCLEROSIS: ICD-10-CM

## 2020-03-13 DIAGNOSIS — N18.6 ESRD (END STAGE RENAL DISEASE) ON DIALYSIS: Chronic | ICD-10-CM

## 2020-03-13 DIAGNOSIS — Z01.810 PREOP CARDIOVASCULAR EXAM: Primary | ICD-10-CM

## 2020-03-13 DIAGNOSIS — Z99.2 ESRD (END STAGE RENAL DISEASE) ON DIALYSIS: Chronic | ICD-10-CM

## 2020-03-13 PROCEDURE — 99204 OFFICE O/P NEW MOD 45 MIN: CPT | Mod: S$PBB,,, | Performed by: INTERNAL MEDICINE

## 2020-03-13 PROCEDURE — 99999 PR PBB SHADOW E&M-EST. PATIENT-LVL III: ICD-10-PCS | Mod: PBBFAC,,, | Performed by: INTERNAL MEDICINE

## 2020-03-13 PROCEDURE — 99999 PR PBB SHADOW E&M-EST. PATIENT-LVL III: CPT | Mod: PBBFAC,,, | Performed by: INTERNAL MEDICINE

## 2020-03-13 PROCEDURE — 99213 OFFICE O/P EST LOW 20 MIN: CPT | Mod: PBBFAC | Performed by: INTERNAL MEDICINE

## 2020-03-13 PROCEDURE — 99204 PR OFFICE/OUTPT VISIT, NEW, LEVL IV, 45-59 MIN: ICD-10-PCS | Mod: S$PBB,,, | Performed by: INTERNAL MEDICINE

## 2020-03-13 NOTE — LETTER
March 13, 2020      Adilson Aguilar MD  [address] [place] [address] [place] [address]           [facility]  [address] [place] [address] [place] [address]  Phone: [phone]  Fax: [phone]          Patient: Gillian Rich   MR Number: [id number]   YOB: 1950   Date of Visit: 3/13/2020       Dear Dr. Adilson Aguilar:    Thank you for referring Gillian Rich to me for evaluation. Attached you will find relevant portions of my assessment and plan of care.    If you have questions, please do not hesitate to call me. I look forward to following Gillian Rich along with you.    Sincerely,    Yann Sims MD    Enclosure  CC:  No Recipients    If you would like to receive this communication electronically, please contact [email or web address] or [phone] to request more information on [employer] Link access.    For providers and/or their staff who would like to refer a patient to [facility], please contact us through our one-stop-shop provider referral line, [facility], at [phone].    If you feel you have received this communication in error or would no longer like to receive these types of communications, please e-mail [email or web address]
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
## 2020-03-14 RX ORDER — POLYMYXIN B SULFATE AND TRIMETHOPRIM 1; 10000 MG/ML; [USP'U]/ML
1 SOLUTION OPHTHALMIC EVERY 6 HOURS
Qty: 10 ML | Refills: 0 | Status: SHIPPED | OUTPATIENT
Start: 2020-03-14 | End: 2020-03-19

## 2020-03-27 ENCOUNTER — TELEPHONE (OUTPATIENT)
Dept: CARDIOLOGY | Facility: HOSPITAL | Age: 70
End: 2020-03-27

## 2020-04-06 DIAGNOSIS — K04.7 DENTAL ABSCESS: ICD-10-CM

## 2020-04-06 RX ORDER — AMOXICILLIN 500 MG/1
500 CAPSULE ORAL 3 TIMES DAILY
Qty: 30 CAPSULE | Refills: 0 | Status: SHIPPED | OUTPATIENT
Start: 2020-04-06 | End: 2020-04-16

## 2020-04-07 ENCOUNTER — TELEPHONE (OUTPATIENT)
Dept: CARDIOLOGY | Facility: CLINIC | Age: 70
End: 2020-04-07

## 2020-04-07 NOTE — TELEPHONE ENCOUNTER
Missed tests today, due to no transportation until 5/22.  Transportation will not transport at this time.

## 2020-04-14 DIAGNOSIS — J32.9 OTHER SINUSITIS, UNSPECIFIED CHRONICITY: ICD-10-CM

## 2020-04-14 RX ORDER — FLUTICASONE PROPIONATE 50 MCG
2 SPRAY, SUSPENSION (ML) NASAL DAILY
Qty: 10 ML | Refills: 2 | Status: SHIPPED | OUTPATIENT
Start: 2020-04-14 | End: 2020-09-25

## 2020-04-14 NOTE — TELEPHONE ENCOUNTER
----- Message from Melvi Sims sent at 4/14/2020 11:03 AM CDT -----  Contact: [phone]  Would like to consult with the nurse, patient would like to get a refill on her Medication, Please call back at [phone], thanks sj  .Type:  RX Refill Request    Who Called:  Ms Rich  Refill or New Rx:refill  RX Name and Strength:anibiotic, , Nasal spray  How is the patient currently taking it? (ex. 1XDay): as needed  Is this a 30 day or 90 day RX:  Preferred Pharmacy with phone number:.      [id number] [address] - [address]  [address] [place] [address]  Phone: [phone] Fax: [phone]      Local or Mail Order:local  Ordering Provider:Dr Kitchen  Would the patient rather a call back or a response via [facility]? callback  Best Call Back Number:[phone]  Additional Information:

## 2020-04-30 RX ORDER — DICLOFENAC SODIUM 10 MG/G
4 GEL TOPICAL 3 TIMES DAILY
Qty: 100 G | Refills: 3 | Status: SHIPPED | OUTPATIENT
Start: 2020-04-30 | End: 2021-01-29

## 2020-05-26 ENCOUNTER — TELEPHONE (OUTPATIENT)
Dept: NEPHROLOGY | Facility: CLINIC | Age: 70
End: 2020-05-26

## 2020-05-26 NOTE — TELEPHONE ENCOUNTER
Patient states her health insurance benefits are incorrect in [employer] database. Pt states she is being overbilled, because they are not billing one of her insurances. I told patient that we do not handle insurance. She asked that I send a message to Dr. Kitchen.

## 2020-05-26 NOTE — TELEPHONE ENCOUNTER
----- Message from Handy Gallo sent at 5/26/2020  3:26 PM CDT -----  Contact: Pt   Pt called in regards to speaking with the provider themselves in regards to getting help with a personal matter. Pt can be reached at [phone] (exod)

## 2020-06-01 ENCOUNTER — HOSPITAL ENCOUNTER (OUTPATIENT)
Dept: CARDIOLOGY | Facility: HOSPITAL | Age: 70
Discharge: HOME | End: 2020-06-01
Attending: INTERNAL MEDICINE
Payer: MEDICARE

## 2020-06-01 VITALS
SYSTOLIC BLOOD PRESSURE: 122 MMHG | BODY MASS INDEX: 36.37 KG/M2 | WEIGHT: 213 LBS | BODY MASS INDEX: 36.37 KG/M2 | DIASTOLIC BLOOD PRESSURE: 74 MMHG | HEIGHT: 64 IN | DIASTOLIC BLOOD PRESSURE: 74 MMHG | SYSTOLIC BLOOD PRESSURE: 122 MMHG | WEIGHT: 213 LBS | HEIGHT: 64 IN

## 2020-06-01 DIAGNOSIS — R94.31 ABNORMAL ECG: ICD-10-CM

## 2020-06-01 DIAGNOSIS — R07.89 ATYPICAL CHEST PAIN: ICD-10-CM

## 2020-06-01 DIAGNOSIS — N18.6 ESRD (END STAGE RENAL DISEASE) ON DIALYSIS: ICD-10-CM

## 2020-06-01 DIAGNOSIS — I73.9 PVD (PERIPHERAL VASCULAR DISEASE): ICD-10-CM

## 2020-06-01 DIAGNOSIS — N18.6 ESRD (END STAGE RENAL DISEASE) ON DIALYSIS: Chronic | ICD-10-CM

## 2020-06-01 DIAGNOSIS — I70.0 AORTIC ATHEROSCLEROSIS: ICD-10-CM

## 2020-06-01 DIAGNOSIS — Z01.810 PREOP CARDIOVASCULAR EXAM: ICD-10-CM

## 2020-06-01 DIAGNOSIS — I73.9 PERIPHERAL VASCULAR DISEASE: ICD-10-CM

## 2020-06-01 DIAGNOSIS — I70.0 AORTIC ATHEROSCLEROSIS: Primary | ICD-10-CM

## 2020-06-01 DIAGNOSIS — Z99.2 DIALYSIS PATIENT: ICD-10-CM

## 2020-06-01 DIAGNOSIS — I10 HYPERTENSION, UNSPECIFIED TYPE: ICD-10-CM

## 2020-06-01 DIAGNOSIS — Z99.2 ESRD (END STAGE RENAL DISEASE) ON DIALYSIS: ICD-10-CM

## 2020-06-01 DIAGNOSIS — Z99.2 ESRD (END STAGE RENAL DISEASE) ON DIALYSIS: Chronic | ICD-10-CM

## 2020-06-01 LAB
LEFT TBI: 0.29
LEFT TOE PRESSURE: 35 MMHG
RIGHT ARM BP: 122 MMHG
RIGHT TBI: 0.64
RIGHT TOE PRESSURE: 78 MMHG

## 2020-06-01 PROCEDURE — 93922 CV US ANKLE BRACHIAL INDICES EXT LTD WO STRESS (CUPID ONLY): ICD-10-PCS | Mod: 26,,, | Performed by: INTERNAL MEDICINE

## 2020-06-01 PROCEDURE — 93925 LOWER EXTREMITY STUDY: CPT

## 2020-06-01 PROCEDURE — 93925 LOWER EXTREMITY STUDY: CPT | Mod: 26,,, | Performed by: INTERNAL MEDICINE

## 2020-06-01 PROCEDURE — 93925 CV US DOPPLER ARTERIAL LEGS BILATERAL (CUPID ONLY): ICD-10-PCS | Mod: 26,,, | Performed by: INTERNAL MEDICINE

## 2020-06-01 PROCEDURE — 93922 UPR/L XTREMITY ART 2 LEVELS: CPT

## 2020-06-01 PROCEDURE — 93922 UPR/L XTREMITY ART 2 LEVELS: CPT | Mod: 26,,, | Performed by: INTERNAL MEDICINE

## 2020-06-02 LAB
LEFT ANT TIBIAL SYS PSV: 19 CM/S
LEFT CFA PSV: 115 CM/S
LEFT EXTERNAL ILIAC PSV: 146 CM/S
LEFT PERONEAL SYS PSV: 0 CM/S
LEFT POPLITEAL PSV: 51 CM/S
LEFT POST TIBIAL SYS PSV: 35 CM/S
LEFT PROFUNDA SYS PSV: 124 CM/S
LEFT SUPER FEMORAL DIST SYS PSV: 73 CM/S
LEFT SUPER FEMORAL MID SYS PSV: 88 CM/S
LEFT SUPER FEMORAL OSTIAL SYS PSV: 71 CM/S
LEFT SUPER FEMORAL PROX SYS PSV: 74 CM/S
LEFT TIB/PER TRUNK SYS PSV: 35 CM/S
RIGHT ANT TIBIAL SYS PSV: 49 CM/S
RIGHT CFA PSV: 124 CM/S
RIGHT EXTERNAL ILLIAC PSV: 141 CM/S
RIGHT PERONEAL SYS PSV: 41 CM/S
RIGHT POPLITEAL PSV: 49 CM/S
RIGHT POST TIBIAL SYS PSV: 91 CM/S
RIGHT PROFUNDA SYS PSV: 76 CM/S
RIGHT SUPER FEMORAL DIST SYS PSV: 60 CM/S
RIGHT SUPER FEMORAL MID SYS PSV: 71 CM/S
RIGHT SUPER FEMORAL OSTIAL SYS PSV: 97 CM/S
RIGHT SUPER FEMORAL PROX SYS PSV: 69 CM/S
RIGHT TIB/PER TRUNK SYS PSV: 66 CM/S

## 2020-06-13 ENCOUNTER — HOSPITAL ENCOUNTER (EMERGENCY)
Facility: HOSPITAL | Age: 70
Discharge: HOME | End: 2020-06-13
Attending: EMERGENCY MEDICINE
Payer: MEDICARE

## 2020-06-13 VITALS
DIASTOLIC BLOOD PRESSURE: 62 MMHG | OXYGEN SATURATION: 100 % | HEART RATE: 70 BPM | BODY MASS INDEX: 36.56 KG/M2 | RESPIRATION RATE: 16 BRPM | HEIGHT: 64 IN | TEMPERATURE: 98 F | SYSTOLIC BLOOD PRESSURE: 121 MMHG

## 2020-06-13 DIAGNOSIS — T82.518A VASCULAR CATHETER DYSFUNCTION, INITIAL ENCOUNTER: Primary | ICD-10-CM

## 2020-06-13 DIAGNOSIS — Z51.89 VISIT FOR WOUND CHECK: ICD-10-CM

## 2020-06-13 PROCEDURE — 99281 EMR DPT VST MAYX REQ PHY/QHP: CPT
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
## 2020-06-13 NOTE — ED NOTES
Vas cath dressing changed using sterile technique. New dressing applied to upper left arm using sterile gauze.

## 2020-06-13 NOTE — ED NOTES
Patient identifiers verified and correct for Gillian Rcih.    +Patient had small amount of old blood around vas cath. Patient reports having surgery on left upper arm on yesterday and wound like a new dressing applied.     LOC: The patient is awake, alert and aware of environment with an appropriate affect, the patient is oriented x 3 and speaking appropriately.  APPEARANCE: Patient resting comfortably and in no acute distress, patient is clean and well groomed, patient's clothing is properly fastened.  SKIN: The skin is warm and dry, color consistent with ethnicity, patient has normal skin turgor and moist mucus membranes, skin intact, no breakdown or bruising noted.  MUSCULOSKELETAL: Patient moving all extremities spontaneously.  RESPIRATORY: Airway is open and patent, respirations are spontaneous.  CARDIAC: Patient has a normal rate, no periphreal edema noted, capillary refill < 3 seconds.  ABDOMEN: Soft and non tender to palpation.

## 2020-06-22 ENCOUNTER — TELEPHONE (OUTPATIENT)
Dept: CARDIOLOGY | Facility: HOSPITAL | Age: 70
End: 2020-06-22

## 2020-06-22 DIAGNOSIS — I73.9 PERIPHERAL VASCULAR DISEASE: ICD-10-CM

## 2020-06-22 DIAGNOSIS — I73.9 PVD (PERIPHERAL VASCULAR DISEASE): Primary | ICD-10-CM

## 2020-06-22 DIAGNOSIS — I70.0 AORTIC ATHEROSCLEROSIS: ICD-10-CM

## 2020-06-22 NOTE — TELEPHONE ENCOUNTER
Please call pt.  B LE arterial u/s shows some blockages in legs, mainly below knees and these are small vessels..  U/s could not see above her groin region to rule out blockages.  Recommend CTA abdomen with runoff to fully evaluate for PAD.  Iodine allergy -- will need premed night before with prednisone, benadryl, pepcid.    Thanks    Dr Sims

## 2020-06-22 NOTE — TELEPHONE ENCOUNTER
"Telephoned patient to advise of need for CTA, scheduled for 6/25/20 at 850am  Discussed Iodine allergy and premed -patient stated that was before Dialysis and that she's not allergic to Iodine or contrast "it was to protect what little kidney function she had left"  Advised would check with Dr. Sims and confirmed her Pharmacy just in case  "

## 2020-06-23 NOTE — TELEPHONE ENCOUNTER
Telephoned patient to advise of prescription at [facility] and directions (times)  She asked that Dr. Sims be notified that she's having problems with her access graft and that they have done one procedure and she's scheduled for another one tomorrow

## 2020-06-23 NOTE — TELEPHONE ENCOUNTER
Telephoned [facility] with Premed prescription Prednisone 50 mg+ Benadryl 50 mg+ Pepcid 40 mg beginning the evening and morning of CTA (13 hrs, 7hrs and 1 hr) before scheduled test. 850pm, 350am and 750am

## 2020-06-24 ENCOUNTER — HOSPITAL ENCOUNTER (OUTPATIENT)
Facility: HOSPITAL | Age: 70
Discharge: HOME | End: 2020-06-26
Attending: EMERGENCY MEDICINE | Admitting: INTERNAL MEDICINE
Payer: MEDICARE

## 2020-06-24 ENCOUNTER — TELEPHONE (OUTPATIENT)
Dept: RADIOLOGY | Facility: HOSPITAL | Age: 70
End: 2020-06-24

## 2020-06-24 DIAGNOSIS — E87.5 HYPERKALEMIA: ICD-10-CM

## 2020-06-24 DIAGNOSIS — R07.9 CHEST PAIN: ICD-10-CM

## 2020-06-24 PROBLEM — R53.1 GENERALIZED WEAKNESS: Status: ACTIVE | Noted: 2020-06-24

## 2020-06-24 PROBLEM — E87.1 HYPONATREMIA: Status: ACTIVE | Noted: 2020-06-24

## 2020-06-24 LAB
ALBUMIN SERPL BCP-MCNC: 4.2 G/DL (ref 3.5–5.2)
ALP SERPL-CCNC: 142 U/L (ref 55–135)
ALT SERPL W/O P-5'-P-CCNC: <5 U/L (ref 10–44)
ANION GAP SERPL CALC-SCNC: 13 MMOL/L (ref 8–16)
AST SERPL-CCNC: 16 U/L (ref 10–40)
BASOPHILS # BLD AUTO: 0.08 K/UL (ref 0–0.2)
BASOPHILS NFR BLD: 0.8 % (ref 0–1.9)
BILIRUB SERPL-MCNC: 0.3 MG/DL (ref 0.1–1)
BNP SERPL-MCNC: 800 PG/ML (ref 0–99)
BUN SERPL-MCNC: 50 MG/DL (ref 8–23)
CALCIUM SERPL-MCNC: 9.3 MG/DL (ref 8.7–10.5)
CHLORIDE SERPL-SCNC: 97 MMOL/L (ref 95–110)
CO2 SERPL-SCNC: 25 MMOL/L (ref 23–29)
CREAT SERPL-MCNC: 8.3 MG/DL (ref 0.5–1.4)
DIFFERENTIAL METHOD: ABNORMAL
EOSINOPHIL # BLD AUTO: 0.3 K/UL (ref 0–0.5)
EOSINOPHIL NFR BLD: 2.7 % (ref 0–8)
ERYTHROCYTE [DISTWIDTH] IN BLOOD BY AUTOMATED COUNT: 15.7 % (ref 11.5–14.5)
EST. GFR  (AFRICAN AMERICAN): 5 ML/MIN/1.73 M^2
EST. GFR  (NON AFRICAN AMERICAN): 4 ML/MIN/1.73 M^2
GLUCOSE SERPL-MCNC: 100 MG/DL (ref 70–110)
HCT VFR BLD AUTO: 38.7 % (ref 37–48.5)
HCV AB SERPL QL IA: POSITIVE
HGB BLD-MCNC: 12 G/DL (ref 12–16)
IMM GRANULOCYTES # BLD AUTO: 0.07 K/UL (ref 0–0.04)
IMM GRANULOCYTES NFR BLD AUTO: 0.7 % (ref 0–0.5)
LACTATE SERPL-SCNC: 1.3 MMOL/L (ref 0.5–2.2)
LYMPHOCYTES # BLD AUTO: 2.3 K/UL (ref 1–4.8)
LYMPHOCYTES NFR BLD: 21.9 % (ref 18–48)
MCH RBC QN AUTO: 31 PG (ref 27–31)
MCHC RBC AUTO-ENTMCNC: 31 G/DL (ref 32–36)
MCV RBC AUTO: 100 FL (ref 82–98)
MONOCYTES # BLD AUTO: 1.1 K/UL (ref 0.3–1)
MONOCYTES NFR BLD: 10.8 % (ref 4–15)
NEUTROPHILS # BLD AUTO: 6.6 K/UL (ref 1.8–7.7)
NEUTROPHILS NFR BLD: 63.1 % (ref 38–73)
NRBC BLD-RTO: 0 /100 WBC
PLATELET # BLD AUTO: 262 K/UL (ref 150–350)
PMV BLD AUTO: 10.2 FL (ref 9.2–12.9)
POTASSIUM SERPL-SCNC: 5.9 MMOL/L (ref 3.5–5.1)
PROT SERPL-MCNC: 8.3 G/DL (ref 6–8.4)
RBC # BLD AUTO: 3.87 M/UL (ref 4–5.4)
SARS-COV-2 RDRP RESP QL NAA+PROBE: NEGATIVE
SODIUM SERPL-SCNC: 135 MMOL/L (ref 136–145)
TROPONIN I SERPL DL<=0.01 NG/ML-MCNC: 0.02 NG/ML (ref 0–0.03)
TROPONIN I SERPL DL<=0.01 NG/ML-MCNC: 0.03 NG/ML (ref 0–0.03)
WBC # BLD AUTO: 10.39 K/UL (ref 3.9–12.7)

## 2020-06-24 PROCEDURE — 25000003 PHARM REV CODE 250: Performed by: FAMILY MEDICINE

## 2020-06-24 PROCEDURE — G0378 HOSPITAL OBSERVATION PER HR: HCPCS | Mod: CS

## 2020-06-24 PROCEDURE — 93005 ELECTROCARDIOGRAM TRACING: CPT

## 2020-06-24 PROCEDURE — 93010 ELECTROCARDIOGRAM REPORT: CPT | Mod: ,,, | Performed by: INTERNAL MEDICINE

## 2020-06-24 PROCEDURE — 25000003 PHARM REV CODE 250: Performed by: INTERNAL MEDICINE

## 2020-06-24 PROCEDURE — 83605 ASSAY OF LACTIC ACID: CPT

## 2020-06-24 PROCEDURE — 86803 HEPATITIS C AB TEST: CPT

## 2020-06-24 PROCEDURE — 63600175 PHARM REV CODE 636 W HCPCS: Performed by: FAMILY MEDICINE

## 2020-06-24 PROCEDURE — 80053 COMPREHEN METABOLIC PANEL: CPT

## 2020-06-24 PROCEDURE — 93010 EKG 12-LEAD: ICD-10-PCS | Mod: ,,, | Performed by: INTERNAL MEDICINE

## 2020-06-24 PROCEDURE — G0378 HOSPITAL OBSERVATION PER HR: HCPCS

## 2020-06-24 PROCEDURE — 96372 THER/PROPH/DIAG INJ SC/IM: CPT | Mod: 59

## 2020-06-24 PROCEDURE — 83880 ASSAY OF NATRIURETIC PEPTIDE: CPT

## 2020-06-24 PROCEDURE — 87040 BLOOD CULTURE FOR BACTERIA: CPT

## 2020-06-24 PROCEDURE — U0002 COVID-19 LAB TEST NON-CDC: HCPCS

## 2020-06-24 PROCEDURE — 84484 ASSAY OF TROPONIN QUANT: CPT | Mod: 91

## 2020-06-24 PROCEDURE — 63600175 PHARM REV CODE 636 W HCPCS: Performed by: INTERNAL MEDICINE

## 2020-06-24 PROCEDURE — 36415 COLL VENOUS BLD VENIPUNCTURE: CPT

## 2020-06-24 PROCEDURE — 96374 THER/PROPH/DIAG INJ IV PUSH: CPT

## 2020-06-24 PROCEDURE — 99291 CRITICAL CARE FIRST HOUR: CPT | Mod: 25

## 2020-06-24 PROCEDURE — 85025 COMPLETE CBC W/AUTO DIFF WBC: CPT

## 2020-06-24 RX ORDER — OXYCODONE AND ACETAMINOPHEN 10; 325 MG/1; MG/1
1 TABLET ORAL EVERY 4 HOURS PRN
Status: DISCONTINUED | OUTPATIENT
Start: 2020-06-24 | End: 2020-06-26 | Stop reason: HOSPADM

## 2020-06-24 RX ORDER — MEPERIDINE HYDROCHLORIDE 25 MG/ML
25 INJECTION INTRAMUSCULAR; INTRAVENOUS; SUBCUTANEOUS
Status: COMPLETED | OUTPATIENT
Start: 2020-06-24 | End: 2020-06-24

## 2020-06-24 RX ORDER — ONDANSETRON 2 MG/ML
4 INJECTION INTRAMUSCULAR; INTRAVENOUS EVERY 8 HOURS PRN
Status: DISCONTINUED | OUTPATIENT
Start: 2020-06-24 | End: 2020-06-26 | Stop reason: HOSPADM

## 2020-06-24 RX ORDER — OXYCODONE AND ACETAMINOPHEN 5; 325 MG/1; MG/1
1 TABLET ORAL EVERY 4 HOURS PRN
Status: DISCONTINUED | OUTPATIENT
Start: 2020-06-24 | End: 2020-06-26 | Stop reason: HOSPADM

## 2020-06-24 RX ORDER — DIPHENHYDRAMINE HCL 25 MG
25 CAPSULE ORAL EVERY 6 HOURS PRN
Status: DISCONTINUED | OUTPATIENT
Start: 2020-06-24 | End: 2020-06-26 | Stop reason: HOSPADM

## 2020-06-24 RX ORDER — SEVELAMER CARBONATE 800 MG/1
2400 TABLET, FILM COATED ORAL
Status: DISCONTINUED | OUTPATIENT
Start: 2020-06-25 | End: 2020-06-26 | Stop reason: HOSPADM

## 2020-06-24 RX ORDER — GUAIFENESIN 100 MG/5ML
200 SOLUTION ORAL EVERY 4 HOURS PRN
Status: DISCONTINUED | OUTPATIENT
Start: 2020-06-24 | End: 2020-06-26 | Stop reason: HOSPADM

## 2020-06-24 RX ORDER — PROMETHAZINE HYDROCHLORIDE 25 MG/ML
25 INJECTION, SOLUTION INTRAMUSCULAR; INTRAVENOUS
Status: ACTIVE | OUTPATIENT
Start: 2020-06-24 | End: 2020-06-25

## 2020-06-24 RX ORDER — ACETAMINOPHEN 325 MG/1
650 TABLET ORAL EVERY 6 HOURS PRN
Status: DISCONTINUED | OUTPATIENT
Start: 2020-06-24 | End: 2020-06-26 | Stop reason: HOSPADM

## 2020-06-24 RX ORDER — HYDROMORPHONE HYDROCHLORIDE 1 MG/ML
1 INJECTION, SOLUTION INTRAMUSCULAR; INTRAVENOUS; SUBCUTANEOUS EVERY 4 HOURS PRN
Status: DISCONTINUED | OUTPATIENT
Start: 2020-06-24 | End: 2020-06-24

## 2020-06-24 RX ORDER — FLUTICASONE PROPIONATE 50 MCG
2 SPRAY, SUSPENSION (ML) NASAL DAILY
Status: DISCONTINUED | OUTPATIENT
Start: 2020-06-25 | End: 2020-06-26 | Stop reason: HOSPADM

## 2020-06-24 RX ADMIN — SODIUM POLYSTYRENE SULFONATE 30 G: 15 SUSPENSION ORAL; RECTAL at 10:06

## 2020-06-24 RX ADMIN — SODIUM POLYSTYRENE SULFONATE 30 G: 15 SUSPENSION ORAL; RECTAL at 07:06

## 2020-06-24 RX ADMIN — ONDANSETRON HYDROCHLORIDE 4 MG: 2 INJECTION, SOLUTION INTRAMUSCULAR; INTRAVENOUS at 08:06

## 2020-06-24 RX ADMIN — OXYCODONE HYDROCHLORIDE AND ACETAMINOPHEN 1 TABLET: 10; 325 TABLET ORAL at 08:06

## 2020-06-24 RX ADMIN — MEPERIDINE HYDROCHLORIDE 25 MG: 25 INJECTION INTRAMUSCULAR; INTRAVENOUS; SUBCUTANEOUS at 06:06

## 2020-06-24 NOTE — ED NOTES
Orthostatics: lying 52                             160/70                         Sittin                                  161/63                         Standin                                    78/52
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
## 2020-06-24 NOTE — ED TRIAGE NOTES
Pt. Comes to the ER with weakness. Patient states she went to dialysis today for 3 1/2 hours. Patient states recently at [facility] for grafting of fistula. Multiple failed attempts. Pts. Aaox4, feels weak.

## 2020-06-25 LAB
ALBUMIN SERPL BCP-MCNC: 3.8 G/DL (ref 3.5–5.2)
ALP SERPL-CCNC: 126 U/L (ref 55–135)
ALT SERPL W/O P-5'-P-CCNC: 8 U/L (ref 10–44)
ANION GAP SERPL CALC-SCNC: 17 MMOL/L (ref 8–16)
AST SERPL-CCNC: 20 U/L (ref 10–40)
BASOPHILS # BLD AUTO: 0.09 K/UL (ref 0–0.2)
BASOPHILS NFR BLD: 0.8 % (ref 0–1.9)
BILIRUB SERPL-MCNC: 0.3 MG/DL (ref 0.1–1)
BUN SERPL-MCNC: 53 MG/DL (ref 8–23)
CALCIUM SERPL-MCNC: 8.5 MG/DL (ref 8.7–10.5)
CHLORIDE SERPL-SCNC: 99 MMOL/L (ref 95–110)
CO2 SERPL-SCNC: 21 MMOL/L (ref 23–29)
CREAT SERPL-MCNC: 9.1 MG/DL (ref 0.5–1.4)
DIFFERENTIAL METHOD: ABNORMAL
EOSINOPHIL # BLD AUTO: 0.4 K/UL (ref 0–0.5)
EOSINOPHIL NFR BLD: 3.3 % (ref 0–8)
ERYTHROCYTE [DISTWIDTH] IN BLOOD BY AUTOMATED COUNT: 15.5 % (ref 11.5–14.5)
EST. GFR  (AFRICAN AMERICAN): 5 ML/MIN/1.73 M^2
EST. GFR  (NON AFRICAN AMERICAN): 4 ML/MIN/1.73 M^2
GLUCOSE SERPL-MCNC: 112 MG/DL (ref 70–110)
HCT VFR BLD AUTO: 34.7 % (ref 37–48.5)
HGB BLD-MCNC: 10.6 G/DL (ref 12–16)
IMM GRANULOCYTES # BLD AUTO: 0.03 K/UL (ref 0–0.04)
IMM GRANULOCYTES NFR BLD AUTO: 0.3 % (ref 0–0.5)
LYMPHOCYTES # BLD AUTO: 2.7 K/UL (ref 1–4.8)
LYMPHOCYTES NFR BLD: 24.2 % (ref 18–48)
MAGNESIUM SERPL-MCNC: 3.5 MG/DL (ref 1.6–2.6)
MCH RBC QN AUTO: 30.5 PG (ref 27–31)
MCHC RBC AUTO-ENTMCNC: 30.5 G/DL (ref 32–36)
MCV RBC AUTO: 100 FL (ref 82–98)
MONOCYTES # BLD AUTO: 1.1 K/UL (ref 0.3–1)
MONOCYTES NFR BLD: 10.1 % (ref 4–15)
NEUTROPHILS # BLD AUTO: 6.7 K/UL (ref 1.8–7.7)
NEUTROPHILS NFR BLD: 61.3 % (ref 38–73)
NRBC BLD-RTO: 0 /100 WBC
PHOSPHATE SERPL-MCNC: 3.8 MG/DL (ref 2.7–4.5)
PLATELET # BLD AUTO: 262 K/UL (ref 150–350)
PMV BLD AUTO: 10.5 FL (ref 9.2–12.9)
POTASSIUM SERPL-SCNC: 4.4 MMOL/L (ref 3.5–5.1)
PROT SERPL-MCNC: 7.3 G/DL (ref 6–8.4)
RBC # BLD AUTO: 3.47 M/UL (ref 4–5.4)
SODIUM SERPL-SCNC: 137 MMOL/L (ref 136–145)
WBC # BLD AUTO: 10.98 K/UL (ref 3.9–12.7)

## 2020-06-25 PROCEDURE — 99215 PR OFFICE/OUTPT VISIT, EST, LEVL V, 40-54 MIN: ICD-10-PCS | Mod: ,,, | Performed by: INTERNAL MEDICINE

## 2020-06-25 PROCEDURE — 83735 ASSAY OF MAGNESIUM: CPT

## 2020-06-25 PROCEDURE — 80053 COMPREHEN METABOLIC PANEL: CPT

## 2020-06-25 PROCEDURE — 96376 TX/PRO/DX INJ SAME DRUG ADON: CPT

## 2020-06-25 PROCEDURE — 84100 ASSAY OF PHOSPHORUS: CPT

## 2020-06-25 PROCEDURE — 25000003 PHARM REV CODE 250: Performed by: INTERNAL MEDICINE

## 2020-06-25 PROCEDURE — 85025 COMPLETE CBC W/AUTO DIFF WBC: CPT

## 2020-06-25 PROCEDURE — 63600175 PHARM REV CODE 636 W HCPCS: Performed by: INTERNAL MEDICINE

## 2020-06-25 PROCEDURE — 96375 TX/PRO/DX INJ NEW DRUG ADDON: CPT

## 2020-06-25 PROCEDURE — 36415 COLL VENOUS BLD VENIPUNCTURE: CPT

## 2020-06-25 PROCEDURE — 63600175 PHARM REV CODE 636 W HCPCS: Performed by: NURSE PRACTITIONER

## 2020-06-25 PROCEDURE — 97161 PT EVAL LOW COMPLEX 20 MIN: CPT

## 2020-06-25 PROCEDURE — 97116 GAIT TRAINING THERAPY: CPT

## 2020-06-25 PROCEDURE — 99215 OFFICE O/P EST HI 40 MIN: CPT | Mod: ,,, | Performed by: INTERNAL MEDICINE

## 2020-06-25 PROCEDURE — 25000242 PHARM REV CODE 250 ALT 637 W/ HCPCS: Performed by: INTERNAL MEDICINE

## 2020-06-25 PROCEDURE — G0378 HOSPITAL OBSERVATION PER HR: HCPCS

## 2020-06-25 PROCEDURE — 25000003 PHARM REV CODE 250: Performed by: NURSE PRACTITIONER

## 2020-06-25 RX ORDER — SODIUM CHLORIDE 9 MG/ML
INJECTION, SOLUTION INTRAVENOUS ONCE
Status: DISCONTINUED | OUTPATIENT
Start: 2020-06-25 | End: 2020-06-26 | Stop reason: HOSPADM

## 2020-06-25 RX ORDER — HYDRALAZINE HYDROCHLORIDE 20 MG/ML
10 INJECTION INTRAMUSCULAR; INTRAVENOUS EVERY 8 HOURS PRN
Status: DISCONTINUED | OUTPATIENT
Start: 2020-06-25 | End: 2020-06-26 | Stop reason: HOSPADM

## 2020-06-25 RX ORDER — CETIRIZINE HYDROCHLORIDE 5 MG/1
5 TABLET ORAL DAILY
Status: DISCONTINUED | OUTPATIENT
Start: 2020-06-25 | End: 2020-06-26 | Stop reason: HOSPADM

## 2020-06-25 RX ORDER — MUPIROCIN 20 MG/G
OINTMENT TOPICAL 2 TIMES DAILY
Status: DISCONTINUED | OUTPATIENT
Start: 2020-06-25 | End: 2020-06-26 | Stop reason: HOSPADM

## 2020-06-25 RX ORDER — SODIUM CHLORIDE 9 MG/ML
INJECTION, SOLUTION INTRAVENOUS
Status: DISCONTINUED | OUTPATIENT
Start: 2020-06-25 | End: 2020-06-26 | Stop reason: HOSPADM

## 2020-06-25 RX ADMIN — SEVELAMER CARBONATE 2400 MG: 800 TABLET, FILM COATED ORAL at 11:06

## 2020-06-25 RX ADMIN — PROMETHAZINE HYDROCHLORIDE 6.25 MG: 25 INJECTION INTRAMUSCULAR; INTRAVENOUS at 10:06

## 2020-06-25 RX ADMIN — FLUTICASONE PROPIONATE 100 MCG: 50 SPRAY, METERED NASAL at 08:06

## 2020-06-25 RX ADMIN — MUPIROCIN: 20 OINTMENT TOPICAL at 10:06

## 2020-06-25 RX ADMIN — ACETAMINOPHEN 650 MG: 325 TABLET ORAL at 04:06

## 2020-06-25 RX ADMIN — SEVELAMER CARBONATE 2400 MG: 800 TABLET, FILM COATED ORAL at 08:06

## 2020-06-25 RX ADMIN — ONDANSETRON HYDROCHLORIDE 4 MG: 2 INJECTION, SOLUTION INTRAMUSCULAR; INTRAVENOUS at 06:06

## 2020-06-25 RX ADMIN — OXYCODONE HYDROCHLORIDE AND ACETAMINOPHEN 1 TABLET: 10; 325 TABLET ORAL at 11:06

## 2020-06-25 RX ADMIN — ONDANSETRON HYDROCHLORIDE 4 MG: 2 INJECTION, SOLUTION INTRAMUSCULAR; INTRAVENOUS at 04:06

## 2020-06-25 RX ADMIN — OXYCODONE HYDROCHLORIDE AND ACETAMINOPHEN 1 TABLET: 5; 325 TABLET ORAL at 10:06

## 2020-06-25 RX ADMIN — ONDANSETRON HYDROCHLORIDE 4 MG: 2 INJECTION, SOLUTION INTRAMUSCULAR; INTRAVENOUS at 11:06

## 2020-06-25 RX ADMIN — CETIRIZINE HYDROCHLORIDE 5 MG: 5 TABLET ORAL at 06:06

## 2020-06-25 RX ADMIN — OXYCODONE HYDROCHLORIDE AND ACETAMINOPHEN 1 TABLET: 5; 325 TABLET ORAL at 03:06

## 2020-06-25 RX ADMIN — SEVELAMER CARBONATE 2400 MG: 800 TABLET, FILM COATED ORAL at 04:06

## 2020-06-25 RX ADMIN — HYDRALAZINE HYDROCHLORIDE 10 MG: 20 INJECTION INTRAMUSCULAR; INTRAVENOUS at 06:06

## 2020-06-25 NOTE — ASSESSMENT & PLAN NOTE
Patient presented with weakness, nausea, pain at right femoral vascath site. Suspect vascath infection. Will remove vascath and culture tip. Follow up on blood  Cultures.
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
## 2020-06-25 NOTE — HPI
Ms. Rich is a 69-year-old morbidly obese  female with PMH significant for ESRD on HD M/W/F, recently had left upper extremity fistula placed.  She also has right femoral vascular catheter in place for the past 2-3 weeks.  She was in her usual dialysis session earlier today, being dialyzed through the right vascular catheter, which was repeated ED malfunction.  She presents to the ED today complaining of nausea, generalized weakness, fatigue, pain at the right femoral vascular catheter site and also discomfort in the left upper extremity fistula site.  No erythema or induration noted.  In the ED she was found to be hyperkalemic, with potassium 5.9, patient states that she was able to finish 4 hr of dialysis session earlier today.  .  Lactic acid 1.3.  WBC 10.3.  Denies fever, chills.  ED physician discussed case with Dr. Claros, on-call vascular surgeon who will evaluate patient in a.m..  Dr. Kitchen with nephrology consulted, recommended admission for hyperkalemia management.    Admitting diagnosis generalized weakness, nausea, hyperkalemia in a ESRD patient

## 2020-06-25 NOTE — ASSESSMENT & PLAN NOTE
Associated with iscomfort in the right femoral vascular catheter region, and left upper extremity fistula.  Oral pain medications as needed.  Continue supportive care.    6/25  PT/OT
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
## 2020-06-25 NOTE — HPI
69 year old female with ESRD, anemia, HTN, PCKD, hyperparathyroidism presents to [facility] with nausea, generalized weakness, fatigue, pain at the right femoral vascular catheter site and also discomfort in the left upper extremity fistula site.     Nephrology was consulted to help with patient's renal care while she is admitted at [facility]. I saw and examined patient in her hospital room. Patient today reports that presenting symptoms have largely resolved. She still rep[orts ome weakness and intermittent nausea. No other issues at present.     Patient dilayzes on MWF schedule at [facility] under my care. Access is : left arm AVF and right femoral vascath.
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
## 2020-06-25 NOTE — ASSESSMENT & PLAN NOTE
Hemodialysis earlier today, [facility] as scheduled.  However potassium still high at 5.9.  Per patient, they had been having difficulty with right femoral vascular catheter.  Nephrology consulted for possible repeat hemodialysis in a.m.    6/25  HD per [facility]

## 2020-06-25 NOTE — PLAN OF CARE
"Initial assessment completed. Met with patient. Pt lives at home in an apartement. Her plan is to return to home with continued assistance from Medicaid program- [facility]. Pt reports that she get to and from her doctor appointments and grocery from "[employer]" provided by Medicare. She was recently discharged from [facility] for an LUE graph infection. She uses a walker and wheelchair at home. She goes to OP dialysis MWF at [facility]. Patient denies any post hospital needs or services at this time.Transitional Care Folder, Discharge Planning Begins on Admission pamphlet, [facility] Bedside Delivery pamphlet, Advance Directive information given to patient. Communication Board updated with CM name and contact information. Instructed patient or family to call with any questions or concerns. Patient has Medicare Part A/B and Medicaid insurance.     PCP: FRANCO Sims  My Chart- no  Bedside Delivery: yes         [facility] (ESRD Bundle Only) - [place] - [address]  [address]  [address]  [facility] [address]  Phone: [phone] Fax: [phone]    [facility]/pharmacy #[id number] - [place], [place] - [address] [facility]  [facility]  [address] [facility]   [facility] [address]  Phone: [phone] Fax: [phone]    [facility] [id number] [facility] [employer] - [place], [place] - [address]  [address] [facility] [address]  Phone: [phone] Fax: [phone]    Primary Doctor No  Payor: MEDICARE / Plan: MEDICARE PART A & B / Product Type: [facility] /     06/25/20 1115   Discharge Assessment   Confirmed/corrected address and phone number on facesheet? Yes   Assessment information obtained from? Patient   Communicated expected length of stay with patient/caregiver no   Prior to hospitilization cognitive status: Alert/Oriented   Prior to hospitalization functional status: Assistive Equipment;Needs Assistance   Current cognitive status: Alert/Oriented   Current " "Functional Status: Assistive Equipment;Needs Assistance   Facility Arrived From: home   Lives With alone   Able to Return to Prior Arrangements yes   Is patient able to care for self after discharge? No  (patient recieves help from Medicaid Waiver- [facility]   Who are your caregiver(s) and their phone number(s)? Nevaeh Salazar- [phone]   Patient's perception of discharge disposition home or selfcare   Readmission Within the Last 30 Days no previous admission in last 30 days   Patient currently receives any other outside agency services? No   Equipment Currently Used at Home wheelchair;walker, rolling   Part D Coverage n/a   Do you have any problems affording any of your prescribed medications? No   Is the patient taking medications as prescribed? yes   Does the patient have transportation home? Yes   Transportation Anticipated family or friend will provide  (one of her daughters- Ivon or Anna Reynolds- [phone])   Dialysis Name and Scheduled days [facility] [address] [facility]   Does the patient receive services at the [facility]? No   Discharge Plan A Home  (Home with [employer] provided by Medicaid)   Discharge Plan B Home   DME Needed Upon Discharge  none   Patient/Family in Agreement with Plan yes     "

## 2020-06-25 NOTE — PLAN OF CARE
POC reviewed, verbalized understanding. AAOx4. 2L O2 NC. Pt remained free from falls, fall precautions in place. Pt is SR-SB on monitor. VSS. No other c/o at this time. Midline intact. Given PRN percocet 5 and 10 for varying levels of pain. Call bell and personal belongings within reach. Hourly rounding complete. Reminded to call for assistance. Will continue to monitor.

## 2020-06-25 NOTE — ASSESSMENT & PLAN NOTE
Potassium 5.9, in spite of dialysis earlier today.  Issues with the right vascular catheter access.  Kayexalate 30 g p.o. x2 doses.  Nephrology aware.    6/25  Improved with medical management  Renal following

## 2020-06-25 NOTE — ASSESSMENT & PLAN NOTE
Heart rate in the 50s.  Hold beta-blockers.  Monitor closely on telemetry.    6/25  HR improved with BB on hold   Monitor
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
## 2020-06-25 NOTE — ASSESSMENT & PLAN NOTE
Potassium 5.9, in spite of dialysis earlier today.  Issues with the right vascular catheter access.  Kayexalate 30 g p.o. x2 doses.  Nephrology aware.

## 2020-06-25 NOTE — ASSESSMENT & PLAN NOTE
Associated with iscomfort in the right femoral vascular catheter region, and left upper extremity fistula.  Oral pain medications as needed.  Continue supportive care.

## 2020-06-25 NOTE — ASSESSMENT & PLAN NOTE
Hemodialysis earlier today, MWD as scheduled.  However potassium still high at 5.9.  Per patient, they had been having difficulty with right femoral vascular catheter.  Nephrology consulted for possible repeat hemodialysis in a.m.

## 2020-06-25 NOTE — HOSPITAL COURSE
6/25- Patient seen at bedside this morning, resting comfortably. Reports pain at vasc cath site and notes this causes difficulty walking. Discussed with Renal removal of vasc cath. Endorses some nausea. Hyperkalemia resolved with medical treatment. Scheduled for HD tomorrow. No other acute complaints at this moment.   6/26 - Patient seen at bedside this morning, eating breakfast. Patient reports improvement of weakness. To have Femoral Vasc Cath removed today and tip culture. Will get HD then discharged. Patient will follow up with Renal next week. Home health consulted. Patient stable for discharge.

## 2020-06-25 NOTE — ASSESSMENT & PLAN NOTE
Patient dialyzes on MWF schedule at [facility].    Next HD tomorrow.     Access: left arm AVG, right femoral vascath.

## 2020-06-26 VITALS
HEART RATE: 82 BPM | HEIGHT: 64 IN | OXYGEN SATURATION: 95 % | WEIGHT: 226.44 LBS | SYSTOLIC BLOOD PRESSURE: 163 MMHG | DIASTOLIC BLOOD PRESSURE: 79 MMHG | TEMPERATURE: 98 F | RESPIRATION RATE: 18 BRPM | BODY MASS INDEX: 38.66 KG/M2

## 2020-06-26 PROBLEM — E87.1 HYPONATREMIA: Status: RESOLVED | Noted: 2020-06-24 | Resolved: 2020-06-26

## 2020-06-26 PROBLEM — R53.1 GENERALIZED WEAKNESS: Status: RESOLVED | Noted: 2020-06-24 | Resolved: 2020-06-26

## 2020-06-26 PROBLEM — E87.5 HYPERKALEMIA: Status: RESOLVED | Noted: 2020-06-24 | Resolved: 2020-06-26

## 2020-06-26 PROBLEM — R00.1 BRADYCARDIA: Status: RESOLVED | Noted: 2019-01-26 | Resolved: 2020-06-26

## 2020-06-26 PROCEDURE — G0378 HOSPITAL OBSERVATION PER HR: HCPCS | Mod: CS

## 2020-06-26 PROCEDURE — 99215 OFFICE O/P EST HI 40 MIN: CPT | Mod: ,,, | Performed by: INTERNAL MEDICINE

## 2020-06-26 PROCEDURE — 87070 CULTURE OTHR SPECIMN AEROBIC: CPT

## 2020-06-26 PROCEDURE — 80100016 HC MAINTENANCE HEMODIALYSIS

## 2020-06-26 PROCEDURE — 25000003 PHARM REV CODE 250: Performed by: INTERNAL MEDICINE

## 2020-06-26 PROCEDURE — 99215 PR OFFICE/OUTPT VISIT, EST, LEVL V, 40-54 MIN: ICD-10-PCS | Mod: ,,, | Performed by: INTERNAL MEDICINE

## 2020-06-26 PROCEDURE — G0257 UNSCHED DIALYSIS ESRD PT HOS: HCPCS

## 2020-06-26 RX ADMIN — MUPIROCIN: 20 OINTMENT TOPICAL at 08:06

## 2020-06-26 RX ADMIN — FLUTICASONE PROPIONATE 100 MCG: 50 SPRAY, METERED NASAL at 08:06

## 2020-06-26 RX ADMIN — SEVELAMER CARBONATE 2400 MG: 800 TABLET, FILM COATED ORAL at 08:06

## 2020-06-26 RX ADMIN — OXYCODONE HYDROCHLORIDE AND ACETAMINOPHEN 1 TABLET: 10; 325 TABLET ORAL at 07:06

## 2020-06-26 RX ADMIN — SEVELAMER CARBONATE 2400 MG: 800 TABLET, FILM COATED ORAL at 11:06

## 2020-06-26 RX ADMIN — CETIRIZINE HYDROCHLORIDE 5 MG: 5 TABLET ORAL at 08:06

## 2020-06-26 NOTE — PROGRESS NOTES
[facility]   Nephrology  Progress Note    Patient Name: Gillian Rich  MRN: [id number]  Admission Date: 6/24/2020  Hospital Length of Stay: 0 days  Attending Provider: Ronaldo Kinney MD   Primary Care Physician: Primary Doctor No  Principal Problem:Hyperkalemia    Consults  Subjective:     Interval History:  No acute events. Seen at , E AVF, no issues,     Review of patient's allergies indicates:   Allergen Reactions    Contrast media     Iodine and iodide containing products      Current Facility-Administered Medications   Medication Frequency    0.9%  NaCl infusion PRN    0.9%  NaCl infusion Once    acetaminophen tablet 650 mg Q6H PRN    cetirizine tablet 5 mg Daily    diphenhydrAMINE capsule 25 mg Q6H PRN    fluticasone propionate 50 mcg/actuation nasal spray 100 mcg Daily    guaifenesin 100 mg/5 ml syrup 200 mg Q4H PRN    hydrALAZINE injection 10 mg Q8H PRN    mupirocin 2 % ointment BID    ondansetron injection 4 mg Q8H PRN    oxyCODONE-acetaminophen  mg per tablet 1 tablet Q4H PRN    oxyCODONE-acetaminophen 5-325 mg per tablet 1 tablet Q4H PRN    promethazine (PHENERGAN) 6.25 mg in dextrose 5 % 50 mL IVPB Q6H PRN    sevelamer carbonate tablet 2,400 mg TID WM       Objective:     Vital Signs (Most Recent):  Temp: 97.1 °F (36.2 °C) (06/26/20 1350)  Pulse: 82 (06/26/20 1540)  Resp: 18 (06/26/20 1350)  BP: (!) 172/87 (06/26/20 1540)  SpO2: 95 % (06/26/20 1120)  O2 Device (Oxygen Therapy): room air (06/26/20 1350) Vital Signs (24h Range):  Temp:  [96.8 °F (36 °C)-98.5 °F (36.9 °C)] 97.1 °F (36.2 °C)  Pulse:  [65-87] 82  Resp:  [18-20] 18  SpO2:  [95 %-98 %] 95 %  BP: (106-195)/(49-97) 172/87     Weight: 102.7 kg (226 lb 6.6 oz) (06/26/20 0347)  Body mass index is 38.86 kg/m².  Body surface area is 2.15 meters squared.    I/O last 3 completed shifts:  In: 1488 [P.O.:1438; IV Piggyback:50]  Out: 0     Physical Exam  Constitutional:       General: She is not in acute  distress.     Appearance: She is well-developed.   HENT:      Head: Normocephalic and atraumatic.      Mouth/Throat:      Pharynx: No oropharyngeal exudate.   Eyes:      General: No scleral icterus.     Conjunctiva/sclera: Conjunctivae normal.      Pupils: Pupils are equal, round, and reactive to light.   Neck:      Musculoskeletal: Normal range of motion and neck supple.      Thyroid: No thyroid mass or thyromegaly.      Vascular: No carotid bruit or JVD.      Trachea: No tracheal deviation.   Cardiovascular:      Rate and Rhythm: Normal rate and regular rhythm.      Heart sounds: Normal heart sounds. No murmur. No friction rub. No gallop.    Pulmonary:      Effort: Pulmonary effort is normal. No respiratory distress.      Breath sounds: Normal breath sounds. No wheezing or rales.   Chest:      Chest wall: No tenderness.   Abdominal:      General: Bowel sounds are normal. There is no distension or abdominal bruit.      Palpations: Abdomen is soft. There is no mass.      Tenderness: There is no abdominal tenderness. There is no guarding or rebound.   Musculoskeletal: Normal range of motion.         General: No tenderness.      Comments: LUE AVF , right femoral vascath    Lymphadenopathy:      Cervical: No cervical adenopathy.   Skin:     General: Skin is warm.      Coloration: Skin is not pale.      Findings: No erythema or rash.   Neurological:      Mental Status: She is alert and oriented to person, place, and time.      Cranial Nerves: No cranial nerve deficit.      Motor: No abnormal muscle tone.      Coordination: Coordination normal.      Deep Tendon Reflexes: Reflexes are normal and symmetric. Reflexes normal.   Psychiatric:         Behavior: Behavior normal.         Judgment: Judgment normal.         Significant Labs:sure  CBC:   Recent Labs   Lab 06/25/20  0411   WBC 10.98   RBC 3.47*   HGB 10.6*   HCT 34.7*      *   MCH 30.5   MCHC 30.5*     CMP:   Recent Labs   Lab 06/25/20 0411   *    CALCIUM 8.5*   ALBUMIN 3.8   PROT 7.3      K 4.4   CO2 21*   CL 99   BUN 53*   CREATININE 9.1*   ALKPHOS 126   ALT 8*   AST 20   BILITOT 0.3     Coagulation: No results for input(s): PT, INR, APTT in the last 168 hours.  LFTs:   Recent Labs   Lab 06/25/20  0411   ALT 8*   AST 20   ALKPHOS 126   BILITOT 0.3   PROT 7.3   ALBUMIN 3.8     All labs within the past 24 hours have been reviewed.    Significant Imaging:  Reviewed    Lab Results   Component Value Date    .0 (H) 03/22/2018    CALCIUM 8.5 (L) 06/25/2020    CAION 1.10 03/22/2018    PHOS 3.8 06/25/2020       Lab Results   Component Value Date    ALBUMIN 3.8 06/25/2020           Assessment/Plan:     Active Diagnoses:    Diagnosis Date Noted POA    ESRD on dialysis since 2/24/16 [N18.6, Z99.2]  Not Applicable     Chronic    Hypertension [I10]  Yes     Chronic      Problems Resolved During this Admission:    Diagnosis Date Noted Date Resolved POA    PRINCIPAL PROBLEM:  Hyperkalemia [E87.5] 06/24/2020 06/26/2020 Yes    Hyponatremia [E87.1] 06/24/2020 06/26/2020 Yes    Generalized weakness [R53.1] 06/24/2020 06/26/2020 Yes    Bradycardia [R00.1] 01/26/2019 06/26/2020 Yes       * Hyperkalemia  Has resolved with HD     Generalized weakness  Patient presented with weakness, nausea, pain at right femoral vascath site. Blood cx negative so far, femoral vascath removed      Hypertension  Acceptable BP control.      ESRD on dialysis since 2/24/16  Patient dialyzes on MWF schedule at [facility].     Tolerated HD well today ,      Access: left arm AVG, right femoral vascath removed today after discussion with Dr Meyer, no complications, pressure applied        Thank you for your consult. I will follow-up with patient. Please contact us if you have any additional questions.     Total time spent 40 minutes including time needed to review the records,  patient  evaluation, documentation, face-to-face discussion with the patient, primary team, vascular  surgery,    more than 50% of the time was spent on coordination of care and counseling.       Patricio Cintron MD  Nephrology  [facility]

## 2020-06-26 NOTE — ASSESSMENT & PLAN NOTE
Hemodialysis earlier today, MWD as scheduled.  However potassium still high at 5.9.  Per patient, they had been having difficulty with right femoral vascular catheter.  Nephrology consulted for possible repeat hemodialysis in a.m.    6/26   HD today  Follow up with Renal on Monday

## 2020-06-26 NOTE — PROGRESS NOTES
Patient hemodialysis treatment duration 3 hours 10 minutes. Patient requested early treatment end. 2500 ml fluid removed. Left upper ext AVG Preformed without difficulty. Dr. Cintron at bedside;

## 2020-06-26 NOTE — DISCHARGE SUMMARY
[facility] Hospital Medicine  Discharge Summary      Patient Name: Gillian Rich  MRN: [id number]  Admission Date: 6/24/2020  Hospital Length of Stay: 0 days  Discharge Date and Time:  06/26/2020 5:24 PM  Attending Physician: Ronaldo Kinney MD   Discharging Provider: Ronaldo Kinney MD  Primary Care Provider: Primary Doctor No      HPI:   Ms. Rich is a 69-year-old morbidly obese  female with PMH significant for ESRD on HD M/W/F, recently had left upper extremity fistula placed.  She also has right femoral vascular catheter in place for the past 2-3 weeks.  She was in her usual dialysis session earlier today, being dialyzed through the right vascular catheter, which was repeated ED malfunction.  She presents to the ED today complaining of nausea, generalized weakness, fatigue, pain at the right femoral vascular catheter site and also discomfort in the left upper extremity fistula site.  No erythema or induration noted.  In the ED she was found to be hyperkalemic, with potassium 5.9, patient states that she was able to finish 4 hr of dialysis session earlier today.  .  Lactic acid 1.3.  WBC 10.3.  Denies fever, chills.  ED physician discussed case with Dr. Claros, on-call vascular surgeon who will evaluate patient in a.m..  Dr. Kitchen with nephrology consulted, recommended admission for hyperkalemia management.    Admitting diagnosis generalized weakness, nausea, hyperkalemia in a ESRD patient    * No surgery found *      Hospital Course:   6/25- Patient seen at bedside this morning, resting comfortably. Reports pain at vasc cath site and notes this causes difficulty walking. Discussed with Renal removal of vasc cath. Endorses some nausea. Hyperkalemia resolved with medical treatment. Scheduled for HD tomorrow. No other acute complaints at this moment.   6/26 - Patient seen at bedside this morning, eating breakfast. Patient reports improvement of weakness. To  have Femoral Vasc Cath removed today and tip culture. Will get HD then discharged. Patient will follow up with Renal next week. Home health consulted. Patient stable for discharge.      Consults:   Consults (From admission, onward)        Status Ordering Provider     Inpatient consult to Nephrology  Once     Provider:  Ayo Kitchen MD    Acknowledged PAYAL SHEA     Inpatient consult to Vascular Surgery  Once     Provider:  Jeremy Meyer MD    Acknowledged PAYAL SHEA          Hypertension  Continue home meds      ESRD on dialysis since 2/24/16  Hemodialysis earlier today, MWD as scheduled.  However potassium still high at 5.9.  Per patient, they had been having difficulty with right femoral vascular catheter.  Nephrology consulted for possible repeat hemodialysis in a.m.    6/26   HD today  Follow up with Renal on Monday           Final Active Diagnoses:    Diagnosis Date Noted POA    ESRD on dialysis since 2/24/16 [N18.6, Z99.2]  Not Applicable     Chronic    Hypertension [I10]  Yes     Chronic      Problems Resolved During this Admission:    Diagnosis Date Noted Date Resolved POA    PRINCIPAL PROBLEM:  Hyperkalemia [E87.5] 06/24/2020 06/26/2020 Yes    Hyponatremia [E87.1] 06/24/2020 06/26/2020 Yes    Generalized weakness [R53.1] 06/24/2020 06/26/2020 Yes    Bradycardia [R00.1] 01/26/2019 06/26/2020 Yes       Discharged Condition: stable    Disposition: Home or Self Care    Follow Up:    Patient Instructions:   No discharge procedures on file.    Significant Diagnostic Studies: Labs: All labs within the past 24 hours have been reviewed    Pending Diagnostic Studies:     None         Medications:  Reconciled Home Medications:      Medication List      CONTINUE taking these medications    acetaminophen 325 MG tablet  Commonly known as: TYLENOL  Take 650 mg by mouth.     cloNIDine 0.3 MG tablet  Commonly known as: CATAPRES  Take 1 tablet (0.3 mg total) by mouth 3 (three) times daily.     cyclobenzaprine  5 MG tablet  Commonly known as: FLEXERIL  Take 1 tablet (5 mg total) by mouth nightly as needed for Muscle spasms.     diclofenac sodium 1 % Gel  Commonly known as: VOLTAREN  APPLY 4 G TOPICALLY 3 (THREE) TIMES DAILY.     ferric citrate 210 mg iron Tab  Commonly known as: AURYXIA  Take 2 tablets by mouth 3 (three) times daily meals and 1 tablet with snacks     fluticasone propionate 50 mcg/actuation nasal spray  Commonly known as: FLONASE  2 sprays (100 mcg total) by Each Nostril route once daily.     gabapentin 300 MG capsule  Commonly known as: NEURONTIN  Take 1 capsule (300mg) by mouth every night X 1 week then increase to 2 capsules (600mg) QHS thereafter. Increase as tolerated.     guaifenesin-codeine 100-10 mg/5 ml  mg/5 mL syrup  Commonly known as: TUSSI-ORGANIDIN NR  Take 5 mLs by mouth every 6 (six) hours as needed for Cough.     hydrALAZINE 50 MG tablet  Commonly known as: APRESOLINE  TAKE 1 TABLET BY MOUTH THREE TIMES A DAY     loratadine 10 mg tablet  Commonly known as: CLARITIN  Take 1 tablet (10 mg total) by mouth once daily.     methocarbamoL 500 MG Tab  Commonly known as: ROBAXIN  Take 1 tablet (500 mg total) by mouth 3 (three) times daily as needed (muscle spasms).     metoprolol succinate 25 MG 24 hr tablet  Commonly known as: TOPROL-XL  Take 1 tablet (25 mg total) by mouth 2 (two) times daily.     nystatin-triamcinolone cream  Commonly known as: MYCOLOG II  Apply topically 3 (three) times daily.     ondansetron 4 MG tablet  Commonly known as: ZOFRAN  Take 4 mg by mouth every 6 (six) hours.     ondansetron 4 MG Tbdl  Commonly known as: ZOFRAN-ODT  Take 1 tablet (4 mg total) by mouth every 8 (eight) hours as needed.     oxyCODONE-acetaminophen  mg per tablet  Commonly known as: PERCOCET  Take 1 tablet by mouth.     sevelamer carbonate 800 mg Tab  Commonly known as: RENVELA  Take 3 tablets (2,400 mg total) by mouth 3 (three) times daily with meals.     traMADoL 50 mg tablet  Commonly  known as: ULTRAM  Take 1/2 to 1 tab PO QD to BID PRN pain.            Indwelling Lines/Drains at time of discharge:   Lines/Drains/Airways     Central Venous Catheter Line                 Hemodialysis Catheter right femoral -- days          Drain                 Hemodialysis AV Fistula Left upper arm -- days         Hemodialysis AV Graft Left upper arm -- days                Time spent on the discharge of patient: greater than 35 minutes  Patient was seen and examined on the date of discharge and determined to be suitable for discharge.         Ronaldo Kinney MD  Department of Hospital Medicine  [facility]

## 2020-06-26 NOTE — PLAN OF CARE
Care plan reviewed with patient. Pain managed by PRN med. Nauseated most of the shift. Pedro to walk with PT in the hallway. Free from falls. No other complaints made.

## 2020-06-26 NOTE — PLAN OF CARE
Pt remains fall free this shift.  Pt AAOx4, verbal, clear speech.  Skin warm and dry. No new skin issues.  Telemonitoring in progress, 60s SR  Room air  Up to bathroom  Assist standby with transfers.  Midline to RUE. AV shunt to LUE and R femoral.   Prn med management for N/V. Effectiveness with Promethazine IV.   Bed low, side rails up x 2, wheels locked, call light in reach.   Bed alarm maintained for safety.   Patient instructed to call for assistance.   Hourly rounding completed.   24 hour chart check completed  POC updated and reviewed with pt. Will continue POC.

## 2020-06-27 NOTE — PLAN OF CARE
Plan of care reviewed with pt. HD completed today. Pt free from falls; safety precautions in place. Frequent weight shifting encouraged. JVAAN fisutla with no complications; brill and thruit present. Right femoral vas cath removed by MD. Pt currently resting comfortably in bed. Reminded to call for assistance.

## 2020-06-29 LAB — BACTERIA CATH TIP CULT: NO GROWTH

## 2020-06-30 ENCOUNTER — TELEPHONE (OUTPATIENT)
Dept: NEPHROLOGY | Facility: CLINIC | Age: 70
End: 2020-06-30

## 2020-06-30 LAB
BACTERIA BLD CULT: NORMAL
BACTERIA BLD CULT: NORMAL

## 2020-06-30 NOTE — TELEPHONE ENCOUNTER
----- Message from Bill BARKER Aaron sent at 6/30/2020  3:09 PM CDT -----  Regarding: sore throat  Contact: patient  Patient called in and stated she has a severe sore throat & wanted to see if an antibiotic could be called in for her?      [facility] [address] [address] [employer] - [address] - [address]  [address]  [place] [facility] [address]  Phone: [phone] Fax: [phone]    Patient call back number is [phone]

## 2020-06-30 NOTE — TELEPHONE ENCOUNTER
Pt says dr sorto was supposed to send order for anbtx when she was at [facility]. Sent him a message 6/30/2020/1523/sf

## 2020-07-14 ENCOUNTER — TELEPHONE (OUTPATIENT)
Dept: NEPHROLOGY | Facility: CLINIC | Age: 70
End: 2020-07-14

## 2020-07-14 NOTE — TELEPHONE ENCOUNTER
"P[T CALLED DEMANDING TO SEE DR MORTENSEN TODAY FOR A TOOTHACHE/ABCESS. I TOLD HER SHE NEEDED TO GO TO URGENT OR ED AS HE IS NOT IN CLINIC THIS WEEK. SHE SAID HE IS MY PCP" I TOLD HER IM SORRY WE CANT HELP YOU IN THE CLINIC TODAY AS HE IS NOT HERE. SHE THEN THANKED ME AND SAID SHE OK". 7/14/2020/1123/SF  "

## 2020-07-14 NOTE — TELEPHONE ENCOUNTER
----- Message from Anu Chery sent at 7/14/2020  9:34 AM CDT -----  Regarding: medication  Pt needing a call back from the office regarding needing medication .                       please contact pt [phone]

## 2020-07-14 NOTE — TELEPHONE ENCOUNTER
Patient states she has an abscess in the right side of her mouth. Pt is requesting an ABT. Informed patient to call PCP, but she states Dr. Kitchen is her PCP. Please advise.

## 2020-07-22 DIAGNOSIS — M47.816 LUMBAR FACET ARTHROPATHY: ICD-10-CM

## 2020-07-22 DIAGNOSIS — M51.36 DDD (DEGENERATIVE DISC DISEASE), LUMBAR: ICD-10-CM

## 2020-07-22 RX ORDER — METOPROLOL SUCCINATE 25 MG/1
25 TABLET, EXTENDED RELEASE ORAL 2 TIMES DAILY
Refills: 3
Start: 2020-07-22 | End: 2020-07-27 | Stop reason: SDUPTHER

## 2020-07-23 ENCOUNTER — HOSPITAL ENCOUNTER (OUTPATIENT)
Facility: HOSPITAL | Age: 70
Discharge: HOME | End: 2020-07-26
Attending: FAMILY MEDICINE | Admitting: INTERNAL MEDICINE
Payer: MEDICARE

## 2020-07-23 ENCOUNTER — DOCUMENTATION ONLY (OUTPATIENT)
Dept: NEPHROLOGY | Facility: CLINIC | Age: 70
End: 2020-07-23

## 2020-07-23 DIAGNOSIS — R55 SYNCOPE, UNSPECIFIED SYNCOPE TYPE: ICD-10-CM

## 2020-07-23 DIAGNOSIS — I10 ESSENTIAL HYPERTENSION: ICD-10-CM

## 2020-07-23 DIAGNOSIS — N18.6 ESRD (END STAGE RENAL DISEASE) ON DIALYSIS: Chronic | ICD-10-CM

## 2020-07-23 DIAGNOSIS — Z99.2 ESRD (END STAGE RENAL DISEASE) ON DIALYSIS: Chronic | ICD-10-CM

## 2020-07-23 DIAGNOSIS — I21.4 NSTEMI (NON-ST ELEVATED MYOCARDIAL INFARCTION): Primary | ICD-10-CM

## 2020-07-23 DIAGNOSIS — R55 SYNCOPE, NEAR: ICD-10-CM

## 2020-07-23 DIAGNOSIS — R07.9 CHEST PAIN: ICD-10-CM

## 2020-07-23 DIAGNOSIS — R55 SYNCOPE: ICD-10-CM

## 2020-07-23 PROCEDURE — 93005 ELECTROCARDIOGRAM TRACING: CPT

## 2020-07-23 PROCEDURE — 80053 COMPREHEN METABOLIC PANEL: CPT

## 2020-07-23 PROCEDURE — 84484 ASSAY OF TROPONIN QUANT: CPT

## 2020-07-23 PROCEDURE — 93010 ELECTROCARDIOGRAM REPORT: CPT | Mod: ,,, | Performed by: INTERNAL MEDICINE

## 2020-07-23 PROCEDURE — 93010 EKG 12-LEAD: ICD-10-PCS | Mod: ,,, | Performed by: INTERNAL MEDICINE

## 2020-07-23 PROCEDURE — 96374 THER/PROPH/DIAG INJ IV PUSH: CPT

## 2020-07-23 PROCEDURE — 99291 CRITICAL CARE FIRST HOUR: CPT | Mod: 25

## 2020-07-23 PROCEDURE — 85025 COMPLETE CBC W/AUTO DIFF WBC: CPT

## 2020-07-23 PROCEDURE — 83735 ASSAY OF MAGNESIUM: CPT

## 2020-07-23 PROCEDURE — 85610 PROTHROMBIN TIME: CPT

## 2020-07-23 PROCEDURE — 83880 ASSAY OF NATRIURETIC PEPTIDE: CPT

## 2020-07-23 PROCEDURE — 84443 ASSAY THYROID STIM HORMONE: CPT
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
## 2020-07-24 PROBLEM — R55 SYNCOPE, NEAR: Status: ACTIVE | Noted: 2020-07-24

## 2020-07-24 PROBLEM — I47.19 ATRIAL TACHYCARDIA: Status: ACTIVE | Noted: 2020-07-24

## 2020-07-24 PROBLEM — I21.4 NSTEMI (NON-ST ELEVATED MYOCARDIAL INFARCTION): Status: ACTIVE | Noted: 2020-07-24

## 2020-07-24 PROBLEM — R79.89 ELEVATED TROPONIN: Status: ACTIVE | Noted: 2020-07-24

## 2020-07-24 LAB
ALBUMIN SERPL BCP-MCNC: 3.4 G/DL (ref 3.5–5.2)
ALBUMIN SERPL BCP-MCNC: 3.5 G/DL (ref 3.5–5.2)
ALP SERPL-CCNC: 101 U/L (ref 55–135)
ALP SERPL-CCNC: 105 U/L (ref 55–135)
ALT SERPL W/O P-5'-P-CCNC: 10 U/L (ref 10–44)
ALT SERPL W/O P-5'-P-CCNC: 11 U/L (ref 10–44)
ANION GAP SERPL CALC-SCNC: 16 MMOL/L (ref 8–16)
ANION GAP SERPL CALC-SCNC: 19 MMOL/L (ref 8–16)
AORTIC ROOT ANNULUS: 3.44 CM
ASCENDING AORTA: 2.71 CM
AST SERPL-CCNC: 14 U/L (ref 10–40)
AST SERPL-CCNC: 19 U/L (ref 10–40)
AV INDEX (PROSTH): 0.8
AV MEAN GRADIENT: 13 MMHG
AV PEAK GRADIENT: 18 MMHG
AV VALVE AREA: 1.97 CM2
AV VELOCITY RATIO: 0.76
BASOPHILS # BLD AUTO: 0.07 K/UL (ref 0–0.2)
BASOPHILS # BLD AUTO: 0.09 K/UL (ref 0–0.2)
BASOPHILS NFR BLD: 0.6 % (ref 0–1.9)
BASOPHILS NFR BLD: 0.7 % (ref 0–1.9)
BILIRUB SERPL-MCNC: 0.2 MG/DL (ref 0.1–1)
BILIRUB SERPL-MCNC: 0.2 MG/DL (ref 0.1–1)
BNP SERPL-MCNC: 42 PG/ML (ref 0–99)
BSA FOR ECHO PROCEDURE: 2.22 M2
BUN SERPL-MCNC: 60 MG/DL (ref 8–23)
BUN SERPL-MCNC: 61 MG/DL (ref 8–23)
CALCIUM SERPL-MCNC: 9.5 MG/DL (ref 8.7–10.5)
CALCIUM SERPL-MCNC: 9.8 MG/DL (ref 8.7–10.5)
CHLORIDE SERPL-SCNC: 100 MMOL/L (ref 95–110)
CHLORIDE SERPL-SCNC: 94 MMOL/L (ref 95–110)
CO2 SERPL-SCNC: 22 MMOL/L (ref 23–29)
CO2 SERPL-SCNC: 26 MMOL/L (ref 23–29)
CREAT SERPL-MCNC: 8.4 MG/DL (ref 0.5–1.4)
CREAT SERPL-MCNC: 8.9 MG/DL (ref 0.5–1.4)
CV ECHO LV RWT: 0.68 CM
DIFFERENTIAL METHOD: ABNORMAL
DIFFERENTIAL METHOD: ABNORMAL
DOP CALC AO PEAK VEL: 2.15 M/S
DOP CALC AO VTI: 45.25 CM
DOP CALC LVOT AREA: 2.5 CM2
DOP CALC LVOT DIAMETER: 1.77 CM
DOP CALC LVOT PEAK VEL: 1.63 M/S
DOP CALC LVOT STROKE VOLUME: 89.27 CM3
DOP CALC RVOT PEAK VEL: 1.11 M/S
DOP CALC RVOT VTI: 29.36 CM
DOP CALCLVOT PEAK VEL VTI: 36.3 CM
E WAVE DECELERATION TIME: 262.31 MSEC
E/A RATIO: 0.79
ECHO LV POSTERIOR WALL: 1.53 CM (ref 0.6–1.1)
EOSINOPHIL # BLD AUTO: 0.1 K/UL (ref 0–0.5)
EOSINOPHIL # BLD AUTO: 0.2 K/UL (ref 0–0.5)
EOSINOPHIL NFR BLD: 0.7 % (ref 0–8)
EOSINOPHIL NFR BLD: 1.3 % (ref 0–8)
ERYTHROCYTE [DISTWIDTH] IN BLOOD BY AUTOMATED COUNT: 15.6 % (ref 11.5–14.5)
ERYTHROCYTE [DISTWIDTH] IN BLOOD BY AUTOMATED COUNT: 15.8 % (ref 11.5–14.5)
EST. GFR  (AFRICAN AMERICAN): 5 ML/MIN/1.73 M^2
EST. GFR  (AFRICAN AMERICAN): 5 ML/MIN/1.73 M^2
EST. GFR  (NON AFRICAN AMERICAN): 4 ML/MIN/1.73 M^2
EST. GFR  (NON AFRICAN AMERICAN): 4 ML/MIN/1.73 M^2
FRACTIONAL SHORTENING: 31 % (ref 28–44)
GLUCOSE SERPL-MCNC: 117 MG/DL (ref 70–110)
GLUCOSE SERPL-MCNC: 85 MG/DL (ref 70–110)
HCT VFR BLD AUTO: 38.3 % (ref 37–48.5)
HCT VFR BLD AUTO: 40.3 % (ref 37–48.5)
HGB BLD-MCNC: 11.9 G/DL (ref 12–16)
HGB BLD-MCNC: 12.2 G/DL (ref 12–16)
IMM GRANULOCYTES # BLD AUTO: 0.06 K/UL (ref 0–0.04)
IMM GRANULOCYTES # BLD AUTO: 0.09 K/UL (ref 0–0.04)
IMM GRANULOCYTES NFR BLD AUTO: 0.5 % (ref 0–0.5)
IMM GRANULOCYTES NFR BLD AUTO: 0.7 % (ref 0–0.5)
INR PPP: 1 (ref 0.8–1.2)
INTERVENTRICULAR SEPTUM: 1.42 CM (ref 0.6–1.1)
IVRT: 76.12 MSEC
LA MAJOR: 4.22 CM
LA MINOR: 2.95 CM
LA WIDTH: 2.54 CM
LEFT ATRIUM SIZE: 2.87 CM
LEFT ATRIUM VOLUME INDEX: 10.2 ML/M2
LEFT ATRIUM VOLUME: 21.52 CM3
LEFT INTERNAL DIMENSION IN SYSTOLE: 3.1 CM (ref 2.1–4)
LEFT VENTRICLE DIASTOLIC VOLUME INDEX: 43.24 ML/M2
LEFT VENTRICLE DIASTOLIC VOLUME: 91.39 ML
LEFT VENTRICLE MASS INDEX: 126 G/M2
LEFT VENTRICLE SYSTOLIC VOLUME INDEX: 17.9 ML/M2
LEFT VENTRICLE SYSTOLIC VOLUME: 37.83 ML
LEFT VENTRICULAR INTERNAL DIMENSION IN DIASTOLE: 4.48 CM (ref 3.5–6)
LEFT VENTRICULAR MASS: 267.05 G
LYMPHOCYTES # BLD AUTO: 2.8 K/UL (ref 1–4.8)
LYMPHOCYTES # BLD AUTO: 3.3 K/UL (ref 1–4.8)
LYMPHOCYTES NFR BLD: 22.7 % (ref 18–48)
LYMPHOCYTES NFR BLD: 27 % (ref 18–48)
MAGNESIUM SERPL-MCNC: 2.6 MG/DL (ref 1.6–2.6)
MAGNESIUM SERPL-MCNC: 2.6 MG/DL (ref 1.6–2.6)
MCH RBC QN AUTO: 29.8 PG (ref 27–31)
MCH RBC QN AUTO: 29.8 PG (ref 27–31)
MCHC RBC AUTO-ENTMCNC: 30.3 G/DL (ref 32–36)
MCHC RBC AUTO-ENTMCNC: 31.1 G/DL (ref 32–36)
MCV RBC AUTO: 96 FL (ref 82–98)
MCV RBC AUTO: 98 FL (ref 82–98)
MONOCYTES # BLD AUTO: 1 K/UL (ref 0.3–1)
MONOCYTES # BLD AUTO: 1.4 K/UL (ref 0.3–1)
MONOCYTES NFR BLD: 10.8 % (ref 4–15)
MONOCYTES NFR BLD: 8.3 % (ref 4–15)
MV PEAK A VEL: 0.99 M/S
MV PEAK E VEL: 0.78 M/S
MV STENOSIS PRESSURE HALF TIME: 76.07 MS
MV VALVE AREA P 1/2 METHOD: 2.89 CM2
NEUTROPHILS # BLD AUTO: 7.5 K/UL (ref 1.8–7.7)
NEUTROPHILS # BLD AUTO: 8.1 K/UL (ref 1.8–7.7)
NEUTROPHILS NFR BLD: 62 % (ref 38–73)
NEUTROPHILS NFR BLD: 64.7 % (ref 38–73)
NRBC BLD-RTO: 0 /100 WBC
NRBC BLD-RTO: 0 /100 WBC
PHOSPHATE SERPL-MCNC: 3.8 MG/DL (ref 2.7–4.5)
PLATELET # BLD AUTO: 342 K/UL (ref 150–350)
PLATELET # BLD AUTO: 365 K/UL (ref 150–350)
PMV BLD AUTO: 10.1 FL (ref 9.2–12.9)
PMV BLD AUTO: 9.5 FL (ref 9.2–12.9)
POTASSIUM SERPL-SCNC: 4.2 MMOL/L (ref 3.5–5.1)
POTASSIUM SERPL-SCNC: 4.8 MMOL/L (ref 3.5–5.1)
PROT SERPL-MCNC: 7.1 G/DL (ref 6–8.4)
PROT SERPL-MCNC: 7.1 G/DL (ref 6–8.4)
PROTHROMBIN TIME: 10.6 SEC (ref 9–12.5)
PV MEAN GRADIENT: 3.44 MMHG
PV PEAK GRADIENT: 4.89 MMHG
RA MAJOR: 3.4 CM
RA WIDTH: 2.66 CM
RBC # BLD AUTO: 3.99 M/UL (ref 4–5.4)
RBC # BLD AUTO: 4.1 M/UL (ref 4–5.4)
SARS-COV-2 RDRP RESP QL NAA+PROBE: NEGATIVE
SINUS: 3.04 CM
SODIUM SERPL-SCNC: 136 MMOL/L (ref 136–145)
SODIUM SERPL-SCNC: 141 MMOL/L (ref 136–145)
STJ: 2.93 CM
TRICUSPID ANNULAR PLANE SYSTOLIC EXCURSION: 2.43 CM
TROPONIN I SERPL DL<=0.01 NG/ML-MCNC: 0.04 NG/ML (ref 0–0.03)
TROPONIN I SERPL DL<=0.01 NG/ML-MCNC: 0.06 NG/ML (ref 0–0.03)
TROPONIN I SERPL DL<=0.01 NG/ML-MCNC: 0.07 NG/ML (ref 0–0.03)
TROPONIN I SERPL DL<=0.01 NG/ML-MCNC: 0.08 NG/ML (ref 0–0.03)
TSH SERPL DL<=0.005 MIU/L-ACNC: 1.19 UIU/ML (ref 0.4–4)
WBC # BLD AUTO: 12.14 K/UL (ref 3.9–12.7)
WBC # BLD AUTO: 12.47 K/UL (ref 3.9–12.7)

## 2020-07-24 PROCEDURE — 84100 ASSAY OF PHOSPHORUS: CPT

## 2020-07-24 PROCEDURE — 84484 ASSAY OF TROPONIN QUANT: CPT

## 2020-07-24 PROCEDURE — 25000003 PHARM REV CODE 250: Performed by: NURSE PRACTITIONER

## 2020-07-24 PROCEDURE — G0378 HOSPITAL OBSERVATION PER HR: HCPCS

## 2020-07-24 PROCEDURE — 80053 COMPREHEN METABOLIC PANEL: CPT

## 2020-07-24 PROCEDURE — 63600175 PHARM REV CODE 636 W HCPCS: Performed by: NURSE PRACTITIONER

## 2020-07-24 PROCEDURE — 85025 COMPLETE CBC W/AUTO DIFF WBC: CPT

## 2020-07-24 PROCEDURE — 63600175 PHARM REV CODE 636 W HCPCS: Performed by: FAMILY MEDICINE

## 2020-07-24 PROCEDURE — 36415 COLL VENOUS BLD VENIPUNCTURE: CPT

## 2020-07-24 PROCEDURE — 99215 OFFICE O/P EST HI 40 MIN: CPT | Mod: ,,, | Performed by: INTERNAL MEDICINE

## 2020-07-24 PROCEDURE — 83735 ASSAY OF MAGNESIUM: CPT

## 2020-07-24 PROCEDURE — 25000003 PHARM REV CODE 250: Performed by: FAMILY MEDICINE

## 2020-07-24 PROCEDURE — 99214 PR OFFICE/OUTPT VISIT, EST, LEVL IV, 30-39 MIN: ICD-10-PCS | Mod: ,,, | Performed by: INTERNAL MEDICINE

## 2020-07-24 PROCEDURE — 99215 PR OFFICE/OUTPT VISIT, EST, LEVL V, 40-54 MIN: ICD-10-PCS | Mod: ,,, | Performed by: INTERNAL MEDICINE

## 2020-07-24 PROCEDURE — 84484 ASSAY OF TROPONIN QUANT: CPT | Mod: 91

## 2020-07-24 PROCEDURE — U0002 COVID-19 LAB TEST NON-CDC: HCPCS

## 2020-07-24 PROCEDURE — 90935 HEMODIALYSIS ONE EVALUATION: CPT

## 2020-07-24 PROCEDURE — 99214 OFFICE O/P EST MOD 30 MIN: CPT | Mod: ,,, | Performed by: INTERNAL MEDICINE

## 2020-07-24 RX ORDER — NAPROXEN SODIUM 220 MG/1
81 TABLET, FILM COATED ORAL DAILY
Status: DISCONTINUED | OUTPATIENT
Start: 2020-07-25 | End: 2020-07-26 | Stop reason: HOSPADM

## 2020-07-24 RX ORDER — HEPARIN SODIUM 1000 [USP'U]/ML
2000 INJECTION, SOLUTION INTRAVENOUS; SUBCUTANEOUS ONCE
Status: CANCELLED | OUTPATIENT
Start: 2020-07-24

## 2020-07-24 RX ORDER — HYDROCODONE BITARTRATE AND ACETAMINOPHEN 7.5; 325 MG/1; MG/1
1 TABLET ORAL EVERY 6 HOURS PRN
Status: DISCONTINUED | OUTPATIENT
Start: 2020-07-24 | End: 2020-07-26 | Stop reason: HOSPADM

## 2020-07-24 RX ORDER — SEVELAMER CARBONATE 800 MG/1
2400 TABLET, FILM COATED ORAL
Status: DISCONTINUED | OUTPATIENT
Start: 2020-07-24 | End: 2020-07-26 | Stop reason: HOSPADM

## 2020-07-24 RX ORDER — ONDANSETRON 2 MG/ML
4 INJECTION INTRAMUSCULAR; INTRAVENOUS EVERY 8 HOURS PRN
Status: DISCONTINUED | OUTPATIENT
Start: 2020-07-24 | End: 2020-07-26 | Stop reason: HOSPADM

## 2020-07-24 RX ORDER — HYDRALAZINE HYDROCHLORIDE 50 MG/1
50 TABLET, FILM COATED ORAL EVERY 8 HOURS
Status: DISCONTINUED | OUTPATIENT
Start: 2020-07-24 | End: 2020-07-25

## 2020-07-24 RX ORDER — SODIUM CHLORIDE 0.9 % (FLUSH) 0.9 %
10 SYRINGE (ML) INJECTION
Status: DISCONTINUED | OUTPATIENT
Start: 2020-07-24 | End: 2020-07-26 | Stop reason: HOSPADM

## 2020-07-24 RX ORDER — HEPARIN SODIUM 5000 [USP'U]/ML
5000 INJECTION, SOLUTION INTRAVENOUS; SUBCUTANEOUS EVERY 8 HOURS
Status: DISCONTINUED | OUTPATIENT
Start: 2020-07-24 | End: 2020-07-26 | Stop reason: HOSPADM

## 2020-07-24 RX ORDER — ATORVASTATIN CALCIUM 40 MG/1
80 TABLET, FILM COATED ORAL NIGHTLY
Status: DISCONTINUED | OUTPATIENT
Start: 2020-07-24 | End: 2020-07-26 | Stop reason: HOSPADM

## 2020-07-24 RX ORDER — METOPROLOL TARTRATE 25 MG/1
25 TABLET, FILM COATED ORAL 2 TIMES DAILY
Status: DISCONTINUED | OUTPATIENT
Start: 2020-07-24 | End: 2020-07-26 | Stop reason: HOSPADM

## 2020-07-24 RX ORDER — HYDRALAZINE HYDROCHLORIDE 20 MG/ML
10 INJECTION INTRAMUSCULAR; INTRAVENOUS
Status: COMPLETED | OUTPATIENT
Start: 2020-07-24 | End: 2020-07-24

## 2020-07-24 RX ORDER — ASPIRIN 325 MG
325 TABLET ORAL
Status: COMPLETED | OUTPATIENT
Start: 2020-07-24 | End: 2020-07-24

## 2020-07-24 RX ORDER — HYDRALAZINE HYDROCHLORIDE 20 MG/ML
10 INJECTION INTRAMUSCULAR; INTRAVENOUS EVERY 6 HOURS PRN
Status: DISCONTINUED | OUTPATIENT
Start: 2020-07-24 | End: 2020-07-26 | Stop reason: HOSPADM

## 2020-07-24 RX ADMIN — SEVELAMER CARBONATE 2400 MG: 800 TABLET, FILM COATED ORAL at 11:07

## 2020-07-24 RX ADMIN — ATORVASTATIN CALCIUM 80 MG: 40 TABLET, FILM COATED ORAL at 08:07

## 2020-07-24 RX ADMIN — HEPARIN SODIUM 5000 UNITS: 5000 INJECTION INTRAVENOUS; SUBCUTANEOUS at 09:07

## 2020-07-24 RX ADMIN — HYDRALAZINE HYDROCHLORIDE 10 MG: 20 INJECTION INTRAMUSCULAR; INTRAVENOUS at 04:07

## 2020-07-24 RX ADMIN — METOPROLOL TARTRATE 25 MG: 25 TABLET ORAL at 08:07

## 2020-07-24 RX ADMIN — HYDRALAZINE HYDROCHLORIDE 50 MG: 50 TABLET, FILM COATED ORAL at 09:07

## 2020-07-24 RX ADMIN — ASPIRIN 325 MG: 325 TABLET, FILM COATED ORAL at 03:07

## 2020-07-24 RX ADMIN — NITROGLYCERIN 1 INCH: 20 OINTMENT TOPICAL at 03:07

## 2020-07-24 RX ADMIN — METOPROLOL TARTRATE 25 MG: 25 TABLET ORAL at 11:07

## 2020-07-24 RX ADMIN — HYDRALAZINE HYDROCHLORIDE 10 MG: 20 INJECTION INTRAMUSCULAR; INTRAVENOUS at 11:07
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
## 2020-07-24 NOTE — HPI
Ms. Rich's current medical conditions include  HTN, ESRD/dialysis, PVD. Iodine allergy.  Previously followed by Dr. Delvis Rich, [facility], and was told that she had atrial tachycardia and put on Toprol xl approx 2 years ago.   Patient presented to the ED for near syncopal episode. States that she was walking from her bathroom and became very dizzy and had palpitations. Episode happened twice, so she called EMS.   Upon EMS arrival, patient reportedly in A-fib with RVR (no strip provided for Cardiology or ED review). Patient given 2 doses of IV Lopressor 5mg and converted to NSR. Was in NSR at time of arrival to ED.   Patient states that this is the first time palpitations have occurred since seeing Dr. Rich.     Patient denies any chest pain or angina equivalent. Evaluated in March 2020 by Dr. Sims in order to be cleared for knee procedure. Recommendation made for patient to undergo echo and stress test, but not done. Procedure put off due to HD access complications. Had to have what sound to be a fistula revision.   Troponin 0.040, 0.064   COVID Negative   Echo shows normal LVF

## 2020-07-24 NOTE — PLAN OF CARE
SW met with pt at bedside to complete discharge assessment. Pt stated she lives home alone. Pt. Stated she uses a walker for assistance at home, however the legs are damaged. Pt requested a new walker, walker tray, hospital bed, detachable shower head, shower chair, and Long Term care with [facility]. Pt choice form signed. SW provided a transitional care folder, information on advanced directives, information on pharmacy bedside delivery, and discharge planning begins on admission with contact information for any needs/questions.  ISRA provided pt. with  contact information for any needs/questions. Pt stated her Medicaid is in her maiden name Alcides and her medicare is in  name, Hilario.     Payor: MEDICARE / Plan: MEDICARE PART A & B / Product Type: Government /   PCP: Primary Doctor No  Pharmacy:   [facility] ([facility] Only) - [place] - [address]  [address]  [facility] [address]  [place] [address]  Phone: [phone] Fax: [phone]    [id number] - [place] [id number]  [facility]  [address]   [facility] [address]  Phone: [phone] Fax: [phone]    [facility] [id number] [facility] - [place], [place] - [address]  [address] [facility] [address]  Phone: [phone] Fax: [phone]  Bedside Delivery: yes  MyChart: link will be sent       07/24/20 1230   Discharge Assessment   Assessment Type Discharge Planning Assessment   Confirmed/corrected address and phone number on facesheet? Yes   Assessment information obtained from? Patient   Expected Length of Stay (days)   (TBD)   Communicated expected length of stay with patient/caregiver yes   Prior to hospitilization cognitive status: Alert/Oriented   Prior to hospitalization functional status: Independent;Assistive Equipment;Needs Assistance   Current cognitive status: Alert/Oriented   Current Functional Status: Independent;Assistive Equipment;Needs Assistance   Facility Arrived From: Home    Lives With alone   Able to Return to Prior Arrangements yes   Is patient able to care for self after discharge? No   Who are your caregiver(s) and their phone number(s)? Anna Reynolds (daughter) [phone]   Patient's perception of discharge disposition home health;home or selfcare   Readmission Within the Last 30 Days no previous admission in last 30 days   Patient currently being followed by outpatient case management? No   Patient currently receives any other outside agency services? No   Equipment Currently Used at Home walker, standard   Part D Coverage n/a   Do you have any problems affording any of your prescribed medications? No   Is the patient taking medications as prescribed? yes   Does the patient have transportation home? Yes   Transportation Anticipated public transportation   Dialysis Name and Scheduled days MWF at [facility]   Does the patient receive services at the [facility]? No   Discharge Plan A Home Health   Discharge Plan B Home;Home Health   DME Needed Upon Discharge  walker, standard;shower chair;hospital bed;other (see comments)  (walker tray and detachable shower head)   Patient/Family in Agreement with Plan yes       Ez Yuen LMSW 7/24/2020 1:21 PM

## 2020-07-24 NOTE — ASSESSMENT & PLAN NOTE
-Patient reportedly in A-fib with RVR upon arrival to her home. However, there are no strips for my review.   -Given 2 doses of IV Lopressor and converted to NSR, per EMS report  -EKG in the ED shows NSR and no arrhythmias noted on tele since admit  -Continue current dose of metoprolol 25mg BID and ASA until evidence of A-fib  -Needs to follow up with Dr. Sims as scheduled.   -May need to consider holter vs. zio patch   -Echo shows normal LVF
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
## 2020-07-24 NOTE — ASSESSMENT & PLAN NOTE
7/24:  Last dialyzed on 7/22/2020 with 3.5 L removed   On MWF schedule  Will consult nephrology on case

## 2020-07-24 NOTE — ASSESSMENT & PLAN NOTE
7/24:  Pt reported history of afib  Review of visit with Dr. Sims   Showed pt has history of atrial tachycardia   EKG here did not show afib  She was told by ems she had afib  Lopressor given x2 en route  On toprol at home   Echo reviewed   Will consult cardiology on case   Not on anticoagulation  -serial troponin levels followed with mild increased noted in the setting of ESRD   CHADSVASC score: 4

## 2020-07-24 NOTE — ASSESSMENT & PLAN NOTE
7/24:  Will hold home bp meds at this time   Due to reported symptoms  -will resume home medications when appropriate   Hydralazine PRN
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
## 2020-07-24 NOTE — ASSESSMENT & PLAN NOTE
7/24:  Mentation at baseline  Will check neuro checks  Orthostatics  Carotid duplex   Monitor vitals   Possibly secondary to hx of atrial tachycardia   -Echo results reviewed

## 2020-07-24 NOTE — HPI
Gillian Rich is a 69 year old AA woman  with ESRD on HD , anemia, HTN, PCKD, hyperparathyroidism presents to the hospital yesterday for near syncopal episode at home, most likely due to hemodynamic changes from new on today atrial fibrillation, she was evaluated by Cardiology, her medications were adjusted, we were consulted for maintenance dialysis, Patient dilayzes on MWF schedule at [facility] under Dr Kitchen, Access is : left arm AVF , patient seen at dialysis and tolerating well,
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
## 2020-07-24 NOTE — PLAN OF CARE
SW provided pt with information for Long Term Care choice waiver. Pt was instructed to contact [facility] waiver to complete the application.    Ez Yuen LMSW 7/24/2020 4:27 PM

## 2020-07-24 NOTE — PLAN OF CARE
Patient in dialysis  Patient AAO x 4  She able to make needs known    Complain of ringing in ears; NP notified   Denies pain   IV site patent, no redness or swelling   Fistual in JAVAN   BP meds held prior to dialysis   No other concerns voiced at this time

## 2020-07-24 NOTE — HPI
Gillian Rich is a 70 yo female with PMHx of ESRD, Atrial fibrillation and HTN, and Anemia who presented to the ED s/p near syncopal incident at home.  Pt reports that she became very dizzy this norning will using her walker to ambulate from her bedroom to the bathroom.  Associated symptoms include palpitations, blurred vision, BLE edema, and chest pain.  Pt denies: SOB, fatigue, weakness, abdominal pain, nausea, vomiting, appetite changes, and any additional complaints.  Pt states she expereinced a similar episode 2 years prior and was diagnosed with atrial fibrillation at that time.  Pt is followed outpatient by Dr. Sims (Cardiology) and Dr. Gardner (Nephrology) has HD on MWF with last treatment on 7/22/2020 with 3.5 L removed.  Pt is a full code and Medhat Pitts (daughter) at [phone] is the surrogate decision maker.  ER work up showed: H/H 11.9/38.3, CO2 22, AG 19, BUN 60, Creatinine 8.9, and Troponin 0.040.  Echo results reviewed.  Consults for ED and Cardiology placed in ED.  Hospital Medicine contacted for admission with patient placed in Observation Unit for further evaluation.

## 2020-07-24 NOTE — ASSESSMENT & PLAN NOTE
1. ESRD on HD, Patient dialyzes on MWF schedule at [facility]. Access: left arm AVG, staples and sutures removed, seen at dialysis, tolerating well,    2.  Hypertension, blood pressure is improved, medications were adjusted,    3.  Syncope, likely due to hemodynamic changes from new onset atrial fibrillation, clinically improved, cardiology notes reviewed,    4.  Anemia of chronic kidney disease, monitor hemoglobin, Procrit with dialysis when indicated,    5.  Secondary hyperparathyroidism, continue renal diet,
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
## 2020-07-24 NOTE — ASSESSMENT & PLAN NOTE
Demand likely from tachycardia and ESRD  Trend enzymes   Needs to proceed with OP workup as scheduled

## 2020-07-25 LAB
ALBUMIN SERPL BCP-MCNC: 3.4 G/DL (ref 3.5–5.2)
ALP SERPL-CCNC: 106 U/L (ref 55–135)
ALT SERPL W/O P-5'-P-CCNC: 11 U/L (ref 10–44)
ANION GAP SERPL CALC-SCNC: 14 MMOL/L (ref 8–16)
AST SERPL-CCNC: 14 U/L (ref 10–40)
BASOPHILS # BLD AUTO: 0.06 K/UL (ref 0–0.2)
BASOPHILS NFR BLD: 0.7 % (ref 0–1.9)
BILIRUB SERPL-MCNC: 0.3 MG/DL (ref 0.1–1)
BUN SERPL-MCNC: 43 MG/DL (ref 8–23)
CALCIUM SERPL-MCNC: 9.5 MG/DL (ref 8.7–10.5)
CHLORIDE SERPL-SCNC: 98 MMOL/L (ref 95–110)
CO2 SERPL-SCNC: 26 MMOL/L (ref 23–29)
CREAT SERPL-MCNC: 6.8 MG/DL (ref 0.5–1.4)
DIFFERENTIAL METHOD: ABNORMAL
EOSINOPHIL # BLD AUTO: 0.2 K/UL (ref 0–0.5)
EOSINOPHIL NFR BLD: 1.6 % (ref 0–8)
ERYTHROCYTE [DISTWIDTH] IN BLOOD BY AUTOMATED COUNT: 15.4 % (ref 11.5–14.5)
EST. GFR  (AFRICAN AMERICAN): 7 ML/MIN/1.73 M^2
EST. GFR  (NON AFRICAN AMERICAN): 6 ML/MIN/1.73 M^2
GLUCOSE SERPL-MCNC: 93 MG/DL (ref 70–110)
HCT VFR BLD AUTO: 35.5 % (ref 37–48.5)
HGB BLD-MCNC: 11.1 G/DL (ref 12–16)
IMM GRANULOCYTES # BLD AUTO: 0.03 K/UL (ref 0–0.04)
IMM GRANULOCYTES NFR BLD AUTO: 0.3 % (ref 0–0.5)
LYMPHOCYTES # BLD AUTO: 3.2 K/UL (ref 1–4.8)
LYMPHOCYTES NFR BLD: 34.5 % (ref 18–48)
MCH RBC QN AUTO: 30.2 PG (ref 27–31)
MCHC RBC AUTO-ENTMCNC: 31.3 G/DL (ref 32–36)
MCV RBC AUTO: 97 FL (ref 82–98)
MONOCYTES # BLD AUTO: 0.8 K/UL (ref 0.3–1)
MONOCYTES NFR BLD: 8.7 % (ref 4–15)
NEUTROPHILS # BLD AUTO: 5 K/UL (ref 1.8–7.7)
NEUTROPHILS NFR BLD: 54.2 % (ref 38–73)
NRBC BLD-RTO: 0 /100 WBC
PLATELET # BLD AUTO: 329 K/UL (ref 150–350)
PMV BLD AUTO: 9.7 FL (ref 9.2–12.9)
POTASSIUM SERPL-SCNC: 4.3 MMOL/L (ref 3.5–5.1)
PROT SERPL-MCNC: 6.9 G/DL (ref 6–8.4)
RBC # BLD AUTO: 3.68 M/UL (ref 4–5.4)
SODIUM SERPL-SCNC: 138 MMOL/L (ref 136–145)
WBC # BLD AUTO: 9.18 K/UL (ref 3.9–12.7)

## 2020-07-25 PROCEDURE — 96372 THER/PROPH/DIAG INJ SC/IM: CPT

## 2020-07-25 PROCEDURE — 25000242 PHARM REV CODE 250 ALT 637 W/ HCPCS: Performed by: HOSPITALIST

## 2020-07-25 PROCEDURE — 25000003 PHARM REV CODE 250: Performed by: FAMILY MEDICINE

## 2020-07-25 PROCEDURE — G0378 HOSPITAL OBSERVATION PER HR: HCPCS

## 2020-07-25 PROCEDURE — 99215 PR OFFICE/OUTPT VISIT, EST, LEVL V, 40-54 MIN: ICD-10-PCS | Mod: ,,, | Performed by: INTERNAL MEDICINE

## 2020-07-25 PROCEDURE — 99215 OFFICE O/P EST HI 40 MIN: CPT | Mod: ,,, | Performed by: INTERNAL MEDICINE

## 2020-07-25 PROCEDURE — 63600175 PHARM REV CODE 636 W HCPCS: Performed by: NURSE PRACTITIONER

## 2020-07-25 PROCEDURE — 85025 COMPLETE CBC W/AUTO DIFF WBC: CPT

## 2020-07-25 PROCEDURE — 25000003 PHARM REV CODE 250: Performed by: NURSE PRACTITIONER

## 2020-07-25 PROCEDURE — 80053 COMPREHEN METABOLIC PANEL: CPT

## 2020-07-25 PROCEDURE — 63600175 PHARM REV CODE 636 W HCPCS: Performed by: FAMILY MEDICINE

## 2020-07-25 PROCEDURE — 25000003 PHARM REV CODE 250: Performed by: INTERNAL MEDICINE

## 2020-07-25 PROCEDURE — 36415 COLL VENOUS BLD VENIPUNCTURE: CPT

## 2020-07-25 RX ORDER — HYDRALAZINE HYDROCHLORIDE 50 MG/1
100 TABLET, FILM COATED ORAL EVERY 8 HOURS
Status: DISCONTINUED | OUTPATIENT
Start: 2020-07-25 | End: 2020-07-26 | Stop reason: HOSPADM

## 2020-07-25 RX ORDER — FLUTICASONE PROPIONATE 50 MCG
2 SPRAY, SUSPENSION (ML) NASAL DAILY
Status: DISCONTINUED | OUTPATIENT
Start: 2020-07-25 | End: 2020-07-26 | Stop reason: HOSPADM

## 2020-07-25 RX ORDER — CLONIDINE HYDROCHLORIDE 0.3 MG/1
0.3 TABLET ORAL 3 TIMES DAILY
Status: DISCONTINUED | OUTPATIENT
Start: 2020-07-25 | End: 2020-07-26 | Stop reason: HOSPADM

## 2020-07-25 RX ADMIN — HYDRALAZINE HYDROCHLORIDE 10 MG: 20 INJECTION INTRAMUSCULAR; INTRAVENOUS at 12:07

## 2020-07-25 RX ADMIN — HEPARIN SODIUM 5000 UNITS: 5000 INJECTION INTRAVENOUS; SUBCUTANEOUS at 01:07

## 2020-07-25 RX ADMIN — SEVELAMER CARBONATE 2400 MG: 800 TABLET, FILM COATED ORAL at 12:07

## 2020-07-25 RX ADMIN — HEPARIN SODIUM 5000 UNITS: 5000 INJECTION INTRAVENOUS; SUBCUTANEOUS at 05:07

## 2020-07-25 RX ADMIN — SEVELAMER CARBONATE 2400 MG: 800 TABLET, FILM COATED ORAL at 05:07

## 2020-07-25 RX ADMIN — HYDROCODONE BITARTRATE AND ACETAMINOPHEN 1 TABLET: 7.5; 325 TABLET ORAL at 09:07

## 2020-07-25 RX ADMIN — METOPROLOL TARTRATE 25 MG: 25 TABLET ORAL at 08:07

## 2020-07-25 RX ADMIN — FLUTICASONE PROPIONATE 100 MCG: 50 SPRAY, METERED NASAL at 08:07

## 2020-07-25 RX ADMIN — LORAZEPAM 1 MG: 2 INJECTION INTRAMUSCULAR; INTRAVENOUS at 03:07

## 2020-07-25 RX ADMIN — METOPROLOL TARTRATE 25 MG: 25 TABLET ORAL at 09:07

## 2020-07-25 RX ADMIN — HYDRALAZINE HYDROCHLORIDE 50 MG: 50 TABLET, FILM COATED ORAL at 05:07

## 2020-07-25 RX ADMIN — CLONIDINE HYDROCHLORIDE 0.3 MG: 0.3 TABLET ORAL at 08:07

## 2020-07-25 RX ADMIN — HYDRALAZINE HYDROCHLORIDE 50 MG: 50 TABLET, FILM COATED ORAL at 01:07

## 2020-07-25 RX ADMIN — ATORVASTATIN CALCIUM 80 MG: 40 TABLET, FILM COATED ORAL at 08:07

## 2020-07-25 RX ADMIN — HEPARIN SODIUM 5000 UNITS: 5000 INJECTION INTRAVENOUS; SUBCUTANEOUS at 09:07

## 2020-07-25 RX ADMIN — SEVELAMER CARBONATE 2400 MG: 800 TABLET, FILM COATED ORAL at 08:07

## 2020-07-25 RX ADMIN — ASPIRIN 81 MG 81 MG: 81 TABLET ORAL at 09:07

## 2020-07-25 RX ADMIN — HYDRALAZINE HYDROCHLORIDE 100 MG: 50 TABLET, FILM COATED ORAL at 09:07

## 2020-07-25 NOTE — ASSESSMENT & PLAN NOTE
No chest pain reported  Pt has ESRD  According to Cardiology - demand secondary to ESRD and tachycardia
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
## 2020-07-25 NOTE — HOSPITAL COURSE
Pt is Observed for near syncopal and palpitations attributed to Atrial tachycardia. Per EMS, pt reported in A fib with RVR upon arrival to patient's home which was treated and now resolved. Pt on metoprolol 25 mg BID and ASA. Cardiology recommends outpatient placement of holter monitor vs zio patch, 2 D ECHO shows normal LVF. Elevated troponin thought related to tachycardia and ESRD. Carotid US noted significant stenosis in the right ICA and moderate in left ICA. Vascular confirmed need for ASA and recommended outpatient follow up. She was seen by Dr. Meyer Vascular. Pt reported dizziness, blurred vision and tinnitus in the right ear - MRI of Brain pending. PT/OT eval pending due to dizziness. Nephrology resumed dialysis. Labs are baseline. MRI of Brain is negative for acute stroke. There is note of fluid in the right mastoid air cells and right middle ear. Pt is stable for discharge. She was seen and examined and determined to be safe and stable for discharge. She was advised to follow up with Dr. Meyer, Vascular, Cardiology and resume dialysis according to schedule.

## 2020-07-25 NOTE — PT/OT/SLP PROGRESS
Occupational Therapy      Patient Name:  Gillian Rich   MRN:  [id number]    MD ORDER RECEIVED FOR OT EVAL.  EVAL INITIATED VIA Epic CHART REVIEW.  WILL FOLLOW UP.    Christina Isaacs OT  7/25/2020

## 2020-07-25 NOTE — PLAN OF CARE
"Plan of care reviewed with patient, pt verbalized understanding.  Pt remains free from falls this shift, fall precautions in place.bed alarm set.  Pt remains free from skin breakdown, pt educated on the importance of frequent weight shift to decrease the risk of pressure injury. Pt verbalized understanding, turn q2hrs orders in  AAOx4,NAD noted at this time.  BP (!) 177/77 (BP Location: Right arm, Patient Position: Lying)   Pulse 87   Temp 98.9 °F (37.2 °C) (Oral)   Resp 20   Ht 5' 4" (1.626 m)   Wt 99.1 kg (218 lb 7.6 oz)   LMP  (LMP Unknown)   SpO2 95%   Breastfeeding No   BMI 37.50 kg/m²    PIV 20 G to R AC , Saline locked  Pt remained afebrile.  NSR on the tele monitor  Pt admitted for Syncope.pt c/o of intermittent tinnitus to R ear, provider aware. AV fistula to JAVAN. Neuro checks q4hrs.   Pt currently resting comfortably in bed.  Hourly rounding complete. Bed in lowest position, side rails up, call light in reach.  Will continue to monitor.    Problem: Fall Injury Risk  Goal: Absence of Fall and Fall-Related Injury  7/25/2020 0415 by Salena Chung LPN  Outcome: Ongoing, Progressing  7/25/2020 0343 by Salena Chung LPN  Outcome: Ongoing, Progressing     Problem: Adult Inpatient Plan of Care  Goal: Plan of Care Review  7/25/2020 0415 by Salena Chung LPN  Outcome: Ongoing, Progressing  7/25/2020 0343 by Salena Chung LPN  Outcome: Ongoing, Progressing  Goal: Patient-Specific Goal (Individualization)  7/25/2020 0415 by Salena Chung LPN  Outcome: Ongoing, Progressing  7/25/2020 0343 by Salena Chung LPN  Outcome: Ongoing, Progressing  Goal: Absence of Hospital-Acquired Illness or Injury  7/25/2020 0415 by Salena Chung LPN  Outcome: Ongoing, Progressing  7/25/2020 0343 by Salena Chung LPN  Outcome: Ongoing, Progressing  Goal: Optimal Comfort and Wellbeing  7/25/2020 0415 by Salena Chung LPN  Outcome: Ongoing, Progressing  7/25/2020 0343 by Salena Chung LPN  Outcome: " Ongoing, Progressing  Goal: Readiness for Transition of Care  7/25/2020 0415 by Salena Chung LPN  Outcome: Ongoing, Progressing  7/25/2020 0343 by Salena Chung, LPN  Outcome: Ongoing, Progressing  Goal: Rounds/Family Conference  7/25/2020 0415 by Salena Chung, LPN  Outcome: Ongoing, Progressing  7/25/2020 0343 by Salena Chung, LPN  Outcome: Ongoing, Progressing     Problem: Device-Related Complication Risk (Hemodialysis)  Goal: Safe, Effective Therapy Delivery  7/25/2020 0415 by Salena Chung LPN  Outcome: Ongoing, Progressing  7/25/2020 0343 by Salena Chung LPN  Outcome: Ongoing, Progressing     Problem: Hemodynamic Instability (Hemodialysis)  Goal: Vital Signs Remain in Desired Range  7/25/2020 0415 by Salena Chung, LPN  Outcome: Ongoing, Progressing  7/25/2020 0343 by Salena Chung LPN  Outcome: Ongoing, Progressing     Problem: Infection (Hemodialysis)  Goal: Absence of Infection Signs/Symptoms  7/25/2020 0415 by Salena Chung, LPN  Outcome: Ongoing, Progressing  7/25/2020 0343 by Salena Chung, LPN  Outcome: Ongoing, Progressing     Problem: Bariatric Environmental Safety  Goal: Safety Maintained with Care  7/25/2020 0415 by Salena Chung, LPN  Outcome: Ongoing, Progressing  7/25/2020 0343 by Salena Chung, LPN  Outcome: Ongoing, Progressing

## 2020-07-25 NOTE — ASSESSMENT & PLAN NOTE
Pt reported history of afib  Review of visit with Dr. Sims   Showed pt has history of atrial tachycardia   EKG here did not show afib  She was told by ems she had afib  Lopressor given x2 en route  On toprol at home - which was resumed  Echo reviewed   Not on anticoagulation - on ASA  -serial troponin levels followed with mild increased noted in the setting of ESRD - demand ischemia per Cardiology   CHADSVASC score: 4  RESOLVED - Cardiology has signed off

## 2020-07-25 NOTE — ASSESSMENT & PLAN NOTE
1. ESRD on HD, Patient dialyzes on MWF schedule at [facility]. Access: left arm AVG, plan HD on Monday     2.  Hypertension, blood pressure is elevated, will increase the dose of hydralazine to 100 mg 3 times a day, continue other medications,    3.  Syncope, likely due to hemodynamic changes from new onset atrial fibrillation, clinically improved, cardiology notes reviewed,    Left ICA stenosis, vascular note reviewed,     4.  Anemia of chronic kidney disease, monitor hemoglobin, Procrit with dialysis when indicated,    5.  Secondary hyperparathyroidism, continue renal diet,

## 2020-07-25 NOTE — SUBJECTIVE & OBJECTIVE
Interval History:  No acute events, still feels weak, denies CP/SOB ,     Review of patient's allergies indicates:   Allergen Reactions    Contrast media     Iodine and iodide containing products      Current Facility-Administered Medications   Medication Frequency    aspirin chewable tablet 81 mg Daily    atorvastatin tablet 80 mg QHS    cloNIDine tablet 0.3 mg TID    fluticasone propionate 50 mcg/actuation nasal spray 100 mcg Daily    heparin (porcine) injection 5,000 Units Q8H    hydrALAZINE injection 10 mg Q6H PRN    hydrALAZINE tablet 100 mg Q8H    HYDROcodone-acetaminophen 7.5-325 mg per tablet 1 tablet Q6H PRN    lorazepam (ATIVAN) injection 1 mg Q4H PRN    metoprolol tartrate (LOPRESSOR) tablet 25 mg BID    ondansetron injection 4 mg Q8H PRN    sevelamer carbonate tablet 2,400 mg TID WM    sodium chloride 0.9% flush 10 mL PRN       Objective:     Vital Signs (Most Recent):  Temp: 97 °F (36.1 °C) (07/25/20 1144)  Pulse: (!) 120 (07/25/20 1642)  Resp: 18 (07/25/20 1642)  BP: (!) 184/91 (07/25/20 1642)  SpO2: (!) 90 % (07/25/20 1642)  O2 Device (Oxygen Therapy): room air (07/24/20 1934) Vital Signs (24h Range):  Temp:  [97 °F (36.1 °C)-98.9 °F (37.2 °C)] 97 °F (36.1 °C)  Pulse:  [] 120  Resp:  [16-20] 18  SpO2:  [90 %-97 %] 90 %  BP: ()/(49-91) 184/91     Weight: 99.1 kg (218 lb 7.6 oz) (07/25/20 0408)  Body mass index is 37.5 kg/m².  Body surface area is 2.12 meters squared.    I/O last 3 completed shifts:  In: 240 [P.O.:240]  Out: 2582 [Other:2582]    Physical Exam  Constitutional:       General: She is not in acute distress.     Appearance: She is well-developed.   HENT:      Head: Normocephalic and atraumatic.      Mouth/Throat:      Pharynx: No oropharyngeal exudate.   Eyes:      Conjunctiva/sclera: Conjunctivae normal.      Pupils: Pupils are equal, round, and reactive to light.   Neck:      Musculoskeletal: Normal range of motion and neck supple.      Thyroid: No thyroid mass  or thyromegaly.      Vascular: No carotid bruit or JVD.      Trachea: No tracheal deviation.   Cardiovascular:      Rate and Rhythm: Normal rate and regular rhythm.      Heart sounds: Normal heart sounds. No murmur. No friction rub. No gallop.    Pulmonary:      Effort: Pulmonary effort is normal. No respiratory distress.      Breath sounds: Normal breath sounds. No wheezing or rales.   Chest:      Chest wall: No tenderness.   Abdominal:      General: Bowel sounds are normal. There is no distension or abdominal bruit.      Palpations: Abdomen is soft. There is no mass.      Tenderness: There is no abdominal tenderness. There is no guarding or rebound.      Comments: Obese abdomen    Musculoskeletal:         General: No tenderness.      Comments: LUE AVF,    Lymphadenopathy:      Cervical: No cervical adenopathy.   Skin:     General: Skin is warm.      Coloration: Skin is not pale.      Findings: No erythema or rash.      Comments: Multiple healed scars on both arms, from previous surgeries,   Neurological:      Mental Status: She is alert and oriented to person, place, and time.      Cranial Nerves: No cranial nerve deficit.      Motor: No abnormal muscle tone.      Coordination: Coordination normal.   Psychiatric:         Behavior: Behavior normal.         Judgment: Judgment normal.         Significant Labs:  CBC:   Recent Labs   Lab 07/25/20  0506   WBC 9.18   RBC 3.68*   HGB 11.1*   HCT 35.5*      MCV 97   MCH 30.2   MCHC 31.3*     CMP:   Recent Labs   Lab 07/25/20  0506   GLU 93   CALCIUM 9.5   ALBUMIN 3.4*   PROT 6.9      K 4.3   CO2 26   CL 98   BUN 43*   CREATININE 6.8*   ALKPHOS 106   ALT 11   AST 14   BILITOT 0.3     Coagulation:   Recent Labs   Lab 07/23/20  2350   INR 1.0     LFTs:   Recent Labs   Lab 07/25/20  0506   ALT 11   AST 14   ALKPHOS 106   BILITOT 0.3   PROT 6.9   ALBUMIN 3.4*     All labs within the past 24 hours have been reviewed.     Significant Imaging:  Reviewed    Lab Results    Component Value Date    .0 (H) 03/22/2018    CALCIUM 9.5 07/25/2020    CAION 1.10 03/22/2018    PHOS 3.8 07/24/2020       Lab Results   Component Value Date    ALBUMIN 3.4 (L) 07/25/2020

## 2020-07-25 NOTE — PLAN OF CARE
Patient complains of ringing in right ear   MRI performed today   Gave PRN ativan for anxiousness   Patient BP fluctuates up and down.   BP meds given oral and IV   MD aware of BP readings  When given to much BP meds patient says she feels bad   Monitored BP   She complained of pain; PRN pain meds given   No other concerns voiced at this time

## 2020-07-25 NOTE — PROGRESS NOTES
Patient received 3 hours and 20minutes dialysis treatment. Patient request eary  Treatment end by 10 mins due ringing in ears. Primary nurse notified. No medications given during dialysis treatment. Net UF 2000 ml removed. Dr. Cintron visited bedside during treatment. No access issues noted.

## 2020-07-25 NOTE — ASSESSMENT & PLAN NOTE
Last dialyzed on 7/22/2020 with 3.5 L removed   On MWF schedule  Will consult nephrology on case

## 2020-07-25 NOTE — PROGRESS NOTES
[facility]  Nephrology  Progress Note    Patient Name: Gillian Rich  MRN: [id number]  Admission Date: 7/23/2020  Hospital Length of Stay: 0 days  Attending Provider: Dre Hayward, *   Primary Care Physician: Primary Doctor No  Principal Problem:Syncope, near    Subjective:     HPI: Gillian Rich is a 69 year old AA woman  with ESRD on HD , anemia, HTN, PCKD, hyperparathyroidism presents to the hospital yesterday for near syncopal episode at home, most likely due to hemodynamic changes from new on today atrial fibrillation, she was evaluated by Cardiology, her medications were adjusted, we were consulted for maintenance dialysis, Patient dilayzes on MWF schedule at [facility] under Dr Kitchen, Access is : left arm AVF , patient seen at dialysis and tolerating well,      Interval History:  No acute events, still feels weak, denies CP/SOB ,     Review of patient's allergies indicates:   Allergen Reactions    Contrast media     Iodine and iodide containing products      Current Facility-Administered Medications   Medication Frequency    aspirin chewable tablet 81 mg Daily    atorvastatin tablet 80 mg QHS    cloNIDine tablet 0.3 mg TID    fluticasone propionate 50 mcg/actuation nasal spray 100 mcg Daily    heparin (porcine) injection 5,000 Units Q8H    hydrALAZINE injection 10 mg Q6H PRN    hydrALAZINE tablet 100 mg Q8H    HYDROcodone-acetaminophen 7.5-325 mg per tablet 1 tablet Q6H PRN    lorazepam (ATIVAN) injection 1 mg Q4H PRN    metoprolol tartrate (LOPRESSOR) tablet 25 mg BID    ondansetron injection 4 mg Q8H PRN    sevelamer carbonate tablet 2,400 mg TID WM    sodium chloride 0.9% flush 10 mL PRN       Objective:     Vital Signs (Most Recent):  Temp: 97 °F (36.1 °C) (07/25/20 1144)  Pulse: (!) 120 (07/25/20 1642)  Resp: 18 (07/25/20 1642)  BP: (!) 184/91 (07/25/20 1642)  SpO2: (!) 90 % (07/25/20 1642)  O2 Device (Oxygen Therapy): room air (07/24/20 1934)  Vital Signs (24h Range):  Temp:  [97 °F (36.1 °C)-98.9 °F (37.2 °C)] 97 °F (36.1 °C)  Pulse:  [] 120  Resp:  [16-20] 18  SpO2:  [90 %-97 %] 90 %  BP: ()/(49-91) 184/91     Weight: 99.1 kg (218 lb 7.6 oz) (07/25/20 0408)  Body mass index is 37.5 kg/m².  Body surface area is 2.12 meters squared.    I/O last 3 completed shifts:  In: 240 [P.O.:240]  Out: 2582 [Other:2582]    Physical Exam  Constitutional:       General: She is not in acute distress.     Appearance: She is well-developed.   HENT:      Head: Normocephalic and atraumatic.      Mouth/Throat:      Pharynx: No oropharyngeal exudate.   Eyes:      Conjunctiva/sclera: Conjunctivae normal.      Pupils: Pupils are equal, round, and reactive to light.   Neck:      Musculoskeletal: Normal range of motion and neck supple.      Thyroid: No thyroid mass or thyromegaly.      Vascular: No carotid bruit or JVD.      Trachea: No tracheal deviation.   Cardiovascular:      Rate and Rhythm: Normal rate and regular rhythm.      Heart sounds: Normal heart sounds. No murmur. No friction rub. No gallop.    Pulmonary:      Effort: Pulmonary effort is normal. No respiratory distress.      Breath sounds: Normal breath sounds. No wheezing or rales.   Chest:      Chest wall: No tenderness.   Abdominal:      General: Bowel sounds are normal. There is no distension or abdominal bruit.      Palpations: Abdomen is soft. There is no mass.      Tenderness: There is no abdominal tenderness. There is no guarding or rebound.      Comments: Obese abdomen    Musculoskeletal:         General: No tenderness.      Comments: LUE AVF,    Lymphadenopathy:      Cervical: No cervical adenopathy.   Skin:     General: Skin is warm.      Coloration: Skin is not pale.      Findings: No erythema or rash.      Comments: Multiple healed scars on both arms, from previous surgeries,   Neurological:      Mental Status: She is alert and oriented to person, place, and time.      Cranial Nerves: No  cranial nerve deficit.      Motor: No abnormal muscle tone.      Coordination: Coordination normal.   Psychiatric:         Behavior: Behavior normal.         Judgment: Judgment normal.         Significant Labs:  CBC:   Recent Labs   Lab 07/25/20  0506   WBC 9.18   RBC 3.68*   HGB 11.1*   HCT 35.5*      MCV 97   MCH 30.2   MCHC 31.3*     CMP:   Recent Labs   Lab 07/25/20  0506   GLU 93   CALCIUM 9.5   ALBUMIN 3.4*   PROT 6.9      K 4.3   CO2 26   CL 98   BUN 43*   CREATININE 6.8*   ALKPHOS 106   ALT 11   AST 14   BILITOT 0.3     Coagulation:   Recent Labs   Lab 07/23/20  2350   INR 1.0     LFTs:   Recent Labs   Lab 07/25/20  0506   ALT 11   AST 14   ALKPHOS 106   BILITOT 0.3   PROT 6.9   ALBUMIN 3.4*     All labs within the past 24 hours have been reviewed.     Significant Imaging:  Reviewed    Lab Results   Component Value Date    .0 (H) 03/22/2018    CALCIUM 9.5 07/25/2020    CAION 1.10 03/22/2018    PHOS 3.8 07/24/2020       Lab Results   Component Value Date    ALBUMIN 3.4 (L) 07/25/2020         Assessment/Plan:     ESRD on dialysis since 2/24/16  1. ESRD on HD, Patient dialyzes on MWF schedule at [facility]. Access: left arm AVG, plan HD on Monday     2.  Hypertension, blood pressure is elevated, will increase the dose of hydralazine to 100 mg 3 times a day, continue other medications,    3.  Syncope, likely due to hemodynamic changes from new onset atrial fibrillation, clinically improved, cardiology notes reviewed,    Left ICA stenosis, vascular note reviewed,     4.  Anemia of chronic kidney disease, monitor hemoglobin, Procrit with dialysis when indicated,    5.  Secondary hyperparathyroidism, continue renal diet,           Thank you for your consult. I will follow-up with patient. Please contact us if you have any additional questions.     Total time spent 40 minutes including time needed to review the records,  patient  evaluation, documentation, face-to-face discussion with the  patient,   primary team,   more than 50% of the time was spent on coordination of care and counseling.       Patricio Cintron MD  Nephrology  [facility]

## 2020-07-25 NOTE — PROGRESS NOTES
[facility] Hospital Medicine  Progress Note    Patient Name: Gillian Rich  MRN: [id number]  Patient Class: OP- Observation   Admission Date: 7/23/2020  Length of Stay: 0 days  Attending Physician: Dre Hayward, *  Primary Care Provider: Primary Doctor No        Subjective:     Principal Problem:Syncope, near        HPI:  Gillain Rich is a 70 yo female with PMHx of ESRD, Atrial fibrillation and HTN, and Anemia who presented to the ED s/p near syncopal incident at home.  Pt reports that she became very dizzy this norning will using her walker to ambulate from her bedroom to the bathroom.  Associated symptoms include palpitations, blurred vision, BLE edema, and chest pain.  Pt denies: SOB, fatigue, weakness, abdominal pain, nausea, vomiting, appetite changes, and any additional complaints.  Pt states she expereinced a similar episode 2 years prior and was diagnosed with atrial fibrillation at that time.  Pt is followed outpatient by Dr. Sims (Cardiology) and Dr. Gardner (Nephrology) has HD on MWF with last treatment on 7/22/2020 with 3.5 L removed.  Pt is a full code and Medhat Pitts (daughter) at [phone] is the surrogate decision maker.  ER work up showed: H/H 11.9/38.3, CO2 22, AG 19, BUN 60, Creatinine 8.9, and Troponin 0.040.  Echo results reviewed.  Consults for ED and Cardiology placed in ED.  Hospital Medicine contacted for admission with patient placed in Observation Unit for further evaluation.      Overview/Hospital Course:  Pt is Observed for near syncopal and palpitations attributed to Atrial tachycardia. Per EMS, pt reported in A fib with RVR upon arrival to patient's home which was treated and now resolved. Pt on metoprolol 25 mg BID and ASA. Cardiology recommends outpatient placement of holter monitor vs zio patch, 2 D ECHO shows normal LVF. Elevated troponin thought related to tachycardia and ESRD. Carotid US noted significant stenosis in the right ICA and  moderate in left ICA. Vascular confirmed need for ASA and recommended outpatient follow up. She was seen by Dr. Meyer, Vascular. Pt reported dizziness, blurred vision and tinnitus in the right ear - MRI of Brain pending. PT/OT eval pending due to dizziness. Nephrology resumed dialysis. Labs are baseline.         Review of Systems   Constitutional: Positive for activity change. Negative for appetite change, chills, fatigue and fever.   HENT: Negative for congestion, postnasal drip, sinus pressure, sore throat and trouble swallowing.    Eyes: Positive for visual disturbance (resolved ).   Respiratory: Negative for cough, shortness of breath and wheezing.    Cardiovascular: Positive for palpitations and leg swelling. Negative for chest pain.   Gastrointestinal: Negative for abdominal distention, abdominal pain, diarrhea, nausea and vomiting.   Endocrine: Negative for cold intolerance and heat intolerance.   Genitourinary: Negative for difficulty urinating, dysuria, flank pain, frequency and urgency.   Musculoskeletal: Negative for arthralgias, back pain, joint swelling and myalgias.   Skin: Negative for color change and wound.   Allergic/Immunologic: Negative for environmental allergies and food allergies.   Neurological: Positive for dizziness, syncope (pre-syncope ) and weakness. Negative for headaches.   Hematological: Does not bruise/bleed easily.   Psychiatric/Behavioral: Negative for agitation, confusion and sleep disturbance. The patient is not nervous/anxious.      Objective:     Vital Signs (Most Recent):  Temp: 97 °F (36.1 °C) (07/25/20 1144)  Pulse: 93 (07/25/20 1321)  Resp: 18 (07/25/20 1144)  BP: (!) 177/81 (07/25/20 1321)  SpO2: 97 % (07/25/20 1144) Vital Signs (24h Range):  Temp:  [97 °F (36.1 °C)-98.9 °F (37.2 °C)] 97 °F (36.1 °C)  Pulse:  [77-94] 93  Resp:  [16-20] 18  SpO2:  [95 %-97 %] 97 %  BP: ()/(49-90) 177/81     Weight: 99.1 kg (218 lb 7.6 oz)  Body mass index is 37.5  kg/m².    Intake/Output Summary (Last 24 hours) at 7/25/2020 1631  Last data filed at 7/24/2020 1837  Gross per 24 hour   Intake 0 ml   Output 2582 ml   Net -2582 ml      Physical Exam  Constitutional:       Appearance: Normal appearance.   HENT:      Head: Normocephalic.      Nose: Nose normal.      Mouth/Throat:      Pharynx: Oropharynx is clear.   Eyes:      Conjunctiva/sclera: Conjunctivae normal.   Neck:      Musculoskeletal: Normal range of motion.   Cardiovascular:      Rate and Rhythm: Normal rate and regular rhythm.      Pulses: Normal pulses.      Heart sounds: Normal heart sounds.   Pulmonary:      Effort: Pulmonary effort is normal.      Breath sounds: Examination of the right-lower field reveals decreased breath sounds. Examination of the left-lower field reveals decreased breath sounds. Decreased breath sounds present.   Abdominal:      General: Bowel sounds are normal.      Palpations: Abdomen is soft.   Genitourinary:     Comments: Pt on HD   Musculoskeletal: Normal range of motion.         General: Swelling (BLE) present.      Comments: AVG to LUE with +thrill and + bruit    Skin:     General: Skin is warm and dry.   Neurological:      General: No focal deficit present.      Mental Status: She is alert and oriented to person, place, and time.   Psychiatric:         Mood and Affect: Mood normal.         Significant Labs:   CBC:   Recent Labs   Lab 07/23/20  2350 07/24/20  0806 07/25/20  0506   WBC 12.47 12.14 9.18   HGB 12.2 11.9* 11.1*   HCT 40.3 38.3 35.5*   * 342 329     CMP:   Recent Labs   Lab 07/23/20  2350 07/24/20  0806 07/25/20  0506    141 138   K 4.2 4.8 4.3   CL 94* 100 98   CO2 26 22* 26   * 85 93   BUN 61* 60* 43*   CREATININE 8.4* 8.9* 6.8*   CALCIUM 9.5 9.8 9.5   PROT 7.1 7.1 6.9   ALBUMIN 3.5 3.4* 3.4*   BILITOT 0.2 0.2 0.3   ALKPHOS 105 101 106   AST 14 19 14   ALT 10 11 11   ANIONGAP 16 19* 14   EGFRNONAA 4* 4* 6*     All pertinent labs within the past 24 hours  have been reviewed.    Significant Imaging: I have reviewed all pertinent imaging results/findings within the past 24 hours.      Assessment/Plan:      * Syncope, near    Mentation at baseline  Will check neuro checks  Orthostatics - pt is not orthostatic  Carotid duplex - notes carotid arterial disease and seen by Dr. Aguilar, Vascular, who recommends outpatient follow up, continue ASA  Monitor vitals   Possibly secondary to hx of atrial tachycardia - according to Cardiology - beta blocker resumed  -Echo results reviewed   Pt with dizziness and blurred vision - MRI of Brain pending  PT/OT eval and treat secondary to dizziness      Atrial tachycardia    Pt reported history of afib  Review of visit with Dr. Sims   Showed pt has history of atrial tachycardia   EKG here did not show afib  She was told by ems she had afib  Lopressor given x2 en route  On toprol at home - which was resumed  Echo reviewed   Not on anticoagulation - on ASA  -serial troponin levels followed with mild increased noted in the setting of ESRD - demand ischemia per Cardiology   CHADSVASC score: 4  RESOLVED - Cardiology has signed off      Elevated troponin    No chest pain reported  Pt has ESRD  According to Cardiology - demand secondary to ESRD and tachycardia        Chronic back pain    Holding home muscle relaxer   norco PRN       HTN (hypertension)        -will resume home medications  - metoprolol, hydralazine and clonidine  Hydralazine PRN       ESRD on dialysis since 2/24/16    Last dialyzed on 7/22/2020 with 3.5 L removed   On MWF schedule  Will consult nephrology on case         VTE Risk Mitigation (From admission, onward)         Ordered     heparin (porcine) injection 5,000 Units  Every 8 hours      07/24/20 1500     IP VTE HIGH RISK PATIENT  Once      07/24/20 0558     Place sequential compression device  Until discontinued      07/24/20 0558                      Aria Proctor NP  Department of Hospital Medicine   [facility]

## 2020-07-25 NOTE — SUBJECTIVE & OBJECTIVE
Review of Systems   Constitutional: Positive for activity change. Negative for appetite change, chills, fatigue and fever.   HENT: Negative for congestion, postnasal drip, sinus pressure, sore throat and trouble swallowing.    Eyes: Positive for visual disturbance (resolved ).   Respiratory: Negative for cough, shortness of breath and wheezing.    Cardiovascular: Positive for palpitations and leg swelling. Negative for chest pain.   Gastrointestinal: Negative for abdominal distention, abdominal pain, diarrhea, nausea and vomiting.   Endocrine: Negative for cold intolerance and heat intolerance.   Genitourinary: Negative for difficulty urinating, dysuria, flank pain, frequency and urgency.   Musculoskeletal: Negative for arthralgias, back pain, joint swelling and myalgias.   Skin: Negative for color change and wound.   Allergic/Immunologic: Negative for environmental allergies and food allergies.   Neurological: Positive for dizziness, syncope (pre-syncope ) and weakness. Negative for headaches.   Hematological: Does not bruise/bleed easily.   Psychiatric/Behavioral: Negative for agitation, confusion and sleep disturbance. The patient is not nervous/anxious.      Objective:     Vital Signs (Most Recent):  Temp: 97 °F (36.1 °C) (07/25/20 1144)  Pulse: 93 (07/25/20 1321)  Resp: 18 (07/25/20 1144)  BP: (!) 177/81 (07/25/20 1321)  SpO2: 97 % (07/25/20 1144) Vital Signs (24h Range):  Temp:  [97 °F (36.1 °C)-98.9 °F (37.2 °C)] 97 °F (36.1 °C)  Pulse:  [77-94] 93  Resp:  [16-20] 18  SpO2:  [95 %-97 %] 97 %  BP: ()/(49-90) 177/81     Weight: 99.1 kg (218 lb 7.6 oz)  Body mass index is 37.5 kg/m².    Intake/Output Summary (Last 24 hours) at 7/25/2020 1631  Last data filed at 7/24/2020 1837  Gross per 24 hour   Intake 0 ml   Output 2582 ml   Net -2582 ml      Physical Exam  Constitutional:       Appearance: Normal appearance.   HENT:      Head: Normocephalic.      Nose: Nose normal.      Mouth/Throat:      Pharynx:  Oropharynx is clear.   Eyes:      Conjunctiva/sclera: Conjunctivae normal.   Neck:      Musculoskeletal: Normal range of motion.   Cardiovascular:      Rate and Rhythm: Normal rate and regular rhythm.      Pulses: Normal pulses.      Heart sounds: Normal heart sounds.   Pulmonary:      Effort: Pulmonary effort is normal.      Breath sounds: Examination of the right-lower field reveals decreased breath sounds. Examination of the left-lower field reveals decreased breath sounds. Decreased breath sounds present.   Abdominal:      General: Bowel sounds are normal.      Palpations: Abdomen is soft.   Genitourinary:     Comments: Pt on HD   Musculoskeletal: Normal range of motion.         General: Swelling (BLE) present.      Comments: AVG to LUE with +thrill and + bruit    Skin:     General: Skin is warm and dry.   Neurological:      General: No focal deficit present.      Mental Status: She is alert and oriented to person, place, and time.   Psychiatric:         Mood and Affect: Mood normal.         Significant Labs:   CBC:   Recent Labs   Lab 07/23/20  2350 07/24/20  0806 07/25/20  0506   WBC 12.47 12.14 9.18   HGB 12.2 11.9* 11.1*   HCT 40.3 38.3 35.5*   * 342 329     CMP:   Recent Labs   Lab 07/23/20  2350 07/24/20  0806 07/25/20  0506    141 138   K 4.2 4.8 4.3   CL 94* 100 98   CO2 26 22* 26   * 85 93   BUN 61* 60* 43*   CREATININE 8.4* 8.9* 6.8*   CALCIUM 9.5 9.8 9.5   PROT 7.1 7.1 6.9   ALBUMIN 3.5 3.4* 3.4*   BILITOT 0.2 0.2 0.3   ALKPHOS 105 101 106   AST 14 19 14   ALT 10 11 11   ANIONGAP 16 19* 14   EGFRNONAA 4* 4* 6*     All pertinent labs within the past 24 hours have been reviewed.    Significant Imaging: I have reviewed all pertinent imaging results/findings within the past 24 hours.

## 2020-07-25 NOTE — ASSESSMENT & PLAN NOTE
Mentation at baseline  Will check neuro checks  Orthostatics - pt is not orthostatic  Carotid duplex - notes carotid arterial disease and seen by Dr. Aguilar, Vascular, who recommends outpatient follow up, continue ASA  Monitor vitals   Possibly secondary to hx of atrial tachycardia - according to Cardiology - beta blocker resumed  -Echo results reviewed   Pt with dizziness and blurred vision - MRI of Brain pending  PT/OT eval and treat secondary to dizziness

## 2020-07-26 VITALS
RESPIRATION RATE: 18 BRPM | SYSTOLIC BLOOD PRESSURE: 135 MMHG | DIASTOLIC BLOOD PRESSURE: 62 MMHG | HEART RATE: 97 BPM | TEMPERATURE: 99 F | BODY MASS INDEX: 37.41 KG/M2 | WEIGHT: 219.13 LBS | OXYGEN SATURATION: 93 % | HEIGHT: 64 IN

## 2020-07-26 PROBLEM — I47.19 ATRIAL TACHYCARDIA: Status: RESOLVED | Noted: 2020-07-24 | Resolved: 2020-07-26

## 2020-07-26 PROBLEM — R79.89 ELEVATED TROPONIN: Status: RESOLVED | Noted: 2020-07-24 | Resolved: 2020-07-26

## 2020-07-26 PROBLEM — R55 SYNCOPE, NEAR: Status: RESOLVED | Noted: 2020-07-24 | Resolved: 2020-07-26

## 2020-07-26 LAB
ALBUMIN SERPL BCP-MCNC: 3.2 G/DL (ref 3.5–5.2)
ALP SERPL-CCNC: 109 U/L (ref 55–135)
ALT SERPL W/O P-5'-P-CCNC: 8 U/L (ref 10–44)
ANION GAP SERPL CALC-SCNC: 15 MMOL/L (ref 8–16)
AST SERPL-CCNC: 14 U/L (ref 10–40)
BASOPHILS # BLD AUTO: 0.06 K/UL (ref 0–0.2)
BASOPHILS NFR BLD: 0.6 % (ref 0–1.9)
BILIRUB SERPL-MCNC: 0.3 MG/DL (ref 0.1–1)
BUN SERPL-MCNC: 71 MG/DL (ref 8–23)
CALCIUM SERPL-MCNC: 9.4 MG/DL (ref 8.7–10.5)
CHLORIDE SERPL-SCNC: 96 MMOL/L (ref 95–110)
CO2 SERPL-SCNC: 22 MMOL/L (ref 23–29)
CREAT SERPL-MCNC: 9.2 MG/DL (ref 0.5–1.4)
DIFFERENTIAL METHOD: ABNORMAL
EOSINOPHIL # BLD AUTO: 0.2 K/UL (ref 0–0.5)
EOSINOPHIL NFR BLD: 2.3 % (ref 0–8)
ERYTHROCYTE [DISTWIDTH] IN BLOOD BY AUTOMATED COUNT: 15.6 % (ref 11.5–14.5)
EST. GFR  (AFRICAN AMERICAN): 5 ML/MIN/1.73 M^2
EST. GFR  (NON AFRICAN AMERICAN): 4 ML/MIN/1.73 M^2
GLUCOSE SERPL-MCNC: 97 MG/DL (ref 70–110)
HCT VFR BLD AUTO: 32.9 % (ref 37–48.5)
HGB BLD-MCNC: 10.4 G/DL (ref 12–16)
IMM GRANULOCYTES # BLD AUTO: 0.04 K/UL (ref 0–0.04)
IMM GRANULOCYTES NFR BLD AUTO: 0.4 % (ref 0–0.5)
LYMPHOCYTES # BLD AUTO: 3.6 K/UL (ref 1–4.8)
LYMPHOCYTES NFR BLD: 36.9 % (ref 18–48)
MCH RBC QN AUTO: 30.3 PG (ref 27–31)
MCHC RBC AUTO-ENTMCNC: 31.6 G/DL (ref 32–36)
MCV RBC AUTO: 96 FL (ref 82–98)
MONOCYTES # BLD AUTO: 1 K/UL (ref 0.3–1)
MONOCYTES NFR BLD: 10.3 % (ref 4–15)
NEUTROPHILS # BLD AUTO: 4.8 K/UL (ref 1.8–7.7)
NEUTROPHILS NFR BLD: 49.5 % (ref 38–73)
NRBC BLD-RTO: 0 /100 WBC
PLATELET # BLD AUTO: 323 K/UL (ref 150–350)
PMV BLD AUTO: 9.6 FL (ref 9.2–12.9)
POTASSIUM SERPL-SCNC: 4.9 MMOL/L (ref 3.5–5.1)
PROT SERPL-MCNC: 6.4 G/DL (ref 6–8.4)
RBC # BLD AUTO: 3.43 M/UL (ref 4–5.4)
SODIUM SERPL-SCNC: 133 MMOL/L (ref 136–145)
WBC # BLD AUTO: 9.8 K/UL (ref 3.9–12.7)

## 2020-07-26 PROCEDURE — G0378 HOSPITAL OBSERVATION PER HR: HCPCS

## 2020-07-26 PROCEDURE — 99214 OFFICE O/P EST MOD 30 MIN: CPT | Mod: ,,, | Performed by: INTERNAL MEDICINE

## 2020-07-26 PROCEDURE — 99214 PR OFFICE/OUTPT VISIT, EST, LEVL IV, 30-39 MIN: ICD-10-PCS | Mod: ,,, | Performed by: INTERNAL MEDICINE

## 2020-07-26 PROCEDURE — 97535 SELF CARE MNGMENT TRAINING: CPT

## 2020-07-26 PROCEDURE — 93010 EKG 12-LEAD: ICD-10-PCS | Mod: ,,, | Performed by: INTERNAL MEDICINE

## 2020-07-26 PROCEDURE — 25000003 PHARM REV CODE 250: Performed by: FAMILY MEDICINE

## 2020-07-26 PROCEDURE — 36415 COLL VENOUS BLD VENIPUNCTURE: CPT

## 2020-07-26 PROCEDURE — 25000003 PHARM REV CODE 250: Performed by: INTERNAL MEDICINE

## 2020-07-26 PROCEDURE — 80053 COMPREHEN METABOLIC PANEL: CPT

## 2020-07-26 PROCEDURE — 97162 PT EVAL MOD COMPLEX 30 MIN: CPT

## 2020-07-26 PROCEDURE — 85025 COMPLETE CBC W/AUTO DIFF WBC: CPT

## 2020-07-26 PROCEDURE — 25000003 PHARM REV CODE 250: Performed by: NURSE PRACTITIONER

## 2020-07-26 PROCEDURE — 97530 THERAPEUTIC ACTIVITIES: CPT

## 2020-07-26 PROCEDURE — 63600175 PHARM REV CODE 636 W HCPCS: Performed by: NURSE PRACTITIONER

## 2020-07-26 PROCEDURE — 97166 OT EVAL MOD COMPLEX 45 MIN: CPT

## 2020-07-26 PROCEDURE — 25000003 PHARM REV CODE 250: Performed by: HOSPITALIST

## 2020-07-26 PROCEDURE — 93005 ELECTROCARDIOGRAM TRACING: CPT

## 2020-07-26 PROCEDURE — 93010 ELECTROCARDIOGRAM REPORT: CPT | Mod: ,,, | Performed by: INTERNAL MEDICINE

## 2020-07-26 RX ORDER — ADHESIVE BANDAGE
30 BANDAGE TOPICAL DAILY PRN
Status: DISCONTINUED | OUTPATIENT
Start: 2020-07-26 | End: 2020-07-26 | Stop reason: HOSPADM

## 2020-07-26 RX ORDER — ASPIRIN 81 MG/1
81 TABLET ORAL DAILY
Qty: 90 TABLET | Refills: 3 | Status: SHIPPED | OUTPATIENT
Start: 2020-07-26 | End: 2021-02-25

## 2020-07-26 RX ORDER — ATORVASTATIN CALCIUM 80 MG/1
80 TABLET, FILM COATED ORAL NIGHTLY
Qty: 90 TABLET | Refills: 3 | Status: SHIPPED | OUTPATIENT
Start: 2020-07-26 | End: 2021-02-25

## 2020-07-26 RX ORDER — ADHESIVE BANDAGE
30 BANDAGE TOPICAL ONCE
Status: COMPLETED | OUTPATIENT
Start: 2020-07-26 | End: 2020-07-26

## 2020-07-26 RX ADMIN — MAGNESIUM HYDROXIDE 2400 MG: 400 SUSPENSION ORAL at 05:07

## 2020-07-26 RX ADMIN — METOPROLOL TARTRATE 25 MG: 25 TABLET ORAL at 08:07

## 2020-07-26 RX ADMIN — SEVELAMER CARBONATE 2400 MG: 800 TABLET, FILM COATED ORAL at 12:07

## 2020-07-26 RX ADMIN — MAGNESIUM HYDROXIDE 2400 MG: 400 SUSPENSION ORAL at 12:07

## 2020-07-26 RX ADMIN — CLONIDINE HYDROCHLORIDE 0.3 MG: 0.3 TABLET ORAL at 08:07

## 2020-07-26 RX ADMIN — HYDRALAZINE HYDROCHLORIDE 100 MG: 50 TABLET, FILM COATED ORAL at 05:07

## 2020-07-26 RX ADMIN — SEVELAMER CARBONATE 2400 MG: 800 TABLET, FILM COATED ORAL at 08:07

## 2020-07-26 RX ADMIN — ASPIRIN 81 MG 81 MG: 81 TABLET ORAL at 08:07

## 2020-07-26 RX ADMIN — FLUTICASONE PROPIONATE 100 MCG: 50 SPRAY, METERED NASAL at 08:07

## 2020-07-26 RX ADMIN — HEPARIN SODIUM 5000 UNITS: 5000 INJECTION INTRAVENOUS; SUBCUTANEOUS at 05:07

## 2020-07-26 RX ADMIN — HYDROCODONE BITARTRATE AND ACETAMINOPHEN 1 TABLET: 7.5; 325 TABLET ORAL at 08:07

## 2020-07-26 NOTE — PLAN OF CARE
Patient currently requires SBA for bed mobility, CGA for transfers with RW, and CGA for ambulation with RW (short distances in room).

## 2020-07-26 NOTE — ASSESSMENT & PLAN NOTE
1. ESRD on HD, Patient dialyzes on MWF schedule at [facility]. Access: left arm AVG,     2.  Hypertension, blood pressure is better     3.  Syncope, likely due to hemodynamic changes from new onset atrial fibrillation, clinically improved, cardiology notes reviewed,    Left ICA stenosis, vascular note reviewed,     4.  Anemia of chronic kidney disease, monitor hemoglobin, Procrit with dialysis when indicated,    5.  Secondary hyperparathyroidism, continue renal diet,     6. Ok d.c home and f/u out pt HD , cards and vasc surgery

## 2020-07-26 NOTE — DISCHARGE SUMMARY
[facility] Hospital Medicine  Discharge Summary      Patient Name: Gillian Rich  MRN: [id number]  Admission Date: 7/23/2020  Hospital Length of Stay: 0 days  Discharge Date and Time:  07/26/2020 5:28 PM  Attending Physician: Dre Lynn MD   Discharging Provider: Aria Porctor NP  Primary Care Provider: Primary Doctor No      HPI:   Gillian Rich is a 68 yo female with PMHx of ESRD, Atrial fibrillation and HTN, and Anemia who presented to the ED s/p near syncopal incident at home.  Pt reports that she became very dizzy this norning will using her walker to ambulate from her bedroom to the bathroom.  Associated symptoms include palpitations, blurred vision, BLE edema, and chest pain.  Pt denies: SOB, fatigue, weakness, abdominal pain, nausea, vomiting, appetite changes, and any additional complaints.  Pt states she expereinced a similar episode 2 years prior and was diagnosed with atrial fibrillation at that time.  Pt is followed outpatient by Dr. Sims (Cardiology) and Dr. Gardner (Nephrology) has HD on MWF with last treatment on 7/22/2020 with 3.5 L removed.  Pt is a full code and Medhat Pitts (daughter) at [phone] is the surrogate decision maker.  ER work up showed: H/H 11.9/38.3, CO2 22, AG 19, BUN 60, Creatinine 8.9, and Troponin 0.040.  Echo results reviewed.  Consults for ED and Cardiology placed in ED.  Hospital Medicine contacted for admission with patient placed in Observation Unit for further evaluation.      * No surgery found *      Hospital Course:   Pt is Observed for near syncopal and palpitations attributed to Atrial tachycardia. Per EMS, pt reported in A fib with RVR upon arrival to patient's home which was treated and now resolved. Pt on metoprolol 25 mg BID and ASA. Cardiology recommends outpatient placement of holter monitor vs zio patch, 2 D ECHO shows normal LVF. Elevated troponin thought related to tachycardia and ESRD. Carotid US noted significant  stenosis in the right ICA and moderate in left ICA. Vascular confirmed need for ASA and recommended outpatient follow up. She was seen by Breezy Mccartney. Pt reported dizziness, blurred vision and tinnitus in the right ear - MRI of Brain pending. PT/OT eval pending due to dizziness. Nephrology resumed dialysis. Labs are baseline. MRI of Brain is negative for acute stroke. There is note of fluid in the right mastoid air cells and right middle ear. Pt is stable for discharge. She was seen and examined and determined to be safe and stable for discharge. She was advised to follow up with Dr. Meyer Vascular, Cardiology and resume dialysis according to schedule.      Consults:   Consults (From admission, onward)        Status Ordering Provider     Inpatient consult to Cardiology  Once     Provider:  Dereck Fleming MD    Completed LE, WILLEM     Inpatient consult to Nephrology  Once     Provider:  Patricio Cintron MD    Completed LE, WILLEM     Inpatient consult to Vascular Surgery  Once     Provider:  Jeremy Meyer MD    Completed RUBA VALDERRAMA          No new Assessment & Plan notes have been filed under this hospital service since the last note was generated.  Service: Hospital Medicine    Final Active Diagnoses:    Diagnosis Date Noted POA    Chronic back pain [M54.9, G89.29] 01/26/2019 Yes    ESRD on dialysis since 2/24/16 [N18.6, Z99.2]  Not Applicable     Chronic    HTN (hypertension) [I10]  Yes      Problems Resolved During this Admission:    Diagnosis Date Noted Date Resolved POA    PRINCIPAL PROBLEM:  Syncope, near [R55] 07/24/2020 07/26/2020 Yes    Elevated troponin [R79.89] 07/24/2020 07/26/2020 Yes    Atrial tachycardia [I47.1] 07/24/2020 07/26/2020 Yes    NSTEMI (non-ST elevated myocardial infarction) [I21.4]  07/26/2020 Yes    Syncope [R55]  07/26/2020 Yes       Discharged Condition: stable    Disposition: Home or Self Care    Follow Up:  Follow-up Information     dialysis In 3  months.    Why: resume according to your usual schedule           Vincent Carvalho MD In 2 weeks.    Specialty: Otolaryngology  Why: Hospital Follow Up - Fluid in the middle ear - tinnitus  Contact information:  [id number] [facility]  [place] [address] [phone]             Your Cardiologist.    Why: in reference to Atrial Fibrillation               Patient Instructions:      Notify your health care provider if you experience any of the following:  temperature >100.4     Notify your health care provider if you experience any of the following:  persistent nausea and vomiting or diarrhea     Notify your health care provider if you experience any of the following:  difficulty breathing or increased cough     Activity as tolerated       Significant Diagnostic Studies: Labs:   CMP   Recent Labs   Lab 07/25/20  0506 07/26/20  0529    133*   K 4.3 4.9   CL 98 96   CO2 26 22*   GLU 93 97   BUN 43* 71*   CREATININE 6.8* 9.2*   CALCIUM 9.5 9.4   PROT 6.9 6.4   ALBUMIN 3.4* 3.2*   BILITOT 0.3 0.3   ALKPHOS 106 109   AST 14 14   ALT 11 8*   ANIONGAP 14 15   ESTGFRAFRICA 7* 5*   EGFRNONAA 6* 4*    and CBC   Recent Labs   Lab 07/25/20  0506 07/26/20  0529   WBC 9.18 9.80   HGB 11.1* 10.4*   HCT 35.5* 32.9*    323       Pending Diagnostic Studies:     None         Medications:  Reconciled Home Medications:      Medication List      START taking these medications    aspirin 81 MG EC tablet  Commonly known as: ECOTRIN  Take 1 tablet (81 mg total) by mouth once daily.     atorvastatin 80 MG tablet  Commonly known as: LIPITOR  Take 1 tablet (80 mg total) by mouth every evening.        CONTINUE taking these medications    acetaminophen 325 MG tablet  Commonly known as: TYLENOL  Take 650 mg by mouth.     cloNIDine 0.3 MG tablet  Commonly known as: CATAPRES  Take 1 tablet (0.3 mg total) by mouth 3 (three) times daily.     cyclobenzaprine 5 MG tablet  Commonly known as: FLEXERIL  Take 1 tablet (5 mg total) by mouth nightly as  needed for Muscle spasms.     diclofenac sodium 1 % Gel  Commonly known as: VOLTAREN  APPLY 4 G TOPICALLY 3 (THREE) TIMES DAILY.     ferric citrate 210 mg iron Tab  Commonly known as: AURYXIA  Take 2 tablets by mouth 3 (three) times daily meals and 1 tablet with snacks     fluticasone propionate 50 mcg/actuation nasal spray  Commonly known as: FLONASE  2 sprays (100 mcg total) by Each Nostril route once daily.     gabapentin 300 MG capsule  Commonly known as: NEURONTIN  Take 1 capsule (300mg) by mouth every night X 1 week then increase to 2 capsules (600mg) QHS thereafter. Increase as tolerated.     guaifenesin-codeine 100-10 mg/5 ml  mg/5 mL syrup  Commonly known as: TUSSI-ORGANIDIN NR  Take 5 mLs by mouth every 6 (six) hours as needed for Cough.     hydrALAZINE 50 MG tablet  Commonly known as: APRESOLINE  TAKE 1 TABLET BY MOUTH THREE TIMES A DAY     loratadine 10 mg tablet  Commonly known as: CLARITIN  Take 1 tablet (10 mg total) by mouth once daily.     metoprolol succinate 25 MG 24 hr tablet  Commonly known as: TOPROL-XL  Take 1 tablet (25 mg total) by mouth 2 (two) times daily.     nystatin-triamcinolone cream  Commonly known as: MYCOLOG II  Apply topically 3 (three) times daily.     ondansetron 4 MG Tbdl  Commonly known as: ZOFRAN-ODT  Take 1 tablet (4 mg total) by mouth every 8 (eight) hours as needed.     oxyCODONE-acetaminophen  mg per tablet  Commonly known as: PERCOCET  Take 1 tablet by mouth.     sevelamer carbonate 800 mg Tab  Commonly known as: RENVELA  Take 3 tablets (2,400 mg total) by mouth 3 (three) times daily with meals.     traMADoL 50 mg tablet  Commonly known as: ULTRAM  Take 1/2 to 1 tab PO QD to BID PRN pain.        STOP taking these medications    methocarbamoL 500 MG Tab  Commonly known as: ROBAXIN     ondansetron 4 MG tablet  Commonly known as: ZOFRAN            Indwelling Lines/Drains at time of discharge:   Lines/Drains/Airways     Drain                 Hemodialysis AV  Fistula Left upper arm -- days         Hemodialysis AV Graft Left upper arm -- days                Time spent on the discharge of patient: > 30 minutes  Patient was seen and examined on the date of discharge and determined to be suitable for discharge.         Aria Proctor NP  Department of Hospital Medicine  [facility]

## 2020-07-26 NOTE — SUBJECTIVE & OBJECTIVE
Interval History:  No acute events, still feels weak, denies CP/SOB ,     Review of patient's allergies indicates:   Allergen Reactions    Contrast media     Iodine and iodide containing products      Current Facility-Administered Medications   Medication Frequency    aspirin chewable tablet 81 mg Daily    atorvastatin tablet 80 mg QHS    cloNIDine tablet 0.3 mg TID    fluticasone propionate 50 mcg/actuation nasal spray 100 mcg Daily    heparin (porcine) injection 5,000 Units Q8H    hydrALAZINE injection 10 mg Q6H PRN    hydrALAZINE tablet 100 mg Q8H    HYDROcodone-acetaminophen 7.5-325 mg per tablet 1 tablet Q6H PRN    lorazepam (ATIVAN) injection 1 mg Q4H PRN    magnesium hydroxide 400 mg/5 ml suspension 2,400 mg Daily PRN    metoprolol tartrate (LOPRESSOR) tablet 25 mg BID    ondansetron injection 4 mg Q8H PRN    sevelamer carbonate tablet 2,400 mg TID WM    sodium chloride 0.9% flush 10 mL PRN       Objective:     Vital Signs (Most Recent):  Temp: 98.7 °F (37.1 °C) (07/26/20 0806)  Pulse: 86 (07/26/20 0806)  Resp: 18 (07/26/20 0853)  BP: 135/62 (07/26/20 0806)  SpO2: (!) 93 % (07/26/20 0806)  O2 Device (Oxygen Therapy): room air (07/24/20 1934) Vital Signs (24h Range):  Temp:  [97 °F (36.1 °C)-98.9 °F (37.2 °C)] 98.7 °F (37.1 °C)  Pulse:  [] 86  Resp:  [16-20] 18  SpO2:  [90 %-97 %] 93 %  BP: (118-192)/(53-91) 135/62     Weight: 99.4 kg (219 lb 2.2 oz) (07/26/20 0400)  Body mass index is 37.61 kg/m².  Body surface area is 2.12 meters squared.    I/O last 3 completed shifts:  In: 838 [P.O.:838]  Out: -     Physical Exam  Constitutional:       General: She is not in acute distress.     Appearance: She is well-developed.   HENT:      Head: Normocephalic and atraumatic.      Mouth/Throat:      Pharynx: No oropharyngeal exudate.   Eyes:      Conjunctiva/sclera: Conjunctivae normal.      Pupils: Pupils are equal, round, and reactive to light.   Neck:      Musculoskeletal: Normal range of motion  and neck supple.      Thyroid: No thyroid mass or thyromegaly.      Vascular: No carotid bruit or JVD.      Trachea: No tracheal deviation.   Cardiovascular:      Rate and Rhythm: Normal rate and regular rhythm.      Heart sounds: Normal heart sounds. No murmur. No friction rub. No gallop.    Pulmonary:      Effort: Pulmonary effort is normal. No respiratory distress.      Breath sounds: Normal breath sounds. No wheezing or rales.   Chest:      Chest wall: No tenderness.   Abdominal:      General: Bowel sounds are normal. There is no distension or abdominal bruit.      Palpations: Abdomen is soft. There is no mass.      Tenderness: There is no abdominal tenderness. There is no guarding or rebound.      Comments: Obese abdomen    Musculoskeletal:         General: No tenderness.      Comments: LUE AVF,    Lymphadenopathy:      Cervical: No cervical adenopathy.   Skin:     General: Skin is warm.      Coloration: Skin is not pale.      Findings: No erythema or rash.      Comments: Multiple healed scars on both arms, from previous surgeries,   Neurological:      Mental Status: She is alert and oriented to person, place, and time.      Cranial Nerves: No cranial nerve deficit.      Motor: No abnormal muscle tone.      Coordination: Coordination normal.   Psychiatric:         Behavior: Behavior normal.         Judgment: Judgment normal.         Significant Labs:  CBC:   Recent Labs   Lab 07/26/20  0529   WBC 9.80   RBC 3.43*   HGB 10.4*   HCT 32.9*      MCV 96   MCH 30.3   MCHC 31.6*     CMP:   Recent Labs   Lab 07/25/20  0506   GLU 93   CALCIUM 9.5   ALBUMIN 3.4*   PROT 6.9      K 4.3   CO2 26   CL 98   BUN 43*   CREATININE 6.8*   ALKPHOS 106   ALT 11   AST 14   BILITOT 0.3     Coagulation:   Recent Labs   Lab 07/23/20  2350   INR 1.0     LFTs:   Recent Labs   Lab 07/25/20  0506   ALT 11   AST 14   ALKPHOS 106   BILITOT 0.3   PROT 6.9   ALBUMIN 3.4*     All labs within the past 24 hours have been reviewed.      Significant Imaging:  Reviewed    Lab Results   Component Value Date    .0 (H) 03/22/2018    CALCIUM 9.5 07/25/2020    CAION 1.10 03/22/2018    PHOS 3.8 07/24/2020       Lab Results   Component Value Date    ALBUMIN 3.4 (L) 07/25/2020        6
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
## 2020-07-26 NOTE — PLAN OF CARE
"Plan of care reviewed with patient, pt verbalized understanding.  Pt remains free from falls this shift, fall precautions in place.bed alarm set.  Pt remains free from skin breakdown, pt educated on the importance of frequent weight shift to decrease the risk of pressure injury. Pt verbalized understanding, encourage turn q2hrs orders in  AAOx4,NAD noted at this time.  BP (!) 122/58 (BP Location: Left arm, Patient Position: Lying)   Pulse 92   Temp 98.9 °F (37.2 °C) (Oral)   Resp 18   Ht 5' 4" (1.626 m)   Wt 99.4 kg (219 lb 2.2 oz)   LMP  (LMP Unknown)   SpO2 (!) 92%   Breastfeeding No   BMI 37.61 kg/m²   IV access lost this shift, providers aware  Pt remained afebrile.  NSR on the tele monitor  Pt admitted for Syncope. AV fistula to JAVAN. Neuro checks q4hrs.   Pt currently resting comfortably in bed.  Hourly rounding complete. Bed in lowest position, side rails up, call light in reach.  Will continue to monitor.  Problem: Fall Injury Risk  Goal: Absence of Fall and Fall-Related Injury  Outcome: Ongoing, Progressing     Problem: Adult Inpatient Plan of Care  Goal: Plan of Care Review  Outcome: Ongoing, Progressing  Goal: Patient-Specific Goal (Individualization)  Outcome: Ongoing, Progressing  Goal: Absence of Hospital-Acquired Illness or Injury  Outcome: Ongoing, Progressing  Goal: Optimal Comfort and Wellbeing  Outcome: Ongoing, Progressing  Goal: Readiness for Transition of Care  Outcome: Ongoing, Progressing  Goal: Rounds/Family Conference  Outcome: Ongoing, Progressing     Problem: Device-Related Complication Risk (Hemodialysis)  Goal: Safe, Effective Therapy Delivery  Outcome: Ongoing, Progressing     Problem: Hemodynamic Instability (Hemodialysis)  Goal: Vital Signs Remain in Desired Range  Outcome: Ongoing, Progressing     Problem: Infection (Hemodialysis)  Goal: Absence of Infection Signs/Symptoms  Outcome: Ongoing, Progressing     Problem: Bariatric Environmental Safety  Goal: Safety Maintained " with Care  Outcome: Ongoing, Progressing     Problem: Skin Injury Risk Increased  Goal: Skin Health and Integrity  Outcome: Ongoing, Progressing

## 2020-07-26 NOTE — NURSING
pt immediately returns to supine after being turned despite being educated on the importance of frequent weight shifts, will continue to encourage turn q2hrs
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
## 2020-07-26 NOTE — NURSING
IV access lost, 3 nurses attempted to find new site without success. No one in ER able to place midline. Providers notified.

## 2020-07-26 NOTE — PROGRESS NOTES
[facility]  Nephrology  Progress Note    Patient Name: Gillian Rihc  MRN: [id number]  Admission Date: 7/23/2020  Hospital Length of Stay: 0 days  Attending Provider: Dre Hayward, *   Primary Care Physician: Primary Doctor No  Principal Problem:Syncope, near    Subjective:     HPI: Gillian Rich is a 69 year old AA woman  with ESRD on HD , anemia, HTN, PCKD, hyperparathyroidism presents to the hospital yesterday for near syncopal episode at home, most likely due to hemodynamic changes from new on today atrial fibrillation, she was evaluated by Cardiology, her medications were adjusted, we were consulted for maintenance dialysis, Patient dilayzes on MWF schedule at [facility] under Dr Kitchen, Access is : left arm AVF , patient seen at dialysis and tolerating well,      Interval History:  No acute events, still feels weak, denies CP/SOB ,     Review of patient's allergies indicates:   Allergen Reactions    Contrast media     Iodine and iodide containing products      Current Facility-Administered Medications   Medication Frequency    aspirin chewable tablet 81 mg Daily    atorvastatin tablet 80 mg QHS    cloNIDine tablet 0.3 mg TID    fluticasone propionate 50 mcg/actuation nasal spray 100 mcg Daily    heparin (porcine) injection 5,000 Units Q8H    hydrALAZINE injection 10 mg Q6H PRN    hydrALAZINE tablet 100 mg Q8H    HYDROcodone-acetaminophen 7.5-325 mg per tablet 1 tablet Q6H PRN    lorazepam (ATIVAN) injection 1 mg Q4H PRN    magnesium hydroxide 400 mg/5 ml suspension 2,400 mg Daily PRN    metoprolol tartrate (LOPRESSOR) tablet 25 mg BID    ondansetron injection 4 mg Q8H PRN    sevelamer carbonate tablet 2,400 mg TID WM    sodium chloride 0.9% flush 10 mL PRN       Objective:     Vital Signs (Most Recent):  Temp: 98.7 °F (37.1 °C) (07/26/20 0806)  Pulse: 86 (07/26/20 0806)  Resp: 18 (07/26/20 0853)  BP: 135/62 (07/26/20 0806)  SpO2: (!) 93 % (07/26/20  0806)  O2 Device (Oxygen Therapy): room air (07/24/20 1934) Vital Signs (24h Range):  Temp:  [97 °F (36.1 °C)-98.9 °F (37.2 °C)] 98.7 °F (37.1 °C)  Pulse:  [] 86  Resp:  [16-20] 18  SpO2:  [90 %-97 %] 93 %  BP: (118-192)/(53-91) 135/62     Weight: 99.4 kg (219 lb 2.2 oz) (07/26/20 0400)  Body mass index is 37.61 kg/m².  Body surface area is 2.12 meters squared.    I/O last 3 completed shifts:  In: 838 [P.O.:838]  Out: -     Physical Exam  Constitutional:       General: She is not in acute distress.     Appearance: She is well-developed.   HENT:      Head: Normocephalic and atraumatic.      Mouth/Throat:      Pharynx: No oropharyngeal exudate.   Eyes:      Conjunctiva/sclera: Conjunctivae normal.      Pupils: Pupils are equal, round, and reactive to light.   Neck:      Musculoskeletal: Normal range of motion and neck supple.      Thyroid: No thyroid mass or thyromegaly.      Vascular: No carotid bruit or JVD.      Trachea: No tracheal deviation.   Cardiovascular:      Rate and Rhythm: Normal rate and regular rhythm.      Heart sounds: Normal heart sounds. No murmur. No friction rub. No gallop.    Pulmonary:      Effort: Pulmonary effort is normal. No respiratory distress.      Breath sounds: Normal breath sounds. No wheezing or rales.   Chest:      Chest wall: No tenderness.   Abdominal:      General: Bowel sounds are normal. There is no distension or abdominal bruit.      Palpations: Abdomen is soft. There is no mass.      Tenderness: There is no abdominal tenderness. There is no guarding or rebound.      Comments: Obese abdomen    Musculoskeletal:         General: No tenderness.      Comments: LUE AVF,    Lymphadenopathy:      Cervical: No cervical adenopathy.   Skin:     General: Skin is warm.      Coloration: Skin is not pale.      Findings: No erythema or rash.      Comments: Multiple healed scars on both arms, from previous surgeries,   Neurological:      Mental Status: She is alert and oriented to  person, place, and time.      Cranial Nerves: No cranial nerve deficit.      Motor: No abnormal muscle tone.      Coordination: Coordination normal.   Psychiatric:         Behavior: Behavior normal.         Judgment: Judgment normal.         Significant Labs:  CBC:   Recent Labs   Lab 07/26/20  0529   WBC 9.80   RBC 3.43*   HGB 10.4*   HCT 32.9*      MCV 96   MCH 30.3   MCHC 31.6*     CMP:   Recent Labs   Lab 07/25/20  0506   GLU 93   CALCIUM 9.5   ALBUMIN 3.4*   PROT 6.9      K 4.3   CO2 26   CL 98   BUN 43*   CREATININE 6.8*   ALKPHOS 106   ALT 11   AST 14   BILITOT 0.3     Coagulation:   Recent Labs   Lab 07/23/20  2350   INR 1.0     LFTs:   Recent Labs   Lab 07/25/20  0506   ALT 11   AST 14   ALKPHOS 106   BILITOT 0.3   PROT 6.9   ALBUMIN 3.4*     All labs within the past 24 hours have been reviewed.     Significant Imaging:  Reviewed    Lab Results   Component Value Date    .0 (H) 03/22/2018    CALCIUM 9.5 07/25/2020    CAION 1.10 03/22/2018    PHOS 3.8 07/24/2020       Lab Results   Component Value Date    ALBUMIN 3.4 (L) 07/25/2020         Assessment/Plan:     ESRD on dialysis since 2/24/16  1. ESRD on HD, Patient dialyzes on MWF schedule at [facility]. Access: left arm AVG,     2.  Hypertension, blood pressure is better     3.  Syncope, likely due to hemodynamic changes from new onset atrial fibrillation, clinically improved, cardiology notes reviewed,    Left ICA stenosis, vascular note reviewed,     4.  Anemia of chronic kidney disease, monitor hemoglobin, Procrit with dialysis when indicated,    5.  Secondary hyperparathyroidism, continue renal diet,     6. Ok d.c home and f/u out pt HD , cards and vasc surgery           Thank you for your consult. I will follow-up with patient. Please contact us if you have any additional questions.     Total time spent 30 minutes including time needed to review the records,  patient  evaluation, documentation, face-to-face discussion with the  patient, primary team,    more than 50% of the time was spent on coordination of care and counseling.       Patricio Cintron MD  Nephrology  [facility]

## 2020-07-27 DIAGNOSIS — Z76.0 MEDICATION REFILL: ICD-10-CM

## 2020-07-27 DIAGNOSIS — N18.6 ESRD (END STAGE RENAL DISEASE): ICD-10-CM

## 2020-07-27 RX ORDER — CLONIDINE HYDROCHLORIDE 0.3 MG/1
0.3 TABLET ORAL 3 TIMES DAILY
Qty: 90 TABLET | Refills: 2 | Status: SHIPPED | OUTPATIENT
Start: 2020-07-27 | End: 2020-11-16 | Stop reason: SDUPTHER

## 2020-07-27 RX ORDER — METOPROLOL SUCCINATE 25 MG/1
25 TABLET, EXTENDED RELEASE ORAL 2 TIMES DAILY
Refills: 3
Start: 2020-07-27 | End: 2021-01-20

## 2020-07-27 RX ORDER — METOPROLOL SUCCINATE 25 MG/1
25 TABLET, EXTENDED RELEASE ORAL 2 TIMES DAILY
Refills: 3
Start: 2020-07-27 | End: 2020-07-27 | Stop reason: SDUPTHER

## 2020-07-27 NOTE — TELEPHONE ENCOUNTER
----- Message from Daria Tolbert sent at 7/27/2020  2:02 PM CDT -----  Contact: Gilliancalli French needs to get Metoprolol succinate moved to the [facility] [employer] here in [place], Please call her at [phone].      [facility] [id number] [facility] [employer] - [place] - [address]  [address] [place] [place] [address]  Phone: [phone] Fax: [phone]    Thanks  Td

## 2020-07-27 NOTE — TELEPHONE ENCOUNTER
----- Message from Damián Vazquez sent at 7/27/2020 10:17 AM CDT -----  Regarding: Refill  Contact: ANH  Type:  RX Refill Request    Who Called: Anh  w[facility]   Refill or New Rx:refill   RX Name and Strength:cloNIDine (CATAPRES) 0.3 MG tablet  ferric citrate (AURYXIA) 210 mg iron Tab  metoprolol succinate (TOPROL-XL) 25 MG 24 hr tablet  How is the patient currently taking it? (ex. 1XDay):  Is this a 30 day or 90 day RX:  Preferred Pharmacy with phone number:  [id number] [employer] [facility] [place] - [address]  [address] [facility] [address]  Phone: [phone] Fax: [phone]  Local or Mail Order:local   Ordering Provider:Dr Kitchen   Would the patient rather a call back or a response via [facility]? Call back   Best Call Back Number:[phone]  Additional Information:

## 2020-07-27 NOTE — PLAN OF CARE
07/27/20 1635   Final Note   Assessment Type Final Discharge Note   Anticipated Discharge Disposition Home   Right Care Referral Info   Post Acute Recommendation No Care

## 2020-08-10 DIAGNOSIS — E83.39 HYPERPHOSPHATEMIA: ICD-10-CM

## 2020-08-10 RX ORDER — SEVELAMER CARBONATE 800 MG/1
2400 TABLET, FILM COATED ORAL
Qty: 270 TABLET | Refills: 11 | Status: SHIPPED | OUTPATIENT
Start: 2020-08-10 | End: 2022-07-11 | Stop reason: SDUPTHER

## 2020-08-10 NOTE — TELEPHONE ENCOUNTER
----- Message from Jose Huitron sent at 8/10/2020 10:22 AM CDT -----  Type:  RX Refill Request    Who Called:  lilibeth  Refill or New Rx: refill   RX Name and Strength: RENVELA 800 mg   How is the patient currently taking it? (ex. 1XDay): 3 tabs 3 times daily   Is this a 30 day or 90 day RX: 90 day   Preferred Pharmacy with phone number:     [id number] [place] [facility] [address] [address]  Phone: [phone] Fax: [phone]      Local or Mail Order: local   Ordering Provider: noreen   Would the patient rather a call back or a response via [facility]?  Call   Best Call Back Number:  Additional Information:         Type:  RX Refill Request    Who Called:  lilibeth  Refill or New Rx: refill   RX Name and Strength: lopressor 50 mg   How is the patient currently taking it? (ex. 1XDay):1 tab  3 times daily   Is this a 30 day or 90 day RX: 90 day   Preferred Pharmacy with phone number:     [id number] [place] [facility] [address] [address]  Phone: [phone] Fax: [phone]      Local or Mail Order: local   Ordering Provider: noreen   Would the patient rather a call back or a response via [facility]?  Call   Best Call Back Number:  Additional Information:

## 2020-08-13 ENCOUNTER — HOSPITAL ENCOUNTER (OUTPATIENT)
Dept: CARDIOLOGY | Facility: HOSPITAL | Age: 70
Discharge: HOME | End: 2020-08-13
Attending: INTERNAL MEDICINE
Payer: MEDICARE

## 2020-08-13 ENCOUNTER — HOSPITAL ENCOUNTER (OUTPATIENT)
Dept: RADIOLOGY | Facility: HOSPITAL | Age: 70
Discharge: HOME | End: 2020-08-13
Attending: INTERNAL MEDICINE
Payer: MEDICARE

## 2020-08-13 ENCOUNTER — TELEPHONE (OUTPATIENT)
Dept: CARDIOLOGY | Facility: CLINIC | Age: 70
End: 2020-08-13

## 2020-08-13 DIAGNOSIS — N18.6 ESRD (END STAGE RENAL DISEASE) ON DIALYSIS: Chronic | ICD-10-CM

## 2020-08-13 DIAGNOSIS — R07.89 ATYPICAL CHEST PAIN: ICD-10-CM

## 2020-08-13 DIAGNOSIS — I10 HYPERTENSION, UNSPECIFIED TYPE: Chronic | ICD-10-CM

## 2020-08-13 DIAGNOSIS — Z99.2 ESRD (END STAGE RENAL DISEASE) ON DIALYSIS: Chronic | ICD-10-CM

## 2020-08-13 DIAGNOSIS — R94.31 ABNORMAL ECG: ICD-10-CM

## 2020-08-13 DIAGNOSIS — I73.9 PERIPHERAL VASCULAR DISEASE: ICD-10-CM

## 2020-08-13 DIAGNOSIS — Z99.2 DIALYSIS PATIENT: ICD-10-CM

## 2020-08-13 DIAGNOSIS — Z01.810 PREOP CARDIOVASCULAR EXAM: ICD-10-CM

## 2020-08-13 DIAGNOSIS — I70.0 AORTIC ATHEROSCLEROSIS: ICD-10-CM

## 2020-08-13 LAB
CV STRESS BASE HR: 67 BPM
DIASTOLIC BLOOD PRESSURE: 78 MMHG
NUC REST EJECTION FRACTION: 81
NUC STRESS EJECTION FRACTION: 76 %
OHS CV CPX 85 PERCENT MAX PREDICTED HEART RATE MALE: 124
OHS CV CPX MAX PREDICTED HEART RATE: 146
OHS CV CPX PATIENT IS FEMALE: 1
OHS CV CPX PATIENT IS MALE: 0
OHS CV CPX PEAK DIASTOLIC BLOOD PRESSURE: 91 MMHG
OHS CV CPX PEAK HEAR RATE: 92 BPM
OHS CV CPX PEAK RATE PRESSURE PRODUCT: NORMAL
OHS CV CPX PEAK SYSTOLIC BLOOD PRESSURE: 214 MMHG
OHS CV CPX PERCENT MAX PREDICTED HEART RATE ACHIEVED: 63
OHS CV CPX RATE PRESSURE PRODUCT PRESENTING: NORMAL
STRESS ECHO POST EXERCISE DUR MIN: 1 MINUTES
STRESS ECHO POST EXERCISE DUR SEC: 26 SECONDS
SYSTOLIC BLOOD PRESSURE: 169 MMHG

## 2020-08-13 PROCEDURE — 93017 CV STRESS TEST TRACING ONLY: CPT

## 2020-08-13 PROCEDURE — 78452 HT MUSCLE IMAGE SPECT MULT: CPT | Mod: 26,,, | Performed by: INTERNAL MEDICINE

## 2020-08-13 PROCEDURE — 78452 STRESS TEST WITH MYOCARDIAL PERFUSION (CUPID ONLY): ICD-10-PCS | Mod: 26,,, | Performed by: INTERNAL MEDICINE

## 2020-08-13 PROCEDURE — 93018 CV STRESS TEST I&R ONLY: CPT | Mod: ,,, | Performed by: INTERNAL MEDICINE

## 2020-08-13 PROCEDURE — A9502 TC99M TETROFOSMIN: HCPCS

## 2020-08-13 PROCEDURE — 93016 CV STRESS TEST SUPVJ ONLY: CPT | Mod: ,,, | Performed by: INTERNAL MEDICINE

## 2020-08-13 PROCEDURE — 93016 STRESS TEST WITH MYOCARDIAL PERFUSION (CUPID ONLY): ICD-10-PCS | Mod: ,,, | Performed by: INTERNAL MEDICINE

## 2020-08-13 PROCEDURE — 93018 STRESS TEST WITH MYOCARDIAL PERFUSION (CUPID ONLY): ICD-10-PCS | Mod: ,,, | Performed by: INTERNAL MEDICINE

## 2020-08-13 RX ORDER — REGADENOSON 0.08 MG/ML
0.4 INJECTION, SOLUTION INTRAVENOUS ONCE
Status: COMPLETED | OUTPATIENT
Start: 2020-08-13 | End: 2020-08-13

## 2020-08-13 RX ADMIN — REGADENOSON 0.4 MG: 0.08 INJECTION, SOLUTION INTRAVENOUS at 01:08

## 2020-08-13 NOTE — TELEPHONE ENCOUNTER
Spoke with patient to advise her to keep her appointments today.  Patient verbalized understanding.

## 2020-08-14 NOTE — TELEPHONE ENCOUNTER
Please call pt.  She passed her nuclear stress test.  No blockages noted.  Normal heart strength.      Dr Sims

## 2020-08-14 NOTE — TELEPHONE ENCOUNTER
Spoke with patient to advise her that she passed her nuclear stress test.  No blockages noted.  Normal heart strength.  Denies questions/concerns.

## 2020-08-26 ENCOUNTER — TELEPHONE (OUTPATIENT)
Dept: ORTHOPEDICS | Facility: CLINIC | Age: 70
End: 2020-08-26

## 2020-08-26 NOTE — TELEPHONE ENCOUNTER
Returned the patient's phone call in regards to their message below. Informed the patient that I have to speak with Dr. Aguilar when he returns to the office. Patient verbalized understanding.FP           ----- Message from Shae Norton sent at 8/26/2020  2:20 PM CDT -----  Regarding: walker  Pt is calling requesting a walker immediatly. She is also requesting a appt. Please call back. [phone]  Thanks

## 2020-08-27 ENCOUNTER — TELEPHONE (OUTPATIENT)
Dept: NEPHROLOGY | Facility: CLINIC | Age: 70
End: 2020-08-27

## 2020-08-27 NOTE — TELEPHONE ENCOUNTER
----- Message from Elmira Carpenter sent at 8/27/2020 12:34 PM CDT -----  States she needs to speak to the nurse regarding paper work for long term care (disability). Please call pt [phone]. Thank you

## 2020-08-27 NOTE — TELEPHONE ENCOUNTER
Returned call to patient. She is requesting a letter stating that she is totally disable.  The letter needs to include she is currently on dialysis, with the amount of hours she dialyze. Patient states that she gave a copy to the  of what's needed.  Also it needs to be under Gillian Pitts.

## 2020-08-28 ENCOUNTER — TELEPHONE (OUTPATIENT)
Dept: ORTHOPEDICS | Facility: CLINIC | Age: 70
End: 2020-08-28

## 2020-08-28 NOTE — TELEPHONE ENCOUNTER
Returned the patient's phone call. Called the patient to let them know that Dr. Aguilar states that he will not give them a rollator walker and that they can be schedule a appointment next available with him. Left a message for the patient to give the office a call back.FP

## 2020-09-01 ENCOUNTER — TELEPHONE (OUTPATIENT)
Dept: NEPHROLOGY | Facility: CLINIC | Age: 70
End: 2020-09-01

## 2020-09-01 NOTE — TELEPHONE ENCOUNTER
----- Message from Lani Florez sent at 9/1/2020  3:17 PM CDT -----  Contact: pt  Pt requesting a call back regarding paperwork that Dr. Kitchen is supposed to filling out. Please call pt back at [phone]

## 2020-09-23 ENCOUNTER — TELEPHONE (OUTPATIENT)
Dept: NEPHROLOGY | Facility: CLINIC | Age: 70
End: 2020-09-23

## 2020-09-23 NOTE — TELEPHONE ENCOUNTER
----- Message from Jose Huitron sent at 9/23/2020  9:54 AM CDT -----  .Type:  Needs Medical Advice    Who Called: CARMEN ODONNELL   Symptoms (please be specific):    How long has patient had these symptoms:    Pharmacy name and phone #:    Would the patient rather a call back or a response via [facility]?  Call   Best Call Back Number: [phone] (home)    Additional Information: Pt is requesting a call back from the nurse in regards to the pt needing paperwork at [facility] filled out please

## 2020-09-23 NOTE — TELEPHONE ENCOUNTER
Informed patient to have dialysis unit to fill out paperwork. Dr. Kitchen is out of town. Pt verbalized understanding.

## 2020-09-25 ENCOUNTER — TELEPHONE (OUTPATIENT)
Dept: CARDIOLOGY | Facility: CLINIC | Age: 70
End: 2020-09-25

## 2020-09-25 ENCOUNTER — OFFICE VISIT (OUTPATIENT)
Dept: ORTHOPEDICS | Facility: CLINIC | Age: 70
End: 2020-09-25
Payer: MEDICARE

## 2020-09-25 VITALS
DIASTOLIC BLOOD PRESSURE: 92 MMHG | HEIGHT: 64 IN | SYSTOLIC BLOOD PRESSURE: 134 MMHG | WEIGHT: 219 LBS | HEART RATE: 89 BPM | BODY MASS INDEX: 37.39 KG/M2

## 2020-09-25 DIAGNOSIS — M17.11 ARTHRITIS OF KNEE, RIGHT: Primary | ICD-10-CM

## 2020-09-25 DIAGNOSIS — M17.12 ARTHRITIS OF KNEE, LEFT: ICD-10-CM

## 2020-09-25 DIAGNOSIS — Z01.818 PREOP TESTING: Primary | ICD-10-CM

## 2020-09-25 DIAGNOSIS — M21.061 ACQUIRED GENU VALGUM, BILATERAL: ICD-10-CM

## 2020-09-25 DIAGNOSIS — M17.11 ARTHRITIS OF KNEE, RIGHT: ICD-10-CM

## 2020-09-25 DIAGNOSIS — M51.36 DEGENERATIVE DISC DISEASE, LUMBAR: ICD-10-CM

## 2020-09-25 DIAGNOSIS — M21.062 ACQUIRED GENU VALGUM, BILATERAL: ICD-10-CM

## 2020-09-25 DIAGNOSIS — I73.9 PERIPHERAL ARTERIAL DISEASE: ICD-10-CM

## 2020-09-25 PROCEDURE — 99999 PR PBB SHADOW E&M-EST. PATIENT-LVL III: CPT | Mod: PBBFAC,,, | Performed by: ORTHOPAEDIC SURGERY

## 2020-09-25 PROCEDURE — 99999 PR PBB SHADOW E&M-EST. PATIENT-LVL III: ICD-10-PCS | Mod: PBBFAC,,, | Performed by: ORTHOPAEDIC SURGERY

## 2020-09-25 PROCEDURE — 99213 OFFICE O/P EST LOW 20 MIN: CPT | Mod: PBBFAC | Performed by: ORTHOPAEDIC SURGERY

## 2020-09-25 PROCEDURE — 99214 OFFICE O/P EST MOD 30 MIN: CPT | Mod: S$PBB,,, | Performed by: ORTHOPAEDIC SURGERY

## 2020-09-25 PROCEDURE — 99214 PR OFFICE/OUTPT VISIT, EST, LEVL IV, 30-39 MIN: ICD-10-PCS | Mod: S$PBB,,, | Performed by: ORTHOPAEDIC SURGERY

## 2020-09-25 RX ORDER — FAMOTIDINE 40 MG/1
TABLET, FILM COATED ORAL
COMMUNITY
Start: 2020-06-23 | End: 2021-02-25 | Stop reason: ALTCHOICE

## 2020-09-25 RX ORDER — DIPHENHYDRAMINE HCL 50 MG
CAPSULE ORAL
COMMUNITY
Start: 2020-06-23 | End: 2020-09-25

## 2020-09-25 NOTE — PATIENT INSTRUCTIONS
Right total knee replacement    Procedure, common risks and benefits,alternatives discussed in details.All questions answered.Patient expressed understanding.Patient instructed to call for any questions that could arise in the future.    Most common Risks:  Infection /approximately 2%  Leg-length discrepancy/most common with total hip replacement  Dislocation/mostCommon with total hip replacement-3-5% chance  Neuro-vascular injury ( resulting in loss of motor and sensory functions)/numbness on the outside of the knee and possible foot drop  Pain  Blood clot  Fat clot/most common with both knees at the same time  Loss of motion/most common if therapy not performed  Fracture of bone  Failure of procedure to achieve its intended purpose/80% successful in total knee replacement, 90% successful in total hip replacement  Failure of hardware/may last approximately 15 years  Non-union or mal-union of bone  Malalignment  Death    Surgeries approximately 2 hr done under general anesthetic or spinal.  Hospital stay is 23 hr maximum per government rules.  It is considered outpatient surgery/extended recovery.  You will be going home with home health for approximately 2 weeks with therapy.  You will continue therapy as outpatient after that.  It will take Roughly 3-6 months for complete healing to occur.  You will have to go to a class before surgery for more information  Since you on dialysis you will be a allowed 2 days in the hospital.  She will have year surgery on a Tuesday and you dialysis on Wednesday and be discharged after dialysis    rollator prescription is needed  Must get cleared by Cardiology as well as nephrology  Brochure was given

## 2020-09-25 NOTE — TELEPHONE ENCOUNTER
Pt is calling the staff the staff regarding a clearance to have surgery     Pt stated that Dr Aguilar  is requesting the clearance for pt surgery before Oct 13, 2020 in writing.    Pt states she doesn't have no transportation today for her appointment at 5:00 pm.

## 2020-09-25 NOTE — TELEPHONE ENCOUNTER
Pt may have knee surgery at low CV risk.  Recent nuclear stress test and echo showed normal coronary perfusion and normal LVEF.    Dr Sims
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
## 2020-09-29 ENCOUNTER — TELEPHONE (OUTPATIENT)
Dept: ORTHOPEDICS | Facility: CLINIC | Age: 70
End: 2020-09-29

## 2020-09-29 DIAGNOSIS — M17.11 PRIMARY OSTEOARTHRITIS OF RIGHT KNEE: Primary | ICD-10-CM

## 2020-09-29 NOTE — TELEPHONE ENCOUNTER
Pt concerned about getting her walker. Informed pt that order was put in, and would be sure that our DME company was notified. Pt expressed verbal understanding. Also informed pt to have labs faxed over ASAP. She says labs were done yesterday at dialysis, and she would have them fax over as soon as she received results. Gave pt fax number to give to dialysis clinic. She repeated back fax number to me. Pt was thankful for the returned call, AL, LPN.     ----- Message from Sonia Li sent at 9/29/2020  1:50 PM CDT -----  Type:  Patient Returning Call    Who Called:patient  Who Left Message for Patient:nurse Rea  Does the patient know what this is regarding?:na  Would the patient rather a call back or a response via [facility]? Call back  Best Call Back [phone]:  Additional Information: na

## 2020-09-29 NOTE — TELEPHONE ENCOUNTER
----- Message from Melvi Sims sent at 9/29/2020  9:47 AM CDT -----  Contact: [phone]  Would like to consult with the nurse, patient  is returning the nurse call, please call back thanks sj   .Type:  Patient Returning Call    Who Called: ms de los santos  Who Left Message for Patient:  Does the patient know what this is regarding?:  Would the patient rather a call back or a response via [facility]? callback  Best Call Back Number:[phone]  Additional Information:

## 2020-09-29 NOTE — TELEPHONE ENCOUNTER
Called and left message in regards to patients upcoming surgery on 10/13 - patient has not yet gotten her labs/cxr/ekg - informed on message that we must have have the preop testing before 10/06/2020

## 2020-09-29 NOTE — TELEPHONE ENCOUNTER
Called and spoke with patient in regards to labs - patient reports that she had labs done at dialysis and is getting them faxed over to our office - I will monitor our fax and let patient know if we do not receive them by the end of week- patient verbalized understanding and thankful for call

## 2020-10-06 ENCOUNTER — OFFICE VISIT (OUTPATIENT)
Dept: ORTHOPEDICS | Facility: HOSPITAL | Age: 70
End: 2020-10-06
Attending: ORTHOPAEDIC SURGERY
Payer: MEDICARE

## 2020-10-06 VITALS — DIASTOLIC BLOOD PRESSURE: 54 MMHG | HEART RATE: 77 BPM | SYSTOLIC BLOOD PRESSURE: 104 MMHG | TEMPERATURE: 98 F

## 2020-10-06 DIAGNOSIS — Z99.2 ANEMIA IN CHRONIC KIDNEY DISEASE, ON CHRONIC DIALYSIS: ICD-10-CM

## 2020-10-06 DIAGNOSIS — N18.6 ANEMIA IN CHRONIC KIDNEY DISEASE, ON CHRONIC DIALYSIS: ICD-10-CM

## 2020-10-06 DIAGNOSIS — N18.6 ESRD (END STAGE RENAL DISEASE) ON DIALYSIS: Chronic | ICD-10-CM

## 2020-10-06 DIAGNOSIS — E78.2 MIXED HYPERLIPIDEMIA: ICD-10-CM

## 2020-10-06 DIAGNOSIS — Z99.2 ESRD (END STAGE RENAL DISEASE) ON DIALYSIS: Chronic | ICD-10-CM

## 2020-10-06 DIAGNOSIS — I12.9 HYPERTENSION, RENAL DISEASE: ICD-10-CM

## 2020-10-06 DIAGNOSIS — I73.9 PERIPHERAL VASCULAR DISEASE: ICD-10-CM

## 2020-10-06 DIAGNOSIS — D63.1 ANEMIA IN CHRONIC KIDNEY DISEASE, ON CHRONIC DIALYSIS: ICD-10-CM

## 2020-10-06 DIAGNOSIS — M17.11 PRIMARY OSTEOARTHRITIS OF RIGHT KNEE: ICD-10-CM

## 2020-10-06 PROCEDURE — 99212 PR OFFICE/OUTPT VISIT, EST, LEVL II, 10-19 MIN: ICD-10-PCS | Mod: ,,, | Performed by: NURSE PRACTITIONER

## 2020-10-06 PROCEDURE — 99212 OFFICE O/P EST SF 10 MIN: CPT | Mod: ,,, | Performed by: NURSE PRACTITIONER

## 2020-10-06 RX ORDER — IBUPROFEN 800 MG/1
800 TABLET ORAL EVERY 6 HOURS PRN
Status: ON HOLD | COMMUNITY
End: 2020-10-15 | Stop reason: HOSPADM

## 2020-10-06 NOTE — DISCHARGE INSTRUCTIONS
Pre op instructions reviewed with patient per phone:    To confirm, Your surgeon has instructed you:  Surgery is scheduled 10/13/20at 1000am.      Please report to [facility] 1[facility] floor main lobby by 0800am.   Pre admit office to call afternoon prior to surgery with final arrival time    Covid 19 testing is scheduled for 10/10/20 at [facility]  @ 1130am  Please self quarantine after Covid testing, prior to surgery      INSTRUCTIONS IMPORTANT!!!  ¨ Do not eat, drink, or smoke after 12 midnight prior to surgery, including water. OK to brush teeth, no gum, candy or mints!    LAB APPOINTMENT: 10/12/20 @ 0730AM. Please do not remove red band prior to surgery once applied by Lab.    ¨ Take only these medicines with a small swallow of water-morning of surgery.  ***          ____   Due to COVID 19 concerns, 1 visitor will be allowed in the pre operative area, and must adhere to social distancing guidelines.  One visitor/family member is currently allowed to visit in-patient rooms from 08:00 a.m - 6:00 p.m    ____   Family/caregivers will be updated re pt status via text/cell phone      ____  Do not wear makeup, including mascara.  ____  No powder, lotions or creams to surgical area.  ____  Please remove all jewelry, including piercings and leave at home.  ____  No money or valuables needed. Please leave at home.  ____  Please bring identification and insurance information to hospital.  ____  If going home the same day, arrange for a ride home. You will not be able to   drive if Anesthesia was used.  ____  Children, under 12 years old, must remain in the waiting room with an adult.  They are not allowed in patient areas.  ____  Wear loose fitting clothing. Allow for dressings, bandages.  ____  Stop Aspirin, Ibuprofen, Motrin and Aleve at least 5-7 days before surgery, unless otherwise instructed by your doctor, or the nurse.   You MAY use Tylenol/acetaminophen until day of surgery.  ____  If you take  diabetic medication, do not take am of surgery unless instructed by   Doctor.  ____ Stop taking any Fish Oil supplement or any Vitamins that contain Vitamin E at least 5 days prior to surgery.          Bathing Instructions-- The night before surgery and the morning prior to coming to the hospital:   -Do not shave the surgical area.   -Shower and wash your hair and body as usual with anti-bacterial  soap and shampoo.   -Rinse your hair and body completely.   -Use one packet of hibiclens to wash the surgical site (using your hand) gently for 5 minutes.  Do not scrub you skin too hard.   -Do not use hibiclens on your head, face, or genitals.   -Do not wash with anti-bacterial soap after you use the hibiclens.   -Rinse your body thoroughly.   -Dry with clean, soft towel.  Do not use lotion, cream, deodorant, or powders on   the surgical site.    Use antibacterial soap in place of hibiclens if your surgery is on the head, face or genitals.         Surgical Site Infection    Prevention of surgical site infections:     -Keep incisions clean and dry.   -Do not soak/submerge incisions in water until completely healed.   -Do not apply lotions, powders, creams, or deodorants to site.   -Always make sure hands are cleaned with antibacterial soap/ alcohol-based   prior to touching the surgical site.  (This includes doctors, nurses, staff, and yourself.)    Signs and symptoms:   -Redness and pain around the area where you had surgery   -Drainage of cloudy fluid from your surgical wound   -Fever over 100.4  I have read or had read and explained to me, and understand the above information.

## 2020-10-07 DIAGNOSIS — Z01.818 PREOP TESTING: Primary | ICD-10-CM

## 2020-10-07 PROBLEM — E78.5 HYPERLIPEMIA: Status: ACTIVE | Noted: 2020-10-07

## 2020-10-07 PROBLEM — M17.0 PRIMARY OSTEOARTHRITIS OF BOTH KNEES: Status: ACTIVE | Noted: 2020-10-07

## 2020-10-07 NOTE — ASSESSMENT & PLAN NOTE
Known risk factors for perioperative complications: Anemia  Coronary disease  Renal dysfunction    Difficulty with intubation is not anticipated.    Cardiac Risk Estimation:  Patient also had preop stress test done by her Cardiologist and patient cleared for Surgery     1.) Preoperative workup as follows: Patient had preop stress test done by her Cardiologist and patient cleared for Surgery  2.) Change in medication regimen before surgery: none, continue medication regimen including morning of surgery, with sip of water.  3.) Prophylaxis for cardiac events with perioperative beta-blockers: not indicated.  4.) Invasive hemodynamic monitoring perioperatively: not indicated.  5.) Deep vein thrombosis prophylaxis postoperatively: regimen to be chosen by surgical team.  6.) Surveillance for postoperative MI with ECG immediately postoperatively and on postoperati ve days 1 and 2 AND troponin levels 24 hours postoperatively and on day 4 or hospital discharge (whichever comes first): not indicated.  7.) Current medications which may produce withdrawal symptoms if withheld perioperatively: None  8.) Other measures: Preoperative hemodialysis (Per Nephrology discretion- Patient with history of hyperaklemia).
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
## 2020-10-12 ENCOUNTER — TELEPHONE (OUTPATIENT)
Dept: NEPHROLOGY | Facility: CLINIC | Age: 70
End: 2020-10-12

## 2020-10-12 ENCOUNTER — ANESTHESIA EVENT (OUTPATIENT)
Dept: SURGERY | Facility: HOSPITAL | Age: 70
DRG: 469 | End: 2020-10-12
Payer: MEDICARE

## 2020-10-12 NOTE — TELEPHONE ENCOUNTER
----- Message from Jason Refugio sent at 10/12/2020  2:41 PM CDT -----  Type:  Needs Medical Advice    Who Called: pt  Symptoms (please be specific):    How long has patient had these symptoms:    Pharmacy name and phone #:    Would the patient rather a call back or a response via [facility]? call  Best Call Back Number: [phone] (home)   Additional Information:   Pt is wanting to verify is paper work has been received for permanent disability, please call pt to confirm.
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
## 2020-10-13 ENCOUNTER — ANESTHESIA (OUTPATIENT)
Dept: SURGERY | Facility: HOSPITAL | Age: 70
DRG: 469 | End: 2020-10-13
Payer: MEDICARE

## 2020-10-13 ENCOUNTER — HOSPITAL ENCOUNTER (INPATIENT)
Facility: HOSPITAL | Age: 70
LOS: 5 days | Discharge: NURSING, REHAB OR LONG-TERM CARE | DRG: 469 | End: 2020-10-18
Attending: ORTHOPAEDIC SURGERY | Admitting: ORTHOPAEDIC SURGERY
Payer: MEDICARE

## 2020-10-13 DIAGNOSIS — E87.5 HYPERKALEMIA: ICD-10-CM

## 2020-10-13 DIAGNOSIS — N18.6 ESRD (END STAGE RENAL DISEASE) ON DIALYSIS: Chronic | ICD-10-CM

## 2020-10-13 DIAGNOSIS — M17.11 ARTHRITIS OF RIGHT KNEE: ICD-10-CM

## 2020-10-13 DIAGNOSIS — M17.11 PRIMARY OSTEOARTHRITIS OF RIGHT KNEE: Primary | ICD-10-CM

## 2020-10-13 DIAGNOSIS — Z99.2 ESRD (END STAGE RENAL DISEASE) ON DIALYSIS: Chronic | ICD-10-CM

## 2020-10-13 DIAGNOSIS — Z01.818 PREOP TESTING: ICD-10-CM

## 2020-10-13 PROBLEM — D72.829 LEUKOCYTOSIS: Status: ACTIVE | Noted: 2020-10-13

## 2020-10-13 LAB
ABO + RH BLD: NORMAL
ALBUMIN SERPL BCP-MCNC: 3.8 G/DL (ref 3.5–5.2)
ALP SERPL-CCNC: 124 U/L (ref 55–135)
ALT SERPL W/O P-5'-P-CCNC: 14 U/L (ref 10–44)
ANION GAP SERPL CALC-SCNC: 17 MMOL/L (ref 8–16)
AST SERPL-CCNC: 19 U/L (ref 10–40)
BASOPHILS # BLD AUTO: 0.03 K/UL (ref 0–0.2)
BASOPHILS NFR BLD: 0.2 % (ref 0–1.9)
BILIRUB SERPL-MCNC: 0.3 MG/DL (ref 0.1–1)
BLD GP AB SCN CELLS X3 SERPL QL: NORMAL
BUN SERPL-MCNC: 38 MG/DL (ref 8–23)
CALCIUM SERPL-MCNC: 8.8 MG/DL (ref 8.7–10.5)
CHLORIDE SERPL-SCNC: 102 MMOL/L (ref 95–110)
CO2 SERPL-SCNC: 17 MMOL/L (ref 23–29)
CREAT SERPL-MCNC: 8 MG/DL (ref 0.5–1.4)
DIFFERENTIAL METHOD: ABNORMAL
EOSINOPHIL # BLD AUTO: 0 K/UL (ref 0–0.5)
EOSINOPHIL NFR BLD: 0 % (ref 0–8)
ERYTHROCYTE [DISTWIDTH] IN BLOOD BY AUTOMATED COUNT: 16.3 % (ref 11.5–14.5)
EST. GFR  (AFRICAN AMERICAN): 5 ML/MIN/1.73 M^2
EST. GFR  (NON AFRICAN AMERICAN): 5 ML/MIN/1.73 M^2
GLUCOSE SERPL-MCNC: 158 MG/DL (ref 70–110)
HCT VFR BLD AUTO: 39.5 % (ref 37–48.5)
HCT VFR BLD AUTO: 39.9 % (ref 37–48.5)
HGB BLD-MCNC: 11.8 G/DL (ref 12–16)
HGB BLD-MCNC: 11.9 G/DL (ref 12–16)
IMM GRANULOCYTES # BLD AUTO: 0.14 K/UL (ref 0–0.04)
IMM GRANULOCYTES NFR BLD AUTO: 0.8 % (ref 0–0.5)
LYMPHOCYTES # BLD AUTO: 1.6 K/UL (ref 1–4.8)
LYMPHOCYTES NFR BLD: 9.3 % (ref 18–48)
MCH RBC QN AUTO: 30.3 PG (ref 27–31)
MCHC RBC AUTO-ENTMCNC: 29.9 G/DL (ref 32–36)
MCV RBC AUTO: 102 FL (ref 82–98)
MONOCYTES # BLD AUTO: 0.3 K/UL (ref 0.3–1)
MONOCYTES NFR BLD: 1.9 % (ref 4–15)
NEUTROPHILS # BLD AUTO: 14.7 K/UL (ref 1.8–7.7)
NEUTROPHILS NFR BLD: 87.8 % (ref 38–73)
NRBC BLD-RTO: 0 /100 WBC
PLATELET # BLD AUTO: 311 K/UL (ref 150–350)
PMV BLD AUTO: 9.1 FL (ref 9.2–12.9)
POCT GLUCOSE: 104 MG/DL (ref 70–110)
POTASSIUM SERPL-SCNC: 5.6 MMOL/L (ref 3.5–5.1)
POTASSIUM SERPL-SCNC: 5.9 MMOL/L (ref 3.5–5.1)
PROT SERPL-MCNC: 7.4 G/DL (ref 6–8.4)
RBC # BLD AUTO: 3.89 M/UL (ref 4–5.4)
SARS-COV-2 RDRP RESP QL NAA+PROBE: NEGATIVE
SODIUM SERPL-SCNC: 136 MMOL/L (ref 136–145)
WBC # BLD AUTO: 16.8 K/UL (ref 3.9–12.7)

## 2020-10-13 PROCEDURE — 25000003 PHARM REV CODE 250: Performed by: ORTHOPAEDIC SURGERY

## 2020-10-13 PROCEDURE — 27447 PR TOTAL KNEE ARTHROPLASTY: ICD-10-PCS | Mod: RT,,, | Performed by: ORTHOPAEDIC SURGERY

## 2020-10-13 PROCEDURE — 71000033 HC RECOVERY, INTIAL HOUR: Performed by: ORTHOPAEDIC SURGERY

## 2020-10-13 PROCEDURE — 99900035 HC TECH TIME PER 15 MIN (STAT)

## 2020-10-13 PROCEDURE — 25000003 PHARM REV CODE 250: Performed by: ANESTHESIOLOGY

## 2020-10-13 PROCEDURE — 99214 PR OFFICE/OUTPT VISIT, EST, LEVL IV, 30-39 MIN: ICD-10-PCS | Mod: 95,,, | Performed by: INTERNAL MEDICINE

## 2020-10-13 PROCEDURE — 27447 TOTAL KNEE ARTHROPLASTY: CPT | Mod: AS,RT,, | Performed by: PHYSICIAN ASSISTANT

## 2020-10-13 PROCEDURE — 36000711: Performed by: ORTHOPAEDIC SURGERY

## 2020-10-13 PROCEDURE — U0002 COVID-19 LAB TEST NON-CDC: HCPCS

## 2020-10-13 PROCEDURE — 97162 PT EVAL MOD COMPLEX 30 MIN: CPT

## 2020-10-13 PROCEDURE — 37000008 HC ANESTHESIA 1ST 15 MINUTES: Performed by: ORTHOPAEDIC SURGERY

## 2020-10-13 PROCEDURE — 99214 OFFICE O/P EST MOD 30 MIN: CPT | Mod: 95,,, | Performed by: INTERNAL MEDICINE

## 2020-10-13 PROCEDURE — 27447 TOTAL KNEE ARTHROPLASTY: CPT | Mod: RT,,, | Performed by: ORTHOPAEDIC SURGERY

## 2020-10-13 PROCEDURE — 63600175 PHARM REV CODE 636 W HCPCS: Performed by: NURSE ANESTHETIST, CERTIFIED REGISTERED

## 2020-10-13 PROCEDURE — 36415 COLL VENOUS BLD VENIPUNCTURE: CPT

## 2020-10-13 PROCEDURE — 71000039 HC RECOVERY, EACH ADD'L HOUR: Performed by: ORTHOPAEDIC SURGERY

## 2020-10-13 PROCEDURE — 80053 COMPREHEN METABOLIC PANEL: CPT

## 2020-10-13 PROCEDURE — 85018 HEMOGLOBIN: CPT

## 2020-10-13 PROCEDURE — 84132 ASSAY OF SERUM POTASSIUM: CPT

## 2020-10-13 PROCEDURE — 93010 ELECTROCARDIOGRAM REPORT: CPT | Mod: ,,, | Performed by: INTERNAL MEDICINE

## 2020-10-13 PROCEDURE — C1713 ANCHOR/SCREW BN/BN,TIS/BN: HCPCS | Performed by: ORTHOPAEDIC SURGERY

## 2020-10-13 PROCEDURE — 27201423 OPTIME MED/SURG SUP & DEVICES STERILE SUPPLY: Performed by: ORTHOPAEDIC SURGERY

## 2020-10-13 PROCEDURE — 93005 ELECTROCARDIOGRAM TRACING: CPT

## 2020-10-13 PROCEDURE — 63600175 PHARM REV CODE 636 W HCPCS: Performed by: ORTHOPAEDIC SURGERY

## 2020-10-13 PROCEDURE — 85025 COMPLETE CBC W/AUTO DIFF WBC: CPT

## 2020-10-13 PROCEDURE — 25000003 PHARM REV CODE 250: Performed by: NURSE PRACTITIONER

## 2020-10-13 PROCEDURE — 93010 EKG 12-LEAD: ICD-10-PCS | Mod: ,,, | Performed by: INTERNAL MEDICINE

## 2020-10-13 PROCEDURE — 25000003 PHARM REV CODE 250: Performed by: NURSE ANESTHETIST, CERTIFIED REGISTERED

## 2020-10-13 PROCEDURE — 25000003 PHARM REV CODE 250: Performed by: INTERNAL MEDICINE

## 2020-10-13 PROCEDURE — C9290 INJ, BUPIVACAINE LIPOSOME: HCPCS | Performed by: ORTHOPAEDIC SURGERY

## 2020-10-13 PROCEDURE — 85014 HEMATOCRIT: CPT

## 2020-10-13 PROCEDURE — 97530 THERAPEUTIC ACTIVITIES: CPT

## 2020-10-13 PROCEDURE — 37000009 HC ANESTHESIA EA ADD 15 MINS: Performed by: ORTHOPAEDIC SURGERY

## 2020-10-13 PROCEDURE — 63600175 PHARM REV CODE 636 W HCPCS: Performed by: ANESTHESIOLOGY

## 2020-10-13 PROCEDURE — 86901 BLOOD TYPING SEROLOGIC RH(D): CPT

## 2020-10-13 PROCEDURE — 27000221 HC OXYGEN, UP TO 24 HOURS

## 2020-10-13 PROCEDURE — C1776 JOINT DEVICE (IMPLANTABLE): HCPCS | Performed by: ORTHOPAEDIC SURGERY

## 2020-10-13 PROCEDURE — 27447 PR TOTAL KNEE ARTHROPLASTY: ICD-10-PCS | Mod: AS,RT,, | Performed by: PHYSICIAN ASSISTANT

## 2020-10-13 PROCEDURE — 36000710: Performed by: ORTHOPAEDIC SURGERY

## 2020-10-13 PROCEDURE — 99900037 HC PT THERAPY SCREENING (STAT)

## 2020-10-13 PROCEDURE — 94799 UNLISTED PULMONARY SVC/PX: CPT

## 2020-10-13 DEVICE — CEMENT BONE SMPLX HV GENTMYCN: Type: IMPLANTABLE DEVICE | Site: KNEE | Status: FUNCTIONAL

## 2020-10-13 DEVICE — IMPLANTABLE DEVICE: Type: IMPLANTABLE DEVICE | Site: KNEE | Status: FUNCTIONAL

## 2020-10-13 DEVICE — INSERT TIBIAL PS #3 XPE 11MM: Type: IMPLANTABLE DEVICE | Site: KNEE | Status: FUNCTIONAL

## 2020-10-13 DEVICE — PATELLA XPE SMALL 29MM: Type: IMPLANTABLE DEVICE | Site: KNEE | Status: FUNCTIONAL

## 2020-10-13 DEVICE — STEM STRAIGHT PSA 14X75MM: Type: IMPLANTABLE DEVICE | Site: KNEE | Status: FUNCTIONAL

## 2020-10-13 RX ORDER — HYDRALAZINE HYDROCHLORIDE 50 MG/1
50 TABLET, FILM COATED ORAL EVERY 8 HOURS
Status: DISCONTINUED | OUTPATIENT
Start: 2020-10-13 | End: 2020-10-18 | Stop reason: HOSPADM

## 2020-10-13 RX ORDER — ATORVASTATIN CALCIUM 10 MG/1
10 TABLET, FILM COATED ORAL NIGHTLY
Status: DISCONTINUED | OUTPATIENT
Start: 2020-10-13 | End: 2020-10-13

## 2020-10-13 RX ORDER — METOCLOPRAMIDE HYDROCHLORIDE 5 MG/ML
10 INJECTION INTRAMUSCULAR; INTRAVENOUS EVERY 10 MIN PRN
Status: DISCONTINUED | OUTPATIENT
Start: 2020-10-13 | End: 2020-10-13 | Stop reason: HOSPADM

## 2020-10-13 RX ORDER — ATORVASTATIN CALCIUM 40 MG/1
80 TABLET, FILM COATED ORAL NIGHTLY
Status: DISCONTINUED | OUTPATIENT
Start: 2020-10-13 | End: 2020-10-18 | Stop reason: HOSPADM

## 2020-10-13 RX ORDER — BISACODYL 10 MG
10 SUPPOSITORY, RECTAL RECTAL DAILY PRN
Status: DISCONTINUED | OUTPATIENT
Start: 2020-10-13 | End: 2020-10-18 | Stop reason: HOSPADM

## 2020-10-13 RX ORDER — HYDROMORPHONE HYDROCHLORIDE 2 MG/ML
0.2 INJECTION, SOLUTION INTRAMUSCULAR; INTRAVENOUS; SUBCUTANEOUS EVERY 5 MIN PRN
Status: DISCONTINUED | OUTPATIENT
Start: 2020-10-13 | End: 2020-10-13 | Stop reason: HOSPADM

## 2020-10-13 RX ORDER — SODIUM CHLORIDE 9 MG/ML
INJECTION, SOLUTION INTRAVENOUS CONTINUOUS PRN
Status: DISCONTINUED | OUTPATIENT
Start: 2020-10-13 | End: 2020-10-13

## 2020-10-13 RX ORDER — MORPHINE SULFATE 4 MG/ML
4 INJECTION, SOLUTION INTRAMUSCULAR; INTRAVENOUS ONCE
Status: COMPLETED | OUTPATIENT
Start: 2020-10-13 | End: 2020-10-13

## 2020-10-13 RX ORDER — LIDOCAINE HYDROCHLORIDE 10 MG/ML
INJECTION, SOLUTION EPIDURAL; INFILTRATION; INTRACAUDAL; PERINEURAL
Status: DISCONTINUED | OUTPATIENT
Start: 2020-10-13 | End: 2020-10-13

## 2020-10-13 RX ORDER — ROCURONIUM BROMIDE 10 MG/ML
INJECTION, SOLUTION INTRAVENOUS
Status: DISCONTINUED | OUTPATIENT
Start: 2020-10-13 | End: 2020-10-13

## 2020-10-13 RX ORDER — ACETAMINOPHEN 325 MG/1
650 TABLET ORAL EVERY 8 HOURS PRN
Status: DISCONTINUED | OUTPATIENT
Start: 2020-10-13 | End: 2020-10-18 | Stop reason: HOSPADM

## 2020-10-13 RX ORDER — FENTANYL CITRATE 50 UG/ML
INJECTION, SOLUTION INTRAMUSCULAR; INTRAVENOUS
Status: DISCONTINUED | OUTPATIENT
Start: 2020-10-13 | End: 2020-10-13

## 2020-10-13 RX ORDER — ONDANSETRON 2 MG/ML
INJECTION INTRAMUSCULAR; INTRAVENOUS
Status: DISCONTINUED | OUTPATIENT
Start: 2020-10-13 | End: 2020-10-13

## 2020-10-13 RX ORDER — KETAMINE HYDROCHLORIDE 50 MG/ML
INJECTION, SOLUTION INTRAMUSCULAR; INTRAVENOUS
Status: DISCONTINUED | OUTPATIENT
Start: 2020-10-13 | End: 2020-10-13

## 2020-10-13 RX ORDER — OXYCODONE HYDROCHLORIDE 5 MG/1
10 TABLET ORAL
Status: DISCONTINUED | OUTPATIENT
Start: 2020-10-13 | End: 2020-10-15

## 2020-10-13 RX ORDER — SEVELAMER CARBONATE 800 MG/1
2400 TABLET, FILM COATED ORAL
Status: DISCONTINUED | OUTPATIENT
Start: 2020-10-13 | End: 2020-10-18 | Stop reason: HOSPADM

## 2020-10-13 RX ORDER — SODIUM CHLORIDE 0.9 % (FLUSH) 0.9 %
3 SYRINGE (ML) INJECTION EVERY 8 HOURS
Status: DISCONTINUED | OUTPATIENT
Start: 2020-10-13 | End: 2020-10-13 | Stop reason: HOSPADM

## 2020-10-13 RX ORDER — ONDANSETRON 2 MG/ML
4 INJECTION INTRAMUSCULAR; INTRAVENOUS EVERY 12 HOURS PRN
Status: DISCONTINUED | OUTPATIENT
Start: 2020-10-13 | End: 2020-10-14

## 2020-10-13 RX ORDER — OXYCODONE HYDROCHLORIDE 5 MG/1
5 TABLET ORAL
Status: DISCONTINUED | OUTPATIENT
Start: 2020-10-13 | End: 2020-10-17

## 2020-10-13 RX ORDER — GABAPENTIN 300 MG/1
300 CAPSULE ORAL NIGHTLY
Status: DISCONTINUED | OUTPATIENT
Start: 2020-10-13 | End: 2021-03-05

## 2020-10-13 RX ORDER — TRANEXAMIC ACID 100 MG/ML
1000 INJECTION, SOLUTION INTRAVENOUS
Status: COMPLETED | OUTPATIENT
Start: 2020-10-13 | End: 2020-10-13

## 2020-10-13 RX ORDER — ACETAMINOPHEN 10 MG/ML
INJECTION, SOLUTION INTRAVENOUS
Status: DISCONTINUED | OUTPATIENT
Start: 2020-10-13 | End: 2020-10-13

## 2020-10-13 RX ORDER — PROPOFOL 10 MG/ML
VIAL (ML) INTRAVENOUS
Status: DISCONTINUED | OUTPATIENT
Start: 2020-10-13 | End: 2020-10-13

## 2020-10-13 RX ORDER — METOPROLOL TARTRATE 25 MG/1
25 TABLET, FILM COATED ORAL 2 TIMES DAILY
Status: DISCONTINUED | OUTPATIENT
Start: 2020-10-13 | End: 2020-10-18 | Stop reason: HOSPADM

## 2020-10-13 RX ORDER — MEPERIDINE HYDROCHLORIDE 25 MG/ML
12.5 INJECTION INTRAMUSCULAR; INTRAVENOUS; SUBCUTANEOUS ONCE AS NEEDED
Status: COMPLETED | OUTPATIENT
Start: 2020-10-13 | End: 2020-10-13

## 2020-10-13 RX ORDER — HYDRALAZINE HYDROCHLORIDE 20 MG/ML
INJECTION INTRAMUSCULAR; INTRAVENOUS
Status: DISCONTINUED | OUTPATIENT
Start: 2020-10-13 | End: 2020-10-13

## 2020-10-13 RX ORDER — VANCOMYCIN HYDROCHLORIDE 1 G/20ML
INJECTION, POWDER, LYOPHILIZED, FOR SOLUTION INTRAVENOUS
Status: DISCONTINUED | OUTPATIENT
Start: 2020-10-13 | End: 2020-10-13 | Stop reason: HOSPADM

## 2020-10-13 RX ORDER — DIPHENHYDRAMINE HYDROCHLORIDE 50 MG/ML
25 INJECTION INTRAMUSCULAR; INTRAVENOUS EVERY 6 HOURS PRN
Status: DISCONTINUED | OUTPATIENT
Start: 2020-10-13 | End: 2020-10-13 | Stop reason: HOSPADM

## 2020-10-13 RX ORDER — SODIUM CHLORIDE 0.9 % (FLUSH) 0.9 %
10 SYRINGE (ML) INJECTION EVERY 6 HOURS
Status: ACTIVE | OUTPATIENT
Start: 2020-10-13

## 2020-10-13 RX ORDER — CEFAZOLIN SODIUM 2 G/50ML
2 SOLUTION INTRAVENOUS
Status: COMPLETED | OUTPATIENT
Start: 2020-10-13 | End: 2020-10-13

## 2020-10-13 RX ORDER — DOCUSATE SODIUM 100 MG/1
100 CAPSULE, LIQUID FILLED ORAL 2 TIMES DAILY
Status: DISCONTINUED | OUTPATIENT
Start: 2020-10-13 | End: 2020-10-18 | Stop reason: HOSPADM

## 2020-10-13 RX ORDER — BUPIVACAINE HYDROCHLORIDE AND EPINEPHRINE 2.5; 5 MG/ML; UG/ML
INJECTION, SOLUTION EPIDURAL; INFILTRATION; INTRACAUDAL; PERINEURAL
Status: DISCONTINUED | OUTPATIENT
Start: 2020-10-13 | End: 2020-10-13 | Stop reason: HOSPADM

## 2020-10-13 RX ORDER — ZOLPIDEM TARTRATE 5 MG/1
5 TABLET ORAL NIGHTLY PRN
Status: DISCONTINUED | OUTPATIENT
Start: 2020-10-13 | End: 2020-10-18 | Stop reason: HOSPADM

## 2020-10-13 RX ORDER — HYDROMORPHONE HYDROCHLORIDE 2 MG/ML
0.2 INJECTION, SOLUTION INTRAMUSCULAR; INTRAVENOUS; SUBCUTANEOUS EVERY 5 MIN PRN
Status: COMPLETED | OUTPATIENT
Start: 2020-10-13 | End: 2020-10-13

## 2020-10-13 RX ORDER — CHLORHEXIDINE GLUCONATE ORAL RINSE 1.2 MG/ML
10 SOLUTION DENTAL
Status: DISCONTINUED | OUTPATIENT
Start: 2020-10-13 | End: 2020-10-14 | Stop reason: SDUPTHER

## 2020-10-13 RX ORDER — CEFAZOLIN SODIUM 1 G/50ML
1 SOLUTION INTRAVENOUS
Status: COMPLETED | OUTPATIENT
Start: 2020-10-14 | End: 2020-10-14

## 2020-10-13 RX ORDER — EPHEDRINE SULFATE 50 MG/ML
INJECTION, SOLUTION INTRAVENOUS
Status: DISCONTINUED | OUTPATIENT
Start: 2020-10-13 | End: 2020-10-13

## 2020-10-13 RX ORDER — DEXAMETHASONE SODIUM PHOSPHATE 4 MG/ML
INJECTION, SOLUTION INTRA-ARTICULAR; INTRALESIONAL; INTRAMUSCULAR; INTRAVENOUS; SOFT TISSUE
Status: DISCONTINUED | OUTPATIENT
Start: 2020-10-13 | End: 2020-10-13

## 2020-10-13 RX ORDER — SODIUM CHLORIDE 9 MG/ML
INJECTION, SOLUTION INTRAVENOUS CONTINUOUS
Status: ACTIVE | OUTPATIENT
Start: 2020-10-13 | End: 2020-10-13

## 2020-10-13 RX ORDER — DEXAMETHASONE SODIUM PHOSPHATE 4 MG/ML
8 INJECTION, SOLUTION INTRA-ARTICULAR; INTRALESIONAL; INTRAMUSCULAR; INTRAVENOUS; SOFT TISSUE ONCE
Status: DISCONTINUED | OUTPATIENT
Start: 2020-10-13 | End: 2021-03-05

## 2020-10-13 RX ORDER — CHLORHEXIDINE GLUCONATE ORAL RINSE 1.2 MG/ML
10 SOLUTION DENTAL 2 TIMES DAILY
Status: COMPLETED | OUTPATIENT
Start: 2020-10-13 | End: 2020-10-18

## 2020-10-13 RX ORDER — NEOSTIGMINE METHYLSULFATE 1 MG/ML
INJECTION, SOLUTION INTRAVENOUS
Status: DISCONTINUED | OUTPATIENT
Start: 2020-10-13 | End: 2020-10-13

## 2020-10-13 RX ORDER — FAMOTIDINE 20 MG/1
20 TABLET, FILM COATED ORAL DAILY
Status: DISCONTINUED | OUTPATIENT
Start: 2020-10-14 | End: 2020-10-18 | Stop reason: HOSPADM

## 2020-10-13 RX ORDER — TRANEXAMIC ACID 100 MG/ML
1000 INJECTION, SOLUTION INTRAVENOUS ONCE
Status: DISCONTINUED | OUTPATIENT
Start: 2020-10-13 | End: 2020-10-13 | Stop reason: HOSPADM

## 2020-10-13 RX ADMIN — CEFAZOLIN SODIUM 2 G: 2 SOLUTION INTRAVENOUS at 09:10

## 2020-10-13 RX ADMIN — OXYCODONE 10 MG: 5 TABLET ORAL at 06:10

## 2020-10-13 RX ADMIN — HYDROMORPHONE HYDROCHLORIDE 0.2 MG: 2 INJECTION INTRAMUSCULAR; INTRAVENOUS; SUBCUTANEOUS at 12:10

## 2020-10-13 RX ADMIN — NEOSTIGMINE METHYLSULFATE 5 MG: 1 INJECTION INTRAVENOUS at 11:10

## 2020-10-13 RX ADMIN — EPHEDRINE SULFATE 20 MG: 50 INJECTION INTRAVENOUS at 11:10

## 2020-10-13 RX ADMIN — MEPERIDINE HYDROCHLORIDE 12.5 MG: 25 INJECTION INTRAMUSCULAR; INTRAVENOUS; SUBCUTANEOUS at 01:10

## 2020-10-13 RX ADMIN — KETAMINE HYDROCHLORIDE 25 MG: 50 INJECTION INTRAMUSCULAR; INTRAVENOUS at 10:10

## 2020-10-13 RX ADMIN — CHLORHEXIDINE GLUCONATE 0.12% ORAL RINSE 10 ML: 1.2 LIQUID ORAL at 09:10

## 2020-10-13 RX ADMIN — FENTANYL CITRATE 50 MCG: 50 INJECTION, SOLUTION INTRAMUSCULAR; INTRAVENOUS at 10:10

## 2020-10-13 RX ADMIN — SODIUM ZIRCONIUM CYCLOSILICATE 5 G: 5 POWDER, FOR SUSPENSION ORAL at 06:10

## 2020-10-13 RX ADMIN — GLYCOPYRROLATE 0.8 MG: 0.2 INJECTION, SOLUTION INTRAMUSCULAR; INTRAVENOUS at 11:10

## 2020-10-13 RX ADMIN — DEXTROSE MONOHYDRATE 6.25 MG: 50 INJECTION, SOLUTION INTRAVENOUS at 01:10

## 2020-10-13 RX ADMIN — SODIUM CHLORIDE: 0.9 INJECTION, SOLUTION INTRAVENOUS at 02:10

## 2020-10-13 RX ADMIN — FENTANYL CITRATE 25 MCG: 50 INJECTION, SOLUTION INTRAMUSCULAR; INTRAVENOUS at 10:10

## 2020-10-13 RX ADMIN — MORPHINE SULFATE 4 MG: 4 INJECTION, SOLUTION INTRAMUSCULAR; INTRAVENOUS at 10:10

## 2020-10-13 RX ADMIN — SODIUM CHLORIDE: 0.9 INJECTION, SOLUTION INTRAVENOUS at 09:10

## 2020-10-13 RX ADMIN — ROCURONIUM BROMIDE 15 MG: 10 INJECTION, SOLUTION INTRAVENOUS at 10:10

## 2020-10-13 RX ADMIN — FENTANYL CITRATE 75 MCG: 50 INJECTION, SOLUTION INTRAMUSCULAR; INTRAVENOUS at 09:10

## 2020-10-13 RX ADMIN — HYDRALAZINE HYDROCHLORIDE 50 MG: 50 TABLET, FILM COATED ORAL at 10:10

## 2020-10-13 RX ADMIN — SEVELAMER CARBONATE 2400 MG: 800 TABLET, FILM COATED ORAL at 06:10

## 2020-10-13 RX ADMIN — BUPIVACAINE 266 MG: 13.3 INJECTION, SUSPENSION, LIPOSOMAL INFILTRATION at 10:10

## 2020-10-13 RX ADMIN — PROPOFOL 50 MG: 10 INJECTION, EMULSION INTRAVENOUS at 10:10

## 2020-10-13 RX ADMIN — ACETAMINOPHEN 1000 MG: 10 INJECTION, SOLUTION INTRAVENOUS at 10:10

## 2020-10-13 RX ADMIN — METOPROLOL TARTRATE 25 MG: 25 TABLET, FILM COATED ORAL at 10:10

## 2020-10-13 RX ADMIN — TRANEXAMIC ACID 1000 MG: 100 INJECTION, SOLUTION INTRAVENOUS at 11:10

## 2020-10-13 RX ADMIN — GABAPENTIN 300 MG: 300 CAPSULE ORAL at 09:10

## 2020-10-13 RX ADMIN — LIDOCAINE HYDROCHLORIDE 100 MG: 10 INJECTION, SOLUTION EPIDURAL; INFILTRATION; INTRACAUDAL; PERINEURAL at 09:10

## 2020-10-13 RX ADMIN — FENTANYL CITRATE 50 MCG: 50 INJECTION, SOLUTION INTRAMUSCULAR; INTRAVENOUS at 11:10

## 2020-10-13 RX ADMIN — TRANEXAMIC ACID 1000 MG: 100 INJECTION, SOLUTION INTRAVENOUS at 09:10

## 2020-10-13 RX ADMIN — ONDANSETRON 4 MG: 2 INJECTION, SOLUTION INTRAMUSCULAR; INTRAVENOUS at 11:10

## 2020-10-13 RX ADMIN — DOCUSATE SODIUM 100 MG: 100 CAPSULE, LIQUID FILLED ORAL at 09:10

## 2020-10-13 RX ADMIN — OXYCODONE 10 MG: 5 TABLET ORAL at 03:10

## 2020-10-13 RX ADMIN — KETAMINE HYDROCHLORIDE 25 MG: 50 INJECTION INTRAMUSCULAR; INTRAVENOUS at 09:10

## 2020-10-13 RX ADMIN — DEXAMETHASONE SODIUM PHOSPHATE 8 MG: 4 INJECTION, SOLUTION INTRA-ARTICULAR; INTRALESIONAL; INTRAMUSCULAR; INTRAVENOUS; SOFT TISSUE at 10:10

## 2020-10-13 RX ADMIN — ATORVASTATIN CALCIUM 80 MG: 40 TABLET, FILM COATED ORAL at 10:10

## 2020-10-13 RX ADMIN — ROCURONIUM BROMIDE 50 MG: 10 INJECTION, SOLUTION INTRAVENOUS at 09:10

## 2020-10-13 RX ADMIN — SUGAMMADEX 200 MG: 100 INJECTION, SOLUTION INTRAVENOUS at 11:10

## 2020-10-13 RX ADMIN — EPHEDRINE SULFATE 10 MG: 50 INJECTION INTRAVENOUS at 11:10

## 2020-10-13 RX ADMIN — HYDRALAZINE HYDROCHLORIDE 10 MG: 20 INJECTION INTRAMUSCULAR; INTRAVENOUS at 10:10

## 2020-10-13 RX ADMIN — CHLORHEXIDINE GLUCONATE 0.12% ORAL RINSE 15 ML: 1.2 LIQUID ORAL at 10:10

## 2020-10-13 RX ADMIN — OXYCODONE 10 MG: 5 TABLET ORAL at 09:10

## 2020-10-13 RX ADMIN — PROPOFOL 100 MG: 10 INJECTION, EMULSION INTRAVENOUS at 09:10

## 2020-10-13 NOTE — PLAN OF CARE
Patient remains free of falls and safety precautions maintained. Afebrile. Pain controlled. IV fluids and abx per order. DOUG and SCD in place. KEVEN dressing to right knee. 2L NC. No numbness or tingling. Will continue to monitor. 12 hour chart check.

## 2020-10-13 NOTE — DISCHARGE SUMMARY
[facility]  Discharge Note  Short Stay    Procedure(s) (LRB):  ARTHROPLASTY, KNEE, TOTAL (Right)    OUTCOME: Patient tolerated treatment/procedure well without complication and is now ready for discharge.    DISPOSITION: Home or Self Care    FINAL DIAGNOSIS:  <principal problem not specified>  Right knee arthritis  FOLLOWUP: In clinic    DISCHARGE INSTRUCTIONS: Patient instructions for joint replacement        After surgery at home  1.  Take Tylenol 650 mg 3 times a day for 14 days then as needed for mild pain  2.  Take gabapentin 300 mg nightly for 6 weeks  3.    4.  Must take aspirin 81 mg twice a day for 6 weeks unless you are on other blood thinners/Plavix, Eliquis, Xarelto, Coumadin etc  5.  Must wear compressive stockings for 6 weeks minimum to decrease the risk of blood clot and swelling  6.  Hydrocodone/Norco or oxycodone/Percocet will be prescribed to take every 6 hr as needed for breakthrough pain  7.  May apply ice on the knee to help with decreasing pain  8.  Keep wound dry for 2 weeks until stitches/staples removed than you will be allowed to shower in 24 hr and get the wound wet.  No soaking of the wound in the tub or swimming for 4 weeks after surgery  9.  No driving for 4 weeks unless specified by physician  10.  Avoid touching the wound or surrounding area /at least 2 inches on each side of the surgical incision until staples are removed/stitches   11.  May change the surgical dressing if extremely bloody or has drainage on it. May clean the wound with peroxide or Betadine and apply sterile dressing and Ace wrap over it  12.  Leave hospital dressing on for 3 days then may change by applying sterile 4 x 4 and Ace wrap after cleaning with Betadine or peroxide.  May leave this dressing change to home health nurses     Discharge Procedure Orders   Potassium   Standing Status: Future Standing Exp. Date: 12/06/21        Clinical Reference Documents Added to Patient Instructions       Document     ANESTHESIA: GENERAL ANESTHESIA (ENGLISH)    DEEP BREATHING (ENGLISH)    INCENTIVE SPIROMETER, USING AN (ENGLISH)    JOINT SURGERY, HOME SAFETY AFTER (ENGLISH)    KNEE REPLACEMENT, AFTER: HOSPITAL RECOVERY (ENGLISH)    KNEE REPLACEMENT, AFTER: KEEPING YOUR KNEE HEALTHY (ENGLISH)    KNEE REPLACEMENT, AFTER: RIGHT AFTER SURGERY (ENGLISH)    KNEE REPLACEMENT, AFTER: THE FIRST MONTH  (ENGLISH)    KNEE REPLACEMENT, AFTER: CONTROLLING SWELLING (ENGLISH)    MANAGING PAIN AT HOME AFTER KNEE REPLACEMENT (ENGLISH)    TOTAL KNEE REPLACEMENT, DISCHARGE INSTRUCTIONS FOR (ENGLISH)    USING A WALKER AFTER KNEE REPLACEMENT (ENGLISH)    WHEN TO CALL YOUR SURGEON AFTER KNEE REPLACEMENT (ENGLISH)

## 2020-10-13 NOTE — PLAN OF CARE
Aarti met with patient at bedside to complete discharge assessment.  Patient lives at home alone.  Patient currently does not have help at home.  However, she reported that she will ask one of her [employer] members to assist her in her care.  Patient's transportation for D/C will be medical transportation van.  A rollator and BSC was delivered to patient at bedside.  Patient requested to have a shower chair as well.  SW informed her that her insurance may not cover shower chairs.  Patient goes to [facility] on [address] on MWF.  Patient have access to [facility].  Aarti provided a transitional care folder, information on advanced directives, information on pharmacy bedside delivery, and discharge planning begins on admission with contact information for any needs/questions.  Patient's white board updated with CM name and number.  Patient's PCP: Ayo Beyer MD  Patient's Preferred Pharmacy:   [facility] (ESRD Bundle Only) - [place], [place] - [address]  [address]  [address]  [facility] [address]  Phone: [phone] Fax: [phone]    [facility]/pharmacy #[id number] - [place], [place] - [address]  [facility]  [address]   [facility] [address]  Phone: [phone] Fax: [phone]    [facility] [id number] [facility] - [place], [place] - [address]  [address] [facility] [address]  Phone: [phone] Fax: [phone]       10/13/20 1635   Discharge Assessment   Assessment Type Discharge Planning Assessment   Confirmed/corrected address and phone number on facesheet? Yes   Assessment information obtained from? Patient   Communicated expected length of stay with patient/caregiver yes   Prior to hospitilization cognitive status: Alert/Oriented   Prior to hospitalization functional status: Independent   Current cognitive status: Alert/Oriented   Current Functional Status: Independent   Lives With alone   Able to Return to Prior Arrangements yes   Is patient able to care for self after discharge?  Yes   Who are your caregiver(s) and their phone number(s)? Patient reported that is working on getting someone to help her at home.   Patient's perception of discharge disposition home or selfcare   Readmission Within the Last 30 Days no previous admission in last 30 days   Patient currently being followed by outpatient case management? No   Patient currently receives any other outside agency services? No   Equipment Currently Used at Home none   Do you have any problems affording any of your prescribed medications? No   Is the patient taking medications as prescribed? yes   Does the patient have transportation home? Yes   Transportation Anticipated health plan transportation   Dialysis Name and Scheduled days [facility] [address] [facility]   Does the patient receive services at the Coumadin Clinic? No   Discharge Plan A Home   Discharge Plan B Home Health   DME Needed Upon Discharge  bedside commode;rollator;shower chair   Patient/Family in Agreement with Plan yes     Dulce Ellis LMSW 10/13/2020 4:39 PM

## 2020-10-13 NOTE — TRANSFER OF CARE
"Anesthesia Transfer of Care Note    Patient: Gillian Pitts    Procedure(s) Performed: Procedure(s) (LRB):  ARTHROPLASTY, KNEE, TOTAL (Right)    Patient location: PACU    Anesthesia Type: general    Transport from OR: Transported from OR on room air with adequate spontaneous ventilation    Post pain: adequate analgesia    Post assessment: no apparent anesthetic complications and tolerated procedure well    Post vital signs: stable    Level of consciousness: awake    Nausea/Vomiting: no nausea/vomiting    Complications: none          Last vitals:   Visit Vitals  /64 (BP Location: Right arm, Patient Position: Lying)   Pulse 93   Temp 36.1 °C (97 °F) (Temporal)   Resp 16   Ht 5' 4" (1.626 m)   Wt 108.5 kg (239 lb 3.2 oz)   LMP  (LMP Unknown)   SpO2 97%   Breastfeeding No   BMI 41.06 kg/m²     "
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
## 2020-10-13 NOTE — OP NOTE
[facility] -   Orthopedic Surgery  Operative Note    SUMMARY     Date of Procedure: 10/13/2020     Procedure: Procedure(s) (LRB):  ARTHROPLASTY, KNEE, TOTAL (Right)       Surgeon(s) and Role:     * Adilson Aguilar MD - Primary    Assisting Surgeon: None    Pre-Operative Diagnosis: Arthritis of knee, right [M17.11]    Post-Operative Diagnosis: Post-Op Diagnosis Codes:     * Arthritis of knee, right [M17.11]    Anesthesia: General    Injections/Meds: 266 mg Exparel, 30 cc 0.25% Marcaine with epinephrine, 30 cc injectable saline, , 4 mg morphine    Tourniquet time: none    Significant Surgical Tasks Conducted by the Assistant(s), if Applicable:  toni Wetzel    Complications: none     Estimated Blood Loss (EBL): 200 mL           Implants: united ortho femur #3 PS, tibia #3 with 5mm full wedge with 11mm PS inert, 29 dome patella  gent cement simplex, vancomycin powder, tibial stem [id number]    Specimens: None           Condition: Good    Disposition: PACU - hemodynamically stable.    Attestation: I performed the procedure.    Description:  Medial parapatellar approach used to perform right TKA.  After patient received antibiotics and preop meds the knee was prepped and draped in usual sterile fashion. Midline incision was made 2 fingers above the superior pole of the patella to 2 fingers below.  Full-thickness skin flap raised medially and partially laterally.  Medial parapatellar incision was made to medial tibial tubercle. Medial release off the medial tibial flare perform subperiosteal Elevatin mcl to the posterior medial corner of the tibia.  Patellar fat pad resected partially.  Superior capsulectomy performed.  Osteophytes removed mediolateral meniscus removed. Patella was resurfaced after measuring and free hand technique used.  Partial lateral facetectomy performed.  Patella button trial was applied and the measured and reconstituted thickness. The patella was moved laterally the  knee hyperflexed.  Quarter-inch drill bit used to open the canal into the femur and the canal was suctioned.  Irrigated and suctioned again.  Canal finer inserted an intramedullary alignment tal inserted into the femoral canal and pinned our cutting block at 5° of valgus cut. The tal was removed and distal femur was cut through the cutting block. We measured the size of the femur and marked our rotation and then 1 cutting block applied and pinned in place and proceeded cutting the anterior condyle posterior condyle and chamfer cuts followed by box cut. The femoral canal was bone grafted.  Trial was applied on the femur and posterior osteophytes removed. Directed attention to the tibia quarter-inch drill bit used to open the canal into the tibia.  Canal Finders inserted. A gated the canal and suctioned it. Fluted intramedullary alignment tal applied and using the depth gauge and the cutting block on the tibia.  Proceeded with multiple bent Homans protecting the collateral ligaments and posterior neurovascular structures and using the oscillating saw cut the tibia.  Excess osteophytes removed. Measured the tibia, marked the rotation on the base plate , pinned in place and punched our Aby.  Trials placed into the knee and put the knee through range of motion checking gap in extension , flexion at 45° of flexion.  Come from the sizes.  Esmarch was used and knee flexed ,thigh tourniquet inflated .  The knee was pulse lavaged then was injected in the surrounding soft tissues for postop pain control.  Prosthesis was cemented in place and excess cement was removed. Once cement has hardened the knee was placed through range of motion confirming stability. Once prosthesis was checked the tourniquet was deflated. Closure of the knee performed with 1.  Vicryl and figure-of-eight and running fashion. Subcutaneous tissues were irrigated and then closed using 1.  Vicryl inverted stitch and skin using staple gun.  Sterile  dressing applied.
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
## 2020-10-13 NOTE — PROGRESS NOTES
Pharmacist Renal Dose Adjustment Note    Gillian Pitts is a 69 y.o. female being treated with the medication famotidine.     Patient Data:    Vital Signs (Most Recent):  Temp: 97 °F (36.1 °C) (10/13/20 1158)  Pulse: 70 (10/13/20 1434)  Resp: 19 (10/13/20 1526)  BP: (!) 154/68 (10/13/20 1434)  SpO2: 96 % (10/13/20 1400)   Vital Signs (72h Range):  Temp:  [97 °F (36.1 °C)]   Pulse:  [47-93]   Resp:  [12-20]   BP: (128-154)/(55-70)   SpO2:  [90 %-100 %]      Recent Labs   Lab 10/13/20  1545   CREATININE 8.0*     Serum creatinine: 8 mg/dL (H) 10/13/20 1545  Estimated creatinine clearance: 8.1 mL/min (A)    Medication:famotidine dose: 20 mg frequency Q12H will be changed to medication:famotidine dose:20 mg frequency:Q24H.     Pharmacist's Name: Jessica Nielson  Pharmacist's Extension: [phone]

## 2020-10-13 NOTE — PROGRESS NOTES
Pharmacist Renal Dose Adjustment Note    Gillian Pitts is a 69 y.o. female being treated with the medication cefazolin for surgical prophylaxis.     Patient Data:    Vital Signs (Most Recent):  Temp: 97 °F (36.1 °C) (10/13/20 1158)  Pulse: 70 (10/13/20 1434)  Resp: 20 (10/13/20 1434)  BP: (!) 154/68 (10/13/20 1434)  SpO2: 96 % (10/13/20 1400)   Vital Signs (72h Range):  Temp:  [97 °F (36.1 °C)]   Pulse:  [47-93]   Resp:  [12-20]   BP: (128-154)/(55-70)   SpO2:  [90 %-100 %]      No results for input(s): CREATININE in the last 168 hours.  Creatinine clearance cannot be calculated (Patient's most recent lab result is older than the maximum 7 days allowed.)    Per protocol for dialysis patients, dose will be decreased from 2 gm IV every 8 hours x 2 post-op doses to 1 gm IV every 24 hours x 1 post-op dose (to total 24 hours of prophylactic antibiotics).    Pharmacist's Name: Katherine E Mcardle  Pharmacist's Extension: [phone]

## 2020-10-13 NOTE — HPI
Patient is a 69 year old AA woman with ESRD on HD , anemia, HTN, PCKD, hyperparathyroidism.  Patient admitted under orthopedic surgery for right knee arthroplasty.  Patient is status post surgery.  Preop labs showed potassium of 5.9.  Patient is scheduled to have dialysis tomorrow.      Patient dilayzes on MWF schedule at [facility] under Dr Kitchen, Access is : left arm AV fistula.    10/13/2020 patient seen in consultation for ESRD hyperkalemia at the bedside using telemedicine protocol with the help of the nursing staff.

## 2020-10-13 NOTE — ANESTHESIA POSTPROCEDURE EVALUATION
Anesthesia Post Evaluation    Patient: Gillian Pitts    Procedure(s) Performed: Procedure(s) (LRB):  ARTHROPLASTY, KNEE, TOTAL (Right)    Final Anesthesia Type: general    Patient location during evaluation: PACU  Patient participation: Yes- Able to Participate  Level of consciousness: awake and alert  Post-procedure vital signs: reviewed and stable  Pain management: adequate  Airway patency: patent  JUANA mitigation strategies: Extubation while patient is awake  PONV status at discharge: No PONV  Anesthetic complications: no      Cardiovascular status: hemodynamically stable  Respiratory status: spontaneous ventilation  Hydration status: euvolemic  Follow-up not needed.          Vitals Value Taken Time   /68 10/13/20 1434   Temp 36.1 °C (97 °F) 10/13/20 1158   Pulse 70 10/13/20 1434   Resp 19 10/13/20 1526   SpO2 100 % 10/13/20 1414   Vitals shown include unvalidated device data.      Event Time   Out of Recovery 14:25:00         Pain/Mo Score: Pain Rating Prior to Med Admin: 7 (10/13/2020  3:26 PM)  Pain Rating Post Med Admin: 6 (10/13/2020  2:16 PM)  Mo Score: 8 (10/13/2020  1:00 PM)
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
## 2020-10-14 LAB
ALBUMIN SERPL BCP-MCNC: 3.5 G/DL (ref 3.5–5.2)
ALP SERPL-CCNC: 108 U/L (ref 55–135)
ALT SERPL W/O P-5'-P-CCNC: 5 U/L (ref 10–44)
ANION GAP SERPL CALC-SCNC: 13 MMOL/L (ref 8–16)
ANION GAP SERPL CALC-SCNC: 26 MMOL/L (ref 8–16)
AST SERPL-CCNC: 18 U/L (ref 10–40)
BASOPHILS # BLD AUTO: 0.02 K/UL (ref 0–0.2)
BASOPHILS NFR BLD: 0.1 % (ref 0–1.9)
BILIRUB SERPL-MCNC: 0.3 MG/DL (ref 0.1–1)
BUN SERPL-MCNC: 22 MG/DL (ref 8–23)
BUN SERPL-MCNC: 50 MG/DL (ref 8–23)
CALCIUM SERPL-MCNC: 8.2 MG/DL (ref 8.7–10.5)
CALCIUM SERPL-MCNC: 8.6 MG/DL (ref 8.7–10.5)
CHLORIDE SERPL-SCNC: 102 MMOL/L (ref 95–110)
CHLORIDE SERPL-SCNC: 97 MMOL/L (ref 95–110)
CO2 SERPL-SCNC: 22 MMOL/L (ref 23–29)
CO2 SERPL-SCNC: 5 MMOL/L (ref 23–29)
CREAT SERPL-MCNC: 5.3 MG/DL (ref 0.5–1.4)
CREAT SERPL-MCNC: 8.7 MG/DL (ref 0.5–1.4)
DIFFERENTIAL METHOD: ABNORMAL
EOSINOPHIL # BLD AUTO: 0 K/UL (ref 0–0.5)
EOSINOPHIL NFR BLD: 0 % (ref 0–8)
ERYTHROCYTE [DISTWIDTH] IN BLOOD BY AUTOMATED COUNT: 16.7 % (ref 11.5–14.5)
EST. GFR  (AFRICAN AMERICAN): 5 ML/MIN/1.73 M^2
EST. GFR  (AFRICAN AMERICAN): 9 ML/MIN/1.73 M^2
EST. GFR  (NON AFRICAN AMERICAN): 4 ML/MIN/1.73 M^2
EST. GFR  (NON AFRICAN AMERICAN): 8 ML/MIN/1.73 M^2
GLUCOSE SERPL-MCNC: 140 MG/DL (ref 70–110)
GLUCOSE SERPL-MCNC: 73 MG/DL (ref 70–110)
HCT VFR BLD AUTO: 37.5 % (ref 37–48.5)
HGB BLD-MCNC: 10.9 G/DL (ref 12–16)
IMM GRANULOCYTES # BLD AUTO: 0.06 K/UL (ref 0–0.04)
IMM GRANULOCYTES NFR BLD AUTO: 0.4 % (ref 0–0.5)
LYMPHOCYTES # BLD AUTO: 1.9 K/UL (ref 1–4.8)
LYMPHOCYTES NFR BLD: 14.5 % (ref 18–48)
MCH RBC QN AUTO: 31.1 PG (ref 27–31)
MCHC RBC AUTO-ENTMCNC: 29.1 G/DL (ref 32–36)
MCV RBC AUTO: 107 FL (ref 82–98)
MONOCYTES # BLD AUTO: 1.6 K/UL (ref 0.3–1)
MONOCYTES NFR BLD: 11.9 % (ref 4–15)
NEUTROPHILS # BLD AUTO: 9.8 K/UL (ref 1.8–7.7)
NEUTROPHILS NFR BLD: 73.1 % (ref 38–73)
NRBC BLD-RTO: 0 /100 WBC
PLATELET # BLD AUTO: 223 K/UL (ref 150–350)
PMV BLD AUTO: 9.8 FL (ref 9.2–12.9)
POTASSIUM SERPL-SCNC: 4.1 MMOL/L (ref 3.5–5.1)
POTASSIUM SERPL-SCNC: 5.1 MMOL/L (ref 3.5–5.1)
PROT SERPL-MCNC: 6.7 G/DL (ref 6–8.4)
RBC # BLD AUTO: 3.51 M/UL (ref 4–5.4)
SODIUM SERPL-SCNC: 132 MMOL/L (ref 136–145)
SODIUM SERPL-SCNC: 133 MMOL/L (ref 136–145)
WBC # BLD AUTO: 13.39 K/UL (ref 3.9–12.7)

## 2020-10-14 PROCEDURE — 97110 THERAPEUTIC EXERCISES: CPT

## 2020-10-14 PROCEDURE — 80048 BASIC METABOLIC PNL TOTAL CA: CPT

## 2020-10-14 PROCEDURE — 99900035 HC TECH TIME PER 15 MIN (STAT)

## 2020-10-14 PROCEDURE — 36415 COLL VENOUS BLD VENIPUNCTURE: CPT

## 2020-10-14 PROCEDURE — 97166 OT EVAL MOD COMPLEX 45 MIN: CPT

## 2020-10-14 PROCEDURE — 94799 UNLISTED PULMONARY SVC/PX: CPT

## 2020-10-14 PROCEDURE — 80053 COMPREHEN METABOLIC PANEL: CPT

## 2020-10-14 PROCEDURE — 97530 THERAPEUTIC ACTIVITIES: CPT

## 2020-10-14 PROCEDURE — 99232 PR SUBSEQUENT HOSPITAL CARE,LEVL II: ICD-10-PCS | Mod: ,,, | Performed by: INTERNAL MEDICINE

## 2020-10-14 PROCEDURE — 63600175 PHARM REV CODE 636 W HCPCS: Performed by: NURSE PRACTITIONER

## 2020-10-14 PROCEDURE — 99232 SBSQ HOSP IP/OBS MODERATE 35: CPT | Mod: ,,, | Performed by: INTERNAL MEDICINE

## 2020-10-14 PROCEDURE — 85025 COMPLETE CBC W/AUTO DIFF WBC: CPT

## 2020-10-14 PROCEDURE — 25000003 PHARM REV CODE 250: Performed by: INTERNAL MEDICINE

## 2020-10-14 PROCEDURE — 25000003 PHARM REV CODE 250: Performed by: NURSE PRACTITIONER

## 2020-10-14 PROCEDURE — 80100014 HC HEMODIALYSIS 1:1

## 2020-10-14 PROCEDURE — 63600175 PHARM REV CODE 636 W HCPCS: Performed by: ORTHOPAEDIC SURGERY

## 2020-10-14 PROCEDURE — A4216 STERILE WATER/SALINE, 10 ML: HCPCS | Performed by: ORTHOPAEDIC SURGERY

## 2020-10-14 PROCEDURE — 97116 GAIT TRAINING THERAPY: CPT

## 2020-10-14 PROCEDURE — 11000001 HC ACUTE MED/SURG PRIVATE ROOM

## 2020-10-14 PROCEDURE — 25000003 PHARM REV CODE 250: Performed by: ORTHOPAEDIC SURGERY

## 2020-10-14 PROCEDURE — 99900038 HC OT GENERIC THERAPY SCREENING (STAT)

## 2020-10-14 RX ORDER — MUPIROCIN 20 MG/G
OINTMENT TOPICAL 2 TIMES DAILY
Status: DISCONTINUED | OUTPATIENT
Start: 2020-10-14 | End: 2020-10-18 | Stop reason: HOSPADM

## 2020-10-14 RX ORDER — ONDANSETRON 2 MG/ML
4 INJECTION INTRAMUSCULAR; INTRAVENOUS EVERY 6 HOURS PRN
Status: DISCONTINUED | OUTPATIENT
Start: 2020-10-14 | End: 2020-10-18 | Stop reason: HOSPADM

## 2020-10-14 RX ORDER — SODIUM CHLORIDE 9 MG/ML
INJECTION, SOLUTION INTRAVENOUS ONCE
Status: CANCELLED | OUTPATIENT
Start: 2020-10-14 | End: 2020-10-14

## 2020-10-14 RX ORDER — HEPARIN SODIUM 1000 [USP'U]/ML
1000 INJECTION, SOLUTION INTRAVENOUS; SUBCUTANEOUS
Status: CANCELLED | OUTPATIENT
Start: 2020-10-14

## 2020-10-14 RX ORDER — HYDROMORPHONE HYDROCHLORIDE 1 MG/ML
0.5 INJECTION, SOLUTION INTRAMUSCULAR; INTRAVENOUS; SUBCUTANEOUS ONCE
Status: COMPLETED | OUTPATIENT
Start: 2020-10-14 | End: 2020-10-14

## 2020-10-14 RX ADMIN — SEVELAMER CARBONATE 2400 MG: 800 TABLET, FILM COATED ORAL at 11:10

## 2020-10-14 RX ADMIN — OXYCODONE 10 MG: 5 TABLET ORAL at 02:10

## 2020-10-14 RX ADMIN — METOPROLOL TARTRATE 25 MG: 25 TABLET, FILM COATED ORAL at 09:10

## 2020-10-14 RX ADMIN — CHLORHEXIDINE GLUCONATE 0.12% ORAL RINSE 10 ML: 1.2 LIQUID ORAL at 08:10

## 2020-10-14 RX ADMIN — ONDANSETRON 4 MG: 2 INJECTION INTRAMUSCULAR; INTRAVENOUS at 10:10

## 2020-10-14 RX ADMIN — CEFAZOLIN SODIUM 1 G: 1 SOLUTION INTRAVENOUS at 08:10

## 2020-10-14 RX ADMIN — OXYCODONE 10 MG: 5 TABLET ORAL at 03:10

## 2020-10-14 RX ADMIN — DOCUSATE SODIUM 100 MG: 100 CAPSULE, LIQUID FILLED ORAL at 09:10

## 2020-10-14 RX ADMIN — MUPIROCIN: 20 OINTMENT TOPICAL at 08:10

## 2020-10-14 RX ADMIN — ONDANSETRON 4 MG: 2 INJECTION, SOLUTION INTRAMUSCULAR; INTRAVENOUS at 02:10

## 2020-10-14 RX ADMIN — ONDANSETRON 4 MG: 2 INJECTION INTRAMUSCULAR; INTRAVENOUS at 02:10

## 2020-10-14 RX ADMIN — DOCUSATE SODIUM 100 MG: 100 CAPSULE, LIQUID FILLED ORAL at 08:10

## 2020-10-14 RX ADMIN — OXYCODONE 10 MG: 5 TABLET ORAL at 07:10

## 2020-10-14 RX ADMIN — Medication 10 ML: at 05:10

## 2020-10-14 RX ADMIN — CHLORHEXIDINE GLUCONATE 0.12% ORAL RINSE 10 ML: 1.2 LIQUID ORAL at 09:10

## 2020-10-14 RX ADMIN — HYDROMORPHONE HYDROCHLORIDE 0.5 MG: 1 INJECTION, SOLUTION INTRAMUSCULAR; INTRAVENOUS; SUBCUTANEOUS at 09:10

## 2020-10-14 RX ADMIN — Medication 10 ML: at 11:10

## 2020-10-14 RX ADMIN — SEVELAMER CARBONATE 2400 MG: 800 TABLET, FILM COATED ORAL at 04:10

## 2020-10-14 RX ADMIN — Medication 10 ML: at 06:10

## 2020-10-14 RX ADMIN — HYDRALAZINE HYDROCHLORIDE 50 MG: 50 TABLET, FILM COATED ORAL at 06:10

## 2020-10-14 RX ADMIN — OXYCODONE 10 MG: 5 TABLET ORAL at 11:10

## 2020-10-14 RX ADMIN — SEVELAMER CARBONATE 2400 MG: 800 TABLET, FILM COATED ORAL at 07:10

## 2020-10-14 RX ADMIN — Medication 10 ML: at 12:10

## 2020-10-14 RX ADMIN — HYDRALAZINE HYDROCHLORIDE 50 MG: 50 TABLET, FILM COATED ORAL at 09:10

## 2020-10-14 RX ADMIN — METOPROLOL TARTRATE 25 MG: 25 TABLET, FILM COATED ORAL at 08:10

## 2020-10-14 RX ADMIN — ATORVASTATIN CALCIUM 80 MG: 40 TABLET, FILM COATED ORAL at 09:10

## 2020-10-14 RX ADMIN — FAMOTIDINE 20 MG: 20 TABLET ORAL at 08:10

## 2020-10-14 NOTE — PROGRESS NOTES
"Spoke with PT. PT states while walking in the hallway patient said she felt a "pop" in her knee. PT states she finished walking back to room and sat in chair with no problems. Patient states pain is controlled with pain medication. Secure message sent to PA to make aware.  "

## 2020-10-14 NOTE — PROGRESS NOTES
Spoke with OT. Patient complaining of pain coming from behind her right knee where incision is. Notified PA to make aware.

## 2020-10-14 NOTE — PLAN OF CARE
CM met with patient at the bedside to discuss the discharge plan.  Patient stated that she is a HD patient at [facility].  She stated that she does not have help at home and is not really able to use her operative leg much at this time.  CM discussed home health and explained that it was not round the clock care.  Patient stated that she was unsure how she would manage at home and how she would get to dialysis since she is not walking well at this time.  CM asked patient if she was agreeable to a referral being made to inpatient rehab to find out if she meets the criteria.  She stated that she is interested in speaking to a rep from rehab.  CM discussed facilities and she is agreeable to [facility].  Choice form completed that included both [facility] and [facility].  Referral will be made via [facility].     10/14/20 1424   Discharge Reassessment   Assessment Type Discharge Planning Reassessment   Provided patient/caregiver education on the expected discharge date and the discharge plan Yes   Do you have any problems affording any of your prescribed medications? No   Discharge Plan A Home Health;Home   Discharge Plan B Rehab   DME Needed Upon Discharge  none   Patient choice form signed by patient/caregiver Yes   Anticipated Discharge Disposition Rehab   Can the patient/caregiver answer the patient profile reliably? Yes, cognitively intact

## 2020-10-14 NOTE — PT/OT/SLP PROGRESS
Occupational Therapy      Patient Name:  Gillian Pitts   MRN:  [id number].  EVAL INITIATED VIA CHART REVIEW . PT RECEIVING DIALYSIS, WILL COMPLETE EVAL S/P FINISH WITH DIALYSIS. EVAL REPORT TO FOLLOW  Radha Goldstein, OT.  10/14/2020   1300

## 2020-10-14 NOTE — SUBJECTIVE & OBJECTIVE
"Principal Problem:<principal problem not specified>    Principal Orthopedic Problem:R TKA    Interval History:   continued N/V limiting diet  pain is requiring PO and IV meds at this time  Participating with therapy     Review of patient's allergies indicates:   Allergen Reactions    Iodinated contrast media Other (See Comments)     Kidney shut down    Morphine Other (See Comments)     Severe sedation    Povidone-iodine      pt stated iodine will shut down her kidney    Contrast media     Iodine and iodide containing products        Current Facility-Administered Medications   Medication    acetaminophen tablet 650 mg    atorvastatin tablet 80 mg    bisacodyL suppository 10 mg    ceFAZolin (ANCEF) 1 gram in dextrose 5 % 50 mL IVPB (premix)    chlorhexidine 0.12 % solution 10 mL    chlorhexidine 0.12 % solution 10 mL    dexamethasone injection 8 mg    docusate sodium capsule 100 mg    famotidine tablet 20 mg    gabapentin capsule 300 mg    hydrALAZINE tablet 50 mg    metoprolol tartrate (LOPRESSOR) tablet 25 mg    mupirocin 2 % ointment    nozaseptin (NOZIN) nasal     nozaseptin (NOZIN) nasal     ondansetron injection 4 mg    ondansetron injection 4 mg    oxyCODONE immediate release tablet 10 mg    oxyCODONE immediate release tablet 5 mg    sevelamer carbonate tablet 2,400 mg    sodium chloride 0.9% flush 10 mL    zolpidem tablet 5 mg     Objective:     Vital Signs (Most Recent):  Temp: 97.4 °F (36.3 °C) (10/14/20 0718)  Pulse: 72 (10/14/20 0718)  Resp: 19 (10/14/20 0736)  BP: (!) 125/56 (10/14/20 0718)  SpO2: 98 % (10/14/20 0718) Vital Signs (24h Range):  Temp:  [96 °F (35.6 °C)-97.5 °F (36.4 °C)] 97.4 °F (36.3 °C)  Pulse:  [47-93] 72  Resp:  [12-20] 19  SpO2:  [90 %-100 %] 98 %  BP: (116-154)/(55-79) 125/56     Weight: 108.5 kg (239 lb 3.2 oz)  Height: 5' 4" (162.6 cm)  Body mass index is 41.06 kg/m².      Intake/Output Summary (Last 24 hours) at 10/14/2020 0741  Last " data filed at 10/13/2020 1715  Gross per 24 hour   Intake 1330.42 ml   Output --   Net 1330.42 ml       General    Constitutional: She is oriented to person, place, and time. She appears well-developed and well-nourished.   HENT:   Head: Normocephalic and atraumatic.   Eyes: Pupils are equal, round, and reactive to light.   Neck: Neck supple.   Pulmonary/Chest: Effort normal.   Abdominal: Soft.   Neurological: She is alert and oriented to person, place, and time.   Psychiatric: She has a normal mood and affect.           Right Knee Exam     Comments:  POD1 -     KEVEN in place, dressing C/D/I  No warmth/ erythema noted to surgical site  Sensation intact throughout extremity   Str 5/5 dorsal/plantar flexion   Cap refill <2   2 + pulses         Significant Labs: All pertinent labs within the past 24 hours have been reviewed.    Significant Imaging: I have reviewed all pertinent imaging results/findings.

## 2020-10-14 NOTE — SUBJECTIVE & OBJECTIVE
Interval History: S/P right total knee replacement yesterday per Dr. Aguilar. Post-op pain is well tolerated. Encouraged participation with PT/OT. Possible discharge home tomorrow.       Review of Systems   Constitutional: Positive for activity change and diaphoresis. Negative for appetite change, chills, fatigue, fever and unexpected weight change.   HENT: Negative for congestion, drooling, facial swelling, postnasal drip, rhinorrhea, sinus pressure, sneezing, sore throat and trouble swallowing.    Eyes: Negative for discharge, redness, itching and visual disturbance.   Respiratory: Negative for apnea, cough, choking, chest tightness, shortness of breath, wheezing and stridor.    Cardiovascular: Negative for chest pain, palpitations and leg swelling.   Gastrointestinal: Negative for abdominal distention, abdominal pain, anal bleeding, blood in stool, constipation, diarrhea, nausea and vomiting.   Endocrine: Negative for cold intolerance and heat intolerance.   Genitourinary: Negative for decreased urine volume, difficulty urinating, dysuria, flank pain, frequency, hematuria, pelvic pain, urgency, vaginal bleeding and vaginal discharge.   Musculoskeletal: Positive for arthralgias, back pain, gait problem, joint swelling and myalgias. Negative for neck pain and neck stiffness.   Skin: Negative for color change, pallor, rash and wound.   Allergic/Immunologic: Negative for environmental allergies and food allergies.   Neurological: Negative for dizziness, seizures, facial asymmetry, speech difficulty, weakness, light-headedness, numbness and headaches.   Hematological: Does not bruise/bleed easily.   Psychiatric/Behavioral: Negative for agitation, confusion, hallucinations, sleep disturbance and suicidal ideas. The patient is not nervous/anxious.    All other systems reviewed and are negative.    Objective:     Vital Signs (Most Recent):  Temp: (P) 97.5 °F (36.4 °C) (10/14/20 1120)  Pulse: (P) 65 (10/14/20  1120)  Resp: 19 (10/14/20 1521)  BP: (P) 118/60 (10/14/20 1120)  SpO2: 98 % (10/14/20 0718) Vital Signs (24h Range):  Temp:  [96 °F (35.6 °C)-97.5 °F (36.4 °C)] (P) 97.5 °F (36.4 °C)  Pulse:  [65-75] (P) 65  Resp:  [16-20] 19  SpO2:  [96 %-98 %] 98 %  BP: (116-138)/(55-79) (P) 118/60     Weight: 108.5 kg (239 lb 3.2 oz)  Body mass index is 41.06 kg/m².    Intake/Output Summary (Last 24 hours) at 10/14/2020 1527  Last data filed at 10/14/2020 1158  Gross per 24 hour   Intake 400.42 ml   Output --   Net 400.42 ml      Physical Exam  Vitals signs and nursing note reviewed.   Constitutional:       Appearance: She is well-developed. She is obese.   HENT:      Head: Normocephalic and atraumatic.      Nose: Nose normal.      Mouth/Throat:      Mouth: Mucous membranes are dry.   Eyes:      Conjunctiva/sclera: Conjunctivae normal.      Pupils: Pupils are equal, round, and reactive to light.   Neck:      Musculoskeletal: Normal range of motion and neck supple.   Cardiovascular:      Rate and Rhythm: Normal rate and regular rhythm.      Pulses: Normal pulses.      Heart sounds: Normal heart sounds. No murmur.   Pulmonary:      Effort: Pulmonary effort is normal. No respiratory distress.      Breath sounds: Examination of the right-lower field reveals decreased breath sounds. Examination of the left-lower field reveals decreased breath sounds. Decreased breath sounds present.   Abdominal:      General: Bowel sounds are normal. There is no distension.      Palpations: Abdomen is soft.      Tenderness: There is no abdominal tenderness.   Genitourinary:     Comments: Deferred   Musculoskeletal: Normal range of motion.         General: Tenderness (R knee ) present. No deformity.      Comments: Limited ROM to R knee post procedure   Skin:     General: Skin is warm and dry.      Capillary Refill: Capillary refill takes less than 2 seconds.      Findings: No erythema.      Comments: Surgical wound with dressing to R knee     Neurological:      General: No focal deficit present.      Mental Status: She is alert and oriented to person, place, and time.      Deep Tendon Reflexes: Reflexes are normal and symmetric.   Psychiatric:         Mood and Affect: Mood normal.         Behavior: Behavior normal.         Significant Labs: All pertinent labs within the past 24 hours have been reviewed.    Significant Imaging:
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
## 2020-10-14 NOTE — HPI
70 yo female with long standing history of R knee arthritis failed improvement with non operative treatment. Thus, elected to undergo R knee replacement with Dr. Aguilar 10/13/2020. Patient is cc of nausea and vomiting with some relief overnight, not able to eat more than crackers currently. Patient is also requiring multiple pain medications around the clock - both IV and PO means. She was able to participate somewhat with therapy yesterday. She is scheduled for dialysis today.     10/15/2020- Nausea has resolved this morning and patient spirits are lifted. Tolerated dinner without concern   Pain is improving, current regimen   Agrees to therapy this morning   Discussed potential rehab placement on discharge for better confidence and training on transfers with goal to reach full potential     10/16/2020- Patient doing well, in great spirits today- hair is fixed, wearing jewelry   Pain well controlled, tolerating diet, participating well with therapy   Eager to get out of the hospital  Denies SOB, CP, N/V, N/T or sensation changes, loss of bladder/bowel     10/17/2020- mental status change this am   Alert, but unobtainable history s/t AMS

## 2020-10-14 NOTE — NURSING
HD x 3.25 hrs, system beginning to clot at 3.25 hr anaya, noted by increasing TMP/venous pressures and frequent alarms - pt reinfused prior to completely clotting, 0 EBL.    2L UF with HD, was initially set for 3L but SBP dropped into the low 90's multiple times so goal was reduced to 2L.Tolerated 2L UF well.     No complaints/adverse signs/symptoms/reactions with HD tx today.

## 2020-10-14 NOTE — PLAN OF CARE
Patient remains free of falls and safety precautions maintained. Afebrile. Pain controlled. IV abx per order. Dialysis done today. Dressing intact to right knee with drainage. KEVEN dressing suctioning. Will continue to monitor. 12 hour chart check.
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
## 2020-10-14 NOTE — PLAN OF CARE
Reviewed POC with patient. No ss of distress or adverse reactions to meds. R knee dressing intact., light drainage. Pain meds given per orders. Will continue to monitor patient progress.   Problem: Adult Inpatient Plan of Care  Goal: Plan of Care Review  Outcome: Ongoing, Progressing  Goal: Patient-Specific Goal (Individualization)  Outcome: Ongoing, Progressing  Goal: Absence of Hospital-Acquired Illness or Injury  Outcome: Ongoing, Progressing  Goal: Optimal Comfort and Wellbeing  Outcome: Ongoing, Progressing  Goal: Readiness for Transition of Care  Outcome: Ongoing, Progressing  Goal: Rounds/Family Conference  Outcome: Ongoing, Progressing     Problem: Fall Injury Risk  Goal: Absence of Fall and Fall-Related Injury  Outcome: Ongoing, Progressing     Problem: Skin Injury Risk Increased  Goal: Skin Health and Integrity  Outcome: Ongoing, Progressing     Problem: Skin Injury Risk Increased  Goal: Skin Health and Integrity  Outcome: Ongoing, Progressing     Problem: Bariatric Environmental Safety  Goal: Safety Maintained with Care  Outcome: Ongoing, Progressing

## 2020-10-14 NOTE — PLAN OF CARE
Bed Mobility - cga; Transfers min/cga with RW; amb 2 steps only r/t nausea/weakness; min a with RW; rec hhpt and spv/assist [facility]

## 2020-10-14 NOTE — SUBJECTIVE & OBJECTIVE
Interval History:  Hemodialysis today    Review of patient's allergies indicates:   Allergen Reactions    Iodinated contrast media Other (See Comments)     Kidney shut down    Morphine Other (See Comments)     Severe sedation    Povidone-iodine      pt stated iodine will shut down her kidney    Contrast media     Iodine and iodide containing products      Current Facility-Administered Medications   Medication Frequency    acetaminophen tablet 650 mg Q8H PRN    atorvastatin tablet 80 mg QHS    bisacodyL suppository 10 mg Daily PRN    chlorhexidine 0.12 % solution 10 mL BID    dexamethasone injection 8 mg Once    docusate sodium capsule 100 mg BID    famotidine tablet 20 mg Daily    gabapentin capsule 300 mg QHS    hydrALAZINE tablet 50 mg Q8H    metoprolol tartrate (LOPRESSOR) tablet 25 mg BID    mupirocin 2 % ointment BID    nozaseptin (NOZIN) nasal  BID    ondansetron injection 4 mg Q6H PRN    oxyCODONE immediate release tablet 10 mg Q3H PRN    oxyCODONE immediate release tablet 5 mg Q3H PRN    sevelamer carbonate tablet 2,400 mg TID WM    sodium chloride 0.9% flush 10 mL Q6H    zolpidem tablet 5 mg Nightly PRN       Objective:     Vital Signs (Most Recent):  Temp: 97.4 °F (36.3 °C) (10/14/20 0718)  Pulse: 72 (10/14/20 0718)  Resp: 19 (10/14/20 1111)  BP: (!) 125/56 (10/14/20 0718)  SpO2: 98 % (10/14/20 0718)  O2 Device (Oxygen Therapy): room air (10/14/20 0736) Vital Signs (24h Range):  Temp:  [96 °F (35.6 °C)-97.5 °F (36.4 °C)] 97.4 °F (36.3 °C)  Pulse:  [67-93] 72  Resp:  [12-20] 19  SpO2:  [90 %-99 %] 98 %  BP: (116-154)/(55-79) 125/56     Weight: 108.5 kg (239 lb 3.2 oz) (10/13/20 1014)  Body mass index is 41.06 kg/m².  Body surface area is 2.21 meters squared.    I/O last 3 completed shifts:  In: 1330.4 [I.V.:1280.4; IV Piggyback:50]  Out: -     Physical Exam  Vitals signs and nursing note reviewed. Exam conducted with a chaperone present.   Constitutional:       Appearance:  She is well-developed.   HENT:      Head: Normocephalic and atraumatic.   Eyes:      Conjunctiva/sclera: Conjunctivae normal.      Pupils: Pupils are equal, round, and reactive to light.   Neck:      Musculoskeletal: Full passive range of motion without pain, normal range of motion and neck supple. No edema.      Thyroid: No thyroid mass or thyromegaly.      Vascular: No carotid bruit.   Cardiovascular:      Rate and Rhythm: Normal rate and regular rhythm.      Chest Wall: PMI is not displaced.      Pulses: Normal pulses.      Heart sounds: Normal heart sounds, S1 normal and S2 normal. No murmur. No friction rub.   Pulmonary:      Effort: Pulmonary effort is normal. No accessory muscle usage or respiratory distress.      Breath sounds: Normal breath sounds. No wheezing or rales.   Chest:      Chest wall: No tenderness.   Abdominal:      General: Bowel sounds are normal. There is no distension.      Palpations: Abdomen is soft. There is no mass.      Tenderness: There is no abdominal tenderness. There is no rebound.      Hernia: No hernia is present.      Comments: Truncal obesity   Musculoskeletal: Normal range of motion.         General: No tenderness.      Comments: Right knee surgery     Left Arm AVF positive bruit and thrill    Multiple surgery scars from previous surgeries in both arms   Skin:     General: Skin is warm and dry.      Coloration: Skin is not pale.      Findings: No bruising, ecchymosis, erythema or rash.      Nails: There is no clubbing.     Neurological:      Mental Status: She is alert and oriented to person, place, and time.      Cranial Nerves: No cranial nerve deficit.      Sensory: No sensory deficit.      Motor: No abnormal muscle tone.      Coordination: Coordination normal.      Deep Tendon Reflexes: Reflexes are normal and symmetric. Reflexes normal.   Psychiatric:         Speech: Speech normal.         Behavior: Behavior normal.         Thought Content: Thought content normal.          Judgment: Judgment normal.         Significant Labs:  CBC:   Recent Labs   Lab 10/14/20  0647   WBC 13.39*   RBC 3.51*   HGB 10.9*   HCT 37.5      *   MCH 31.1*   MCHC 29.1*     CMP:   Recent Labs   Lab 10/13/20  1545 10/14/20  0647   * 73   CALCIUM 8.8 8.2*   ALBUMIN 3.8  --    PROT 7.4  --     133*   K 5.6* 5.1   CO2 17* 5*    102   BUN 38* 50*   CREATININE 8.0* 8.7*   ALKPHOS 124  --    ALT 14  --    AST 19  --    BILITOT 0.3  --      All labs within the past 24 hours have been reviewed.     Significant Imaging:

## 2020-10-14 NOTE — PROGRESS NOTES
[facility] -   Nephrology  Progress Note    Patient Name: Gillian Pitts  MRN: [id number]  Admission Date: 10/13/2020  Hospital Length of Stay: 1 days  Attending Provider: Adilson Aguilar MD   Primary Care Physician: Primary Doctor No  Principal Problem:<principal problem not specified>    Subjective:     HPI: Patient is a 69 year old AA woman with ESRD on HD , anemia, HTN, PCKD, hyperparathyroidism.  Patient admitted under orthopedic surgery for right knee arthroplasty.  Patient is status post surgery.  Preop labs showed potassium of 5.9.  Patient is scheduled to have dialysis tomorrow.      Patient dilayzes on MWF schedule at [facility] under Dr Kitchen, Access is : left arm AV fistula.    10/13/2020 patient seen in consultation for ESRD hyperkalemia at the bedside using telemedicine protocol with the help of the nursing staff.    Interval History:  Hemodialysis today    Review of patient's allergies indicates:   Allergen Reactions    Iodinated contrast media Other (See Comments)     Kidney shut down    Morphine Other (See Comments)     Severe sedation    Povidone-iodine      pt stated iodine will shut down her kidney    Contrast media     Iodine and iodide containing products      Current Facility-Administered Medications   Medication Frequency    acetaminophen tablet 650 mg Q8H PRN    atorvastatin tablet 80 mg QHS    bisacodyL suppository 10 mg Daily PRN    chlorhexidine 0.12 % solution 10 mL BID    dexamethasone injection 8 mg Once    docusate sodium capsule 100 mg BID    famotidine tablet 20 mg Daily    gabapentin capsule 300 mg QHS    hydrALAZINE tablet 50 mg Q8H    metoprolol tartrate (LOPRESSOR) tablet 25 mg BID    mupirocin 2 % ointment BID    nozaseptin (NOZIN) nasal  BID    ondansetron injection 4 mg Q6H PRN    oxyCODONE immediate release tablet 10 mg Q3H PRN    oxyCODONE immediate release tablet 5 mg Q3H PRN    sevelamer carbonate tablet 2,400 mg  TID WM    sodium chloride 0.9% flush 10 mL Q6H    zolpidem tablet 5 mg Nightly PRN       Objective:     Vital Signs (Most Recent):  Temp: 97.4 °F (36.3 °C) (10/14/20 0718)  Pulse: 72 (10/14/20 0718)  Resp: 19 (10/14/20 1111)  BP: (!) 125/56 (10/14/20 0718)  SpO2: 98 % (10/14/20 0718)  O2 Device (Oxygen Therapy): room air (10/14/20 0736) Vital Signs (24h Range):  Temp:  [96 °F (35.6 °C)-97.5 °F (36.4 °C)] 97.4 °F (36.3 °C)  Pulse:  [67-93] 72  Resp:  [12-20] 19  SpO2:  [90 %-99 %] 98 %  BP: (116-154)/(55-79) 125/56     Weight: 108.5 kg (239 lb 3.2 oz) (10/13/20 1014)  Body mass index is 41.06 kg/m².  Body surface area is 2.21 meters squared.    I/O last 3 completed shifts:  In: 1330.4 [I.V.:1280.4; IV Piggyback:50]  Out: -     Physical Exam  Vitals signs and nursing note reviewed. Exam conducted with a chaperone present.   Constitutional:       Appearance: She is well-developed.   HENT:      Head: Normocephalic and atraumatic.   Eyes:      Conjunctiva/sclera: Conjunctivae normal.      Pupils: Pupils are equal, round, and reactive to light.   Neck:      Musculoskeletal: Full passive range of motion without pain, normal range of motion and neck supple. No edema.      Thyroid: No thyroid mass or thyromegaly.      Vascular: No carotid bruit.   Cardiovascular:      Rate and Rhythm: Normal rate and regular rhythm.      Chest Wall: PMI is not displaced.      Pulses: Normal pulses.      Heart sounds: Normal heart sounds, S1 normal and S2 normal. No murmur. No friction rub.   Pulmonary:      Effort: Pulmonary effort is normal. No accessory muscle usage or respiratory distress.      Breath sounds: Normal breath sounds. No wheezing or rales.   Chest:      Chest wall: No tenderness.   Abdominal:      General: Bowel sounds are normal. There is no distension.      Palpations: Abdomen is soft. There is no mass.      Tenderness: There is no abdominal tenderness. There is no rebound.      Hernia: No hernia is present.       Comments: Truncal obesity   Musculoskeletal: Normal range of motion.         General: No tenderness.      Comments: Right knee surgery     Left Arm AVF positive bruit and thrill    Multiple surgery scars from previous surgeries in both arms   Skin:     General: Skin is warm and dry.      Coloration: Skin is not pale.      Findings: No bruising, ecchymosis, erythema or rash.      Nails: There is no clubbing.     Neurological:      Mental Status: She is alert and oriented to person, place, and time.      Cranial Nerves: No cranial nerve deficit.      Sensory: No sensory deficit.      Motor: No abnormal muscle tone.      Coordination: Coordination normal.      Deep Tendon Reflexes: Reflexes are normal and symmetric. Reflexes normal.   Psychiatric:         Speech: Speech normal.         Behavior: Behavior normal.         Thought Content: Thought content normal.         Judgment: Judgment normal.         Significant Labs:  CBC:   Recent Labs   Lab 10/14/20  0647   WBC 13.39*   RBC 3.51*   HGB 10.9*   HCT 37.5      *   MCH 31.1*   MCHC 29.1*     CMP:   Recent Labs   Lab 10/13/20  1545 10/14/20  0647   * 73   CALCIUM 8.8 8.2*   ALBUMIN 3.8  --    PROT 7.4  --     133*   K 5.6* 5.1   CO2 17* 5*    102   BUN 38* 50*   CREATININE 8.0* 8.7*   ALKPHOS 124  --    ALT 14  --    AST 19  --    BILITOT 0.3  --      All labs within the past 24 hours have been reviewed.     Significant Imaging:      Assessment/Plan:     ESRD on dialysis since 2/24/16  10/14/2020 hemodialysis today          Thank you for your consult.     Kylie Grant MD  Nephrology  [facility]

## 2020-10-14 NOTE — PT/OT/SLP PROGRESS
Physical Therapy  Treatment    Gillian Pitts   MRN: [id number]   Admitting Diagnosis: <principal problem not specified>    PT Received On: 10/14/20  PT Start Time: 0755     PT Stop Time: 0820    PT Total Time (min): 25 min       Billable Minutes:  Gait Training 15 and Therapeutic Exercise 10    Treatment Type: Treatment  PT/PTA: PT     PTA Visit Number: 0       General Precautions: Standard, fall  Orthopedic Precautions: RLE weight bearing as tolerated   Braces: N/A         Subjective:  Communicated with NURSE LANE AND Epic CHART REVIEW  prior to session.   PT AGREED TO TX     Pain/Comfort  Pain Rating 1: 10/10  Location - Side 1: Right  Location 1: knee  Pain Rating Post-Intervention 1: 0/10  Pain Rating Post-Intervention 2: 0/10    Objective:   Patient found with: peripheral IV, wound vac    Functional Mobility:  PT MET IN RM  SUP>SIT EOB WITH SBA AND HOB ELEVATED. PT SCOOTED TO EOB AND COMPLETED AP, TKE, MIP X 10 REPS. PT STOOD WITH RW AND SBA CGA. PT GT TRAINED WITH STEP TO GT AND CUES FOR POSTURE X 22' WITH RW. PT RETURNED TO RM T/F TO CHAIR WITH ALL NEEDS MET AND CALL BELL IN REACH    AM-PAC 6 CLICK MOBILITY  How much help from another person does this patient currently need?   1 = Unable, Total/Dependent Assistance  2 = A lot, Maximum/Moderate Assistance  3 = A little, Minimum/Contact Guard/Supervision  4 = None, Modified Rains/Independent    Turning over in bed (including adjusting bedclothes, sheets and blankets)?: 4  Sitting down on and standing up from a chair with arms (e.g., wheelchair, bedside commode, etc.): 3  Moving from lying on back to sitting on the side of the bed?: 4  Moving to and from a bed to a chair (including a wheelchair)?: 3  Need to walk in hospital room?: 3  Climbing 3-5 steps with a railing?: 1  Basic Mobility Total Score: 18    AM-PAC Raw Score CMS G-Code Modifier Level of Impairment Assistance   6 % Total / Unable   7 - 9 CM 80 - 100% Maximal Assist   10 -  14 CL 60 - 80% Moderate Assist   15 - 19 CK 40 - 60% Moderate Assist   20 - 22 CJ 20 - 40% Minimal Assist   23 CI 1-20% SBA / CGA   24 CH 0% Independent/ Mod I     Patient left up in chair with call button in reach and chair alarm on.    Assessment:  PT PROGRESSING WITH GT. .    Rehab identified problem list/impairments: Rehab identified problem list/impairments: weakness, impaired endurance, impaired functional mobilty, gait instability, impaired balance, pain, decreased lower extremity function, decreased ROM    Rehab potential is good.    Activity tolerance: Good    Discharge recommendations: Discharge Facility/Level of Care Needs: home health PT     Barriers to discharge:      Equipment recommendations: Equipment Needed After Discharge: walker, rolling     GOALS:   Multidisciplinary Problems     Physical Therapy Goals        Problem: Physical Therapy Goal    Goal Priority Disciplines Outcome Goal Variances Interventions   Physical Therapy Goal     PT, PT/OT Ongoing, Progressing     Description: 1. Patient will perform supine to/from sit mod indep  2. Patient will perform sit to/from stand with RW mod indep  3. Patient will ambulate 150ft RW mod indep no gross LOB  4. Patient will perform r TKR TE x 20 reps                   PLAN:    Patient to be seen 5 x/week  to address the above listed problems via gait training, therapeutic activities, therapeutic exercises  Plan of Care expires: 10/20/20  Plan of Care reviewed with: patient         Alina Annie, PT  10/14/2020

## 2020-10-14 NOTE — PROGRESS NOTES
[facility]  Orthopedics  Progress Note    Patient Name: Gillian Pitts  MRN: [id number]  Admission Date: 10/13/2020  Hospital Length of Stay: 1 days  Attending Provider: Adilson Aguilar MD  Primary Care Provider: Primary Doctor No  Follow-up For: Procedure(s) (LRB):  ARTHROPLASTY, KNEE, TOTAL (Right)    Post-Operative Day: 1 Day Post-Op  Subjective:     Principal Problem:<principal problem not specified>    Principal Orthopedic Problem:R TKA    Interval History:   continued N/V limiting diet  pain is requiring PO and IV meds at this time  Participating with therapy     Review of patient's allergies indicates:   Allergen Reactions    Iodinated contrast media Other (See Comments)     Kidney shut down    Morphine Other (See Comments)     Severe sedation    Povidone-iodine      pt stated iodine will shut down her kidney    Contrast media     Iodine and iodide containing products        Current Facility-Administered Medications   Medication    acetaminophen tablet 650 mg    atorvastatin tablet 80 mg    bisacodyL suppository 10 mg    ceFAZolin (ANCEF) 1 gram in dextrose 5 % 50 mL IVPB (premix)    chlorhexidine 0.12 % solution 10 mL    chlorhexidine 0.12 % solution 10 mL    dexamethasone injection 8 mg    docusate sodium capsule 100 mg    famotidine tablet 20 mg    gabapentin capsule 300 mg    hydrALAZINE tablet 50 mg    metoprolol tartrate (LOPRESSOR) tablet 25 mg    mupirocin 2 % ointment    nozaseptin (NOZIN) nasal     nozaseptin (NOZIN) nasal     ondansetron injection 4 mg    ondansetron injection 4 mg    oxyCODONE immediate release tablet 10 mg    oxyCODONE immediate release tablet 5 mg    sevelamer carbonate tablet 2,400 mg    sodium chloride 0.9% flush 10 mL    zolpidem tablet 5 mg     Objective:     Vital Signs (Most Recent):  Temp: 97.4 °F (36.3 °C) (10/14/20 0718)  Pulse: 72 (10/14/20 0718)  Resp: 19 (10/14/20 0736)  BP: (!) 125/56  "(10/14/20 0718)  SpO2: 98 % (10/14/20 0718) Vital Signs (24h Range):  Temp:  [96 °F (35.6 °C)-97.5 °F (36.4 °C)] 97.4 °F (36.3 °C)  Pulse:  [47-93] 72  Resp:  [12-20] 19  SpO2:  [90 %-100 %] 98 %  BP: (116-154)/(55-79) 125/56     Weight: 108.5 kg (239 lb 3.2 oz)  Height: 5' 4" (162.6 cm)  Body mass index is 41.06 kg/m².      Intake/Output Summary (Last 24 hours) at 10/14/2020 0741  Last data filed at 10/13/2020 1715  Gross per 24 hour   Intake 1330.42 ml   Output --   Net 1330.42 ml       General    Constitutional: She is oriented to person, place, and time. She appears well-developed and well-nourished.   HENT:   Head: Normocephalic and atraumatic.   Eyes: Pupils are equal, round, and reactive to light.   Neck: Neck supple.   Pulmonary/Chest: Effort normal.   Abdominal: Soft.   Neurological: She is alert and oriented to person, place, and time.   Psychiatric: She has a normal mood and affect.           Right Knee Exam     Comments:  POD1 -     KEVEN in place, dressing C/D/I  No warmth/ erythema noted to surgical site  Sensation intact throughout extremity   Str 5/5 dorsal/plantar flexion   Cap refill <2   2 + pulses         Significant Labs: All pertinent labs within the past 24 hours have been reviewed.    Significant Imaging: I have reviewed all pertinent imaging results/findings.    Assessment/Plan:     Arthritis of right knee  POD1- R TKA    Continue observation and current management   Nausea continues- likely from anesthesia, advance diet as tolerated  Pain - continue current regimen, titrate IV meds as tolerated to PO    Patient to undergo dialysis today   Encourage therapy participation   Likely DC tomorrow       Dr. Aguilar is aware of the patient & current presentation. He agrees with the current plan above.       GIUSEP[facility] Dawn  Orthopedics  [facility]  "

## 2020-10-14 NOTE — PROGRESS NOTES
[facility] Hospital Medicine  Progress Note    Patient Name: Gillian Pitts  MRN: [id number]  Patient Class: IP- Inpatient   Admission Date: 10/13/2020  Length of Stay: 1 days  Attending Physician: Adilson Aguilar MD  Primary Care Provider: Primary Doctor No        Subjective:     Principal Problem:Arthritis of right knee        HPI:  Gillian Pitts is a 68 yo female with PMHx of secondary hyperparathyroidism, ESRD (MWF), HTN, and Anemia who presented with arthritis to bilateral knees (R>L).  Pt reported symptom progression s/p fall with pain not relived by conservative measures, steroid injections, and PT refused.  Associated symptoms include: activity changes, numbness, tingling, and back pain.  Pt denies HA, dizziness, SOB, CP, palpitations, nausea, vomiting, abdominal pain, diarrhea, and all other additional complaints.  Pt underwent total R knee arthroplasty today per Dr. Aguilar (Orthopedic Surgery).  Pt is a full code and Medhat Pitts (daughter) at [phone] is the surrogate decision maker.  Hospital Medicine consulted for medical management post procedure at the request of the primary team.      Overview/Hospital Course:  10/14/20 S/P right total knee replacement yesterday per Dr. Aguilar. Post-op pain is well tolerated. Encouraged participation with PT/OT. Possible discharge home tomorrow.     Interval History: S/P right total knee replacement yesterday per Dr. Aguilar. Post-op pain is well tolerated. Encouraged participation with PT/OT. Possible discharge home tomorrow.       Review of Systems   Constitutional: Positive for activity change and diaphoresis. Negative for appetite change, chills, fatigue, fever and unexpected weight change.   HENT: Negative for congestion, drooling, facial swelling, postnasal drip, rhinorrhea, sinus pressure, sneezing, sore throat and trouble swallowing.    Eyes: Negative for discharge, redness, itching and visual disturbance.    Respiratory: Negative for apnea, cough, choking, chest tightness, shortness of breath, wheezing and stridor.    Cardiovascular: Negative for chest pain, palpitations and leg swelling.   Gastrointestinal: Negative for abdominal distention, abdominal pain, anal bleeding, blood in stool, constipation, diarrhea, nausea and vomiting.   Endocrine: Negative for cold intolerance and heat intolerance.   Genitourinary: Negative for decreased urine volume, difficulty urinating, dysuria, flank pain, frequency, hematuria, pelvic pain, urgency, vaginal bleeding and vaginal discharge.   Musculoskeletal: Positive for arthralgias, back pain, gait problem, joint swelling and myalgias. Negative for neck pain and neck stiffness.   Skin: Negative for color change, pallor, rash and wound.   Allergic/Immunologic: Negative for environmental allergies and food allergies.   Neurological: Negative for dizziness, seizures, facial asymmetry, speech difficulty, weakness, light-headedness, numbness and headaches.   Hematological: Does not bruise/bleed easily.   Psychiatric/Behavioral: Negative for agitation, confusion, hallucinations, sleep disturbance and suicidal ideas. The patient is not nervous/anxious.    All other systems reviewed and are negative.    Objective:     Vital Signs (Most Recent):  Temp: (P) 97.5 °F (36.4 °C) (10/14/20 1120)  Pulse: (P) 65 (10/14/20 1120)  Resp: 19 (10/14/20 1521)  BP: (P) 118/60 (10/14/20 1120)  SpO2: 98 % (10/14/20 0718) Vital Signs (24h Range):  Temp:  [96 °F (35.6 °C)-97.5 °F (36.4 °C)] (P) 97.5 °F (36.4 °C)  Pulse:  [65-75] (P) 65  Resp:  [16-20] 19  SpO2:  [96 %-98 %] 98 %  BP: (116-138)/(55-79) (P) 118/60     Weight: 108.5 kg (239 lb 3.2 oz)  Body mass index is 41.06 kg/m².    Intake/Output Summary (Last 24 hours) at 10/14/2020 1527  Last data filed at 10/14/2020 1158  Gross per 24 hour   Intake 400.42 ml   Output --   Net 400.42 ml      Physical Exam  Vitals signs and nursing note reviewed.    Constitutional:       Appearance: She is well-developed. She is obese.   HENT:      Head: Normocephalic and atraumatic.      Nose: Nose normal.      Mouth/Throat:      Mouth: Mucous membranes are dry.   Eyes:      Conjunctiva/sclera: Conjunctivae normal.      Pupils: Pupils are equal, round, and reactive to light.   Neck:      Musculoskeletal: Normal range of motion and neck supple.   Cardiovascular:      Rate and Rhythm: Normal rate and regular rhythm.      Pulses: Normal pulses.      Heart sounds: Normal heart sounds. No murmur.   Pulmonary:      Effort: Pulmonary effort is normal. No respiratory distress.      Breath sounds: Examination of the right-lower field reveals decreased breath sounds. Examination of the left-lower field reveals decreased breath sounds. Decreased breath sounds present.   Abdominal:      General: Bowel sounds are normal. There is no distension.      Palpations: Abdomen is soft.      Tenderness: There is no abdominal tenderness.   Genitourinary:     Comments: Deferred   Musculoskeletal: Normal range of motion.         General: Tenderness (R knee ) present. No deformity.      Comments: Limited ROM to R knee post procedure   Skin:     General: Skin is warm and dry.      Capillary Refill: Capillary refill takes less than 2 seconds.      Findings: No erythema.      Comments: Surgical wound with dressing to R knee    Neurological:      General: No focal deficit present.      Mental Status: She is alert and oriented to person, place, and time.      Deep Tendon Reflexes: Reflexes are normal and symmetric.   Psychiatric:         Mood and Affect: Mood normal.         Behavior: Behavior normal.         Significant Labs: All pertinent labs within the past 24 hours have been reviewed.    Significant Imaging:             Assessment/Plan:      * Arthritis of right knee  -Pt admitted to Medical Surgical Unit post procedure  -Orthopedic surgery-managed per primary team  -IV antibiotics   -analgesia as  needed   -antiemetics as needed   -PT/OT as needed   -discharge anticipated per primary team   10/14/20 S/P right total knee replacement yesterday per Dr. Aguilar. Post-op pain is well tolerated. Encouraged participation with PT/OT. Possible discharge home tomorrow.       Leukocytosis  -likely reactive post procedure   -IV antibiotics as needed   -repeat CBC in am       Hyperlipemia  -statin continued       Hyperkalemia  -5.9 > 5.6 noted   -in the setting ESRD- diagnosed 2/24/16  -Douglas malcolm   -Nephrology consulted   -HD planned for tomorrow   -repeat labs pending   -EKG results pending   10/14/20 Resolved     Hypertension, renal disease  -Hydralazine and Lopressor continued   -Clonidine held- will resume when appropriate       ESRD on dialysis since 2/24/16  -Nephrology consulted   -MWF schedule   -Renvela continued       Anemia in chronic kidney disease, on chronic dialysis  -H/H stable post procedure   -will resume iron supplement upon discharge  -repeat CBC in am          VTE Risk Mitigation (From admission, onward)         Ordered     IP VTE LOW RISK PATIENT  Once      10/13/20 0752     Place DOUG hose  Until discontinued      10/13/20 0752     Place sequential compression device  Until discontinued      10/13/20 0752                Discharge Planning   DARRIN:      Code Status: Full Code   Is the patient medically ready for discharge?:     Reason for patient still in hospital (select all that apply): Patient trending condition, Laboratory test and Treatment  Discharge Plan A: Home Health, Home                  Perfecto Gutierrez NP  Department of Hospital Medicine   [facility]

## 2020-10-14 NOTE — HOSPITAL COURSE
10/14/20 S/P right total knee replacement yesterday per Dr. Aguilar. Post-op pain is well tolerated. Encouraged participation with PT/OT. Possible discharge home tomorrow. 10/15/20 The patient reports persistent pain to RLE overnight, not relieved with pain meds. RLE US ordered was negative for a DVT. PT/OT evaluated the patient and recommende  d rehab placement. Will adjust PRN analgesia.     10/16/2020 - Potassium 7.2. IV insulin given but unable to give dextrose and IV not working. Pt underwent dialysis - then plans for discharge to Rehab to continue PT/OT. Pt is discharged to Rehab by Orthopedics. DVT prophylaxis of ASA ordered by Orthopedics    10/17/2020 - Pt was planned for discharge to [facility] today however, glucose = 43. Pt eating and accucheck = 73. Called to room as nursing verbalized that pt was disoriented to time and repeating different years. Repeat glucose = 117 and pt ate a small amount of breakfast. Assessed the patient and no obvious focal deficits noted. She did keep repeating the year she was born. CT of Head was negative for acute stroke - there is intracranial atherosclerosis and microvascular ischemic changes. Hyperkalemia noted 6.1 and Nephrology ordered an additional dialysis treated. Pt was seen a second time in dialysis. No gross neuro deficits except repeating the word yes to all questions asked and attempts to communicate. MRI of Brain pending. Will plan to discharge to [facility] tomorrow if MRI of Brain negative and labs/glucose appropriate. Discussed with Dr. Morales. Due to patient's disorientation - prn analgesia discontinued. Disorientation attributed to hypoglycemia. Pt does not appear to be septic, no medications to cause hypoglycemia, CT imaging of brain negative for hemorrhage. Pt received IV insulin yesterday without dextrose as there was no IV site. Will continue to monitor glucose and Neuro checks.     10/18/2020 - pt refused MRI of Brain. She had returned to neuro  baseline. Family attributed her disorientation secondary to opiate analgesics given due to knee replacement. She is medically stable. Hospital Medicine is signing off.

## 2020-10-15 LAB
ANION GAP SERPL CALC-SCNC: 12 MMOL/L (ref 8–16)
BASOPHILS # BLD AUTO: 0.04 K/UL (ref 0–0.2)
BASOPHILS NFR BLD: 0.4 % (ref 0–1.9)
BUN SERPL-MCNC: 30 MG/DL (ref 8–23)
CALCIUM SERPL-MCNC: 8.9 MG/DL (ref 8.7–10.5)
CHLORIDE SERPL-SCNC: 97 MMOL/L (ref 95–110)
CO2 SERPL-SCNC: 21 MMOL/L (ref 23–29)
CREAT SERPL-MCNC: 6.8 MG/DL (ref 0.5–1.4)
DIFFERENTIAL METHOD: ABNORMAL
EOSINOPHIL # BLD AUTO: 0.1 K/UL (ref 0–0.5)
EOSINOPHIL NFR BLD: 0.5 % (ref 0–8)
ERYTHROCYTE [DISTWIDTH] IN BLOOD BY AUTOMATED COUNT: 16.6 % (ref 11.5–14.5)
EST. GFR  (AFRICAN AMERICAN): 7 ML/MIN/1.73 M^2
EST. GFR  (NON AFRICAN AMERICAN): 6 ML/MIN/1.73 M^2
GLUCOSE SERPL-MCNC: 94 MG/DL (ref 70–110)
HCT VFR BLD AUTO: 35.1 % (ref 37–48.5)
HGB BLD-MCNC: 10.3 G/DL (ref 12–16)
IMM GRANULOCYTES # BLD AUTO: 0.06 K/UL (ref 0–0.04)
IMM GRANULOCYTES NFR BLD AUTO: 0.6 % (ref 0–0.5)
LYMPHOCYTES # BLD AUTO: 2.2 K/UL (ref 1–4.8)
LYMPHOCYTES NFR BLD: 21.8 % (ref 18–48)
MCH RBC QN AUTO: 29.8 PG (ref 27–31)
MCHC RBC AUTO-ENTMCNC: 29.3 G/DL (ref 32–36)
MCV RBC AUTO: 101 FL (ref 82–98)
MONOCYTES # BLD AUTO: 1.3 K/UL (ref 0.3–1)
MONOCYTES NFR BLD: 12.3 % (ref 4–15)
NEUTROPHILS # BLD AUTO: 6.6 K/UL (ref 1.8–7.7)
NEUTROPHILS NFR BLD: 64.4 % (ref 38–73)
NRBC BLD-RTO: 0 /100 WBC
PLATELET # BLD AUTO: 222 K/UL (ref 150–350)
PMV BLD AUTO: 9.5 FL (ref 9.2–12.9)
POTASSIUM SERPL-SCNC: 5.2 MMOL/L (ref 3.5–5.1)
RBC # BLD AUTO: 3.46 M/UL (ref 4–5.4)
SODIUM SERPL-SCNC: 130 MMOL/L (ref 136–145)
WBC # BLD AUTO: 10.29 K/UL (ref 3.9–12.7)

## 2020-10-15 PROCEDURE — 80048 BASIC METABOLIC PNL TOTAL CA: CPT

## 2020-10-15 PROCEDURE — 97110 THERAPEUTIC EXERCISES: CPT

## 2020-10-15 PROCEDURE — 85025 COMPLETE CBC W/AUTO DIFF WBC: CPT

## 2020-10-15 PROCEDURE — 25000003 PHARM REV CODE 250: Performed by: NURSE PRACTITIONER

## 2020-10-15 PROCEDURE — A4216 STERILE WATER/SALINE, 10 ML: HCPCS | Performed by: ORTHOPAEDIC SURGERY

## 2020-10-15 PROCEDURE — 97530 THERAPEUTIC ACTIVITIES: CPT

## 2020-10-15 PROCEDURE — 99900035 HC TECH TIME PER 15 MIN (STAT)

## 2020-10-15 PROCEDURE — 99232 SBSQ HOSP IP/OBS MODERATE 35: CPT | Mod: ,,, | Performed by: INTERNAL MEDICINE

## 2020-10-15 PROCEDURE — 11000001 HC ACUTE MED/SURG PRIVATE ROOM

## 2020-10-15 PROCEDURE — 25000003 PHARM REV CODE 250: Performed by: ORTHOPAEDIC SURGERY

## 2020-10-15 PROCEDURE — 99232 PR SUBSEQUENT HOSPITAL CARE,LEVL II: ICD-10-PCS | Mod: ,,, | Performed by: INTERNAL MEDICINE

## 2020-10-15 PROCEDURE — 63600175 PHARM REV CODE 636 W HCPCS: Performed by: NURSE PRACTITIONER

## 2020-10-15 PROCEDURE — 36415 COLL VENOUS BLD VENIPUNCTURE: CPT

## 2020-10-15 PROCEDURE — 25000003 PHARM REV CODE 250: Performed by: INTERNAL MEDICINE

## 2020-10-15 PROCEDURE — 94799 UNLISTED PULMONARY SVC/PX: CPT

## 2020-10-15 RX ORDER — OXYCODONE HYDROCHLORIDE 5 MG/1
15 TABLET ORAL
Status: DISCONTINUED | OUTPATIENT
Start: 2020-10-15 | End: 2020-10-17

## 2020-10-15 RX ORDER — OXYCODONE AND ACETAMINOPHEN 10; 325 MG/1; MG/1
1 TABLET ORAL EVERY 8 HOURS PRN
Qty: 28 TABLET | Refills: 0 | Status: SHIPPED | OUTPATIENT
Start: 2020-10-15 | End: 2020-11-03

## 2020-10-15 RX ORDER — HEPARIN SODIUM 1000 [USP'U]/ML
2000 INJECTION, SOLUTION INTRAVENOUS; SUBCUTANEOUS ONCE
Status: CANCELLED | OUTPATIENT
Start: 2020-10-16

## 2020-10-15 RX ORDER — LACTULOSE 10 G/15ML
20 SOLUTION ORAL EVERY 6 HOURS PRN
Status: DISCONTINUED | OUTPATIENT
Start: 2020-10-15 | End: 2020-10-18 | Stop reason: HOSPADM

## 2020-10-15 RX ORDER — OXYCODONE AND ACETAMINOPHEN 10; 325 MG/1; MG/1
1 TABLET ORAL EVERY 8 HOURS PRN
Qty: 28 TABLET | Refills: 0 | Status: SHIPPED | OUTPATIENT
Start: 2020-10-15 | End: 2020-10-15 | Stop reason: SDUPTHER

## 2020-10-15 RX ORDER — OXYCODONE HYDROCHLORIDE 5 MG/1
15 TABLET ORAL
Status: DISCONTINUED | OUTPATIENT
Start: 2020-10-15 | End: 2020-10-15

## 2020-10-15 RX ADMIN — SEVELAMER CARBONATE 2400 MG: 800 TABLET, FILM COATED ORAL at 05:10

## 2020-10-15 RX ADMIN — HYDRALAZINE HYDROCHLORIDE 50 MG: 50 TABLET, FILM COATED ORAL at 06:10

## 2020-10-15 RX ADMIN — OXYCODONE 10 MG: 5 TABLET ORAL at 09:10

## 2020-10-15 RX ADMIN — ATORVASTATIN CALCIUM 80 MG: 40 TABLET, FILM COATED ORAL at 09:10

## 2020-10-15 RX ADMIN — OXYCODONE HYDROCHLORIDE 15 MG: 5 TABLET ORAL at 09:10

## 2020-10-15 RX ADMIN — SEVELAMER CARBONATE 2400 MG: 800 TABLET, FILM COATED ORAL at 07:10

## 2020-10-15 RX ADMIN — MUPIROCIN: 20 OINTMENT TOPICAL at 09:10

## 2020-10-15 RX ADMIN — Medication 10 ML: at 06:10

## 2020-10-15 RX ADMIN — SEVELAMER CARBONATE 2400 MG: 800 TABLET, FILM COATED ORAL at 11:10

## 2020-10-15 RX ADMIN — FAMOTIDINE 20 MG: 20 TABLET ORAL at 09:10

## 2020-10-15 RX ADMIN — OXYCODONE 15 MG: 5 TABLET ORAL at 02:10

## 2020-10-15 RX ADMIN — OXYCODONE HYDROCHLORIDE 15 MG: 5 TABLET ORAL at 06:10

## 2020-10-15 RX ADMIN — DOCUSATE SODIUM 100 MG: 100 CAPSULE, LIQUID FILLED ORAL at 09:10

## 2020-10-15 RX ADMIN — Medication 10 ML: at 11:10

## 2020-10-15 RX ADMIN — OXYCODONE 10 MG: 5 TABLET ORAL at 02:10

## 2020-10-15 RX ADMIN — Medication 10 ML: at 12:10

## 2020-10-15 RX ADMIN — METOPROLOL TARTRATE 25 MG: 25 TABLET, FILM COATED ORAL at 09:10

## 2020-10-15 RX ADMIN — ONDANSETRON 4 MG: 2 INJECTION INTRAMUSCULAR; INTRAVENOUS at 06:10

## 2020-10-15 RX ADMIN — OXYCODONE 10 MG: 5 TABLET ORAL at 06:10

## 2020-10-15 RX ADMIN — CHLORHEXIDINE GLUCONATE 0.12% ORAL RINSE 10 ML: 1.2 LIQUID ORAL at 09:10

## 2020-10-15 RX ADMIN — HYDRALAZINE HYDROCHLORIDE 50 MG: 50 TABLET, FILM COATED ORAL at 02:10

## 2020-10-15 RX ADMIN — OXYCODONE 15 MG: 5 TABLET ORAL at 11:10

## 2020-10-15 NOTE — SUBJECTIVE & OBJECTIVE
Interval History:  Hemodialysis tomorrow    Review of patient's allergies indicates:   Allergen Reactions    Iodinated contrast media Other (See Comments)     Kidney shut down    Morphine Other (See Comments)     Severe sedation    Povidone-iodine      pt stated iodine will shut down her kidney    Contrast media     Iodine and iodide containing products      Current Facility-Administered Medications   Medication Frequency    acetaminophen tablet 650 mg Q8H PRN    atorvastatin tablet 80 mg QHS    bisacodyL suppository 10 mg Daily PRN    chlorhexidine 0.12 % solution 10 mL BID    dexamethasone injection 8 mg Once    docusate sodium capsule 100 mg BID    famotidine tablet 20 mg Daily    gabapentin capsule 300 mg QHS    hydrALAZINE tablet 50 mg Q8H    metoprolol tartrate (LOPRESSOR) tablet 25 mg BID    mupirocin 2 % ointment BID    nozaseptin (NOZIN) nasal  BID    ondansetron injection 4 mg Q6H PRN    oxyCODONE immediate release tablet 10 mg Q3H PRN    oxyCODONE immediate release tablet 5 mg Q3H PRN    sevelamer carbonate tablet 2,400 mg TID WM    sodium chloride 0.9% flush 10 mL Q6H    zolpidem tablet 5 mg Nightly PRN       Objective:     Vital Signs (Most Recent):  Temp: 98.7 °F (37.1 °C) (10/15/20 0723)  Pulse: 101 (10/15/20 0723)  Resp: 15 (10/15/20 0920)  BP: (!) 116/54 (10/15/20 0723)  SpO2: 98 % (10/15/20 0723)  O2 Device (Oxygen Therapy): room air (10/15/20 0337) Vital Signs (24h Range):  Temp:  [97.5 °F (36.4 °C)-98.9 °F (37.2 °C)] 98.7 °F (37.1 °C)  Pulse:  [] 101  Resp:  [14-20] 15  SpO2:  [93 %-98 %] 98 %  BP: ()/(41-70) 116/54     Weight: 108.5 kg (239 lb 3.2 oz) (10/13/20 1014)  Body mass index is 41.06 kg/m².  Body surface area is 2.21 meters squared.    I/O last 3 completed shifts:  In: 910.5 [P.O.:360; Other:500; IV Piggyback:50.5]  Out: 2500 [Other:2500]    Physical Exam  Vitals signs and nursing note reviewed. Exam conducted with a chaperone present.    Constitutional:       Appearance: She is well-developed.   HENT:      Head: Normocephalic and atraumatic.   Eyes:      Conjunctiva/sclera: Conjunctivae normal.      Pupils: Pupils are equal, round, and reactive to light.   Neck:      Musculoskeletal: Full passive range of motion without pain, normal range of motion and neck supple. No edema.      Thyroid: No thyroid mass or thyromegaly.      Vascular: No carotid bruit.   Cardiovascular:      Rate and Rhythm: Normal rate and regular rhythm.      Chest Wall: PMI is not displaced.      Pulses: Normal pulses.      Heart sounds: Normal heart sounds, S1 normal and S2 normal. No murmur. No friction rub.   Pulmonary:      Effort: Pulmonary effort is normal. No accessory muscle usage or respiratory distress.      Breath sounds: Normal breath sounds. No wheezing or rales.   Chest:      Chest wall: No tenderness.   Abdominal:      General: Bowel sounds are normal. There is no distension.      Palpations: Abdomen is soft. There is no mass.      Tenderness: There is no abdominal tenderness. There is no rebound.      Hernia: No hernia is present.      Comments: Truncal obesity   Musculoskeletal: Normal range of motion.         General: No tenderness.      Comments: Right knee surgery     Left Arm AVF positive bruit and thrill    Multiple surgery scars from previous surgeries in both arms   Skin:     General: Skin is warm and dry.      Coloration: Skin is not pale.      Findings: No bruising, ecchymosis, erythema or rash.      Nails: There is no clubbing.     Neurological:      Mental Status: She is alert and oriented to person, place, and time.      Cranial Nerves: No cranial nerve deficit.      Sensory: No sensory deficit.      Motor: No abnormal muscle tone.      Coordination: Coordination normal.      Deep Tendon Reflexes: Reflexes are normal and symmetric. Reflexes normal.   Psychiatric:         Speech: Speech normal.         Behavior: Behavior normal.         Thought  Content: Thought content normal.         Judgment: Judgment normal.         Significant Labs:  All labs within the past 24 hours have been reviewed.     Significant Imaging:

## 2020-10-15 NOTE — PROGRESS NOTES
[facility] Hospital Medicine  Progress Note    Patient Name: Gillian Pitts  MRN: [id number]  Patient Class: IP- Inpatient   Admission Date: 10/13/2020  Length of Stay: 2 days  Attending Physician: Adilson Aguilar MD  Primary Care Provider: Primary Doctor No        Subjective:     Principal Problem:Arthritis of right knee        HPI:  Gillian Pitts is a 68 yo female with PMHx of secondary hyperparathyroidism, ESRD (MWF), HTN, and Anemia who presented with arthritis to bilateral knees (R>L).  Pt reported symptom progression s/p fall with pain not relived by conservative measures, steroid injections, and PT refused.  Associated symptoms include: activity changes, numbness, tingling, and back pain.  Pt denies HA, dizziness, SOB, CP, palpitations, nausea, vomiting, abdominal pain, diarrhea, and all other additional complaints.  Pt underwent total R knee arthroplasty today per Dr. Aguilar (Orthopedic Surgery).  Pt is a full code and Medhat Pitts (daughter) at [phone] is the surrogate decision maker.  Hospital Medicine consulted for medical management post procedure at the request of the primary team.      Overview/Hospital Course:  10/14/20 S/P right total knee replacement yesterday per Dr. Aguilar. Post-op pain is well tolerated. Encouraged participation with PT/OT. Possible discharge home tomorrow. 10/15/20 The patient reports persistent pain to RLE overnight, not relieved with pain meds. RLE US ordered was negative for a DVT. PT/OT evaluated the patient and recommended rehab placement. Will adjust PRN analgesia.     Interval History: The patient reports persistent pain to RLE overnight, not relieved with pain meds. RLE US ordered was negative for a DVT. PT/OT evaluated the patient and recommended rehab placement. Will adjust PRN analgesia.       Review of Systems   Constitutional: Positive for activity change and diaphoresis. Negative for appetite change, chills,  fatigue, fever and unexpected weight change.   HENT: Negative for congestion, drooling, facial swelling, postnasal drip, rhinorrhea, sinus pressure, sneezing, sore throat and trouble swallowing.    Eyes: Negative for discharge, redness, itching and visual disturbance.   Respiratory: Negative for apnea, cough, choking, chest tightness, shortness of breath, wheezing and stridor.    Cardiovascular: Negative for chest pain, palpitations and leg swelling.   Gastrointestinal: Negative for abdominal distention, abdominal pain, anal bleeding, blood in stool, constipation, diarrhea, nausea and vomiting.   Endocrine: Negative for cold intolerance and heat intolerance.   Genitourinary: Negative for decreased urine volume, difficulty urinating, dysuria, flank pain, frequency, hematuria, pelvic pain, urgency, vaginal bleeding and vaginal discharge.   Musculoskeletal: Positive for arthralgias, back pain, gait problem, joint swelling and myalgias. Negative for neck pain and neck stiffness.   Skin: Negative for color change, pallor, rash and wound.   Allergic/Immunologic: Negative for environmental allergies and food allergies.   Neurological: Negative for dizziness, seizures, facial asymmetry, speech difficulty, weakness, light-headedness, numbness and headaches.   Hematological: Does not bruise/bleed easily.   Psychiatric/Behavioral: Negative for agitation, confusion, hallucinations, sleep disturbance and suicidal ideas. The patient is not nervous/anxious.    All other systems reviewed and are negative.    Objective:     Vital Signs (Most Recent):  Temp: 98.7 °F (37.1 °C) (10/15/20 0723)  Pulse: 101 (10/15/20 0723)  Resp: 14 (10/15/20 1134)  BP: (!) 116/54 (10/15/20 0723)  SpO2: 98 % (10/15/20 0723) Vital Signs (24h Range):  Temp:  [97.6 °F (36.4 °C)-98.9 °F (37.2 °C)] 98.7 °F (37.1 °C)  Pulse:  [] 101  Resp:  [14-20] 14  SpO2:  [93 %-98 %] 98 %  BP: ()/(41-70) 116/54     Weight: 108.5 kg (239 lb 3.2 oz)  Body mass  index is 41.06 kg/m².    Intake/Output Summary (Last 24 hours) at 10/15/2020 1154  Last data filed at 10/15/2020 0800  Gross per 24 hour   Intake 910.5 ml   Output 2500 ml   Net -1589.5 ml      Physical Exam  Vitals signs and nursing note reviewed.   Constitutional:       Appearance: She is well-developed. She is obese.   HENT:      Head: Normocephalic and atraumatic.      Nose: Nose normal.      Mouth/Throat:      Mouth: Mucous membranes are dry.   Eyes:      Conjunctiva/sclera: Conjunctivae normal.      Pupils: Pupils are equal, round, and reactive to light.   Neck:      Musculoskeletal: Normal range of motion and neck supple.   Cardiovascular:      Rate and Rhythm: Normal rate and regular rhythm.      Pulses: Normal pulses.      Heart sounds: Normal heart sounds. No murmur.   Pulmonary:      Effort: Pulmonary effort is normal. No respiratory distress.      Breath sounds: Examination of the right-lower field reveals decreased breath sounds. Examination of the left-lower field reveals decreased breath sounds. Decreased breath sounds present.   Abdominal:      General: Bowel sounds are normal. There is no distension.      Palpations: Abdomen is soft.      Tenderness: There is no abdominal tenderness.   Genitourinary:     Comments: Deferred   Musculoskeletal: Normal range of motion.         General: Tenderness (R knee ) present. No deformity.      Comments: Limited ROM to R knee post procedure   Skin:     General: Skin is warm and dry.      Capillary Refill: Capillary refill takes less than 2 seconds.      Findings: No erythema.      Comments: Surgical wound with dressing to R knee    Neurological:      General: No focal deficit present.      Mental Status: She is alert and oriented to person, place, and time.      Deep Tendon Reflexes: Reflexes are normal and symmetric.   Psychiatric:         Mood and Affect: Mood normal.         Behavior: Behavior normal.         Significant Labs: All pertinent labs within the past  24 hours have been reviewed.    Significant Imaging:             Assessment/Plan:      * Arthritis of right knee  -Pt admitted to Medical Surgical Unit post procedure  -Orthopedic surgery-managed per primary team  -IV antibiotics   -analgesia as needed   -antiemetics as needed   -PT/OT as needed   -discharge anticipated per primary team   10/14/20 S/P right total knee replacement yesterday per Dr. Aguilar. Post-op pain is well tolerated. Encouraged participation with PT/OT. Possible discharge home tomorrow.   10/15/20 The patient reports persistent pain to RLE overnight, not relieved with pain meds. RLE US ordered was negative for a DVT. PT/OT evaluated the patient and recommended rehab placement. Will adjust PRN analgesia.       Leukocytosis  -likely reactive post procedure   -IV antibiotics as needed   -repeat CBC in am       Hyperlipemia  -statin continued       Hyperkalemia  -5.9 > 5.6 noted   -in the setting ESRD- diagnosed 2/24/16  -Lokelma given   -Nephrology consulted   -HD planned for tomorrow   -repeat labs pending   -EKG results pending   10/14/20 Resolved     Hypertension, renal disease  -Hydralazine and Lopressor continued   -Clonidine held- will resume when appropriate       ESRD on dialysis since 2/24/16  -Nephrology consulted   -MWF schedule   -Renvela continued   10/15/20 Nephrology following. Next dialysis treatment scheduled for tomorrow.     Anemia in chronic kidney disease, on chronic dialysis  -H/H stable post procedure   -will resume iron supplement upon discharge  -repeat CBC in am          VTE Risk Mitigation (From admission, onward)         Ordered     IP VTE LOW RISK PATIENT  Once      10/13/20 0752     Place DOUG hose  Until discontinued      10/13/20 0752     Place sequential compression device  Until discontinued      10/13/20 0752                Discharge Planning   DARRIN:      Code Status: Full Code   Is the patient medically ready for discharge?:     Reason for patient still in  hospital (select all that apply): Patient trending condition, Laboratory test and Treatment  Discharge Plan A: Home Health, Home                  Perfecto Gutierrez NP  Department of Hospital Medicine   [facility]

## 2020-10-15 NOTE — PLAN OF CARE
P.T. TX CONTINUED TODAY. PT WITH LIMITED MOBILITY D/T INC PAIN AND WEAKNESS. PT GT TRAINED X 3'. P.T. D/C REC 10/15/20 IS INPT REHAB.

## 2020-10-15 NOTE — PROGRESS NOTES
[facility]  Orthopedics  Progress Note    Patient Name: Gillian Pitts  MRN: [id number]  Admission Date: 10/13/2020  Hospital Length of Stay: 2 days  Attending Provider: Adilson Aguilar MD  Primary Care Provider: Primary Doctor No  Follow-up For: Procedure(s) (LRB):  ARTHROPLASTY, KNEE, TOTAL (Right)    Post-Operative Day: 2 Days Post-Op  Subjective:     Principal Problem:Arthritis of right knee    Principal Orthopedic Problem:  R knee arthritis     Interval History: no acute events overnight  Improved N/V - tolerating diet   Pain improving  More agreeable to therapy   Dialysis completed yesterday     Review of patient's allergies indicates:   Allergen Reactions    Iodinated contrast media Other (See Comments)     Kidney shut down    Morphine Other (See Comments)     Severe sedation    Povidone-iodine      pt stated iodine will shut down her kidney    Contrast media     Iodine and iodide containing products        Current Facility-Administered Medications   Medication    acetaminophen tablet 650 mg    atorvastatin tablet 80 mg    bisacodyL suppository 10 mg    chlorhexidine 0.12 % solution 10 mL    dexamethasone injection 8 mg    docusate sodium capsule 100 mg    famotidine tablet 20 mg    gabapentin capsule 300 mg    hydrALAZINE tablet 50 mg    metoprolol tartrate (LOPRESSOR) tablet 25 mg    mupirocin 2 % ointment    nozaseptin (NOZIN) nasal     ondansetron injection 4 mg    oxyCODONE immediate release tablet 10 mg    oxyCODONE immediate release tablet 5 mg    sevelamer carbonate tablet 2,400 mg    sodium chloride 0.9% flush 10 mL    zolpidem tablet 5 mg     Objective:     Vital Signs (Most Recent):  Temp: 98.7 °F (37.1 °C) (10/15/20 0723)  Pulse: 101 (10/15/20 0723)  Resp: 14 (10/15/20 0723)  BP: (!) 116/54 (10/15/20 0723)  SpO2: 98 % (10/15/20 0723) Vital Signs (24h Range):  Temp:  [97.5 °F (36.4 °C)-98.9 °F (37.2 °C)] 98.7 °F (37.1 °C)  Pulse:   "[] 101  Resp:  [14-20] 14  SpO2:  [93 %-98 %] 98 %  BP: ()/(41-70) 116/54     Weight: 108.5 kg (239 lb 3.2 oz)  Height: 5' 4" (162.6 cm)  Body mass index is 41.06 kg/m².      Intake/Output Summary (Last 24 hours) at 10/15/2020 0851  Last data filed at 10/15/2020 0800  Gross per 24 hour   Intake 1030.5 ml   Output 2500 ml   Net -1469.5 ml       General    Constitutional: She is oriented to person, place, and time. She appears well-developed and well-nourished.   HENT:   Head: Normocephalic and atraumatic.   Eyes: Pupils are equal, round, and reactive to light.   Neck: Neck supple.   Pulmonary/Chest: Effort normal.   Abdominal: Soft.   Neurological: She is alert and oriented to person, place, and time.   Psychiatric: She has a normal mood and affect.           Right Knee Exam     Comments:  POD2 -     KEVEN in place, dressing C/D/I  No warmth/ erythema noted to surgical site  Sensation intact throughout extremity   Str 5/5 dorsal/plantar flexion   Cap refill <2   2 + pulses         Significant Labs: All pertinent labs within the past 24 hours have been reviewed.    Significant Imaging: I have reviewed all pertinent imaging results/findings.    Assessment/Plan:     * Arthritis of right knee  POD2- R TKA    Likely dc to rehab today, pending placement   Nausea improved- advancing diet as tolerated, able to eat full dinner   Pain improved as well on current regimen     Dialysis yesterday, appreciate Nephrology assistance. Discussed potential need Friday should patient still be awaiting placement  ( Dr. Grant)   Encourage therapy participation, spirits lifted this am and patient agrees         Dr. Aguilar is aware of the patient & current presentation. He agrees with the current plan above.     GIUSEPPE Dawn  Orthopedics  [facility]  "

## 2020-10-15 NOTE — PLAN OF CARE
Patient received laying in bed awake, x4, in no acute distress. Vitals present within stable limits. Medications tolerated well. Dressing to RLE remains clean and intact. Safety precautions maintained. Will continue to monitor progress.

## 2020-10-15 NOTE — SUBJECTIVE & OBJECTIVE
Interval History: The patient reports persistent pain to RLE overnight, not relieved with pain meds. RLE US ordered was negative for a DVT. PT/OT evaluated the patient and recommended rehab placement. Will adjust PRN analgesia.       Review of Systems   Constitutional: Positive for activity change and diaphoresis. Negative for appetite change, chills, fatigue, fever and unexpected weight change.   HENT: Negative for congestion, drooling, facial swelling, postnasal drip, rhinorrhea, sinus pressure, sneezing, sore throat and trouble swallowing.    Eyes: Negative for discharge, redness, itching and visual disturbance.   Respiratory: Negative for apnea, cough, choking, chest tightness, shortness of breath, wheezing and stridor.    Cardiovascular: Negative for chest pain, palpitations and leg swelling.   Gastrointestinal: Negative for abdominal distention, abdominal pain, anal bleeding, blood in stool, constipation, diarrhea, nausea and vomiting.   Endocrine: Negative for cold intolerance and heat intolerance.   Genitourinary: Negative for decreased urine volume, difficulty urinating, dysuria, flank pain, frequency, hematuria, pelvic pain, urgency, vaginal bleeding and vaginal discharge.   Musculoskeletal: Positive for arthralgias, back pain, gait problem, joint swelling and myalgias. Negative for neck pain and neck stiffness.   Skin: Negative for color change, pallor, rash and wound.   Allergic/Immunologic: Negative for environmental allergies and food allergies.   Neurological: Negative for dizziness, seizures, facial asymmetry, speech difficulty, weakness, light-headedness, numbness and headaches.   Hematological: Does not bruise/bleed easily.   Psychiatric/Behavioral: Negative for agitation, confusion, hallucinations, sleep disturbance and suicidal ideas. The patient is not nervous/anxious.    All other systems reviewed and are negative.    Objective:     Vital Signs (Most Recent):  Temp: 98.7 °F (37.1 °C) (10/15/20  0723)  Pulse: 101 (10/15/20 0723)  Resp: 14 (10/15/20 1134)  BP: (!) 116/54 (10/15/20 0723)  SpO2: 98 % (10/15/20 0723) Vital Signs (24h Range):  Temp:  [97.6 °F (36.4 °C)-98.9 °F (37.2 °C)] 98.7 °F (37.1 °C)  Pulse:  [] 101  Resp:  [14-20] 14  SpO2:  [93 %-98 %] 98 %  BP: ()/(41-70) 116/54     Weight: 108.5 kg (239 lb 3.2 oz)  Body mass index is 41.06 kg/m².    Intake/Output Summary (Last 24 hours) at 10/15/2020 1154  Last data filed at 10/15/2020 0800  Gross per 24 hour   Intake 910.5 ml   Output 2500 ml   Net -1589.5 ml      Physical Exam  Vitals signs and nursing note reviewed.   Constitutional:       Appearance: She is well-developed. She is obese.   HENT:      Head: Normocephalic and atraumatic.      Nose: Nose normal.      Mouth/Throat:      Mouth: Mucous membranes are dry.   Eyes:      Conjunctiva/sclera: Conjunctivae normal.      Pupils: Pupils are equal, round, and reactive to light.   Neck:      Musculoskeletal: Normal range of motion and neck supple.   Cardiovascular:      Rate and Rhythm: Normal rate and regular rhythm.      Pulses: Normal pulses.      Heart sounds: Normal heart sounds. No murmur.   Pulmonary:      Effort: Pulmonary effort is normal. No respiratory distress.      Breath sounds: Examination of the right-lower field reveals decreased breath sounds. Examination of the left-lower field reveals decreased breath sounds. Decreased breath sounds present.   Abdominal:      General: Bowel sounds are normal. There is no distension.      Palpations: Abdomen is soft.      Tenderness: There is no abdominal tenderness.   Genitourinary:     Comments: Deferred   Musculoskeletal: Normal range of motion.         General: Tenderness (R knee ) present. No deformity.      Comments: Limited ROM to R knee post procedure   Skin:     General: Skin is warm and dry.      Capillary Refill: Capillary refill takes less than 2 seconds.      Findings: No erythema.      Comments: Surgical wound with dressing  to R knee    Neurological:      General: No focal deficit present.      Mental Status: She is alert and oriented to person, place, and time.      Deep Tendon Reflexes: Reflexes are normal and symmetric.   Psychiatric:         Mood and Affect: Mood normal.         Behavior: Behavior normal.         Significant Labs: All pertinent labs within the past 24 hours have been reviewed.    Significant Imaging:

## 2020-10-15 NOTE — PLAN OF CARE
Problem: Adult Inpatient Plan of Care  Goal: Plan of Care Review  Outcome: Ongoing, Progressing     Pt remained free of injury during shift, stable condition, NAD, VSS, pain adequately controlled with prn medication, KEVEN dressing intact with small amount of dried drainage present, and will continue to monitor. 24hr chart review performed.

## 2020-10-15 NOTE — DISCHARGE INSTRUCTIONS
Patient instructions for joint replacement    Before surgery  1.  Shower with Hibiclens soap/antibacterial for 3 days prior to surgery to decrease risk of infection  2..  Stop all blood thinners/aspirin, Coumadin, warfarin, Plavix, Eliquis, Xarelto etc 5 days prior to surgery  3.  Take Celebrex 400 mg the night prior to surgery after dinner or meloxicam 15 mg  4.  Take Tylenol 650 mg the night prior to surgery    Ask physicians for prescription of Celebrex or Mobic if needed    After surgery at home  1.  Take Tylenol 650 mg 3 times a day for 14 days then as needed for mild pain  2.  Take gabapentin 300 mg nightly for 6 weeks  3.  Take Celebrex 200 mg or meloxicam 15 mg daily for 6 weeks unless having cardiac issues to take for 2 weeks only  4.  Must take aspirin 81 mg twice a day for 6 weeks unless you are on other blood thinners/Plavix, Eliquis, Xarelto, Coumadin etc  5.  Must wear compressive stockings for 6 weeks minimum to decrease the risk of blood clot and swelling  6.  Hydrocodone/Norco or oxycodone/Percocet will be prescribed to take every 6 hr as needed for breakthrough pain  7.  May apply ice on the knee to help with decreasing pain  8.  Keep wound dry for 2 weeks until stitches/staples removed than you will be allowed to shower in 24 hr and get the wound wet.  No soaking of the wound in the tub or swimming for 4 weeks after surgery  9.  No driving for 4 weeks unless specified by physician  10.  Avoid touching the wound or surrounding area /at least 2 inches on each side of the surgical incision until staples are removed/stitches   11.  May change the surgical dressing if extremely bloody or has drainage on it. May clean the wound with peroxide or Betadine and apply sterile dressing and Ace wrap over it  12.  Leave hospital dressing on for 3 days then may change by applying sterile 4 x 4 and Ace wrap after cleaning with Betadine or peroxide.  May leave this dressing change to home health nurses

## 2020-10-15 NOTE — SUBJECTIVE & OBJECTIVE
"Principal Problem:Arthritis of right knee    Principal Orthopedic Problem:  R knee arthritis     Interval History: no acute events overnight  Improved N/V - tolerating diet   Pain improving  More agreeable to therapy   Dialysis completed yesterday     Review of patient's allergies indicates:   Allergen Reactions    Iodinated contrast media Other (See Comments)     Kidney shut down    Morphine Other (See Comments)     Severe sedation    Povidone-iodine      pt stated iodine will shut down her kidney    Contrast media     Iodine and iodide containing products        Current Facility-Administered Medications   Medication    acetaminophen tablet 650 mg    atorvastatin tablet 80 mg    bisacodyL suppository 10 mg    chlorhexidine 0.12 % solution 10 mL    dexamethasone injection 8 mg    docusate sodium capsule 100 mg    famotidine tablet 20 mg    gabapentin capsule 300 mg    hydrALAZINE tablet 50 mg    metoprolol tartrate (LOPRESSOR) tablet 25 mg    mupirocin 2 % ointment    nozaseptin (NOZIN) nasal     ondansetron injection 4 mg    oxyCODONE immediate release tablet 10 mg    oxyCODONE immediate release tablet 5 mg    sevelamer carbonate tablet 2,400 mg    sodium chloride 0.9% flush 10 mL    zolpidem tablet 5 mg     Objective:     Vital Signs (Most Recent):  Temp: 98.7 °F (37.1 °C) (10/15/20 0723)  Pulse: 101 (10/15/20 0723)  Resp: 14 (10/15/20 0723)  BP: (!) 116/54 (10/15/20 0723)  SpO2: 98 % (10/15/20 0723) Vital Signs (24h Range):  Temp:  [97.5 °F (36.4 °C)-98.9 °F (37.2 °C)] 98.7 °F (37.1 °C)  Pulse:  [] 101  Resp:  [14-20] 14  SpO2:  [93 %-98 %] 98 %  BP: ()/(41-70) 116/54     Weight: 108.5 kg (239 lb 3.2 oz)  Height: 5' 4" (162.6 cm)  Body mass index is 41.06 kg/m².      Intake/Output Summary (Last 24 hours) at 10/15/2020 0851  Last data filed at 10/15/2020 0800  Gross per 24 hour   Intake 1030.5 ml   Output 2500 ml   Net -1469.5 ml       General    Constitutional: She is " oriented to person, place, and time. She appears well-developed and well-nourished.   HENT:   Head: Normocephalic and atraumatic.   Eyes: Pupils are equal, round, and reactive to light.   Neck: Neck supple.   Pulmonary/Chest: Effort normal.   Abdominal: Soft.   Neurological: She is alert and oriented to person, place, and time.   Psychiatric: She has a normal mood and affect.           Right Knee Exam     Comments:  POD2 -     KEVEN in place, dressing C/D/I  No warmth/ erythema noted to surgical site  Sensation intact throughout extremity   Str 5/5 dorsal/plantar flexion   Cap refill <2   2 + pulses         Significant Labs: All pertinent labs within the past 24 hours have been reviewed.    Significant Imaging: I have reviewed all pertinent imaging results/findings.

## 2020-10-15 NOTE — PROGRESS NOTES
[facility] -   Nephrology  Progress Note    Patient Name: Gillian Pitts  MRN: [id number]  Admission Date: 10/13/2020  Hospital Length of Stay: 2 days  Attending Provider: Adilson Aguilar MD   Primary Care Physician: Primary Doctor No  Principal Problem:Arthritis of right knee    Subjective:     HPI: Patient is a 69 year old AA woman with ESRD on HD , anemia, HTN, PCKD, hyperparathyroidism.  Patient admitted under orthopedic surgery for right knee arthroplasty.  Patient is status post surgery.  Preop labs showed potassium of 5.9.  Patient is scheduled to have dialysis tomorrow.      Patient dilayzes on MWF schedule at [facility] under Dr Kitchen, Access is : left arm AV fistula.    10/13/2020 patient seen in consultation for ESRD hyperkalemia at the bedside using telemedicine protocol with the help of the nursing staff.    Interval History:  Hemodialysis tomorrow    Review of patient's allergies indicates:   Allergen Reactions    Iodinated contrast media Other (See Comments)     Kidney shut down    Morphine Other (See Comments)     Severe sedation    Povidone-iodine      pt stated iodine will shut down her kidney    Contrast media     Iodine and iodide containing products      Current Facility-Administered Medications   Medication Frequency    acetaminophen tablet 650 mg Q8H PRN    atorvastatin tablet 80 mg QHS    bisacodyL suppository 10 mg Daily PRN    chlorhexidine 0.12 % solution 10 mL BID    dexamethasone injection 8 mg Once    docusate sodium capsule 100 mg BID    famotidine tablet 20 mg Daily    gabapentin capsule 300 mg QHS    hydrALAZINE tablet 50 mg Q8H    metoprolol tartrate (LOPRESSOR) tablet 25 mg BID    mupirocin 2 % ointment BID    nozaseptin (NOZIN) nasal  BID    ondansetron injection 4 mg Q6H PRN    oxyCODONE immediate release tablet 10 mg Q3H PRN    oxyCODONE immediate release tablet 5 mg Q3H PRN    sevelamer carbonate tablet 2,400 mg TID WM     sodium chloride 0.9% flush 10 mL Q6H    zolpidem tablet 5 mg Nightly PRN       Objective:     Vital Signs (Most Recent):  Temp: 98.7 °F (37.1 °C) (10/15/20 0723)  Pulse: 101 (10/15/20 0723)  Resp: 15 (10/15/20 0920)  BP: (!) 116/54 (10/15/20 0723)  SpO2: 98 % (10/15/20 0723)  O2 Device (Oxygen Therapy): room air (10/15/20 0337) Vital Signs (24h Range):  Temp:  [97.5 °F (36.4 °C)-98.9 °F (37.2 °C)] 98.7 °F (37.1 °C)  Pulse:  [] 101  Resp:  [14-20] 15  SpO2:  [93 %-98 %] 98 %  BP: ()/(41-70) 116/54     Weight: 108.5 kg (239 lb 3.2 oz) (10/13/20 1014)  Body mass index is 41.06 kg/m².  Body surface area is 2.21 meters squared.    I/O last 3 completed shifts:  In: 910.5 [P.O.:360; Other:500; IV Piggyback:50.5]  Out: 2500 [Other:2500]    Physical Exam  Vitals signs and nursing note reviewed. Exam conducted with a chaperone present.   Constitutional:       Appearance: She is well-developed.   HENT:      Head: Normocephalic and atraumatic.   Eyes:      Conjunctiva/sclera: Conjunctivae normal.      Pupils: Pupils are equal, round, and reactive to light.   Neck:      Musculoskeletal: Full passive range of motion without pain, normal range of motion and neck supple. No edema.      Thyroid: No thyroid mass or thyromegaly.      Vascular: No carotid bruit.   Cardiovascular:      Rate and Rhythm: Normal rate and regular rhythm.      Chest Wall: PMI is not displaced.      Pulses: Normal pulses.      Heart sounds: Normal heart sounds, S1 normal and S2 normal. No murmur. No friction rub.   Pulmonary:      Effort: Pulmonary effort is normal. No accessory muscle usage or respiratory distress.      Breath sounds: Normal breath sounds. No wheezing or rales.   Chest:      Chest wall: No tenderness.   Abdominal:      General: Bowel sounds are normal. There is no distension.      Palpations: Abdomen is soft. There is no mass.      Tenderness: There is no abdominal tenderness. There is no rebound.      Hernia: No hernia is  present.      Comments: Truncal obesity   Musculoskeletal: Normal range of motion.         General: No tenderness.      Comments: Right knee surgery     Left Arm AVF positive bruit and thrill    Multiple surgery scars from previous surgeries in both arms   Skin:     General: Skin is warm and dry.      Coloration: Skin is not pale.      Findings: No bruising, ecchymosis, erythema or rash.      Nails: There is no clubbing.     Neurological:      Mental Status: She is alert and oriented to person, place, and time.      Cranial Nerves: No cranial nerve deficit.      Sensory: No sensory deficit.      Motor: No abnormal muscle tone.      Coordination: Coordination normal.      Deep Tendon Reflexes: Reflexes are normal and symmetric. Reflexes normal.   Psychiatric:         Speech: Speech normal.         Behavior: Behavior normal.         Thought Content: Thought content normal.         Judgment: Judgment normal.         Significant Labs:  All labs within the past 24 hours have been reviewed.     Significant Imaging:      Assessment/Plan:     ESRD on dialysis since 2/24/16  10/14/2020 hemodialysis today    10/15/2020 continue with Monday Wednesday Friday scan scheduled dialysis.  Pending placement for rehabilitation.  Expect next hemodialysis to be tomorrow          Thank you for your consult.     Kylie Grant MD  Nephrology  [facility] -

## 2020-10-15 NOTE — PLAN OF CARE
Will transfer to [facility] 10-16 after HD     10/15/20 1036   Post-Acute Status   Post-Acute Authorization Placement   Post-Acute Placement Status Set-up Complete

## 2020-10-15 NOTE — PT/OT/SLP PROGRESS
Physical Therapy  Treatment    Gillian Pitts   MRN: [id number]   Admitting Diagnosis: Arthritis of right knee    PT Received On: 10/15/20  PT Start Time: 0730     PT Stop Time: 0755    PT Total Time (min): 25 min       Billable Minutes:  Therapeutic Activity 15 and Therapeutic Exercise 10    Treatment Type: Treatment  PT/PTA: PT     PTA Visit Number: 0       General Precautions: Standard, fall  Orthopedic Precautions: RLE weight bearing as tolerated   Braces: N/A         Subjective:  Communicated with NURSE RAMIREZ AND Epic CHART REVIEW  prior to session.   PT AGREED TO TX     Pain/Comfort  Pain Rating 1: 9/10  Location - Side 1: Right  Location 1: knee  Pain Addressed 1: Nurse notified  Pain Rating Post-Intervention 1: 9/10    Objective:   Patient found with: peripheral IV, wound vac    Functional Mobility:  PT MET IN RM SUP>SIT EOB WITH MIN A OF R LE. PT SCOOTED TO EOB AND STOOD WITH RW AND MIN A. PT GT TRAINED X 3' WITH RW AND INC C/O PAIN. PT REPORTED SHE WAS WEAK AND IN PAIN AND UNABLE TO CONTINUE. PT T/F TO CHAIR WITH MIN A. PT COMPLETED AP, TKE,AND MIPX 10 REPS. PT LEFT SEATED IN CHAIR AND NURSE AWARE OF PAIN MED REQUEST.     AM-PAC 6 CLICK MOBILITY  How much help from another person does this patient currently need?   1 = Unable, Total/Dependent Assistance  2 = A lot, Maximum/Moderate Assistance  3 = A little, Minimum/Contact Guard/Supervision  4 = None, Modified [place]/Independent    Turning over in bed (including adjusting bedclothes, sheets and blankets)?: 4  Sitting down on and standing up from a chair with arms (e.g., wheelchair, bedside commode, etc.): 3  Moving from lying on back to sitting on the side of the bed?: 3  Moving to and from a bed to a chair (including a wheelchair)?: 3  Need to walk in hospital room?: 3  Climbing 3-5 steps with a railing?: 1  Basic Mobility Total Score: 17    AM-PAC Raw Score CMS G-Code Modifier Level of Impairment Assistance   6 % Total / Unable   7 -  9 CM 80 - 100% Maximal Assist   10 - 14 CL 60 - 80% Moderate Assist   15 - 19 CK 40 - 60% Moderate Assist   20 - 22 CJ 20 - 40% Minimal Assist   23 CI 1-20% SBA / CGA   24 CH 0% Independent/ Mod I     Patient left up in chair with call button in reach.    Assessment:  PT WITH DEC GT TRAINING D/T INC PAIN AND WEAKNESS.     Rehab identified problem list/impairments: Rehab identified problem list/impairments: weakness, impaired endurance, impaired self care skills, impaired functional mobilty, gait instability, impaired balance, pain, decreased lower extremity function, decreased ROM, orthopedic precautions    Rehab potential is good.    Activity tolerance: FAIR    Discharge recommendations: Discharge Facility/Level of Care Needs: rehabilitation facility     Barriers to discharge:      Equipment recommendations: Equipment Needed After Discharge: walker, rolling     GOALS:   Multidisciplinary Problems     Physical Therapy Goals        Problem: Physical Therapy Goal    Goal Priority Disciplines Outcome Goal Variances Interventions   Physical Therapy Goal     PT, PT/OT Ongoing, Progressing     Description: 1. Patient will perform supine to/from sit mod indep  2. Patient will perform sit to/from stand with RW mod indep  3. Patient will ambulate 150ft RW mod indep no gross LOB  4. Patient will perform r TKR TE x 20 reps                   PLAN:    Patient to be seen 5 x/week  to address the above listed problems via gait training, therapeutic activities, therapeutic exercises  Plan of Care expires: 10/20/20  Plan of Care reviewed with: patient         Alina Annie, PT  10/15/2020

## 2020-10-16 LAB
ANION GAP SERPL CALC-SCNC: 20 MMOL/L (ref 8–16)
BASOPHILS # BLD AUTO: 0.06 K/UL (ref 0–0.2)
BASOPHILS NFR BLD: 0.4 % (ref 0–1.9)
BUN SERPL-MCNC: 53 MG/DL (ref 8–23)
CALCIUM SERPL-MCNC: 9.1 MG/DL (ref 8.7–10.5)
CHLORIDE SERPL-SCNC: 94 MMOL/L (ref 95–110)
CO2 SERPL-SCNC: 18 MMOL/L (ref 23–29)
CREAT SERPL-MCNC: 9.5 MG/DL (ref 0.5–1.4)
DIFFERENTIAL METHOD: ABNORMAL
EOSINOPHIL # BLD AUTO: 0.1 K/UL (ref 0–0.5)
EOSINOPHIL NFR BLD: 0.4 % (ref 0–8)
ERYTHROCYTE [DISTWIDTH] IN BLOOD BY AUTOMATED COUNT: 16 % (ref 11.5–14.5)
EST. GFR  (AFRICAN AMERICAN): 4 ML/MIN/1.73 M^2
EST. GFR  (NON AFRICAN AMERICAN): 4 ML/MIN/1.73 M^2
GLUCOSE SERPL-MCNC: 75 MG/DL (ref 70–110)
HCT VFR BLD AUTO: 35.2 % (ref 37–48.5)
HGB BLD-MCNC: 11 G/DL (ref 12–16)
IMM GRANULOCYTES # BLD AUTO: 0.09 K/UL (ref 0–0.04)
IMM GRANULOCYTES NFR BLD AUTO: 0.6 % (ref 0–0.5)
LYMPHOCYTES # BLD AUTO: 3.2 K/UL (ref 1–4.8)
LYMPHOCYTES NFR BLD: 22.9 % (ref 18–48)
MCH RBC QN AUTO: 30.4 PG (ref 27–31)
MCHC RBC AUTO-ENTMCNC: 31.3 G/DL (ref 32–36)
MCV RBC AUTO: 97 FL (ref 82–98)
MONOCYTES # BLD AUTO: 2.3 K/UL (ref 0.3–1)
MONOCYTES NFR BLD: 16.4 % (ref 4–15)
NEUTROPHILS # BLD AUTO: 8.2 K/UL (ref 1.8–7.7)
NEUTROPHILS NFR BLD: 59.3 % (ref 38–73)
NRBC BLD-RTO: 1 /100 WBC
PLATELET # BLD AUTO: 199 K/UL (ref 150–350)
PMV BLD AUTO: 10.1 FL (ref 9.2–12.9)
POCT GLUCOSE: 164 MG/DL (ref 70–110)
POTASSIUM SERPL-SCNC: 7.2 MMOL/L (ref 3.5–5.1)
RBC # BLD AUTO: 3.62 M/UL (ref 4–5.4)
SODIUM SERPL-SCNC: 132 MMOL/L (ref 136–145)
WBC # BLD AUTO: 13.93 K/UL (ref 3.9–12.7)

## 2020-10-16 PROCEDURE — 90935 PR HEMODIALYSIS, ONE EVALUATION: ICD-10-PCS | Mod: ,,, | Performed by: INTERNAL MEDICINE

## 2020-10-16 PROCEDURE — 85025 COMPLETE CBC W/AUTO DIFF WBC: CPT

## 2020-10-16 PROCEDURE — 99900035 HC TECH TIME PER 15 MIN (STAT)

## 2020-10-16 PROCEDURE — 97110 THERAPEUTIC EXERCISES: CPT | Mod: CQ

## 2020-10-16 PROCEDURE — A4216 STERILE WATER/SALINE, 10 ML: HCPCS | Performed by: ORTHOPAEDIC SURGERY

## 2020-10-16 PROCEDURE — 36415 COLL VENOUS BLD VENIPUNCTURE: CPT

## 2020-10-16 PROCEDURE — 90935 HEMODIALYSIS ONE EVALUATION: CPT | Mod: ,,, | Performed by: INTERNAL MEDICINE

## 2020-10-16 PROCEDURE — 80048 BASIC METABOLIC PNL TOTAL CA: CPT

## 2020-10-16 PROCEDURE — 25000003 PHARM REV CODE 250: Performed by: NURSE PRACTITIONER

## 2020-10-16 PROCEDURE — 25000003 PHARM REV CODE 250: Performed by: ORTHOPAEDIC SURGERY

## 2020-10-16 PROCEDURE — 11000001 HC ACUTE MED/SURG PRIVATE ROOM

## 2020-10-16 PROCEDURE — 90935 HEMODIALYSIS ONE EVALUATION: CPT

## 2020-10-16 PROCEDURE — 97530 THERAPEUTIC ACTIVITIES: CPT

## 2020-10-16 PROCEDURE — 63600175 PHARM REV CODE 636 W HCPCS: Performed by: NURSE PRACTITIONER

## 2020-10-16 RX ADMIN — FAMOTIDINE 20 MG: 20 TABLET ORAL at 08:10

## 2020-10-16 RX ADMIN — GABAPENTIN 300 MG: 300 CAPSULE ORAL at 08:10

## 2020-10-16 RX ADMIN — DOCUSATE SODIUM 100 MG: 100 CAPSULE, LIQUID FILLED ORAL at 08:10

## 2020-10-16 RX ADMIN — CHLORHEXIDINE GLUCONATE 0.12% ORAL RINSE 10 ML: 1.2 LIQUID ORAL at 08:10

## 2020-10-16 RX ADMIN — METOPROLOL TARTRATE 25 MG: 25 TABLET, FILM COATED ORAL at 08:10

## 2020-10-16 RX ADMIN — OXYCODONE HYDROCHLORIDE 15 MG: 5 TABLET ORAL at 05:10

## 2020-10-16 RX ADMIN — OXYCODONE HYDROCHLORIDE 15 MG: 5 TABLET ORAL at 03:10

## 2020-10-16 RX ADMIN — Medication 10 ML: at 05:10

## 2020-10-16 RX ADMIN — MUPIROCIN: 20 OINTMENT TOPICAL at 08:10

## 2020-10-16 RX ADMIN — INSULIN HUMAN 10 UNITS: 100 INJECTION, SOLUTION PARENTERAL at 12:10

## 2020-10-16 RX ADMIN — DEXTROSE MONOHYDRATE 12.5 G: 500 INJECTION PARENTERAL at 12:10

## 2020-10-16 RX ADMIN — SEVELAMER CARBONATE 2400 MG: 800 TABLET, FILM COATED ORAL at 08:10

## 2020-10-16 RX ADMIN — SEVELAMER CARBONATE 2400 MG: 800 TABLET, FILM COATED ORAL at 12:10

## 2020-10-16 RX ADMIN — ATORVASTATIN CALCIUM 80 MG: 40 TABLET, FILM COATED ORAL at 08:10

## 2020-10-16 NOTE — SUBJECTIVE & OBJECTIVE
"Principal Problem:Arthritis of right knee    Principal Orthopedic Problem: R knee arthritis     Interval History: no acute events overnight  Patient doing well, in great spirits today- hair is fixed, wearing jewelry   Pain well controlled, tolerating diet, participating well with therapy   Eager to get out of the hospital    Review of patient's allergies indicates:   Allergen Reactions    Iodinated contrast media Other (See Comments)     Kidney shut down    Morphine Other (See Comments)     Severe sedation    Povidone-iodine      pt stated iodine will shut down her kidney    Contrast media     Iodine and iodide containing products        Current Facility-Administered Medications   Medication    acetaminophen tablet 650 mg    atorvastatin tablet 80 mg    bisacodyL suppository 10 mg    chlorhexidine 0.12 % solution 10 mL    dexamethasone injection 8 mg    docusate sodium capsule 100 mg    famotidine tablet 20 mg    gabapentin capsule 300 mg    hydrALAZINE tablet 50 mg    lactulose 20 gram/30 mL solution Soln 20 g    metoprolol tartrate (LOPRESSOR) tablet 25 mg    mupirocin 2 % ointment    nozaseptin (NOZIN) nasal     ondansetron injection 4 mg    oxyCODONE immediate release tablet 15 mg    oxyCODONE immediate release tablet 5 mg    sevelamer carbonate tablet 2,400 mg    sodium chloride 0.9% flush 10 mL    zolpidem tablet 5 mg     Objective:     Vital Signs (Most Recent):  Temp: 97.4 °F (36.3 °C) (10/16/20 0731)  Pulse: 104 (10/16/20 0731)  Resp: 15 (10/16/20 0731)  BP: 134/61 (10/16/20 0731)  SpO2: 98 % (10/16/20 0731) Vital Signs (24h Range):  Temp:  [97.2 °F (36.2 °C)-98.3 °F (36.8 °C)] 97.4 °F (36.3 °C)  Pulse:  [] 104  Resp:  [14-20] 15  SpO2:  [54 %-98 %] 98 %  BP: (101-185)/() 134/61     Weight: 108.5 kg (239 lb 3.2 oz)  Height: 5' 4" (162.6 cm)  Body mass index is 41.06 kg/m².      Intake/Output Summary (Last 24 hours) at 10/16/2020 0811  Last data filed at " 10/16/2020 0600  Gross per 24 hour   Intake 420 ml   Output --   Net 420 ml       General    Constitutional: She is oriented to person, place, and time. She appears well-developed and well-nourished.   HENT:   Head: Normocephalic and atraumatic.   Eyes: Pupils are equal, round, and reactive to light.   Neck: Neck supple.   Pulmonary/Chest: Effort normal.   Abdominal: Soft.   Neurological: She is alert and oriented to person, place, and time.   Psychiatric: She has a normal mood and affect.           Right Knee Exam     Comments:  POD3 -     KEVEN in place, dressing C/D/I  No warmth/ erythema noted to surgical site  Sensation intact throughout extremity   Str 5/5 dorsal/plantar flexion, wiggles toes, lifting extremity off the bed  Cap refill <2   2 + pulses         Significant Labs: All pertinent labs within the past 24 hours have been reviewed.    Significant Imaging: None

## 2020-10-16 NOTE — SUBJECTIVE & OBJECTIVE
Interval History: seen at HD , tolerating well,     Review of patient's allergies indicates:   Allergen Reactions    Iodinated contrast media Other (See Comments)     Kidney shut down    Morphine Other (See Comments)     Severe sedation    Povidone-iodine      pt stated iodine will shut down her kidney    Contrast media     Iodine and iodide containing products      Current Facility-Administered Medications   Medication Frequency    acetaminophen tablet 650 mg Q8H PRN    atorvastatin tablet 80 mg QHS    bisacodyL suppository 10 mg Daily PRN    chlorhexidine 0.12 % solution 10 mL BID    dexamethasone injection 8 mg Once    docusate sodium capsule 100 mg BID    famotidine tablet 20 mg Daily    gabapentin capsule 300 mg QHS    hydrALAZINE tablet 50 mg Q8H    lactulose 20 gram/30 mL solution Soln 20 g Q6H PRN    metoprolol tartrate (LOPRESSOR) tablet 25 mg BID    mupirocin 2 % ointment BID    nozaseptin (NOZIN) nasal  BID    ondansetron injection 4 mg Q6H PRN    oxyCODONE immediate release tablet 15 mg Q3H PRN    oxyCODONE immediate release tablet 5 mg Q3H PRN    sevelamer carbonate tablet 2,400 mg TID WM    sodium chloride 0.9% flush 10 mL Q6H    zolpidem tablet 5 mg Nightly PRN       Objective:     Vital Signs (Most Recent):  Temp: 97.1 °F (36.2 °C) (10/16/20 1314)  Pulse: 68 (10/16/20 1500)  Resp: 15 (10/16/20 1510)  BP: (!) 134/47 (10/16/20 1500)  SpO2: 95 % (10/16/20 1251)  O2 Device (Oxygen Therapy): room air (10/16/20 0325) Vital Signs (24h Range):  Temp:  [96 °F (35.6 °C)-98.3 °F (36.8 °C)] 97.1 °F (36.2 °C)  Pulse:  [] 68  Resp:  [14-20] 15  SpO2:  [54 %-98 %] 95 %  BP: (101-169)/() 134/47     Weight: 108.5 kg (239 lb 3.2 oz) (10/13/20 1014)  Body mass index is 41.06 kg/m².  Body surface area is 2.21 meters squared.    I/O last 3 completed shifts:  In: 780.5 [P.O.:780; IV Piggyback:0.5]  Out: -     Physical Exam  Constitutional:       General: She is not in acute  distress.     Appearance: She is well-developed.   HENT:      Head: Normocephalic and atraumatic.      Mouth/Throat:      Pharynx: No oropharyngeal exudate.   Eyes:      General: No scleral icterus.     Conjunctiva/sclera: Conjunctivae normal.      Pupils: Pupils are equal, round, and reactive to light.   Neck:      Musculoskeletal: Normal range of motion and neck supple.      Thyroid: No thyroid mass or thyromegaly.      Vascular: No carotid bruit or JVD.      Trachea: No tracheal deviation.   Cardiovascular:      Rate and Rhythm: Normal rate and regular rhythm.      Heart sounds: Normal heart sounds. No murmur. No friction rub. No gallop.    Pulmonary:      Effort: Pulmonary effort is normal. No respiratory distress.      Breath sounds: Normal breath sounds. No wheezing or rales.   Chest:      Chest wall: No tenderness.   Abdominal:      General: Bowel sounds are normal. There is no distension or abdominal bruit.      Palpations: Abdomen is soft. There is no mass.      Tenderness: There is no abdominal tenderness. There is no guarding or rebound.   Musculoskeletal:         General: No tenderness.      Comments: LUE AVF, right knee dressings    Lymphadenopathy:      Cervical: No cervical adenopathy.   Skin:     General: Skin is warm.      Coloration: Skin is not pale.      Findings: No erythema or rash.   Neurological:      Cranial Nerves: No cranial nerve deficit.      Motor: No abnormal muscle tone.      Coordination: Coordination normal.      Deep Tendon Reflexes: Reflexes are normal and symmetric. Reflexes normal.   Psychiatric:         Behavior: Behavior normal.         Judgment: Judgment normal.         Significant Labs:  CBC:   Recent Labs   Lab 10/16/20  0631   WBC 13.93*   RBC 3.62*   HGB 11.0*   HCT 35.2*      MCV 97   MCH 30.4   MCHC 31.3*     CMP:   Recent Labs   Lab 10/14/20  1730  10/16/20  0935   *   < > 75   CALCIUM 8.6*   < > 9.1   ALBUMIN 3.5  --   --    PROT 6.7  --   --    *    < > 132*   K 4.1   < > 7.2*   CO2 22*   < > 18*   CL 97   < > 94*   BUN 22   < > 53*   CREATININE 5.3*   < > 9.5*   ALKPHOS 108  --   --    ALT 5*  --   --    AST 18  --   --    BILITOT 0.3  --   --     < > = values in this interval not displayed.     Coagulation: No results for input(s): PT, INR, APTT in the last 168 hours.  LFTs:   Recent Labs   Lab 10/14/20  1730   ALT 5*   AST 18   ALKPHOS 108   BILITOT 0.3   PROT 6.7   ALBUMIN 3.5     All labs within the past 24 hours have been reviewed.     Significant Imaging:  Reviewed    Lab Results   Component Value Date    .0 (H) 03/22/2018    CALCIUM 9.1 10/16/2020    CAION 1.10 03/22/2018    PHOS 3.8 07/24/2020       Lab Results   Component Value Date    ALBUMIN 3.5 10/14/2020

## 2020-10-16 NOTE — PLAN OF CARE
Patient will not have HD until 1:00 pm.  [facility] will not be able to accept that late in the day.  Plan will be for discharge to [facility] on Saturday am.

## 2020-10-16 NOTE — SUBJECTIVE & OBJECTIVE
Interval History: Pt is going to dialysis currently. Pain to knee is controlled with medication.     Review of Systems   Constitutional: Positive for activity change and diaphoresis. Negative for appetite change, chills, fatigue, fever and unexpected weight change.   HENT: Negative for congestion, drooling, facial swelling, postnasal drip, rhinorrhea, sinus pressure, sneezing, sore throat and trouble swallowing.    Eyes: Negative for discharge, redness, itching and visual disturbance.   Respiratory: Negative for apnea, cough, choking, chest tightness, shortness of breath, wheezing and stridor.    Cardiovascular: Negative for chest pain, palpitations and leg swelling.   Gastrointestinal: Negative for abdominal distention, abdominal pain, anal bleeding, blood in stool, constipation, diarrhea, nausea and vomiting.   Endocrine: Negative for cold intolerance and heat intolerance.   Genitourinary: Negative for decreased urine volume, difficulty urinating, dysuria, flank pain, frequency, hematuria, pelvic pain, urgency, vaginal bleeding and vaginal discharge.   Musculoskeletal: Positive for arthralgias, back pain, gait problem, joint swelling and myalgias. Negative for neck pain and neck stiffness.   Skin: Negative for color change, pallor, rash and wound.   Allergic/Immunologic: Negative for environmental allergies and food allergies.   Neurological: Negative for dizziness, seizures, facial asymmetry, speech difficulty, weakness, light-headedness, numbness and headaches.   Hematological: Does not bruise/bleed easily.   Psychiatric/Behavioral: Negative for agitation, confusion, hallucinations, sleep disturbance and suicidal ideas. The patient is not nervous/anxious.    All other systems reviewed and are negative.    Objective:     Vital Signs (Most Recent):  Temp: 97.1 °F (36.2 °C) (10/16/20 1314)  Pulse: 68 (10/16/20 1520)  Resp: 15 (10/16/20 1510)  BP: (!) 98/25 (10/16/20 1520)  SpO2: 95 % (10/16/20 1251) Vital Signs  (24h Range):  Temp:  [96 °F (35.6 °C)-98.3 °F (36.8 °C)] 97.1 °F (36.2 °C)  Pulse:  [] 68  Resp:  [14-20] 15  SpO2:  [54 %-98 %] 95 %  BP: ()/() 98/25     Weight: 108.5 kg (239 lb 3.2 oz)  Body mass index is 41.06 kg/m².    Intake/Output Summary (Last 24 hours) at 10/16/2020 1739  Last data filed at 10/16/2020 0800  Gross per 24 hour   Intake 360 ml   Output --   Net 360 ml      Physical Exam  Vitals signs and nursing note reviewed.   Constitutional:       Appearance: She is well-developed. She is obese.   HENT:      Head: Normocephalic and atraumatic.      Nose: Nose normal.      Mouth/Throat:      Mouth: Mucous membranes are dry.   Eyes:      Conjunctiva/sclera: Conjunctivae normal.      Pupils: Pupils are equal, round, and reactive to light.   Neck:      Musculoskeletal: Normal range of motion and neck supple.   Cardiovascular:      Rate and Rhythm: Normal rate and regular rhythm.      Pulses: Normal pulses.      Heart sounds: Normal heart sounds. No murmur.   Pulmonary:      Effort: Pulmonary effort is normal. No respiratory distress.      Breath sounds: Examination of the right-lower field reveals decreased breath sounds. Examination of the left-lower field reveals decreased breath sounds. Decreased breath sounds present.   Abdominal:      General: Bowel sounds are normal. There is no distension.      Palpations: Abdomen is soft.      Tenderness: There is no abdominal tenderness.   Genitourinary:     Comments: Deferred   Musculoskeletal: Normal range of motion.         General: Tenderness (R knee ) present. No deformity.      Comments: Limited ROM to R knee post procedure   Skin:     General: Skin is warm and dry.      Capillary Refill: Capillary refill takes less than 2 seconds.      Findings: No erythema.      Comments: Surgical wound with dressing to R knee    Neurological:      General: No focal deficit present.      Mental Status: She is alert and oriented to person, place, and time.       Deep Tendon Reflexes: Reflexes are normal and symmetric.   Psychiatric:         Mood and Affect: Mood normal.         Behavior: Behavior normal.         Significant Labs:   CBC:   Recent Labs   Lab 10/15/20  0627 10/16/20  0631   WBC 10.29 13.93*   HGB 10.3* 11.0*   HCT 35.1* 35.2*    199     CMP:   Recent Labs   Lab 10/15/20  0627 10/16/20  0935   * 132*   K 5.2* 7.2*   CL 97 94*   CO2 21* 18*   GLU 94 75   BUN 30* 53*   CREATININE 6.8* 9.5*   CALCIUM 8.9 9.1   ANIONGAP 12 20*   EGFRNONAA 6* 4*     All pertinent labs within the past 24 hours have been reviewed.    Significant Imaging: I have reviewed all pertinent imaging results/findings within the past 24 hours.

## 2020-10-16 NOTE — PT/OT/SLP PROGRESS
Physical Therapy  Treatment    Gillian Pitts   MRN: [id number]   Admitting Diagnosis: Arthritis of right knee    PT Received On: 10/16/20  PT Start Time: 0945     PT Stop Time: 1015    PT Total Time (min): 30 min       Billable Minutes:  Therapeutic Exercise 30    Treatment Type: Treatment  PT/PTA: PTA     PTA Visit Number: 1       General Precautions: Standard, fall  Orthopedic Precautions: RLE weight bearing as tolerated   Braces: N/A         Subjective:  Communicated with nurse Horta and Epic chart review prior to session.  Patient demonstrated significant confusion throughout session. Patient was unable to accurately quantify pain level with explanation.    Pain/Comfort  Pain Rating 1: 5/10  Location - Side 1: Right  Location 1: leg    Objective:   Patient found with: peripheral IV, wound vac    Functional Mobility:  Bed Mobility: supine <-> sit EOB: Min A       Transfers: STS from EOB to RW: Mod A with increased time, multiple attempts before success       Gait: x3 small forward steps with RW Max A x2    Attempted to extensively educate patient on safety, functional mobility, necessity to consistently progress gait and knee ROM. Patient was not receptive to education and appeared unable to comprehend cues and education at all.     AM-PAC 6 CLICK MOBILITY  How much help from another person does this patient currently need?   1 = Unable, Total/Dependent Assistance  2 = A lot, Maximum/Moderate Assistance  3 = A little, Minimum/Contact Guard/Supervision  4 = None, Modified [place]/Independent    Turning over in bed (including adjusting bedclothes, sheets and blankets)?: 3  Sitting down on and standing up from a chair with arms (e.g., wheelchair, bedside commode, etc.): 2  Moving from lying on back to sitting on the side of the bed?: 3  Moving to and from a bed to a chair (including a wheelchair)?: 2  Need to walk in hospital room?: 2  Climbing 3-5 steps with a railing?: 1  Basic Mobility Total Score:  13    AM-PAC Raw Score CMS G-Code Modifier Level of Impairment Assistance   6 % Total / Unable   7 - 9 CM 80 - 100% Maximal Assist   10 - 14 CL 60 - 80% Moderate Assist   15 - 19 CK 40 - 60% Moderate Assist   20 - 22 CJ 20 - 40% Minimal Assist   23 CI 1-20% SBA / CGA   24 CH 0% Independent/ Mod I     Patient left with bed in chair position with call button in reach.    Assessment:  Gillian Pitts is a 69 y.o. female with a medical diagnosis of Arthritis of right knee and presents with overall decline in functional mobility. Patient would continue to benefit from skilled PT to address functional limitations listed below in order to return to PLOF/ decrease caregiver burden.     Rehab identified problem list/impairments: Rehab identified problem list/impairments: weakness, impaired endurance, impaired self care skills, impaired functional mobilty, gait instability, impaired balance, impaired cognition, decreased coordination, decreased lower extremity function, decreased safety awareness, pain, decreased ROM    Rehab potential is good.    Activity tolerance: Poor    Discharge recommendations: Discharge Facility/Level of Care Needs: rehabilitation facility     Barriers to discharge:      Equipment recommendations: Equipment Needed After Discharge: walker, rolling     GOALS:   Multidisciplinary Problems     Physical Therapy Goals        Problem: Physical Therapy Goal    Goal Priority Disciplines Outcome Goal Variances Interventions   Physical Therapy Goal     PT, PT/OT Ongoing, Progressing     Description: 1. Patient will perform supine to/from sit mod indep  2. Patient will perform sit to/from stand with RW mod indep  3. Patient will ambulate 150ft RW mod indep no gross LOB  4. Patient will perform r TKR TE x 20 reps                   PLAN:    Patient to be seen 5 x/week  to address the above listed problems via gait training, therapeutic activities, therapeutic exercises  Plan of Care expires:  10/20/20  Plan of Care reviewed with: patient         Nirali Shresthapetersonnathaly, PTA  10/16/2020

## 2020-10-16 NOTE — PROGRESS NOTES
[facility] Hospital Medicine  Progress Note    Patient Name: Gillian Pitts  MRN: [id number]  Patient Class: IP- Inpatient   Admission Date: 10/13/2020  Length of Stay: 3 days  Attending Physician: Adilson Aguilar MD  Primary Care Provider: Primary Doctor No        Subjective:     Principal Problem:Arthritis of right knee        HPI:  Gillian Pitts is a 68 yo female with PMHx of secondary hyperparathyroidism, ESRD (MWF), HTN, and Anemia who presented with arthritis to bilateral knees (R>L).  Pt reported symptom progression s/p fall with pain not relived by conservative measures, steroid injections, and PT refused.  Associated symptoms include: activity changes, numbness, tingling, and back pain.  Pt denies HA, dizziness, SOB, CP, palpitations, nausea, vomiting, abdominal pain, diarrhea, and all other additional complaints.  Pt underwent total R knee arthroplasty today per Dr. Aguilar (Orthopedic Surgery).  Pt is a full code and Medhat Pitts (daughter) at [phone] is the surrogate decision maker.  Hospital Medicine consulted for medical management post procedure at the request of the primary team.      Overview/Hospital Course:  10/14/20 S/P right total knee replacement yesterday per Dr. Aguilar. Post-op pain is well tolerated. Encouraged participation with PT/OT. Possible discharge home tomorrow. 10/15/20 The patient reports persistent pain to RLE overnight, not relieved with pain meds. RLE US ordered was negative for a DVT. PT/OT evaluated the patient and recommende  d rehab placement. Will adjust PRN analgesia.     10/16/2020 - Potassium 7.2. IV insulin given but unable to give dextrose and IV not working. Pt underwent dialysis - then plans for discharge to Rehab to continue PT/OT. Pt is discharged to Rehab by Orthopedics. DVT prophylaxis of ASA ordered by Orthopedics    Interval History: Pt is going to dialysis currently. Pain to knee is controlled with medication.      Review of Systems   Constitutional: Positive for activity change and diaphoresis. Negative for appetite change, chills, fatigue, fever and unexpected weight change.   HENT: Negative for congestion, drooling, facial swelling, postnasal drip, rhinorrhea, sinus pressure, sneezing, sore throat and trouble swallowing.    Eyes: Negative for discharge, redness, itching and visual disturbance.   Respiratory: Negative for apnea, cough, choking, chest tightness, shortness of breath, wheezing and stridor.    Cardiovascular: Negative for chest pain, palpitations and leg swelling.   Gastrointestinal: Negative for abdominal distention, abdominal pain, anal bleeding, blood in stool, constipation, diarrhea, nausea and vomiting.   Endocrine: Negative for cold intolerance and heat intolerance.   Genitourinary: Negative for decreased urine volume, difficulty urinating, dysuria, flank pain, frequency, hematuria, pelvic pain, urgency, vaginal bleeding and vaginal discharge.   Musculoskeletal: Positive for arthralgias, back pain, gait problem, joint swelling and myalgias. Negative for neck pain and neck stiffness.   Skin: Negative for color change, pallor, rash and wound.   Allergic/Immunologic: Negative for environmental allergies and food allergies.   Neurological: Negative for dizziness, seizures, facial asymmetry, speech difficulty, weakness, light-headedness, numbness and headaches.   Hematological: Does not bruise/bleed easily.   Psychiatric/Behavioral: Negative for agitation, confusion, hallucinations, sleep disturbance and suicidal ideas. The patient is not nervous/anxious.    All other systems reviewed and are negative.    Objective:     Vital Signs (Most Recent):  Temp: 97.1 °F (36.2 °C) (10/16/20 1314)  Pulse: 68 (10/16/20 1520)  Resp: 15 (10/16/20 1510)  BP: (!) 98/25 (10/16/20 1520)  SpO2: 95 % (10/16/20 1251) Vital Signs (24h Range):  Temp:  [96 °F (35.6 °C)-98.3 °F (36.8 °C)] 97.1 °F (36.2 °C)  Pulse:  []  68  Resp:  [14-20] 15  SpO2:  [54 %-98 %] 95 %  BP: ()/() 98/25     Weight: 108.5 kg (239 lb 3.2 oz)  Body mass index is 41.06 kg/m².    Intake/Output Summary (Last 24 hours) at 10/16/2020 1739  Last data filed at 10/16/2020 0800  Gross per 24 hour   Intake 360 ml   Output --   Net 360 ml      Physical Exam  Vitals signs and nursing note reviewed.   Constitutional:       Appearance: She is well-developed. She is obese.   HENT:      Head: Normocephalic and atraumatic.      Nose: Nose normal.      Mouth/Throat:      Mouth: Mucous membranes are dry.   Eyes:      Conjunctiva/sclera: Conjunctivae normal.      Pupils: Pupils are equal, round, and reactive to light.   Neck:      Musculoskeletal: Normal range of motion and neck supple.   Cardiovascular:      Rate and Rhythm: Normal rate and regular rhythm.      Pulses: Normal pulses.      Heart sounds: Normal heart sounds. No murmur.   Pulmonary:      Effort: Pulmonary effort is normal. No respiratory distress.      Breath sounds: Examination of the right-lower field reveals decreased breath sounds. Examination of the left-lower field reveals decreased breath sounds. Decreased breath sounds present.   Abdominal:      General: Bowel sounds are normal. There is no distension.      Palpations: Abdomen is soft.      Tenderness: There is no abdominal tenderness.   Genitourinary:     Comments: Deferred   Musculoskeletal: Normal range of motion.         General: Tenderness (R knee ) present. No deformity.      Comments: Limited ROM to R knee post procedure   Skin:     General: Skin is warm and dry.      Capillary Refill: Capillary refill takes less than 2 seconds.      Findings: No erythema.      Comments: Surgical wound with dressing to R knee    Neurological:      General: No focal deficit present.      Mental Status: She is alert and oriented to person, place, and time.      Deep Tendon Reflexes: Reflexes are normal and symmetric.   Psychiatric:         Mood and  Affect: Mood normal.         Behavior: Behavior normal.         Significant Labs:   CBC:   Recent Labs   Lab 10/15/20  0627 10/16/20  0631   WBC 10.29 13.93*   HGB 10.3* 11.0*   HCT 35.1* 35.2*    199     CMP:   Recent Labs   Lab 10/15/20  0627 10/16/20  0935   * 132*   K 5.2* 7.2*   CL 97 94*   CO2 21* 18*   GLU 94 75   BUN 30* 53*   CREATININE 6.8* 9.5*   CALCIUM 8.9 9.1   ANIONGAP 12 20*   EGFRNONAA 6* 4*     All pertinent labs within the past 24 hours have been reviewed.    Significant Imaging: I have reviewed all pertinent imaging results/findings within the past 24 hours.      Assessment/Plan:      * Arthritis of right knee  -Pt admitted to Medical Surgical Unit post procedure  -Orthopedic surgery-managed per primary team  -IV antibiotics   -analgesia as needed   -antiemetics as needed   -PT/OT as needed   -discharge anticipated per primary team   10/14/20 S/P right total knee replacement yesterday per Dr. Aguilar. Post-op pain is well tolerated. Encouraged participation with PT/OT. Possible discharge home tomorrow.   10/15/20 The patient reports persistent pain to RLE overnight, not relieved with pain meds. RLE US ordered was negative for a DVT. PT/OT evaluated the patient and recommended rehab placement. Will adjust PRN analgesia.   10/16/2020 - discharged per Orthopedics, discharge to Rehab, DVT prophylaxis per Orthopedics      Leukocytosis  -likely reactive post procedure   -IV antibiotics as needed   -repeat CBC in am       Hyperlipemia  -statin continued       Hyperkalemia  -5.9 > 5.6 noted   -in the setting ESRD- diagnosed 2/24/16  [facility]   -Nephrology consulted   -HD planned for tomorrow   -repeat labs pending   -EKG results pending   10/14/20 Resolved   10/16/2020 - K 7.2 - IV insulin ordered and given. Unable to give dextrose as IV infiltrated - Pt to have dialysis    Hypertension, renal disease  -Hydralazine and Lopressor continued   -Clonidine held- will resume when  appropriate       ESRD on dialysis since 2/24/16  -Nephrology consulted   -MWF schedule   -Renvela continued   10/15/20 Nephrology following. Next dialysis treatment scheduled for tomorrow.   10/16/2020 - dialysis today before discharge    Anemia in chronic kidney disease, on chronic dialysis  -H/H stable post procedure   -will resume iron supplement upon discharge  -repeat CBC in am          VTE Risk Mitigation (From admission, onward)         Ordered     IP VTE LOW RISK PATIENT  Once      10/13/20 0752     Place DOUG hose  Until discontinued      10/13/20 0752     Place sequential compression device  Until discontinued      10/13/20 0752                Discharge Planning   DARRIN: 10/17/2020     Code Status: Full Code   Is the patient medically ready for discharge?:     Reason for patient still in hospital (select all that apply): Pending disposition  Discharge Plan A: Home Health, Home                  Aria Proctor NP  Department of Hospital Medicine   [facility]

## 2020-10-16 NOTE — PT/OT/SLP PROGRESS
Occupational Therapy   Treatment    Name: Gillian Pitts  MRN: [id number]  Admitting Diagnosis:  Arthritis of right knee  3 Days Post-Op    Recommendations:     Discharge Recommendations: rehabilitation facility  Discharge Equipment Recommendations:  walker, rolling  Barriers to discharge:       Assessment:     Gillian Pitts is a 69 y.o. female with a medical diagnosis of Arthritis of right knee.  She presents with debility and generalized weakness. Performance deficits affecting function are weakness, impaired functional mobilty, impaired endurance, gait instability, impaired balance, impaired self care skills.     Rehab Prognosis:  Good; patient would benefit from acute skilled OT services to address these deficits and reach maximum level of function.       Plan:     Patient to be seen 3 x/week to address the above listed problems via self-care/home management, therapeutic activities, therapeutic exercises  · Plan of Care Expires: 10/23/20  · Plan of Care Reviewed with: patient    Subjective     Pain/Comfort:  · Pain Rating 1: 9/10  · Location - Side 1: Right  · Location - Orientation 1: posterior  · Location 1: knee    Objective:     Communicated with: nurse Horta and [facility] chart review prior to session.  Patient found up in chair with wound vac, peripheral IV upon OT entry to room.    General Precautions: Standard, fall   Orthopedic Precautions:RLE weight bearing as tolerated   Braces: N/A     Occupational Performance:     Bed Mobility:    · Patient completed Rolling/Turning to Left with  minimum assistance  · Patient completed Rolling/Turning to Right with minimum assistance  · Patient completed Scooting/Bridging with minimum assistance  · Patient completed Supine to Sit with minimum assistance  · Patient completed Sit to Supine with minimum assistance     Functional Mobility/Transfers:  · Patient completed Sit <> Stand Transfer with maximal assistance  with  rolling walker x several verbal and  tactile cues to adhere to safety precaution  · Functional Mobility: pt ambulated 3 steps poor safety , slow moving and tearful    AMPAC 6 Click ADL: 17    Treatment & Education:    Patient left up in chair with all lines intact, call button in reach, bed alarm on and nurse Rula notifiedEducation:      GOALS:   Multidisciplinary Problems     Occupational Therapy Goals        Problem: Occupational Therapy Goal    Goal Priority Disciplines Outcome Interventions   Occupational Therapy Goal     OT, PT/OT Ongoing, Progressing    Description: OT GOALS TO BE MET BY 10-21-20  S WITH UE DRESSING.  MIN A WITH BSC T/F'S  PT WILL TOLERATE 1 SET X 15 REPS B UE ROM EXERCISE  SBA  WITH LE DRESSING                   Time Tracking:     OT Date of Treatment: 10/16/20  OT Start Time: 0945  OT Stop Time: 1015  OT Total Time (min): 30 min    Billable Minutes:Therapeutic Activity 30 minutes    Radha Goldstein OT  10/16/2020

## 2020-10-16 NOTE — PLAN OF CARE
Problem: Adult Inpatient Plan of Care  Goal: Plan of Care Review  Outcome: Ongoing, Progressing        Pt remained free of injury during shift, stable condition, NAD, VSS, pain adequately controlled with prn medication, KEVEN dressing intact with small amount of dried drainage present, SCDs on per orders, nighttime BP medications held due to low blood pressure, will continue to monitor. 24hr chart review performed.

## 2020-10-16 NOTE — PROGRESS NOTES
[facility]  Orthopedics  Progress Note    Patient Name: Gillian Pitts  MRN: [id number]  Admission Date: 10/13/2020  Hospital Length of Stay: 3 days  Attending Provider: Adilson Aguilar MD  Primary Care Provider: Primary Doctor No  Follow-up For: Procedure(s) (LRB):  ARTHROPLASTY, KNEE, TOTAL (Right)    Post-Operative Day: 3 Days Post-Op  Subjective:     Principal Problem:Arthritis of right knee    Principal Orthopedic Problem: R knee arthritis     Interval History: no acute events overnight  Patient doing well, in great spirits today- hair is fixed, wearing jewelry   Pain well controlled, tolerating diet, participating well with therapy   Eager to get out of the hospital    Review of patient's allergies indicates:   Allergen Reactions    Iodinated contrast media Other (See Comments)     Kidney shut down    Morphine Other (See Comments)     Severe sedation    Povidone-iodine      pt stated iodine will shut down her kidney    Contrast media     Iodine and iodide containing products        Current Facility-Administered Medications   Medication    acetaminophen tablet 650 mg    atorvastatin tablet 80 mg    bisacodyL suppository 10 mg    chlorhexidine 0.12 % solution 10 mL    dexamethasone injection 8 mg    docusate sodium capsule 100 mg    famotidine tablet 20 mg    gabapentin capsule 300 mg    hydrALAZINE tablet 50 mg    lactulose 20 gram/30 mL solution Soln 20 g    metoprolol tartrate (LOPRESSOR) tablet 25 mg    mupirocin 2 % ointment    nozaseptin (NOZIN) nasal     ondansetron injection 4 mg    oxyCODONE immediate release tablet 15 mg    oxyCODONE immediate release tablet 5 mg    sevelamer carbonate tablet 2,400 mg    sodium chloride 0.9% flush 10 mL    zolpidem tablet 5 mg     Objective:     Vital Signs (Most Recent):  Temp: 97.4 °F (36.3 °C) (10/16/20 0731)  Pulse: 104 (10/16/20 0731)  Resp: 15 (10/16/20 0731)  BP: 134/61 (10/16/20 0731)  SpO2: 98 %  "(10/16/20 0731) Vital Signs (24h Range):  Temp:  [97.2 °F (36.2 °C)-98.3 °F (36.8 °C)] 97.4 °F (36.3 °C)  Pulse:  [] 104  Resp:  [14-20] 15  SpO2:  [54 %-98 %] 98 %  BP: (101-185)/() 134/61     Weight: 108.5 kg (239 lb 3.2 oz)  Height: 5' 4" (162.6 cm)  Body mass index is 41.06 kg/m².      Intake/Output Summary (Last 24 hours) at 10/16/2020 0811  Last data filed at 10/16/2020 0600  Gross per 24 hour   Intake 420 ml   Output --   Net 420 ml       General    Constitutional: She is oriented to person, place, and time. She appears well-developed and well-nourished.   HENT:   Head: Normocephalic and atraumatic.   Eyes: Pupils are equal, round, and reactive to light.   Neck: Neck supple.   Pulmonary/Chest: Effort normal.   Abdominal: Soft.   Neurological: She is alert and oriented to person, place, and time.   Psychiatric: She has a normal mood and affect.           Right Knee Exam     Comments:  POD3 -     KEVEN in place, dressing C/D/I  No warmth/ erythema noted to surgical site  Sensation intact throughout extremity   Str 5/5 dorsal/plantar flexion, wiggles toes, lifting extremity off the bed  Cap refill <2   2 + pulses         Significant Labs: All pertinent labs within the past 24 hours have been reviewed.    Significant Imaging: None    Assessment/Plan:     * Arthritis of right knee  POD3- R TKA    Plan to dc to rehab today after dialysis   Will ask nursing staff to change KEVEN   Pain controlled on current regimen     Patient will undergo dialysis this morning   Continue post op instructions and DVT prophylaxis   All questions answered at bedside   Pt to FU in clinic and call as need         Dr. Aguilar is aware of the patient & current presentation.   He agrees with the current plan above.     GIUSEPPE Dawn  Orthopedics  [facility]  "

## 2020-10-16 NOTE — PROGRESS NOTES
[facility] -   Nephrology  Progress Note    Patient Name: Gillian Pitts  MRN: [id number]  Admission Date: 10/13/2020  Hospital Length of Stay: 3 days  Attending Provider: Adilson Aguilar MD   Primary Care Physician: Primary Doctor No  Principal Problem:Arthritis of right knee    Subjective:     HPI: Patient is a 69 year old AA woman with ESRD on HD , anemia, HTN, PCKD, hyperparathyroidism.  Patient admitted under orthopedic surgery for right knee arthroplasty.  Patient is status post surgery.  Preop labs showed potassium of 5.9.  Patient is scheduled to have dialysis tomorrow.      Patient dilayzes on MWF schedule at [facility] under Dr Kitchen, Access is : left arm AV fistula.    10/13/2020 patient seen in consultation for ESRD hyperkalemia at the bedside using telemedicine protocol with the help of the nursing staff.    Interval History: seen at HD , tolerating well,     Review of patient's allergies indicates:   Allergen Reactions    Iodinated contrast media Other (See Comments)     Kidney shut down    Morphine Other (See Comments)     Severe sedation    Povidone-iodine      pt stated iodine will shut down her kidney    Contrast media     Iodine and iodide containing products      Current Facility-Administered Medications   Medication Frequency    acetaminophen tablet 650 mg Q8H PRN    atorvastatin tablet 80 mg QHS    bisacodyL suppository 10 mg Daily PRN    chlorhexidine 0.12 % solution 10 mL BID    dexamethasone injection 8 mg Once    docusate sodium capsule 100 mg BID    famotidine tablet 20 mg Daily    gabapentin capsule 300 mg QHS    hydrALAZINE tablet 50 mg Q8H    lactulose 20 gram/30 mL solution Soln 20 g Q6H PRN    metoprolol tartrate (LOPRESSOR) tablet 25 mg BID    mupirocin 2 % ointment BID    nozaseptin (NOZIN) nasal  BID    ondansetron injection 4 mg Q6H PRN    oxyCODONE immediate release tablet 15 mg Q3H PRN    oxyCODONE immediate release  tablet 5 mg Q3H PRN    sevelamer carbonate tablet 2,400 mg TID WM    sodium chloride 0.9% flush 10 mL Q6H    zolpidem tablet 5 mg Nightly PRN       Objective:     Vital Signs (Most Recent):  Temp: 97.1 °F (36.2 °C) (10/16/20 1314)  Pulse: 68 (10/16/20 1500)  Resp: 15 (10/16/20 1510)  BP: (!) 134/47 (10/16/20 1500)  SpO2: 95 % (10/16/20 1251)  O2 Device (Oxygen Therapy): room air (10/16/20 0325) Vital Signs (24h Range):  Temp:  [96 °F (35.6 °C)-98.3 °F (36.8 °C)] 97.1 °F (36.2 °C)  Pulse:  [] 68  Resp:  [14-20] 15  SpO2:  [54 %-98 %] 95 %  BP: (101-169)/() 134/47     Weight: 108.5 kg (239 lb 3.2 oz) (10/13/20 1014)  Body mass index is 41.06 kg/m².  Body surface area is 2.21 meters squared.    I/O last 3 completed shifts:  In: 780.5 [P.O.:780; IV Piggyback:0.5]  Out: -     Physical Exam  Constitutional:       General: She is not in acute distress.     Appearance: She is well-developed.   HENT:      Head: Normocephalic and atraumatic.      Mouth/Throat:      Pharynx: No oropharyngeal exudate.   Eyes:      General: No scleral icterus.     Conjunctiva/sclera: Conjunctivae normal.      Pupils: Pupils are equal, round, and reactive to light.   Neck:      Musculoskeletal: Normal range of motion and neck supple.      Thyroid: No thyroid mass or thyromegaly.      Vascular: No carotid bruit or JVD.      Trachea: No tracheal deviation.   Cardiovascular:      Rate and Rhythm: Normal rate and regular rhythm.      Heart sounds: Normal heart sounds. No murmur. No friction rub. No gallop.    Pulmonary:      Effort: Pulmonary effort is normal. No respiratory distress.      Breath sounds: Normal breath sounds. No wheezing or rales.   Chest:      Chest wall: No tenderness.   Abdominal:      General: Bowel sounds are normal. There is no distension or abdominal bruit.      Palpations: Abdomen is soft. There is no mass.      Tenderness: There is no abdominal tenderness. There is no guarding or rebound.   Musculoskeletal:          General: No tenderness.      Comments: LUE AVF, right knee dressings    Lymphadenopathy:      Cervical: No cervical adenopathy.   Skin:     General: Skin is warm.      Coloration: Skin is not pale.      Findings: No erythema or rash.   Neurological:      Cranial Nerves: No cranial nerve deficit.      Motor: No abnormal muscle tone.      Coordination: Coordination normal.      Deep Tendon Reflexes: Reflexes are normal and symmetric. Reflexes normal.   Psychiatric:         Behavior: Behavior normal.         Judgment: Judgment normal.         Significant Labs:  CBC:   Recent Labs   Lab 10/16/20  0631   WBC 13.93*   RBC 3.62*   HGB 11.0*   HCT 35.2*      MCV 97   MCH 30.4   MCHC 31.3*     CMP:   Recent Labs   Lab 10/14/20  1730  10/16/20  0935   *   < > 75   CALCIUM 8.6*   < > 9.1   ALBUMIN 3.5  --   --    PROT 6.7  --   --    *   < > 132*   K 4.1   < > 7.2*   CO2 22*   < > 18*   CL 97   < > 94*   BUN 22   < > 53*   CREATININE 5.3*   < > 9.5*   ALKPHOS 108  --   --    ALT 5*  --   --    AST 18  --   --    BILITOT 0.3  --   --     < > = values in this interval not displayed.     Coagulation: No results for input(s): PT, INR, APTT in the last 168 hours.  LFTs:   Recent Labs   Lab 10/14/20  1730   ALT 5*   AST 18   ALKPHOS 108   BILITOT 0.3   PROT 6.7   ALBUMIN 3.5     All labs within the past 24 hours have been reviewed.     Significant Imaging:  Reviewed    Lab Results   Component Value Date    .0 (H) 03/22/2018    CALCIUM 9.1 10/16/2020    CAION 1.10 03/22/2018    PHOS 3.8 07/24/2020       Lab Results   Component Value Date    ALBUMIN 3.5 10/14/2020           Assessment/Plan:     ESRD on dialysis since 2/24/16  1. ESRD on HD,   MWF schedule at [facility]. Access: left arm AVG, seen at HD, tolerating well,      2.  Hypertension, blood pressure is controlled      3.  s/p right total knee arthroplasty , ortho following      4.  Anemia of chronic kidney disease, monitor hemoglobin, Procrit  with dialysis when indicated,     5.  Secondary hyperparathyroidism, continue renal diet, renvela      6. Hyperkalemia , expect to improve with HD              Thank you for your consult. I will follow-up with patient. Please contact us if you have any additional questions.     Total time spent 40 minutes including time needed to review the records,  patient  evaluation, documentation, face-to-face discussion with the patient, primary team,   more than 50% of the time was spent on coordination of care and counseling.       Patricio Cintron MD  Nephrology  [facility]

## 2020-10-17 PROBLEM — E16.2 HYPOGLYCEMIA: Status: ACTIVE | Noted: 2020-10-17

## 2020-10-17 LAB
ALBUMIN SERPL BCP-MCNC: 2.8 G/DL (ref 3.5–5.2)
ALBUMIN SERPL BCP-MCNC: 2.9 G/DL (ref 3.5–5.2)
ALLENS TEST: ABNORMAL
ANION GAP SERPL CALC-SCNC: 12 MMOL/L (ref 8–16)
ANION GAP SERPL CALC-SCNC: 20 MMOL/L (ref 8–16)
BASOPHILS # BLD AUTO: 0.05 K/UL (ref 0–0.2)
BASOPHILS NFR BLD: 0.4 % (ref 0–1.9)
BUN SERPL-MCNC: 18 MG/DL (ref 8–23)
BUN SERPL-MCNC: 31 MG/DL (ref 8–23)
CALCIUM SERPL-MCNC: 8.5 MG/DL (ref 8.7–10.5)
CALCIUM SERPL-MCNC: 9.1 MG/DL (ref 8.7–10.5)
CHLORIDE SERPL-SCNC: 101 MMOL/L (ref 95–110)
CHLORIDE SERPL-SCNC: 94 MMOL/L (ref 95–110)
CO2 SERPL-SCNC: 13 MMOL/L (ref 23–29)
CO2 SERPL-SCNC: 29 MMOL/L (ref 23–29)
CREAT SERPL-MCNC: 4.1 MG/DL (ref 0.5–1.4)
CREAT SERPL-MCNC: 6.3 MG/DL (ref 0.5–1.4)
DELSYS: ABNORMAL
DIFFERENTIAL METHOD: ABNORMAL
EOSINOPHIL # BLD AUTO: 0.1 K/UL (ref 0–0.5)
EOSINOPHIL NFR BLD: 0.5 % (ref 0–8)
ERYTHROCYTE [DISTWIDTH] IN BLOOD BY AUTOMATED COUNT: 15.9 % (ref 11.5–14.5)
EST. GFR  (AFRICAN AMERICAN): 12 ML/MIN/1.73 M^2
EST. GFR  (AFRICAN AMERICAN): 7 ML/MIN/1.73 M^2
EST. GFR  (NON AFRICAN AMERICAN): 11 ML/MIN/1.73 M^2
EST. GFR  (NON AFRICAN AMERICAN): 6 ML/MIN/1.73 M^2
GLUCOSE SERPL-MCNC: 117 MG/DL (ref 70–110)
GLUCOSE SERPL-MCNC: 43 MG/DL (ref 70–110)
HCO3 UR-SCNC: 29.9 MMOL/L (ref 24–28)
HCT VFR BLD AUTO: 29 % (ref 37–48.5)
HGB BLD-MCNC: 8.9 G/DL (ref 12–16)
IMM GRANULOCYTES # BLD AUTO: 0.05 K/UL (ref 0–0.04)
IMM GRANULOCYTES NFR BLD AUTO: 0.4 % (ref 0–0.5)
LYMPHOCYTES # BLD AUTO: 0.8 K/UL (ref 1–4.8)
LYMPHOCYTES NFR BLD: 7.1 % (ref 18–48)
MCH RBC QN AUTO: 30 PG (ref 27–31)
MCHC RBC AUTO-ENTMCNC: 30.7 G/DL (ref 32–36)
MCV RBC AUTO: 98 FL (ref 82–98)
MODE: ABNORMAL
MONOCYTES # BLD AUTO: 1 K/UL (ref 0.3–1)
MONOCYTES NFR BLD: 8.1 % (ref 4–15)
NEUTROPHILS # BLD AUTO: 9.8 K/UL (ref 1.8–7.7)
NEUTROPHILS NFR BLD: 83.5 % (ref 38–73)
NRBC BLD-RTO: 0 /100 WBC
PCO2 BLDA: 47.6 MMHG (ref 35–45)
PH SMN: 7.41 [PH] (ref 7.35–7.45)
PHOSPHATE SERPL-MCNC: 2.6 MG/DL (ref 2.7–4.5)
PHOSPHATE SERPL-MCNC: 5.3 MG/DL (ref 2.7–4.5)
PLATELET # BLD AUTO: 268 K/UL (ref 150–350)
PMV BLD AUTO: 10 FL (ref 9.2–12.9)
PO2 BLDA: 73 MMHG (ref 80–100)
POC BE: 5 MMOL/L
POC SATURATED O2: 94 % (ref 95–100)
POCT GLUCOSE: 128 MG/DL (ref 70–110)
POCT GLUCOSE: 134 MG/DL (ref 70–110)
POCT GLUCOSE: 63 MG/DL (ref 70–110)
POCT GLUCOSE: 73 MG/DL (ref 70–110)
POCT GLUCOSE: 95 MG/DL (ref 70–110)
POTASSIUM SERPL-SCNC: 3.9 MMOL/L (ref 3.5–5.1)
POTASSIUM SERPL-SCNC: 6.1 MMOL/L (ref 3.5–5.1)
RBC # BLD AUTO: 2.97 M/UL (ref 4–5.4)
SAMPLE: ABNORMAL
SITE: ABNORMAL
SODIUM SERPL-SCNC: 134 MMOL/L (ref 136–145)
SODIUM SERPL-SCNC: 135 MMOL/L (ref 136–145)
WBC # BLD AUTO: 11.75 K/UL (ref 3.9–12.7)

## 2020-10-17 PROCEDURE — 11000001 HC ACUTE MED/SURG PRIVATE ROOM

## 2020-10-17 PROCEDURE — 85025 COMPLETE CBC W/AUTO DIFF WBC: CPT

## 2020-10-17 PROCEDURE — 36415 COLL VENOUS BLD VENIPUNCTURE: CPT

## 2020-10-17 PROCEDURE — 63600175 PHARM REV CODE 636 W HCPCS: Performed by: NURSE PRACTITIONER

## 2020-10-17 PROCEDURE — 63600175 PHARM REV CODE 636 W HCPCS: Performed by: INTERNAL MEDICINE

## 2020-10-17 PROCEDURE — 25000003 PHARM REV CODE 250: Performed by: NURSE PRACTITIONER

## 2020-10-17 PROCEDURE — 97110 THERAPEUTIC EXERCISES: CPT

## 2020-10-17 PROCEDURE — 82803 BLOOD GASES ANY COMBINATION: CPT

## 2020-10-17 PROCEDURE — 90935 PR HEMODIALYSIS, ONE EVALUATION: ICD-10-PCS | Mod: ,,, | Performed by: INTERNAL MEDICINE

## 2020-10-17 PROCEDURE — 90935 HEMODIALYSIS ONE EVALUATION: CPT | Mod: ,,, | Performed by: INTERNAL MEDICINE

## 2020-10-17 PROCEDURE — A4216 STERILE WATER/SALINE, 10 ML: HCPCS | Performed by: ORTHOPAEDIC SURGERY

## 2020-10-17 PROCEDURE — 25000003 PHARM REV CODE 250: Performed by: ORTHOPAEDIC SURGERY

## 2020-10-17 PROCEDURE — 87040 BLOOD CULTURE FOR BACTERIA: CPT

## 2020-10-17 PROCEDURE — 80069 RENAL FUNCTION PANEL: CPT | Mod: 91

## 2020-10-17 PROCEDURE — 25000003 PHARM REV CODE 250: Performed by: INTERNAL MEDICINE

## 2020-10-17 PROCEDURE — 80100016 HC MAINTENANCE HEMODIALYSIS

## 2020-10-17 PROCEDURE — 99900035 HC TECH TIME PER 15 MIN (STAT)

## 2020-10-17 RX ORDER — IBUPROFEN 200 MG
16 TABLET ORAL
Status: DISCONTINUED | OUTPATIENT
Start: 2020-10-17 | End: 2020-10-18 | Stop reason: HOSPADM

## 2020-10-17 RX ORDER — GLUCAGON 1 MG
1 KIT INJECTION
Status: DISCONTINUED | OUTPATIENT
Start: 2020-10-17 | End: 2020-10-18 | Stop reason: HOSPADM

## 2020-10-17 RX ORDER — IBUPROFEN 200 MG
24 TABLET ORAL
Status: DISCONTINUED | OUTPATIENT
Start: 2020-10-17 | End: 2020-10-18 | Stop reason: HOSPADM

## 2020-10-17 RX ORDER — SODIUM BICARBONATE 650 MG/1
1300 TABLET ORAL 3 TIMES DAILY
Status: DISCONTINUED | OUTPATIENT
Start: 2020-10-17 | End: 2020-10-18 | Stop reason: HOSPADM

## 2020-10-17 RX ADMIN — ATORVASTATIN CALCIUM 80 MG: 40 TABLET, FILM COATED ORAL at 08:10

## 2020-10-17 RX ADMIN — SEVELAMER CARBONATE 2400 MG: 800 TABLET, FILM COATED ORAL at 04:10

## 2020-10-17 RX ADMIN — LORAZEPAM 0.5 MG: 2 INJECTION INTRAMUSCULAR at 07:10

## 2020-10-17 RX ADMIN — SEVELAMER CARBONATE 2400 MG: 800 TABLET, FILM COATED ORAL at 08:10

## 2020-10-17 RX ADMIN — DOCUSATE SODIUM 100 MG: 100 CAPSULE, LIQUID FILLED ORAL at 08:10

## 2020-10-17 RX ADMIN — FAMOTIDINE 20 MG: 20 TABLET ORAL at 08:10

## 2020-10-17 RX ADMIN — Medication 10 ML: at 12:10

## 2020-10-17 RX ADMIN — GABAPENTIN 300 MG: 300 CAPSULE ORAL at 08:10

## 2020-10-17 RX ADMIN — CHLORHEXIDINE GLUCONATE 0.12% ORAL RINSE 10 ML: 1.2 LIQUID ORAL at 08:10

## 2020-10-17 RX ADMIN — METOPROLOL TARTRATE 25 MG: 25 TABLET, FILM COATED ORAL at 08:10

## 2020-10-17 RX ADMIN — MUPIROCIN: 20 OINTMENT TOPICAL at 08:10

## 2020-10-17 RX ADMIN — LORAZEPAM 0.5 MG: 2 INJECTION INTRAMUSCULAR; INTRAVENOUS at 07:10

## 2020-10-17 RX ADMIN — MUPIROCIN: 20 OINTMENT TOPICAL at 09:10

## 2020-10-17 RX ADMIN — SODIUM BICARBONATE 1300 MG: 650 TABLET ORAL at 02:10

## 2020-10-17 RX ADMIN — SODIUM BICARBONATE 1300 MG: 650 TABLET ORAL at 08:10

## 2020-10-17 NOTE — PROGRESS NOTES
[facility] -   Nephrology  Progress Note    Patient Name: Gillian Pitts  MRN: [id number]  Admission Date: 10/13/2020  Hospital Length of Stay: 4 days  Attending Provider: Adilson Aguilar MD   Primary Care Physician: Primary Doctor No  Principal Problem:Arthritis of right knee    Subjective:     HPI: Patient is a 69 year old AA woman with ESRD on HD , anemia, HTN, PCKD, hyperparathyroidism.  Patient admitted under orthopedic surgery for right knee arthroplasty.  Patient is status post surgery.  Preop labs showed potassium of 5.9.  Patient is scheduled to have dialysis tomorrow.      Patient dilayzes on MWF schedule at [facility] under Dr Kitchen, Access is : left arm AV fistula.    10/13/2020 patient seen in consultation for ESRD hyperkalemia at the bedside using telemedicine protocol with the help of the nursing staff.    Interval History: seen at HD , tolerating well, acute HD today due to high K ,     Awake but not fully oriented,    Review of patient's allergies indicates:   Allergen Reactions    Iodinated contrast media Other (See Comments)     Kidney shut down    Morphine Other (See Comments)     Severe sedation    Povidone-iodine      pt stated iodine will shut down her kidney    Contrast media     Iodine and iodide containing products      Current Facility-Administered Medications   Medication Frequency    acetaminophen tablet 650 mg Q8H PRN    atorvastatin tablet 80 mg QHS    bisacodyL suppository 10 mg Daily PRN    chlorhexidine 0.12 % solution 10 mL BID    dexamethasone injection 8 mg Once    dextrose 50% injection 12.5 g PRN    dextrose 50% injection 25 g PRN    docusate sodium capsule 100 mg BID    famotidine tablet 20 mg Daily    gabapentin capsule 300 mg QHS    glucagon (human recombinant) injection 1 mg PRN    glucose chewable tablet 16 g PRN    glucose chewable tablet 24 g PRN    hydrALAZINE tablet 50 mg Q8H    lactulose 20 gram/30 mL solution Soln 20  g Q6H PRN    metoprolol tartrate (LOPRESSOR) tablet 25 mg BID    mupirocin 2 % ointment BID    nozaseptin (NOZIN) nasal  BID    ondansetron injection 4 mg Q6H PRN    sevelamer carbonate tablet 2,400 mg TID WM    sodium bicarbonate tablet 1,300 mg TID    sodium chloride 0.9% flush 10 mL Q6H    zolpidem tablet 5 mg Nightly PRN       Objective:     Vital Signs (Most Recent):  Temp: 97.1 °F (36.2 °C) (10/17/20 0723)  Pulse: (!) 113 (10/17/20 0937)  Resp: 18 (10/17/20 0723)  BP: (!) 119/58 (10/17/20 0937)  SpO2: 97 % (10/17/20 0723)  O2 Device (Oxygen Therapy): room air (10/17/20 0329) Vital Signs (24h Range):  Temp:  [96 °F (35.6 °C)-98.8 °F (37.1 °C)] 97.1 °F (36.2 °C)  Pulse:  [] 113  Resp:  [15-20] 18  SpO2:  [95 %-97 %] 97 %  BP: ()/() 119/58     Weight: 108.5 kg (239 lb 3.2 oz) (10/13/20 1014)  Body mass index is 41.06 kg/m².  Body surface area is 2.21 meters squared.    I/O last 3 completed shifts:  In: 480 [P.O.:480]  Out: 2500 [Other:2500]    Physical Exam  Constitutional:       General: She is not in acute distress.     Appearance: She is well-developed.   HENT:      Head: Normocephalic and atraumatic.      Mouth/Throat:      Pharynx: No oropharyngeal exudate.   Eyes:      General: No scleral icterus.     Conjunctiva/sclera: Conjunctivae normal.      Pupils: Pupils are equal, round, and reactive to light.   Neck:      Musculoskeletal: Normal range of motion and neck supple.      Thyroid: No thyroid mass or thyromegaly.      Vascular: No carotid bruit or JVD.      Trachea: No tracheal deviation.   Cardiovascular:      Rate and Rhythm: Normal rate and regular rhythm.      Heart sounds: Normal heart sounds. No murmur. No friction rub. No gallop.    Pulmonary:      Effort: Pulmonary effort is normal. No respiratory distress.      Breath sounds: Normal breath sounds. No wheezing or rales.   Chest:      Chest wall: No tenderness.   Abdominal:      General: Bowel sounds are normal.  There is no distension or abdominal bruit.      Palpations: Abdomen is soft. There is no mass.      Tenderness: There is no abdominal tenderness. There is no guarding or rebound.   Musculoskeletal:         General: No tenderness.      Comments: LUE AVF, right knee dressings    Lymphadenopathy:      Cervical: No cervical adenopathy.   Skin:     General: Skin is warm.      Coloration: Skin is not pale.      Findings: No erythema or rash.   Neurological:      Cranial Nerves: No cranial nerve deficit.      Motor: No abnormal muscle tone.      Coordination: Coordination normal.      Deep Tendon Reflexes: Reflexes are normal and symmetric. Reflexes normal.   Psychiatric:         Behavior: Behavior normal.         Judgment: Judgment normal.         Significant Labs:  CBC:   Recent Labs   Lab 10/16/20  0631   WBC 13.93*   RBC 3.62*   HGB 11.0*   HCT 35.2*      MCV 97   MCH 30.4   MCHC 31.3*     CMP:   Recent Labs   Lab 10/14/20  1730  10/17/20  0637   *   < > 43*   CALCIUM 8.6*   < > 9.1   ALBUMIN 3.5  --  2.9*   PROT 6.7  --   --    *   < > 134*   K 4.1   < > 6.1*   CO2 22*   < > 13*   CL 97   < > 101   BUN 22   < > 31*   CREATININE 5.3*   < > 6.3*   ALKPHOS 108  --   --    ALT 5*  --   --    AST 18  --   --    BILITOT 0.3  --   --     < > = values in this interval not displayed.     Coagulation: No results for input(s): PT, INR, APTT in the last 168 hours.  LFTs:   Recent Labs   Lab 10/14/20  1730 10/17/20  0637   ALT 5*  --    AST 18  --    ALKPHOS 108  --    BILITOT 0.3  --    PROT 6.7  --    ALBUMIN 3.5 2.9*     All labs within the past 24 hours have been reviewed.     Significant Imaging:  Reviewed    Lab Results   Component Value Date    .0 (H) 03/22/2018    CALCIUM 9.1 10/17/2020    CAION 1.10 03/22/2018    PHOS 5.3 (H) 10/17/2020       Lab Results   Component Value Date    ALBUMIN 2.9 (L) 10/17/2020           Assessment/Plan:     ESRD on dialysis since 2/24/16  1. ESRD on HD,   MWF  schedule at [facility]. Access: left arm AVG,     Emergent HD today due to high K ,      2.  Hypertension, blood pressure is controlled      3.  s/p right total knee arthroplasty , ortho following      4.  Anemia of chronic kidney disease, monitor hemoglobin, Procrit with dialysis when indicated,     5.  Secondary hyperparathyroidism, continue renal diet, renvela      6. Hyperkalemia , expect to improve with HD     7. Met acidosis , HD as ordered, add sodium bicarb supplements     8. AMS, etiology unclear , ABG shows no Co2 retention,              Thank you for your consult. I will follow-up with patient. Please contact us if you have any additional questions.     Total time spent  40 minutes including time needed to review the records,  patient  evaluation, documentation, face-to-face discussion with the patient, primary team,    more than 50% of the time was spent on coordination of care and counseling.       Patricio Cintron MD  Nephrology  [facility]

## 2020-10-17 NOTE — SUBJECTIVE & OBJECTIVE
Principal Problem:Arthritis of right knee    Principal Orthopedic Problem: R knee arthritis     Interval History: Acute AMS this am - stat ct ordered and negative, reviewed scan   Labs discussed with charge nurse , [facility] assisting- elevated potassium, dialysis washing underway presently   Hypoglycemia & hypotension noted- juice/breakfast, fluids, medication given  Per [facility]     Review of patient's allergies indicates:   Allergen Reactions    Iodinated contrast media Other (See Comments)     Kidney shut down    Morphine Other (See Comments)     Severe sedation    Povidone-iodine      pt stated iodine will shut down her kidney    Contrast media     Iodine and iodide containing products        Current Facility-Administered Medications   Medication    acetaminophen tablet 650 mg    atorvastatin tablet 80 mg    bisacodyL suppository 10 mg    chlorhexidine 0.12 % solution 10 mL    dexamethasone injection 8 mg    dextrose 50% injection 12.5 g    dextrose 50% injection 25 g    docusate sodium capsule 100 mg    famotidine tablet 20 mg    gabapentin capsule 300 mg    glucagon (human recombinant) injection 1 mg    glucose chewable tablet 16 g    glucose chewable tablet 24 g    hydrALAZINE tablet 50 mg    lactulose 20 gram/30 mL solution Soln 20 g    metoprolol tartrate (LOPRESSOR) tablet 25 mg    mupirocin 2 % ointment    nozaseptin (NOZIN) nasal     ondansetron injection 4 mg    sevelamer carbonate tablet 2,400 mg    sodium bicarbonate tablet 1,300 mg    sodium chloride 0.9% flush 10 mL    zolpidem tablet 5 mg     Objective:     Vital Signs (Most Recent):  Temp: 97.1 °F (36.2 °C) (10/17/20 0723)  Pulse: (!) 113 (10/17/20 0937)  Resp: 18 (10/17/20 0723)  BP: (!) 119/58 (10/17/20 0937)  SpO2: 97 % (10/17/20 0723) Vital Signs (24h Range):  Temp:  [96 °F (35.6 °C)-98.8 °F (37.1 °C)] 97.1 °F (36.2 °C)  Pulse:  [] 113  Resp:  [15-20] 18  SpO2:  [95 %-97 %] 97 %  BP: ()/() 119/58  "    Weight: 108.5 kg (239 lb 3.2 oz)  Height: 5' 4" (162.6 cm)  Body mass index is 41.06 kg/m².      Intake/Output Summary (Last 24 hours) at 10/17/2020 1125  Last data filed at 10/17/2020 0800  Gross per 24 hour   Intake 480 ml   Output 2500 ml   Net -2020 ml       General    Constitutional: She appears well-developed and well-nourished.   HENT:   Head: Normocephalic and atraumatic.   Eyes: EOM are normal. Pupils are equal, round, and reactive to light.   Neck: Neck supple.   Pulmonary/Chest: Effort normal.   Abdominal: Soft.   Neurological: She is alert.   Oriented to place only, not time or reason of care  Will eventually follow simple commands with multiple request and demonstration ( mirror) intermittently   Unable to answer simple questions other than where she is as she continued to respond [facility] to every question after    Psychiatric: She has a normal mood and affect.           Right Knee Exam     Comments:  POD4 -     KEVEN in place, dressing C/D/I  No warmth/ erythema noted to surgical site  Sensation intact throughout extremity as patient responds to pain with withdrawal on pinch throughout   Str intact dorsal/plantar flexion as patient will move extremity at random but not to request   Cap refill <2   2 + pulses   Neg [place]         Significant Labs: All pertinent labs within the past 24 hours have been reviewed.    Significant Imaging: I have reviewed all pertinent imaging results/findings.  "

## 2020-10-17 NOTE — PROGRESS NOTES
[facility] Medicine  Progress Note    Patient Name: Gillian Pitts  MRN: [id number]  Patient Class: IP- Inpatient   Admission Date: 10/13/2020  Length of Stay: 4 days  Attending Physician: Adilson Aguilar MD  Primary Care Provider: Primary Doctor No        Subjective:     Principal Problem:Arthritis of right knee        HPI:  Gillian Pitts is a 68 yo female with PMHx of secondary hyperparathyroidism, ESRD (MWF), HTN, and Anemia who presented with arthritis to bilateral knees (R>L).  Pt reported symptom progression s/p fall with pain not relived by conservative measures, steroid injections, and PT refused.  Associated symptoms include: activity changes, numbness, tingling, and back pain.  Pt denies HA, dizziness, SOB, CP, palpitations, nausea, vomiting, abdominal pain, diarrhea, and all other additional complaints.  Pt underwent total R knee arthroplasty today per Dr. Aguilar (Orthopedic Surgery).  Pt is a full code and Medhat Pitts (daughter) at [phone] is the surrogate decision maker.  Hospital Medicine consulted for medical management post procedure at the request of the primary team.      Overview/Hospital Course:  10/14/20 S/P right total knee replacement yesterday per Dr. Aguilar. Post-op pain is well tolerated. Encouraged participation with PT/OT. Possible discharge home tomorrow. 10/15/20 The patient reports persistent pain to RLE overnight, not relieved with pain meds. RLE US ordered was negative for a DVT. PT/OT evaluated the patient and recommende  d rehab placement. Will adjust PRN analgesia.     10/16/2020 - Potassium 7.2. IV insulin given but unable to give dextrose and IV not working. Pt underwent dialysis - then plans for discharge to Rehab to continue PT/OT. Pt is discharged to Rehab by Orthopedics. DVT prophylaxis of ASA ordered by Orthopedics    10/17/2020 - Pt was planned for discharge to  Rehab today however, glucose = 43. Pt eating and  accucheck = 73. Called to room as nursing verbalized that pt was disoriented to time and repeating different years. Repeat glucose = 117 and pt ate a small amount of breakfast. Assessed the patient and no obvious focal deficits noted. She did keep repeating the year she was born. CT of Head was negative for acute stroke - there is intracranial atherosclerosis and microvascular ischemic changes. Hyperkalemia noted 6.1 and Nephrology ordered an additional dialysis treated. Pt was seen a second time in dialysis. No gross neuro deficits except repeating the word yes to all questions asked and attempts to communicate. MRI of Brain pending. Will plan to discharge to  Rehab tomorrow if MRI of Brain negative and labs/glucose appropriate. Discussed with Dr. Morales. Due to patient's disorientation - prn analgesia discontinued. Disorientation attributed to hypoglycemia. Pt does not appear to be septic, no medications to cause hypoglycemia, CT imaging of brain negative for hemorrhage. Pt received IV insulin yesterday without dextrose as there was no IV site. Will continue to monitor glucose and Neuro checks.     Interval History: Pt appears well. No obvious facial droop, slurred speech, extremity weakness. She follows commands appropriately and no obvious gross neuro abnormality. Gluc was 43 and has improved to 71. Pt eating small amount of breakfast. She is scheduled for dialysis. If condition improved, brain imaging negative and labs stable - will discharge to Rehab tomorrow.     Review of Systems   Constitutional: Positive for activity change. Negative for appetite change, chills, diaphoresis, fatigue, fever and unexpected weight change.   HENT: Negative for congestion, drooling, facial swelling, postnasal drip, rhinorrhea, sinus pressure, sneezing, sore throat and trouble swallowing.    Eyes: Negative for discharge, redness, itching and visual disturbance.   Respiratory: Negative for apnea, cough, choking, chest  tightness, shortness of breath, wheezing and stridor.    Cardiovascular: Negative for chest pain, palpitations and leg swelling.   Gastrointestinal: Negative for abdominal distention, abdominal pain, anal bleeding, blood in stool, constipation, diarrhea, nausea and vomiting.   Endocrine: Negative for cold intolerance and heat intolerance.   Genitourinary: Negative for decreased urine volume, difficulty urinating, dysuria, flank pain, frequency, hematuria, pelvic pain, urgency, vaginal bleeding and vaginal discharge.   Musculoskeletal: Positive for arthralgias, back pain, gait problem, joint swelling and myalgias. Negative for neck pain and neck stiffness.   Skin: Negative for color change, pallor, rash and wound.   Allergic/Immunologic: Negative for environmental allergies and food allergies.   Neurological: Negative for dizziness, seizures, facial asymmetry, speech difficulty, weakness, light-headedness, numbness and headaches.   Hematological: Does not bruise/bleed easily.   Psychiatric/Behavioral: Positive for confusion. Negative for agitation, hallucinations, sleep disturbance and suicidal ideas. The patient is not nervous/anxious.    All other systems reviewed and are negative.    Objective:     Vital Signs (Most Recent):  Temp: 98.5 °F (36.9 °C) (10/17/20 1400)  Pulse: (!) 112 (10/17/20 1400)  Resp: 18 (10/17/20 1400)  BP: (!) 147/66 (10/17/20 1400)  SpO2: 97 % (10/17/20 0723) Vital Signs (24h Range):  Temp:  [97.1 °F (36.2 °C)-98.8 °F (37.1 °C)] 98.5 °F (36.9 °C)  Pulse:  [] 112  Resp:  [15-20] 18  SpO2:  [96 %-97 %] 97 %  BP: ()/(25-92) 147/66     Weight: 108.5 kg (239 lb 3.2 oz)  Body mass index is 41.06 kg/m².    Intake/Output Summary (Last 24 hours) at 10/17/2020 1417  Last data filed at 10/17/2020 1400  Gross per 24 hour   Intake 1480 ml   Output 2500 ml   Net -1020 ml      Physical Exam  Vitals signs and nursing note reviewed.   Constitutional:       Appearance: Normal appearance. She is  well-developed. She is obese.   HENT:      Head: Normocephalic and atraumatic.      Nose: Nose normal.      Mouth/Throat:      Mouth: Mucous membranes are dry.   Eyes:      Conjunctiva/sclera: Conjunctivae normal.      Pupils: Pupils are equal, round, and reactive to light.   Neck:      Musculoskeletal: Normal range of motion and neck supple.   Cardiovascular:      Rate and Rhythm: Normal rate and regular rhythm.      Heart sounds: Normal heart sounds. No murmur.   Pulmonary:      Effort: Pulmonary effort is normal. No respiratory distress.      Breath sounds: Normal breath sounds.   Abdominal:      General: Bowel sounds are normal. There is no distension.      Palpations: Abdomen is soft.      Tenderness: There is no abdominal tenderness.   Genitourinary:     Comments: Deferred   Musculoskeletal: Normal range of motion.         General: Tenderness (R knee ) present. No deformity.      Comments: Limited ROM to R knee post procedure   Skin:     General: Skin is warm and dry.      Findings: No erythema.      Comments: Surgical wound with dressing to R knee    Neurological:      General: No focal deficit present.      Mental Status: She is alert. She is disoriented.      Deep Tendon Reflexes: Reflexes are normal and symmetric.   Psychiatric:         Behavior: Behavior normal.         Significant Labs:   CBC:   Recent Labs   Lab 10/16/20  0631   WBC 13.93*   HGB 11.0*   HCT 35.2*        CMP:   Recent Labs   Lab 10/16/20  0935 10/17/20  0637 10/17/20  1207   * 134* 135*   K 7.2* 6.1* 3.9   CL 94* 101 94*   CO2 18* 13* 29   GLU 75 43* 117*   BUN 53* 31* 18   CREATININE 9.5* 6.3* 4.1*   CALCIUM 9.1 9.1 8.5*   ALBUMIN  --  2.9* 2.8*   ANIONGAP 20* 20* 12   EGFRNONAA 4* 6* 11*     All pertinent labs within the past 24 hours have been reviewed.    Significant Imaging: I have reviewed all pertinent imaging results/findings within the past 24 hours.      Assessment/Plan:      * Arthritis of right knee  -Pt admitted  to Medical Surgical Unit post procedure  -Orthopedic surgery-managed per primary team  -IV antibiotics   -analgesia as needed   -antiemetics as needed   -PT/OT as needed   -discharge anticipated per primary team   10/14/20 S/P right total knee replacement yesterday per Dr. Aguilar. Post-op pain is well tolerated. Encouraged participation with PT/OT. Possible discharge home tomorrow.   10/15/20 The patient reports persistent pain to RLE overnight, not relieved with pain meds. RLE US ordered was negative for a DVT. PT/OT evaluated the patient and recommended rehab placement. Will adjust PRN analgesia.   10/16/2020 - discharged per Orthopedics, discharge to Rehab, DVT prophylaxis per Orthopedics      Hypoglycemia  Pt with disorientation - unable to verbalize current year and not answering questions appropriately  Follows commands and no obvious physical focal deficits noted  Likely hypoglycemic due to IV insulin given to treat hyperkalemia - dextrose was not given and pt did not have IV access and she went to dialysis  accuchecks every 4 hours  Neuro checks  CT of Head - negative for acute intracranial finding      Leukocytosis  -likely reactive post procedure   -IV antibiotics as needed   -repeat CBC in am   WBC has normalized - pt afebrile      Hyperlipemia  -statin continued       Hyperkalemia  -5.9 > 5.6 noted   -in the setting ESRD- diagnosed 2/24/16  -Lokelma given   -Nephrology consulted   -HD planned for tomorrow   -repeat labs pending   -EKG results pending   10/14/20 Resolved   10/16/2020 - K 7.2 - IV insulin ordered and given. Unable to give dextrose as IV infiltrated - Pt to have dialysis  10/17/2020 - K 6.1 - dialysis ordered again    Hypertension, renal disease  -Hydralazine and Lopressor continued   -Clonidine held- will resume when appropriate       ESRD on dialysis since 2/24/16  -Nephrology consulted   -MWF schedule   -Renvela continued   10/15/20 Nephrology following. Next dialysis treatment  scheduled for tomorrow.   10/16/2020 - dialysis today   10/17/2020 - dialysis today due to hyperkalemia    Anemia in chronic kidney disease, on chronic dialysis  -H/H stable post procedure  H/H 8.9/29.0  -will resume iron supplement upon discharge  -repeat CBC in am          VTE Risk Mitigation (From admission, onward)         Ordered     IP VTE LOW RISK PATIENT  Once      10/13/20 0752     Place DOUG hose  Until discontinued      10/13/20 0752     Place sequential compression device  Until discontinued      10/13/20 0752                Discharge Planning   DARRIN: 10/17/2020     Code Status: Full Code   Is the patient medically ready for discharge?:     Reason for patient still in hospital (select all that apply): Patient trending condition  Discharge Plan A: (Post D/C to IRF)                  Aria Proctor NP  Department of Hospital Medicine   [facility]

## 2020-10-17 NOTE — PT/OT/SLP PROGRESS
Physical Therapy      Patient Name:  Gillian Pitts   MRN:  [id number]    Patient not seen today secondary to Dialysis. Will follow-up next available date/time.    Junaid Finney, PT

## 2020-10-17 NOTE — PLAN OF CARE
10/17/20 0912   Post-Acute Status   Post-Acute Authorization Placement   Post-Acute Placement Status Set-up Complete  (inpt rehab)   Discharge Plan   Discharge Plan A   (Post D/C to IRF)

## 2020-10-17 NOTE — PROGRESS NOTES
HD x 2.5 hours    Net + 860; BP dropped    L AVF accessed and functioned well; pt tolerated tx fairly well once UF turned off    Dr. Cintron visited with pt during tx

## 2020-10-17 NOTE — PROGRESS NOTES
Patient received 3.5 hours of hemodialysis treatment. No medications were given during Dialysis treatment. Net UF 2000 mls removed.  visited bedside during dialysis treatment.No issues access noted.

## 2020-10-17 NOTE — PT/OT/SLP PROGRESS
Physical Therapy  Treatment    Gillian Pitts   MRN: [id number]   Admitting Diagnosis: Arthritis of right knee    PT Received On: 10/17/20  PT Start Time: 0818     PT Stop Time: 0833    PT Total Time (min): 15 min       Billable Minutes:  Therapeutic Activity 5 and Therapeutic Exercise 10    Treatment Type: Treatment  PT/PTA: PTA     PTA Visit Number: 2       General Precautions: Standard, fall  Orthopedic Precautions: RLE weight bearing as tolerated   Braces:           Subjective:  Communicated with epic and nurse Miranda prior to session.      Pain/Comfort  Pain Rating 1: 0/10  Pain Rating Post-Intervention 1: 0/10    Objective:   Patient found with: bed alarm, telemetry    Functional Mobility:  Bed Mobility:        Transfers:       Gait:        Stairs:          Balance:   Static Sit:   Dynamic Sit:   Static Stand:   Dynamic stand:        Therapeutic Activities and Exercises:  Pt worked on bed mobility and supine exercises.      AM-PAC 6 CLICK MOBILITY  How much help from another person does this patient currently need?   1 = Unable, Total/Dependent Assistance  2 = A lot, Maximum/Moderate Assistance  3 = A little, Minimum/Contact Guard/Supervision  4 = None, Modified [place]/Independent    Turning over in bed (including adjusting bedclothes, sheets and blankets)?: 2  Sitting down on and standing up from a chair with arms (e.g., wheelchair, bedside commode, etc.): 1  Moving from lying on back to sitting on the side of the bed?: 1  Moving to and from a bed to a chair (including a wheelchair)?: 1  Need to walk in hospital room?: 1  Climbing 3-5 steps with a railing?: 1  Basic Mobility Total Score: 7    AM-PAC Raw Score CMS G-Code Modifier Level of Impairment Assistance   6 % Total / Unable   7 - 9 CM 80 - 100% Maximal Assist   10 - 14 CL 60 - 80% Moderate Assist   15 - 19 CK 40 - 60% Moderate Assist   20 - 22 CJ 20 - 40% Minimal Assist   23 CI 1-20% SBA / CGA   24 CH 0% Independent/ Mod I      Patient left supine with all lines intact, call button in reach, bed alarm on and Nurse notified.    Assessment:  Gillian Pitts is a 69 y.o. female with a medical diagnosis of Arthritis of right knee and presents with decreased cognition.    Rehab identified problem list/impairments: Rehab identified problem list/impairments: weakness, impaired endurance, impaired sensation, impaired self care skills, impaired functional mobilty, impaired cognition, decreased coordination    Rehab potential is poor.    Activity tolerance: Poor    Discharge recommendations:       Barriers to discharge:      Equipment recommendations:       GOALS:   Multidisciplinary Problems     Physical Therapy Goals        Problem: Physical Therapy Goal    Goal Priority Disciplines Outcome Goal Variances Interventions   Physical Therapy Goal     PT, PT/OT Ongoing, Progressing     Description: 1. Patient will perform supine to/from sit mod indep  2. Patient will perform sit to/from stand with RW mod indep  3. Patient will ambulate 150ft RW mod indep no gross LOB  4. Patient will perform r TKR TE x 20 reps                   PLAN:    Patient to be seen 5 x/week  to address the above listed problems via gait training, therapeutic activities, therapeutic exercises  Plan of Care expires: 10/20/20  Plan of Care reviewed with: patient         Junaid Finney, PT  10/17/2020

## 2020-10-17 NOTE — NURSING
When assessing pt's orientation pt continues to repeat her name when  questioned. She continues to follow commands. No facial or upper extremity drift noted. Critical glucose of 43 per Lab. POCT glucose 63 but pt currently eating breakfast. Recheck glucose 73. VSS. Aria Proctor. NP notified and at bedside for evaluation. CT Scanned ordered. Otherwise pt in no acute distress. Will continue to monitor.    1100 Pt currrently in dialysis at this time.

## 2020-10-17 NOTE — PLAN OF CARE
10/17/20 0916   Final Note   Assessment Type Final Discharge Note   Anticipated Discharge Disposition Rehab   Right Care Referral Info   Post Acute Recommendation IRF   Referral Type [facility] Name [facility]

## 2020-10-17 NOTE — SUBJECTIVE & OBJECTIVE
Interval History: Pt appears well. No obvious facial droop, slurred speech, extremity weakness. She follows commands appropriately and no obvious gross neuro abnormality. Gluc was 43 and has improved to 71. Pt eating small amount of breakfast. She is scheduled for dialysis. If condition improved, brain imaging negative and labs stable - will discharge to Rehab tomorrow.     Review of Systems   Constitutional: Positive for activity change. Negative for appetite change, chills, diaphoresis, fatigue, fever and unexpected weight change.   HENT: Negative for congestion, drooling, facial swelling, postnasal drip, rhinorrhea, sinus pressure, sneezing, sore throat and trouble swallowing.    Eyes: Negative for discharge, redness, itching and visual disturbance.   Respiratory: Negative for apnea, cough, choking, chest tightness, shortness of breath, wheezing and stridor.    Cardiovascular: Negative for chest pain, palpitations and leg swelling.   Gastrointestinal: Negative for abdominal distention, abdominal pain, anal bleeding, blood in stool, constipation, diarrhea, nausea and vomiting.   Endocrine: Negative for cold intolerance and heat intolerance.   Genitourinary: Negative for decreased urine volume, difficulty urinating, dysuria, flank pain, frequency, hematuria, pelvic pain, urgency, vaginal bleeding and vaginal discharge.   Musculoskeletal: Positive for arthralgias, back pain, gait problem, joint swelling and myalgias. Negative for neck pain and neck stiffness.   Skin: Negative for color change, pallor, rash and wound.   Allergic/Immunologic: Negative for environmental allergies and food allergies.   Neurological: Negative for dizziness, seizures, facial asymmetry, speech difficulty, weakness, light-headedness, numbness and headaches.   Hematological: Does not bruise/bleed easily.   Psychiatric/Behavioral: Positive for confusion. Negative for agitation, hallucinations, sleep disturbance and suicidal ideas. The patient  is not nervous/anxious.    All other systems reviewed and are negative.    Objective:     Vital Signs (Most Recent):  Temp: 98.5 °F (36.9 °C) (10/17/20 1400)  Pulse: (!) 112 (10/17/20 1400)  Resp: 18 (10/17/20 1400)  BP: (!) 147/66 (10/17/20 1400)  SpO2: 97 % (10/17/20 0723) Vital Signs (24h Range):  Temp:  [97.1 °F (36.2 °C)-98.8 °F (37.1 °C)] 98.5 °F (36.9 °C)  Pulse:  [] 112  Resp:  [15-20] 18  SpO2:  [96 %-97 %] 97 %  BP: ()/(25-92) 147/66     Weight: 108.5 kg (239 lb 3.2 oz)  Body mass index is 41.06 kg/m².    Intake/Output Summary (Last 24 hours) at 10/17/2020 1417  Last data filed at 10/17/2020 1400  Gross per 24 hour   Intake 1480 ml   Output 2500 ml   Net -1020 ml      Physical Exam  Vitals signs and nursing note reviewed.   Constitutional:       Appearance: Normal appearance. She is well-developed. She is obese.   HENT:      Head: Normocephalic and atraumatic.      Nose: Nose normal.      Mouth/Throat:      Mouth: Mucous membranes are dry.   Eyes:      Conjunctiva/sclera: Conjunctivae normal.      Pupils: Pupils are equal, round, and reactive to light.   Neck:      Musculoskeletal: Normal range of motion and neck supple.   Cardiovascular:      Rate and Rhythm: Normal rate and regular rhythm.      Heart sounds: Normal heart sounds. No murmur.   Pulmonary:      Effort: Pulmonary effort is normal. No respiratory distress.      Breath sounds: Normal breath sounds.   Abdominal:      General: Bowel sounds are normal. There is no distension.      Palpations: Abdomen is soft.      Tenderness: There is no abdominal tenderness.   Genitourinary:     Comments: Deferred   Musculoskeletal: Normal range of motion.         General: Tenderness (R knee ) present. No deformity.      Comments: Limited ROM to R knee post procedure   Skin:     General: Skin is warm and dry.      Findings: No erythema.      Comments: Surgical wound with dressing to R knee    Neurological:      General: No focal deficit present.       Mental Status: She is alert. She is disoriented.      Deep Tendon Reflexes: Reflexes are normal and symmetric.   Psychiatric:         Behavior: Behavior normal.         Significant Labs:   CBC:   Recent Labs   Lab 10/16/20  0631   WBC 13.93*   HGB 11.0*   HCT 35.2*        CMP:   Recent Labs   Lab 10/16/20  0935 10/17/20  0637 10/17/20  1207   * 134* 135*   K 7.2* 6.1* 3.9   CL 94* 101 94*   CO2 18* 13* 29   GLU 75 43* 117*   BUN 53* 31* 18   CREATININE 9.5* 6.3* 4.1*   CALCIUM 9.1 9.1 8.5*   ALBUMIN  --  2.9* 2.8*   ANIONGAP 20* 20* 12   EGFRNONAA 4* 6* 11*     All pertinent labs within the past 24 hours have been reviewed.    Significant Imaging: I have reviewed all pertinent imaging results/findings within the past 24 hours.

## 2020-10-17 NOTE — PLAN OF CARE
Pt in bed and agreeable to PT. Pt able to follow simple demands. Pt able to tolerate minimal supine exercises with Max VC/TC. Pt able to roll in bed with Max VC. Pt left in bed with all needs with in reach, bed alarm on, and nursing aware of change in mental state.

## 2020-10-17 NOTE — PROGRESS NOTES
[facility]  Orthopedics  Progress Note    Patient Name: Gillian Pitts  MRN: [id number]  Admission Date: 10/13/2020  Hospital Length of Stay: 4 days  Attending Provider: Adilson Aguilar MD  Primary Care Provider: Primary Doctor No  Follow-up For: Procedure(s) (LRB):  ARTHROPLASTY, KNEE, TOTAL (Right)    Post-Operative Day: 4 Days Post-Op  Subjective:     Principal Problem:Arthritis of right knee    Principal Orthopedic Problem: R knee arthritis     Interval History: Acute AMS this am - stat ct ordered and negative, reviewed scan   Labs discussed with charge nurse , [facility] assisting- elevated potassium, dialysis washing underway presently   Hypoglycemia & hypotension noted- juice/breakfast, fluids, medication given  Per [facility]     Review of patient's allergies indicates:   Allergen Reactions    Iodinated contrast media Other (See Comments)     Kidney shut down    Morphine Other (See Comments)     Severe sedation    Povidone-iodine      pt stated iodine will shut down her kidney    Contrast media     Iodine and iodide containing products        Current Facility-Administered Medications   Medication    acetaminophen tablet 650 mg    atorvastatin tablet 80 mg    bisacodyL suppository 10 mg    chlorhexidine 0.12 % solution 10 mL    dexamethasone injection 8 mg    dextrose 50% injection 12.5 g    dextrose 50% injection 25 g    docusate sodium capsule 100 mg    famotidine tablet 20 mg    gabapentin capsule 300 mg    glucagon (human recombinant) injection 1 mg    glucose chewable tablet 16 g    glucose chewable tablet 24 g    hydrALAZINE tablet 50 mg    lactulose 20 gram/30 mL solution Soln 20 g    metoprolol tartrate (LOPRESSOR) tablet 25 mg    mupirocin 2 % ointment    nozaseptin (NOZIN) nasal     ondansetron injection 4 mg    sevelamer carbonate tablet 2,400 mg    sodium bicarbonate tablet 1,300 mg    sodium chloride 0.9% flush 10 mL    zolpidem tablet 5 mg  "    Objective:     Vital Signs (Most Recent):  Temp: 97.1 °F (36.2 °C) (10/17/20 0723)  Pulse: (!) 113 (10/17/20 0937)  Resp: 18 (10/17/20 0723)  BP: (!) 119/58 (10/17/20 0937)  SpO2: 97 % (10/17/20 0723) Vital Signs (24h Range):  Temp:  [96 °F (35.6 °C)-98.8 °F (37.1 °C)] 97.1 °F (36.2 °C)  Pulse:  [] 113  Resp:  [15-20] 18  SpO2:  [95 %-97 %] 97 %  BP: ()/() 119/58     Weight: 108.5 kg (239 lb 3.2 oz)  Height: 5' 4" (162.6 cm)  Body mass index is 41.06 kg/m².      Intake/Output Summary (Last 24 hours) at 10/17/2020 1125  Last data filed at 10/17/2020 0800  Gross per 24 hour   Intake 480 ml   Output 2500 ml   Net -2020 ml       General    Constitutional: She appears well-developed and well-nourished.   HENT:   Head: Normocephalic and atraumatic.   Eyes: EOM are normal. Pupils are equal, round, and reactive to light.   Neck: Neck supple.   Pulmonary/Chest: Effort normal.   Abdominal: Soft.   Neurological: She is alert.   Oriented to place only, not time or reason of care  Will eventually follow simple commands with multiple request and demonstration ( mirror) intermittently   Unable to answer simple questions other than where she is as she continued to respond [facility] to every question after    Psychiatric: She has a normal mood and affect.           Right Knee Exam     Comments:  POD4 -     KEVEN in place, dressing C/D/I  No warmth/ erythema noted to surgical site  Sensation intact throughout extremity as patient responds to pain with withdrawal on pinch throughout   Str intact dorsal/plantar flexion as patient will move extremity at random but not to request   Cap refill <2   2 + pulses   Neg Hoffmans         Significant Labs: All pertinent labs within the past 24 hours have been reviewed.    Significant Imaging: I have reviewed all pertinent imaging results/findings.    Assessment/Plan:     * Arthritis of right knee  POD4- R TKA    Dc canceled due to AMS -->Stat CT reviewed and negative "   Elevated potassium- undergoing washing in dialysis unit per Nephrology   Hypoglycemia & hypotension intermittently - [facility] on board and aware, appreciate assistance    ? Further workup via CTA/MRI   Continue ortho precautions, post op instructions and DVT prophylaxis             Dr. Aguilar is aware of the patient & current presentation.   He agrees with the current plan above.     GIUSEP[facility] Dawn  Orthopedics  [facility]

## 2020-10-17 NOTE — PLAN OF CARE
D/C orders placed in [facility].  Per Jitendra with [facility], states  time is 11:00 a.m. and she has already spoken to staff nurse

## 2020-10-18 VITALS
BODY MASS INDEX: 40.83 KG/M2 | DIASTOLIC BLOOD PRESSURE: 59 MMHG | RESPIRATION RATE: 18 BRPM | HEART RATE: 99 BPM | SYSTOLIC BLOOD PRESSURE: 123 MMHG | WEIGHT: 239.19 LBS | TEMPERATURE: 98 F | OXYGEN SATURATION: 94 % | HEIGHT: 64 IN

## 2020-10-18 PROBLEM — E16.2 HYPOGLYCEMIA: Status: RESOLVED | Noted: 2020-10-17 | Resolved: 2020-10-18

## 2020-10-18 LAB
BASOPHILS # BLD AUTO: 0.04 K/UL (ref 0–0.2)
BASOPHILS NFR BLD: 0.3 % (ref 0–1.9)
DIFFERENTIAL METHOD: ABNORMAL
EOSINOPHIL # BLD AUTO: 0.2 K/UL (ref 0–0.5)
EOSINOPHIL NFR BLD: 1.9 % (ref 0–8)
ERYTHROCYTE [DISTWIDTH] IN BLOOD BY AUTOMATED COUNT: 15.9 % (ref 11.5–14.5)
HCT VFR BLD AUTO: 29.2 % (ref 37–48.5)
HGB BLD-MCNC: 9.2 G/DL (ref 12–16)
IMM GRANULOCYTES # BLD AUTO: 0.06 K/UL (ref 0–0.04)
IMM GRANULOCYTES NFR BLD AUTO: 0.5 % (ref 0–0.5)
LYMPHOCYTES # BLD AUTO: 1.8 K/UL (ref 1–4.8)
LYMPHOCYTES NFR BLD: 14.9 % (ref 18–48)
MCH RBC QN AUTO: 30.5 PG (ref 27–31)
MCHC RBC AUTO-ENTMCNC: 31.5 G/DL (ref 32–36)
MCV RBC AUTO: 97 FL (ref 82–98)
MONOCYTES # BLD AUTO: 2.1 K/UL (ref 0.3–1)
MONOCYTES NFR BLD: 17.2 % (ref 4–15)
NEUTROPHILS # BLD AUTO: 7.8 K/UL (ref 1.8–7.7)
NEUTROPHILS NFR BLD: 65.2 % (ref 38–73)
NRBC BLD-RTO: 1 /100 WBC
PLATELET # BLD AUTO: 184 K/UL (ref 150–350)
PMV BLD AUTO: 10.7 FL (ref 9.2–12.9)
POCT GLUCOSE: 107 MG/DL (ref 70–110)
RBC # BLD AUTO: 3.02 M/UL (ref 4–5.4)
WBC # BLD AUTO: 12.02 K/UL (ref 3.9–12.7)

## 2020-10-18 PROCEDURE — 85025 COMPLETE CBC W/AUTO DIFF WBC: CPT

## 2020-10-18 PROCEDURE — 25000003 PHARM REV CODE 250: Performed by: ORTHOPAEDIC SURGERY

## 2020-10-18 PROCEDURE — 97110 THERAPEUTIC EXERCISES: CPT

## 2020-10-18 PROCEDURE — A4216 STERILE WATER/SALINE, 10 ML: HCPCS | Performed by: ORTHOPAEDIC SURGERY

## 2020-10-18 PROCEDURE — 25000003 PHARM REV CODE 250: Performed by: INTERNAL MEDICINE

## 2020-10-18 PROCEDURE — 36415 COLL VENOUS BLD VENIPUNCTURE: CPT

## 2020-10-18 PROCEDURE — 97530 THERAPEUTIC ACTIVITIES: CPT

## 2020-10-18 PROCEDURE — 25000003 PHARM REV CODE 250: Performed by: NURSE PRACTITIONER

## 2020-10-18 RX ORDER — HYDROCODONE BITARTRATE AND ACETAMINOPHEN 10; 325 MG/1; MG/1
1 TABLET ORAL EVERY 6 HOURS PRN
Status: DISCONTINUED | OUTPATIENT
Start: 2020-10-18 | End: 2020-10-18

## 2020-10-18 RX ORDER — SODIUM BICARBONATE 650 MG/1
1300 TABLET ORAL 3 TIMES DAILY
Qty: 180 TABLET | Refills: 11 | COMMUNITY
Start: 2020-10-18 | End: 2022-08-10

## 2020-10-18 RX ORDER — OXYCODONE AND ACETAMINOPHEN 5; 325 MG/1; MG/1
1 TABLET ORAL EVERY 6 HOURS PRN
Qty: 28 TABLET | Refills: 0 | Status: SHIPPED | OUTPATIENT
Start: 2020-10-18 | End: 2020-11-03 | Stop reason: SDUPTHER

## 2020-10-18 RX ORDER — OXYCODONE AND ACETAMINOPHEN 5; 325 MG/1; MG/1
1 TABLET ORAL EVERY 4 HOURS PRN
Status: DISCONTINUED | OUTPATIENT
Start: 2020-10-18 | End: 2020-10-18 | Stop reason: HOSPADM

## 2020-10-18 RX ADMIN — SEVELAMER CARBONATE 2400 MG: 800 TABLET, FILM COATED ORAL at 09:10

## 2020-10-18 RX ADMIN — METOPROLOL TARTRATE 25 MG: 25 TABLET, FILM COATED ORAL at 09:10

## 2020-10-18 RX ADMIN — MUPIROCIN: 20 OINTMENT TOPICAL at 09:10

## 2020-10-18 RX ADMIN — Medication 10 ML: at 12:10

## 2020-10-18 RX ADMIN — CHLORHEXIDINE GLUCONATE 0.12% ORAL RINSE 10 ML: 1.2 LIQUID ORAL at 09:10

## 2020-10-18 RX ADMIN — OXYCODONE HYDROCHLORIDE AND ACETAMINOPHEN 1 TABLET: 5; 325 TABLET ORAL at 09:10

## 2020-10-18 RX ADMIN — DOCUSATE SODIUM 100 MG: 100 CAPSULE, LIQUID FILLED ORAL at 09:10

## 2020-10-18 RX ADMIN — ACETAMINOPHEN 650 MG: 325 TABLET ORAL at 06:10

## 2020-10-18 RX ADMIN — FAMOTIDINE 20 MG: 20 TABLET ORAL at 09:10

## 2020-10-18 RX ADMIN — SODIUM BICARBONATE 1300 MG: 650 TABLET ORAL at 09:10

## 2020-10-18 NOTE — DISCHARGE SUMMARY
[facility]  Discharge Note  Short Stay    Procedure(s) (LRB):  ARTHROPLASTY, KNEE, TOTAL (Right)    OUTCOME: Condition has improved and patient is now ready for discharge.    DISPOSITION: Rehab Facility    FINAL DIAGNOSIS:  Arthritis of right knee    FOLLOWUP: In clinic  Patient postop her pain was not controlled, requiring 3 people to move her into bedside commode, needed dialysis, had some mental changes where CT scan showed no evidence of stroke.  She could not tolerate MRI.  She was dialyzed and Nephrology was following as well as Hospital Medicine.  Her mental changes and disorientation attributed to low glucose.  Day of discharge doing well was oriented and ready for transfer to rehab  DISCHARGE INSTRUCTIONS:    Discharge Procedure Orders   Potassium   Standing Status: Future Standing Exp. Date: 12/06/21        Clinical Reference Documents Added to Patient Instructions       Document    ANESTHESIA: GENERAL ANESTHESIA (ENGLISH)    DEEP BREATHING (ENGLISH)    INCENTIVE SPIROMETER, USING AN (ENGLISH)    JOINT SURGERY, HOME SAFETY AFTER (ENGLISH)    KNEE REPLACEMENT, AFTER: HOSPITAL RECOVERY (ENGLISH)    KNEE REPLACEMENT, AFTER: KEEPING YOUR KNEE HEALTHY (ENGLISH)    KNEE REPLACEMENT, AFTER: RIGHT AFTER SURGERY (ENGLISH)    KNEE REPLACEMENT, AFTER: THE FIRST MONTH  (ENGLISH)    KNEE REPLACEMENT, AFTER: CONTROLLING SWELLING (ENGLISH)    MANAGING PAIN AT HOME AFTER KNEE REPLACEMENT (ENGLISH)    TOTAL KNEE REPLACEMENT, DISCHARGE INSTRUCTIONS FOR (ENGLISH)    USING A WALKER AFTER KNEE REPLACEMENT (ENGLISH)    WHEN TO CALL YOUR SURGEON AFTER KNEE REPLACEMENT (ENGLISH)

## 2020-10-18 NOTE — NURSING
Pt being discharged to [facility] in stable condition. IV removed, catheter intact, pt tolerated well.  Discharge instructions faxed to [facility] and folder given to transport.

## 2020-10-18 NOTE — PT/OT/SLP PROGRESS
Physical Therapy      Patient Name:  Gillian Pitts   MRN:  [id number]    Treatment Time: 0815-0855    S: Patient agreed to PT; dtr present; patient going to rehab today  O: Patient will perform supine to/from sit min A with cues for use of bed rail and technique to move legs together.   Sit to/from stand x 3 reps min/mod A x 2 with RW, A for balance and cues for safe hand placement; Educated patient to use rocking mechanism to use momentum. Unable to take a step r/t L heel pain. Reviewed with patient on supine and seated LE exs to inc rom and strength  A: Limited by fatigue and L heel pain; will benefit from rehab  P: Cont per POC; advance as ruddy with exs and mobility training    2TA 1TE    Brett Mendez, PT

## 2020-10-18 NOTE — PLAN OF CARE
Pt extremely anxious during attempted MRI; prn lorazepam administered with little affect, pt unable to get MRI.   Pt free of falls. Call light in reach. Side rails x 2. Pain well controlled w/ prn meds. Pt turned q2. Cbg monitored per orders. KEVEN dressing to R knee in place and functioning properly. VTE prophylaxis- . VSS. Safety promoted. Chart reviewed, will continue to monitor.

## 2020-10-18 NOTE — PROGRESS NOTES
[facility] Hospital Medicine  Progress Note    Patient Name: Gillian Pitts  MRN: [id number]  Patient Class: IP- Inpatient   Admission Date: 10/13/2020  Length of Stay: 5 days  Attending Physician: No att. providers found  Primary Care Provider: Primary Doctor No        Subjective:     Principal Problem:Arthritis of right knee        HPI:  Gillian Pitts is a 70 yo female with PMHx of secondary hyperparathyroidism, ESRD (MWF), HTN, and Anemia who presented with arthritis to bilateral knees (R>L).  Pt reported symptom progression s/p fall with pain not relived by conservative measures, steroid injections, and PT refused.  Associated symptoms include: activity changes, numbness, tingling, and back pain.  Pt denies HA, dizziness, SOB, CP, palpitations, nausea, vomiting, abdominal pain, diarrhea, and all other additional complaints.  Pt underwent total R knee arthroplasty today per Dr. Aguilar (Orthopedic Surgery).  Pt is a full code and Medhat Pitts (daughter) at [phone] is the surrogate decision maker.  Hospital Medicine consulted for medical management post procedure at the request of the primary team.      Overview/Hospital Course:  10/14/20 S/P right total knee replacement yesterday per Dr. Aguilar. Post-op pain is well tolerated. Encouraged participation with PT/OT. Possible discharge home tomorrow. 10/15/20 The patient reports persistent pain to RLE overnight, not relieved with pain meds. RLE US ordered was negative for a DVT. PT/OT evaluated the patient and recommende  d rehab placement. Will adjust PRN analgesia.     10/16/2020 - Potassium 7.2. IV insulin given but unable to give dextrose and IV not working. Pt underwent dialysis - then plans for discharge to Rehab to continue PT/OT. Pt is discharged to Rehab by Orthopedics. DVT prophylaxis of ASA ordered by Orthopedics    10/17/2020 - Pt was planned for discharge to  Rehab today however, glucose = 43. Pt eating and  accucheck = 73. Called to room as nursing verbalized that pt was disoriented to time and repeating different years. Repeat glucose = 117 and pt ate a small amount of breakfast. Assessed the patient and no obvious focal deficits noted. She did keep repeating the year she was born. CT of Head was negative for acute stroke - there is intracranial atherosclerosis and microvascular ischemic changes. Hyperkalemia noted 6.1 and Nephrology ordered an additional dialysis treated. Pt was seen a second time in dialysis. No gross neuro deficits except repeating the word yes to all questions asked and attempts to communicate. MRI of Brain pending. Will plan to discharge to  Rehab tomorrow if MRI of Brain negative and labs/glucose appropriate. Discussed with Dr. Morales. Due to patient's disorientation - prn analgesia discontinued. Disorientation attributed to hypoglycemia. Pt does not appear to be septic, no medications to cause hypoglycemia, CT imaging of brain negative for hemorrhage. Pt received IV insulin yesterday without dextrose as there was no IV site. Will continue to monitor glucose and Neuro checks.     10/18/2020 - pt refused MRI of Brain. She had returned to neuro baseline. Family attributed her disorientation secondary to opiate analgesics given due to knee replacement. She is medically stable. Hospital Medicine is signing off.     Interval History:  Pt describes right knee pain. She is ready to eat some breakfast.     Review of Systems   Constitutional: Positive for activity change. Negative for appetite change, chills, diaphoresis, fatigue, fever and unexpected weight change.   HENT: Negative for congestion, drooling, facial swelling, postnasal drip, rhinorrhea, sinus pressure, sneezing, sore throat and trouble swallowing.    Eyes: Negative for discharge, redness, itching and visual disturbance.   Respiratory: Negative for apnea, cough, choking, chest tightness, shortness of breath, wheezing and stridor.     Cardiovascular: Negative for chest pain, palpitations and leg swelling.   Gastrointestinal: Negative for abdominal distention, abdominal pain, anal bleeding, blood in stool, constipation, diarrhea, nausea and vomiting.   Endocrine: Negative for cold intolerance and heat intolerance.   Genitourinary: Negative for decreased urine volume, difficulty urinating, dysuria, flank pain, frequency, hematuria, pelvic pain, urgency, vaginal bleeding and vaginal discharge.   Musculoskeletal: Positive for arthralgias, back pain, gait problem, joint swelling and myalgias. Negative for neck pain and neck stiffness.   Skin: Negative for color change, pallor, rash and wound.   Allergic/Immunologic: Negative for environmental allergies and food allergies.   Neurological: Negative for dizziness, seizures, facial asymmetry, speech difficulty, weakness, light-headedness, numbness and headaches.   Hematological: Does not bruise/bleed easily.   Psychiatric/Behavioral: Negative for agitation, confusion, hallucinations, sleep disturbance and suicidal ideas. The patient is not nervous/anxious.    All other systems reviewed and are negative.    Objective:     Vital Signs (Most Recent):  Temp: 97.6 °F (36.4 °C) (10/18/20 0716)  Pulse: 99 (10/18/20 0716)  Resp: 18 (10/18/20 0923)  BP: (!) 123/59 (10/18/20 0716)  SpO2: (!) 94 % (10/18/20 0716) Vital Signs (24h Range):  Temp:  [97.3 °F (36.3 °C)-98.5 °F (36.9 °C)] 97.6 °F (36.4 °C)  Pulse:  [] 99  Resp:  [18] 18  SpO2:  [94 %-98 %] 94 %  BP: ()/(40-66) 123/59     Weight: 108.5 kg (239 lb 3.2 oz)  Body mass index is 41.06 kg/m².    Intake/Output Summary (Last 24 hours) at 10/18/2020 1319  Last data filed at 10/17/2020 1600  Gross per 24 hour   Intake 1180 ml   Output --   Net 1180 ml      Physical Exam  Vitals signs and nursing note reviewed.   Constitutional:       Appearance: Normal appearance. She is well-developed. She is obese.   HENT:      Head: Normocephalic and atraumatic.       Nose: Nose normal.      Mouth/Throat:      Mouth: Mucous membranes are dry.   Eyes:      Conjunctiva/sclera: Conjunctivae normal.      Pupils: Pupils are equal, round, and reactive to light.   Neck:      Musculoskeletal: Normal range of motion and neck supple.   Cardiovascular:      Rate and Rhythm: Normal rate and regular rhythm.      Heart sounds: Normal heart sounds. No murmur.   Pulmonary:      Effort: Pulmonary effort is normal. No respiratory distress.      Breath sounds: Normal breath sounds.   Abdominal:      General: Bowel sounds are normal. There is no distension.      Palpations: Abdomen is soft.      Tenderness: There is no abdominal tenderness.   Genitourinary:     Comments: Deferred   Musculoskeletal: Normal range of motion.         General: Tenderness (R knee ) present. No deformity.      Comments: Limited ROM to R knee post procedure   Skin:     General: Skin is warm and dry.      Findings: No erythema.      Comments: Surgical wound with dressing to R knee    Neurological:      General: No focal deficit present.      Mental Status: She is alert and oriented to person, place, and time.      Deep Tendon Reflexes: Reflexes are normal and symmetric.   Psychiatric:         Mood and Affect: Mood normal.         Behavior: Behavior normal.         Significant Labs:   CBC:   Recent Labs   Lab 10/17/20  1432 10/18/20  0822   WBC 11.75 12.02   HGB 8.9* 9.2*   HCT 29.0* 29.2*    184     CMP:   Recent Labs   Lab 10/17/20  0637 10/17/20  1207   * 135*   K 6.1* 3.9    94*   CO2 13* 29   GLU 43* 117*   BUN 31* 18   CREATININE 6.3* 4.1*   CALCIUM 9.1 8.5*   ALBUMIN 2.9* 2.8*   ANIONGAP 20* 12   EGFRNONAA 6* 11*     All pertinent labs within the past 24 hours have been reviewed.    Significant Imaging: I have reviewed all pertinent imaging results/findings within the past 24 hours.      Assessment/Plan:      * Arthritis of right knee  -Pt admitted to Medical Surgical Unit post  procedure  -Orthopedic surgery-managed per primary team  -IV antibiotics   -analgesia as needed   -antiemetics as needed   -PT/OT as needed   -discharge anticipated per primary team   10/14/20 S/P right total knee replacement yesterday per Dr. Aguilar. Post-op pain is well tolerated. Encouraged participation with PT/OT. Possible discharge home tomorrow.   10/15/20 The patient reports persistent pain to RLE overnight, not relieved with pain meds. RLE US ordered was negative for a DVT. PT/OT evaluated the patient and recommended rehab placement. Will adjust PRN analgesia.   10/16/2020 - discharged per Orthopedics, discharge to Rehab, DVT prophylaxis per Orthopedics  10/18/2020 - medically stable - Hospital Medicine signing off      Leukocytosis  -likely reactive post procedure   -IV antibiotics as needed   -repeat CBC in am   WBC has normalized - pt afebrile      Hyperlipemia  -statin continued       Hyperkalemia  -5.9 > 5.6 noted   -in the setting ESRD- diagnosed 2/24/16  -Lokelma given   -Nephrology consulted   -HD planned for tomorrow   -repeat labs pending   -EKG results pending   10/14/20 Resolved   10/16/2020 - K 7.2 - IV insulin ordered and given. Unable to give dextrose as IV infiltrated - Pt to have dialysis  10/17/2020 - K 6.1 - dialysis ordered again    Hypertension, renal disease  -Hydralazine and Lopressor continued   -Clonidine held- will resume when appropriate       ESRD on dialysis since 2/24/16  -Nephrology consulted   -MWF schedule   -Renvela continued   10/15/20 Nephrology following. Next dialysis treatment scheduled for tomorrow.   10/16/2020 - dialysis today   10/17/2020 - dialysis today due to hyperkalemia    Anemia in chronic kidney disease, on chronic dialysis  -H/H stable post procedure  H/H 8.9/29.0  -will resume iron supplement upon discharge  -repeat CBC in am          VTE Risk Mitigation (From admission, onward)         Ordered     IP VTE LOW RISK PATIENT  Once      10/13/20 0752      Place DOUG hose  Until discontinued      10/13/20 0752     Place sequential compression device  Until discontinued      10/13/20 0752                Hospital Medicine is signing off - has returned to neuro baseline. Vital signs and lab stable.     Discharge Planning   DARRIN: 10/18/2020     Code Status: Full Code   Is the patient medically ready for discharge?: Yes    Reason for patient still in hospital (select all that apply): Pending disposition  Discharge Plan A: (Post D/C to IRF)                  Aria Proctor NP  Department of Hospital Medicine   [facility]

## 2020-10-18 NOTE — PT/OT/SLP PROGRESS
Occupational Therapy      Patient Name:  Gillian Pitts   MRN:  [id number]    TREATMENT TIME:  3208-5048        S: PATIENT C/O (L) HEEL PAIN.  DTR C/O PATIENT WITH INCREASED CONFUSION.     O:  PATIENT T/F'ED SUPINE > SIT WITH MIN (A) AND SIT < > STAND WITH MIN/MOD (A) X2 PERSONS.  PATIENT SAT EOB WITH (S) FOR BALANCE TO ATTEMPT TO DOFF (L) SOCK WITHOUT SUCCESS.  PATIENT AND DTR WERE EDUCATED TO PLACE PILLOW UNDER LLE TO ALLOW (L) HEEL TO FLOAT WITH NO PRESSURE/WB'ING ON MATTRESS TO DECREASE RISK OF HEEL SORE.    A:  PATIENT WITH POOR+ ENDURANCE OVERALL DUE TO C/O (L) HEEL PAIN AND CONFUSION.     P:  D/C FROM ACUTE OT SERVICES DUE TO PATIENT ABOUT TO T/F TO REHAB FACILITY.      CHARGES:  TA1       Christina Isaacs, OT  10/18/2020

## 2020-10-18 NOTE — PROGRESS NOTES
Patient alert oriented x3  Discussed her situation with her nurse who states she is back to normal  Reviewed all notes  Neurovascularly intact  Ready for transfer to rehab  All labs reviewed hemoglobin 9.2, blood glucose 107, potassium 3.9

## 2020-10-18 NOTE — PLAN OF CARE
Pt remains free from falls/injuries this shift. Safety precautions maintained. Pain managed with oral medications. AAOx4, answering questions appropriately. No s/s of acute distress noted. Will continue to monitor. Chart check completed.

## 2020-10-18 NOTE — ASSESSMENT & PLAN NOTE
-5.9 > 5.6 noted   -in the setting ESRD- diagnosed 2/24/16  -Douglas given   -Nephrology consulted   -HD planned for tomorrow   -repeat labs pending   -EKG results pending   10/14/20 Resolved   10/16/2020 - K 7.2 - IV insulin ordered and given. Unable to give dextrose as IV infiltrated - Pt to have dialysis  
-5.9 > 5.6 noted   -in the setting ESRD- diagnosed 2/24/16  -Douglas malcolm   -Nephrology consulted   -HD planned for tomorrow   -repeat labs pending   -EKG results pending     
-5.9 > 5.6 noted   -in the setting ESRD- diagnosed 2/24/16  -Douglas malcolm   -Nephrology consulted   -HD planned for tomorrow   -repeat labs pending   -EKG results pending   10/14/20 Resolved   
-5.9 > 5.6 noted   -in the setting ESRD- diagnosed 2/24/16  -Douglas malcolm   -Nephrology consulted   -HD planned for tomorrow   -repeat labs pending   -EKG results pending   10/14/20 Resolved   
-5.9 > 5.6 noted   -in the setting ESRD- diagnosed 2/24/16  -Douglas malcolm   -Nephrology consulted   -HD planned for tomorrow   -repeat labs pending   -EKG results pending   10/14/20 Resolved   10/16/2020 - K 7.2 - IV insulin ordered and given. Unable to give dextrose as IV infiltrated - Pt to have dialysis  10/17/2020 - K 6.1 - dialysis ordered again  
-5.9 > 5.6 noted   -in the setting ESRD- diagnosed 2/24/16  -Douglas malcolm   -Nephrology consulted   -HD planned for tomorrow   -repeat labs pending   -EKG results pending   10/14/20 Resolved   10/16/2020 - K 7.2 - IV insulin ordered and given. Unable to give dextrose as IV infiltrated - Pt to have dialysis  10/17/2020 - K 6.1 - dialysis ordered again  
-H/H stable post procedure   -will resume iron supplement upon discharge  -repeat CBC in am      
-H/H stable post procedure  H/H 8.9/29.0  -will resume iron supplement upon discharge  -repeat CBC in am      
-H/H stable post procedure  H/H 8.9/29.0  -will resume iron supplement upon discharge  -repeat CBC in am      
-Hydralazine and Lopressor continued   -Clonidine held- will resume when appropriate     
-Nephrology consulted   -[facility] schedule   -Renvela continued   10/15/20 Nephrology following. Next dialysis treatment scheduled for tomorrow.   10/16/2020 - dialysis today   10/17/2020 - dialysis today due to hyperkalemia  
-Nephrology consulted   -[facility] schedule   -Renvela continued     
-Nephrology consulted   -[facility] schedule   -Renvela continued     
-Nephrology consulted   -[facility] schedule   -Renvela continued   10/15/20 Nephrology following. Next dialysis treatment scheduled for tomorrow.   10/16/2020 - dialysis today   10/17/2020 - dialysis today due to hyperkalemia  
-Nephrology consulted   -[facility] schedule   -Renvela continued   10/15/20 Nephrology following. Next dialysis treatment scheduled for tomorrow.   
-Nephrology consulted   -[facility] schedule   -Renvela continued   10/15/20 Nephrology following. Next dialysis treatment scheduled for tomorrow.   10/16/2020 - dialysis today before discharge  
-Pt admitted to Medical Surgical Unit post procedure  -Orthopedic surgery-managed per primary team  -IV antibiotics   -analgesia as needed   -antiemetics as needed   -PT/OT as needed   -discharge anticipated per primary team     
-Pt admitted to Medical Surgical Unit post procedure  -Orthopedic surgery-managed per primary team  -IV antibiotics   -analgesia as needed   -antiemetics as needed   -PT/OT as needed   -discharge anticipated per primary team   10/14/20 S/P right total knee replacement yesterday per Dr. Aguilar. Post-op pain is well tolerated. Encouraged participation with PT/OT. Possible discharge home tomorrow.     
-Pt admitted to Medical Surgical Unit post procedure  -Orthopedic surgery-managed per primary team  -IV antibiotics   -analgesia as needed   -antiemetics as needed   -PT/OT as needed   -discharge anticipated per primary team   10/14/20 S/P right total knee replacement yesterday per Dr. Aguilar. Post-op pain is well tolerated. Encouraged participation with PT/OT. Possible discharge home tomorrow.   10/15/20 The patient reports persistent pain to RLE overnight, not relieved with pain meds. RLE US ordered was negative for a DVT. PT/OT evaluated the patient and recommended rehab placement. Will adjust PRN analgesia.     
-Pt admitted to Medical Surgical Unit post procedure  -Orthopedic surgery-managed per primary team  -IV antibiotics   -analgesia as needed   -antiemetics as needed   -PT/OT as needed   -discharge anticipated per primary team   10/14/20 S/P right total knee replacement yesterday per Dr. Aguilar. Post-op pain is well tolerated. Encouraged participation with PT/OT. Possible discharge home tomorrow.   10/15/20 The patient reports persistent pain to RLE overnight, not relieved with pain meds. RLE US ordered was negative for a DVT. PT/OT evaluated the patient and recommended rehab placement. Will adjust PRN analgesia.   10/16/2020 - discharged per Orthopedics, discharge to Rehab, DVT prophylaxis per Orthopedics    
-Pt admitted to Medical Surgical Unit post procedure  -Orthopedic surgery-managed per primary team  -IV antibiotics   -analgesia as needed   -antiemetics as needed   -PT/OT as needed   -discharge anticipated per primary team   10/14/20 S/P right total knee replacement yesterday per Dr. Aguilar. Post-op pain is well tolerated. Encouraged participation with PT/OT. Possible discharge home tomorrow.   10/15/20 The patient reports persistent pain to RLE overnight, not relieved with pain meds. RLE US ordered was negative for a DVT. PT/OT evaluated the patient and recommended rehab placement. Will adjust PRN analgesia.   10/16/2020 - discharged per Orthopedics, discharge to Rehab, DVT prophylaxis per Orthopedics    
-Pt admitted to Medical Surgical Unit post procedure  -Orthopedic surgery-managed per primary team  -IV antibiotics   -analgesia as needed   -antiemetics as needed   -PT/OT as needed   -discharge anticipated per primary team   10/14/20 S/P right total knee replacement yesterday per Dr. Aguilar. Post-op pain is well tolerated. Encouraged participation with PT/OT. Possible discharge home tomorrow.   10/15/20 The patient reports persistent pain to RLE overnight, not relieved with pain meds. RLE US ordered was negative for a DVT. PT/OT evaluated the patient and recommended rehab placement. Will adjust PRN analgesia.   10/16/2020 - discharged per Orthopedics, discharge to Rehab, DVT prophylaxis per Orthopedics  10/18/2020 - medically stable - Hospital Medicine signing off    
-likely reactive post procedure   -IV antibiotics as needed   -repeat CBC in am     
-likely reactive post procedure   -IV antibiotics as needed   -repeat CBC in am   WBC has normalized - pt afebrile    
-statin continued     
1. ESRD on HD,   MWF schedule at [place]. Access: left arm AVG,     Emergent HD today due to high K ,      2.  Hypertension, blood pressure is controlled      3.  s/p right total knee arthroplasty , ortho following      4.  Anemia of chronic kidney disease, monitor hemoglobin, Procrit with dialysis when indicated,     5.  Secondary hyperparathyroidism, continue renal diet, renvela      6. Hyperkalemia , expect to improve with HD     7. Met acidosis , HD as ordered, add sodium bicarb supplements     8. AMS, etiology unclear , ABG shows no Co2 retention,        
1. ESRD on HD,   MWF schedule at [facility]. Access: left arm AVG, seen at HD, tolerating well,      2.  Hypertension, blood pressure is controlled      3.  s/p right total knee arthroplasty , ortho following      4.  Anemia of chronic kidney disease, monitor hemoglobin, Procrit with dialysis when indicated,     5.  Secondary hyperparathyroidism, continue renal diet, renvela      6. Hyperkalemia , expect to improve with HD        
10/14/2020 hemodialysis today    
10/14/2020 hemodialysis today    10/15/2020 continue with Monday Wednesday Friday scan scheduled dialysis.  Pending placement for rehabilitation.  Expect next hemodialysis to be tomorrow    
[place] today HD in am   
POD1- R TKA    Continue observation and current management   Nausea continues- likely from anesthesia, advance diet as tolerated  Pain - continue current regimen, titrate IV meds as tolerated to PO    Patient to undergo dialysis today   Encourage therapy participation   Likely DC tomorrow     
POD2- R TKA    Likely dc to rehab today, pending placement   Nausea improved- advancing diet as tolerated, able to eat full dinner   Pain improved as well on current regimen     Dialysis yesterday, appreciate Nephrology assistance. Discussed potential need Friday should patient still be awaiting placement  ( Dr. Grant)   Encourage therapy participation, spirits lifted this am and patient agrees     
POD3- R TKA    Plan to dc to rehab today after dialysis   Will ask nursing staff to change KEVEN   Pain controlled on current regimen     Patient will undergo dialysis this morning   Continue post op instructions and DVT prophylaxis   All questions answered at bedside   Pt to FU in clinic and call as need     
POD4- R TKA    Dc canceled due to AMS -->Stat CT reviewed and negative   Elevated potassium- undergoing washing in dialysis unit per Nephrology   Hypoglycemia & hypotension intermittently - [facility] on board and aware, appreciate assistance    ? Further workup via CTA/MRI   Continue ortho precautions, post op instructions and DVT prophylaxis         
Patient is clinically stable no urgent hemodialysis tonight.  Will arrange hemodialysis before discharge tomorrow  
Pt with disorientation - unable to verbalize current year and not answering questions appropriately  Follows commands and no obvious physical focal deficits noted  Likely hypoglycemic due to IV insulin given to treat hyperkalemia - dextrose was not given and pt did not have IV access and she went to dialysis  accuchecks every 4 hours  Neuro checks  CT of Head - negative for acute intracranial finding    
normal...

## 2020-10-18 NOTE — SUBJECTIVE & OBJECTIVE
Interval History:  Pt describes right knee pain. She is ready to eat some breakfast.     Review of Systems   Constitutional: Positive for activity change. Negative for appetite change, chills, diaphoresis, fatigue, fever and unexpected weight change.   HENT: Negative for congestion, drooling, facial swelling, postnasal drip, rhinorrhea, sinus pressure, sneezing, sore throat and trouble swallowing.    Eyes: Negative for discharge, redness, itching and visual disturbance.   Respiratory: Negative for apnea, cough, choking, chest tightness, shortness of breath, wheezing and stridor.    Cardiovascular: Negative for chest pain, palpitations and leg swelling.   Gastrointestinal: Negative for abdominal distention, abdominal pain, anal bleeding, blood in stool, constipation, diarrhea, nausea and vomiting.   Endocrine: Negative for cold intolerance and heat intolerance.   Genitourinary: Negative for decreased urine volume, difficulty urinating, dysuria, flank pain, frequency, hematuria, pelvic pain, urgency, vaginal bleeding and vaginal discharge.   Musculoskeletal: Positive for arthralgias, back pain, gait problem, joint swelling and myalgias. Negative for neck pain and neck stiffness.   Skin: Negative for color change, pallor, rash and wound.   Allergic/Immunologic: Negative for environmental allergies and food allergies.   Neurological: Negative for dizziness, seizures, facial asymmetry, speech difficulty, weakness, light-headedness, numbness and headaches.   Hematological: Does not bruise/bleed easily.   Psychiatric/Behavioral: Negative for agitation, confusion, hallucinations, sleep disturbance and suicidal ideas. The patient is not nervous/anxious.    All other systems reviewed and are negative.    Objective:     Vital Signs (Most Recent):  Temp: 97.6 °F (36.4 °C) (10/18/20 0716)  Pulse: 99 (10/18/20 0716)  Resp: 18 (10/18/20 0923)  BP: (!) 123/59 (10/18/20 0716)  SpO2: (!) 94 % (10/18/20 0716) Vital Signs (24h  Range):  Temp:  [97.3 °F (36.3 °C)-98.5 °F (36.9 °C)] 97.6 °F (36.4 °C)  Pulse:  [] 99  Resp:  [18] 18  SpO2:  [94 %-98 %] 94 %  BP: ()/(40-66) 123/59     Weight: 108.5 kg (239 lb 3.2 oz)  Body mass index is 41.06 kg/m².    Intake/Output Summary (Last 24 hours) at 10/18/2020 1319  Last data filed at 10/17/2020 1600  Gross per 24 hour   Intake 1180 ml   Output --   Net 1180 ml      Physical Exam  Vitals signs and nursing note reviewed.   Constitutional:       Appearance: Normal appearance. She is well-developed. She is obese.   HENT:      Head: Normocephalic and atraumatic.      Nose: Nose normal.      Mouth/Throat:      Mouth: Mucous membranes are dry.   Eyes:      Conjunctiva/sclera: Conjunctivae normal.      Pupils: Pupils are equal, round, and reactive to light.   Neck:      Musculoskeletal: Normal range of motion and neck supple.   Cardiovascular:      Rate and Rhythm: Normal rate and regular rhythm.      Heart sounds: Normal heart sounds. No murmur.   Pulmonary:      Effort: Pulmonary effort is normal. No respiratory distress.      Breath sounds: Normal breath sounds.   Abdominal:      General: Bowel sounds are normal. There is no distension.      Palpations: Abdomen is soft.      Tenderness: There is no abdominal tenderness.   Genitourinary:     Comments: Deferred   Musculoskeletal: Normal range of motion.         General: Tenderness (R knee ) present. No deformity.      Comments: Limited ROM to R knee post procedure   Skin:     General: Skin is warm and dry.      Findings: No erythema.      Comments: Surgical wound with dressing to R knee    Neurological:      General: No focal deficit present.      Mental Status: She is alert and oriented to person, place, and time.      Deep Tendon Reflexes: Reflexes are normal and symmetric.   Psychiatric:         Mood and Affect: Mood normal.         Behavior: Behavior normal.         Significant Labs:   CBC:   Recent Labs   Lab 10/17/20  1432 10/18/20  0822    WBC 11.75 12.02   HGB 8.9* 9.2*   HCT 29.0* 29.2*    184     CMP:   Recent Labs   Lab 10/17/20  0637 10/17/20  1207   * 135*   K 6.1* 3.9    94*   CO2 13* 29   GLU 43* 117*   BUN 31* 18   CREATININE 6.3* 4.1*   CALCIUM 9.1 8.5*   ALBUMIN 2.9* 2.8*   ANIONGAP 20* 12   EGFRNONAA 6* 11*     All pertinent labs within the past 24 hours have been reviewed.    Significant Imaging: I have reviewed all pertinent imaging results/findings within the past 24 hours.

## 2020-10-22 LAB — BACTERIA BLD CULT: NORMAL

## 2020-10-30 ENCOUNTER — TELEPHONE (OUTPATIENT)
Dept: ORTHOPEDICS | Facility: CLINIC | Age: 70
End: 2020-10-30

## 2020-10-30 NOTE — TELEPHONE ENCOUNTER
Attempted to contact the patient twice regarding their appointment they missed today there was no answer so I left her a voicemail.

## 2020-11-03 DIAGNOSIS — M25.561 RIGHT KNEE PAIN, UNSPECIFIED CHRONICITY: ICD-10-CM

## 2020-11-03 DIAGNOSIS — M25.562 LEFT KNEE PAIN, UNSPECIFIED CHRONICITY: ICD-10-CM

## 2020-11-03 DIAGNOSIS — Z96.651 S/P TOTAL KNEE ARTHROPLASTY, RIGHT: Primary | ICD-10-CM

## 2020-11-03 RX ORDER — OXYCODONE AND ACETAMINOPHEN 5; 325 MG/1; MG/1
1 TABLET ORAL EVERY 6 HOURS PRN
Qty: 28 TABLET | Refills: 0 | Status: SHIPPED | OUTPATIENT
Start: 2020-11-03 | End: 2020-11-05 | Stop reason: SDUPTHER

## 2020-11-05 ENCOUNTER — TELEPHONE (OUTPATIENT)
Dept: ORTHOPEDICS | Facility: CLINIC | Age: 70
End: 2020-11-05

## 2020-11-05 DIAGNOSIS — Z96.651 S/P TOTAL KNEE ARTHROPLASTY, RIGHT: ICD-10-CM

## 2020-11-05 DIAGNOSIS — Z96.651 STATUS POST RIGHT KNEE REPLACEMENT: Primary | ICD-10-CM

## 2020-11-05 DIAGNOSIS — M25.561 RIGHT KNEE PAIN, UNSPECIFIED CHRONICITY: ICD-10-CM

## 2020-11-05 RX ORDER — OXYCODONE AND ACETAMINOPHEN 5; 325 MG/1; MG/1
1 TABLET ORAL
Status: CANCELLED | OUTPATIENT
Start: 2020-11-05 | End: 2020-11-05

## 2020-11-05 RX ORDER — OXYCODONE AND ACETAMINOPHEN 5; 325 MG/1; MG/1
1 TABLET ORAL EVERY 6 HOURS PRN
Qty: 28 TABLET | Refills: 0 | Status: SHIPPED | OUTPATIENT
Start: 2020-11-05 | End: 2020-11-06 | Stop reason: SDUPTHER

## 2020-11-05 NOTE — TELEPHONE ENCOUNTER
----- Message from Lani Gautam sent at 11/5/2020 10:05 AM CST -----  Contact: pt  Type:  RX Refill Request for Ms. Rea    Who Called: [place]  Refill or New Rx:new   RX Name and Strength:hydrocodone  How is the patient currently taking it? (ex. 1XDay):every 8 hours  Is this a 30 day or 90 day RX:  Preferred Pharmacy with phone number:    [id number] [employer] [facility] [address] - [address]  [address] [place] [address]  Phone: [phone] Fax: [phone]      Local or Mail Order:local  Ordering Provider:Jeff  Would the patient rather a call back or a response via [facility]? call  Best Call Back Number:[phone]  Additional Information: pt states that she needs this prescription today. She was told to take them every 8 hours and she has been needing to take it every 4 hours because of her pain.

## 2020-11-06 DIAGNOSIS — Z96.651 S/P TOTAL KNEE ARTHROPLASTY, RIGHT: ICD-10-CM

## 2020-11-06 RX ORDER — OXYCODONE AND ACETAMINOPHEN 5; 325 MG/1; MG/1
1 TABLET ORAL EVERY 6 HOURS PRN
Qty: 28 TABLET | Refills: 0 | Status: ON HOLD | OUTPATIENT
Start: 2020-11-06 | End: 2021-03-05 | Stop reason: HOSPADM

## 2020-11-06 RX ORDER — OXYCODONE AND ACETAMINOPHEN 5; 325 MG/1; MG/1
1 TABLET ORAL EVERY 6 HOURS PRN
Qty: 28 TABLET | Refills: 0 | Status: SHIPPED | OUTPATIENT
Start: 2020-11-06 | End: 2020-11-06

## 2020-11-10 DIAGNOSIS — Z96.651 STATUS POST RIGHT KNEE REPLACEMENT: ICD-10-CM

## 2020-11-10 DIAGNOSIS — Z96.651 S/P TOTAL KNEE ARTHROPLASTY, RIGHT: Primary | ICD-10-CM

## 2020-11-10 DIAGNOSIS — M17.11 PRIMARY OSTEOARTHRITIS OF RIGHT KNEE: ICD-10-CM

## 2020-11-12 ENCOUNTER — OFFICE VISIT (OUTPATIENT)
Dept: ORTHOPEDICS | Facility: CLINIC | Age: 70
End: 2020-11-12
Payer: MEDICARE

## 2020-11-12 ENCOUNTER — HOSPITAL ENCOUNTER (OUTPATIENT)
Dept: RADIOLOGY | Facility: HOSPITAL | Age: 70
Discharge: HOME | End: 2020-11-12
Attending: ORTHOPAEDIC SURGERY
Payer: MEDICARE

## 2020-11-12 VITALS — WEIGHT: 239 LBS | HEIGHT: 64 IN | BODY MASS INDEX: 40.8 KG/M2

## 2020-11-12 DIAGNOSIS — M21.062 ACQUIRED VALGUS DEFORMITY OF KNEE, LEFT: ICD-10-CM

## 2020-11-12 DIAGNOSIS — Z96.651 STATUS POST TOTAL KNEE REPLACEMENT USING CEMENT, RIGHT: Primary | ICD-10-CM

## 2020-11-12 DIAGNOSIS — Z96.651 S/P TOTAL KNEE ARTHROPLASTY, RIGHT: Primary | ICD-10-CM

## 2020-11-12 DIAGNOSIS — M17.12 ARTHRITIS OF KNEE, LEFT: ICD-10-CM

## 2020-11-12 DIAGNOSIS — M17.11 ARTHRITIS OF KNEE, RIGHT: ICD-10-CM

## 2020-11-12 PROCEDURE — 73562 XR KNEE ORTHO RIGHT WITH FLEXION: ICD-10-PCS | Mod: 26,LT,, | Performed by: RADIOLOGY

## 2020-11-12 PROCEDURE — 99024 POSTOP FOLLOW-UP VISIT: CPT | Mod: POP,,, | Performed by: ORTHOPAEDIC SURGERY

## 2020-11-12 PROCEDURE — 73564 XR KNEE ORTHO RIGHT WITH FLEXION: ICD-10-PCS | Mod: 26,RT,, | Performed by: RADIOLOGY

## 2020-11-12 PROCEDURE — 99999 PR PBB SHADOW E&M-EST. PATIENT-LVL IV: ICD-10-PCS | Mod: PBBFAC,,, | Performed by: ORTHOPAEDIC SURGERY

## 2020-11-12 PROCEDURE — 73562 X-RAY EXAM OF KNEE 3: CPT | Mod: TC,LT

## 2020-11-12 PROCEDURE — 99024 PR POST-OP FOLLOW-UP VISIT: ICD-10-PCS | Mod: POP,,, | Performed by: ORTHOPAEDIC SURGERY

## 2020-11-12 PROCEDURE — 99214 OFFICE O/P EST MOD 30 MIN: CPT | Mod: PBBFAC,25 | Performed by: ORTHOPAEDIC SURGERY

## 2020-11-12 PROCEDURE — 99999 PR PBB SHADOW E&M-EST. PATIENT-LVL IV: CPT | Mod: PBBFAC,,, | Performed by: ORTHOPAEDIC SURGERY

## 2020-11-12 PROCEDURE — 73562 X-RAY EXAM OF KNEE 3: CPT | Mod: 26,LT,, | Performed by: RADIOLOGY

## 2020-11-12 PROCEDURE — 73564 X-RAY EXAM KNEE 4 OR MORE: CPT | Mod: 26,RT,, | Performed by: RADIOLOGY

## 2020-11-12 RX ORDER — HYDROCODONE BITARTRATE AND ACETAMINOPHEN 10; 325 MG/1; MG/1
1 TABLET ORAL EVERY 6 HOURS PRN
Qty: 28 TABLET | Refills: 0 | Status: SHIPPED | OUTPATIENT
Start: 2020-11-12 | End: 2020-12-01 | Stop reason: SDUPTHER

## 2020-11-12 RX ORDER — CYCLOBENZAPRINE HCL 10 MG
10 TABLET ORAL 3 TIMES DAILY PRN
Qty: 60 TABLET | Refills: 2 | Status: SHIPPED | OUTPATIENT
Start: 2020-11-12 | End: 2020-11-22

## 2020-11-12 NOTE — PATIENT INSTRUCTIONS
Right total knee replacement 10/13/2020  Removal of staples today and Steri-Strips  May shower and get the wound wet to [place]  No sitting in the tub or soaking in a swimming pool  Will start Flexeril/cyclobenzaprine as a muscle relaxant to take only as needed maximum 3 times a day it might make you sleepy  With switch from Percocet to [place]/hydrocodone  Start outpatient physical therapy and [facility] since they have transportation  Return to see me in 4 weeks    Need to have left total knee replacement done in February
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
## 2020-11-16 DIAGNOSIS — Z76.0 MEDICATION REFILL: ICD-10-CM

## 2020-11-16 DIAGNOSIS — N18.6 ESRD (END STAGE RENAL DISEASE): ICD-10-CM

## 2020-11-16 RX ORDER — CLONIDINE HYDROCHLORIDE 0.3 MG/1
0.3 TABLET ORAL 3 TIMES DAILY
Qty: 90 TABLET | Refills: 2 | Status: SHIPPED | OUTPATIENT
Start: 2020-11-16 | End: 2021-01-20 | Stop reason: SDUPTHER

## 2020-11-17 ENCOUNTER — DOCUMENT SCAN (OUTPATIENT)
Dept: HOME HEALTH SERVICES | Facility: HOSPITAL | Age: 70
End: 2020-11-17
Payer: MEDICARE

## 2020-12-01 DIAGNOSIS — Z96.651 STATUS POST TOTAL KNEE REPLACEMENT USING CEMENT, RIGHT: ICD-10-CM

## 2020-12-01 RX ORDER — HYDROCODONE BITARTRATE AND ACETAMINOPHEN 10; 325 MG/1; MG/1
1 TABLET ORAL EVERY 6 HOURS PRN
Qty: 28 TABLET | Refills: 0 | Status: SHIPPED | OUTPATIENT
Start: 2020-12-01 | End: 2020-12-29 | Stop reason: SDUPTHER

## 2020-12-01 NOTE — TELEPHONE ENCOUNTER
Returned the patient's phone call. Informed the patient that I will send this refill request to Dr. Aguilar. FP            ----- Message from Grisel Mcclure sent at 12/1/2020  2:36 PM CST -----  Patient states she need pain meds refilled hydrocodene  [place] in [place] [facility] [place]. Please call back [phone]

## 2020-12-11 DIAGNOSIS — M47.816 LUMBAR FACET ARTHROPATHY: ICD-10-CM

## 2020-12-11 DIAGNOSIS — M51.36 DDD (DEGENERATIVE DISC DISEASE), LUMBAR: ICD-10-CM

## 2020-12-11 NOTE — TELEPHONE ENCOUNTER
----- Message from Celeste Obrien sent at 12/11/2020  3:29 PM CST -----  Patient called in regards to getting some muscle relaxer's, she stated she was just released from the hospital. She would like for you all to send the medication to [facility] on [address].     [phone]

## 2020-12-17 RX ORDER — CYCLOBENZAPRINE HCL 5 MG
5 TABLET ORAL NIGHTLY PRN
Qty: 10 TABLET | Refills: 0 | OUTPATIENT
Start: 2020-12-17

## 2020-12-29 ENCOUNTER — TELEPHONE (OUTPATIENT)
Dept: ORTHOPEDICS | Facility: CLINIC | Age: 70
End: 2020-12-29

## 2020-12-29 DIAGNOSIS — M51.36 DDD (DEGENERATIVE DISC DISEASE), LUMBAR: ICD-10-CM

## 2020-12-29 DIAGNOSIS — M47.816 LUMBAR FACET ARTHROPATHY: ICD-10-CM

## 2020-12-29 DIAGNOSIS — Z96.651 STATUS POST TOTAL KNEE REPLACEMENT USING CEMENT, RIGHT: ICD-10-CM

## 2020-12-29 NOTE — TELEPHONE ENCOUNTER
Returned the patient's phone call. No answer left a message for the patient to give the office a call back.FP      ----- Message from Daria Tolbert sent at 12/29/2020 10:26 AM CST -----  Contact: Gilliancalli French would like a call back at [phone], Regards to getting her post op reschedule and also something called in for pain.      [facility] [address] [address] [employer] - [address] - [address]  [address] [place] [address]  Phone: [phone] Fax: [phone]      Thanks  Td

## 2020-12-30 RX ORDER — HYDROCODONE BITARTRATE AND ACETAMINOPHEN 10; 325 MG/1; MG/1
1 TABLET ORAL EVERY 6 HOURS PRN
Qty: 28 TABLET | Refills: 0 | Status: SHIPPED | OUTPATIENT
Start: 2020-12-30 | End: 2021-01-14 | Stop reason: SDUPTHER

## 2020-12-30 RX ORDER — CYCLOBENZAPRINE HCL 5 MG
5 TABLET ORAL NIGHTLY PRN
Qty: 10 TABLET | Refills: 0 | Status: SHIPPED | OUTPATIENT
Start: 2020-12-30 | End: 2021-03-23 | Stop reason: ALTCHOICE

## 2021-01-04 ENCOUNTER — TELEPHONE (OUTPATIENT)
Dept: ORTHOPEDICS | Facility: CLINIC | Age: 71
End: 2021-01-04

## 2021-01-05 ENCOUNTER — TELEPHONE (OUTPATIENT)
Dept: ORTHOPEDICS | Facility: CLINIC | Age: 71
End: 2021-01-05

## 2021-01-14 DIAGNOSIS — Z96.651 STATUS POST TOTAL KNEE REPLACEMENT USING CEMENT, RIGHT: ICD-10-CM

## 2021-01-15 RX ORDER — HYDROCODONE BITARTRATE AND ACETAMINOPHEN 10; 325 MG/1; MG/1
1 TABLET ORAL EVERY 6 HOURS PRN
Qty: 28 TABLET | Refills: 0 | Status: SHIPPED | OUTPATIENT
Start: 2021-01-15 | End: 2021-03-02

## 2021-01-20 ENCOUNTER — TELEPHONE (OUTPATIENT)
Dept: ORTHOPEDICS | Facility: CLINIC | Age: 71
End: 2021-01-20

## 2021-01-20 DIAGNOSIS — I10 HYPERTENSION, UNSPECIFIED TYPE: Primary | ICD-10-CM

## 2021-01-20 DIAGNOSIS — N18.6 ESRD (END STAGE RENAL DISEASE): ICD-10-CM

## 2021-01-20 DIAGNOSIS — Z76.0 MEDICATION REFILL: ICD-10-CM

## 2021-01-20 RX ORDER — CLONIDINE HYDROCHLORIDE 0.3 MG/1
0.3 TABLET ORAL 3 TIMES DAILY
Qty: 90 TABLET | Refills: 2 | Status: SHIPPED | OUTPATIENT
Start: 2021-01-20 | End: 2021-02-25

## 2021-01-20 RX ORDER — METOPROLOL TARTRATE 50 MG/1
50 TABLET ORAL EVERY 8 HOURS
Qty: 90 TABLET | Refills: 2 | Status: SHIPPED | OUTPATIENT
Start: 2021-01-20 | End: 2021-07-24 | Stop reason: SDUPTHER

## 2021-01-29 ENCOUNTER — OFFICE VISIT (OUTPATIENT)
Dept: ORTHOPEDICS | Facility: CLINIC | Age: 71
End: 2021-01-29
Payer: MEDICARE

## 2021-01-29 VITALS
DIASTOLIC BLOOD PRESSURE: 57 MMHG | SYSTOLIC BLOOD PRESSURE: 103 MMHG | WEIGHT: 239 LBS | HEART RATE: 53 BPM | HEIGHT: 64 IN | BODY MASS INDEX: 40.8 KG/M2

## 2021-01-29 DIAGNOSIS — M17.12 ARTHRITIS OF KNEE, LEFT: ICD-10-CM

## 2021-01-29 DIAGNOSIS — Z96.651 HISTORY OF TOTAL RIGHT KNEE REPLACEMENT: Primary | ICD-10-CM

## 2021-01-29 DIAGNOSIS — M21.062 ACQUIRED VALGUS DEFORMITY OF KNEE, LEFT: ICD-10-CM

## 2021-01-29 PROCEDURE — 99999 PR PBB SHADOW E&M-EST. PATIENT-LVL IV: CPT | Mod: PBBFAC,,, | Performed by: ORTHOPAEDIC SURGERY

## 2021-01-29 PROCEDURE — 99214 OFFICE O/P EST MOD 30 MIN: CPT | Mod: PBBFAC | Performed by: ORTHOPAEDIC SURGERY

## 2021-01-29 PROCEDURE — 99999 PR PBB SHADOW E&M-EST. PATIENT-LVL IV: ICD-10-PCS | Mod: PBBFAC,,, | Performed by: ORTHOPAEDIC SURGERY

## 2021-01-29 PROCEDURE — 99214 PR OFFICE/OUTPT VISIT, EST, LEVL IV, 30-39 MIN: ICD-10-PCS | Mod: S$PBB,,, | Performed by: ORTHOPAEDIC SURGERY

## 2021-01-29 PROCEDURE — 99214 OFFICE O/P EST MOD 30 MIN: CPT | Mod: S$PBB,,, | Performed by: ORTHOPAEDIC SURGERY

## 2021-01-29 RX ORDER — CYCLOBENZAPRINE HCL 10 MG
10 TABLET ORAL 3 TIMES DAILY PRN
Qty: 90 TABLET | Refills: 2 | Status: SHIPPED | OUTPATIENT
Start: 2021-01-29 | End: 2021-02-08

## 2021-01-29 RX ORDER — OXYCODONE AND ACETAMINOPHEN 5; 325 MG/1; MG/1
1 TABLET ORAL EVERY 6 HOURS PRN
Qty: 28 TABLET | Refills: 0 | Status: SHIPPED | OUTPATIENT
Start: 2021-01-29 | End: 2021-03-23 | Stop reason: SDUPTHER

## 2021-02-02 ENCOUNTER — TELEPHONE (OUTPATIENT)
Dept: ORTHOPEDICS | Facility: CLINIC | Age: 71
End: 2021-02-02

## 2021-02-02 DIAGNOSIS — M17.12 ARTHRITIS OF KNEE, LEFT: Primary | ICD-10-CM

## 2021-02-02 DIAGNOSIS — Z01.818 PREOP TESTING: ICD-10-CM

## 2021-02-05 DIAGNOSIS — N18.6 END STAGE RENAL DISEASE ON DIALYSIS: Primary | ICD-10-CM

## 2021-02-05 DIAGNOSIS — Z99.2 END STAGE RENAL DISEASE ON DIALYSIS: Primary | ICD-10-CM

## 2021-02-05 DIAGNOSIS — E87.5 HYPERKALEMIA: ICD-10-CM

## 2021-02-08 DIAGNOSIS — E87.5 HYPERKALEMIA: ICD-10-CM

## 2021-02-08 DIAGNOSIS — Z99.2 END STAGE RENAL DISEASE ON DIALYSIS: ICD-10-CM

## 2021-02-08 DIAGNOSIS — N18.6 END STAGE RENAL DISEASE ON DIALYSIS: ICD-10-CM

## 2021-02-18 ENCOUNTER — TELEPHONE (OUTPATIENT)
Dept: CARDIOLOGY | Facility: CLINIC | Age: 71
End: 2021-02-18

## 2021-02-24 DIAGNOSIS — N18.6 END STAGE RENAL DISEASE ON DIALYSIS: ICD-10-CM

## 2021-02-24 DIAGNOSIS — E87.5 HYPERKALEMIA: ICD-10-CM

## 2021-02-24 DIAGNOSIS — Z99.2 END STAGE RENAL DISEASE ON DIALYSIS: ICD-10-CM

## 2021-02-25 ENCOUNTER — OFFICE VISIT (OUTPATIENT)
Dept: CARDIOLOGY | Facility: CLINIC | Age: 71
End: 2021-02-25
Payer: MEDICARE

## 2021-02-25 ENCOUNTER — HOSPITAL ENCOUNTER (OUTPATIENT)
Dept: CARDIOLOGY | Facility: HOSPITAL | Age: 71
Discharge: HOME | End: 2021-02-25
Attending: ORTHOPAEDIC SURGERY
Payer: MEDICARE

## 2021-02-25 VITALS
DIASTOLIC BLOOD PRESSURE: 76 MMHG | BODY MASS INDEX: 38.84 KG/M2 | WEIGHT: 227.5 LBS | SYSTOLIC BLOOD PRESSURE: 132 MMHG | HEIGHT: 64 IN | HEART RATE: 68 BPM

## 2021-02-25 DIAGNOSIS — I73.9 PERIPHERAL VASCULAR DISEASE: ICD-10-CM

## 2021-02-25 DIAGNOSIS — R94.31 ABNORMAL ECG: ICD-10-CM

## 2021-02-25 DIAGNOSIS — Z99.2 ESRD (END STAGE RENAL DISEASE) ON DIALYSIS: Chronic | ICD-10-CM

## 2021-02-25 DIAGNOSIS — E78.2 MIXED HYPERLIPIDEMIA: ICD-10-CM

## 2021-02-25 DIAGNOSIS — N18.6 ESRD (END STAGE RENAL DISEASE) ON DIALYSIS: Chronic | ICD-10-CM

## 2021-02-25 DIAGNOSIS — I10 HYPERTENSION, UNSPECIFIED TYPE: ICD-10-CM

## 2021-02-25 DIAGNOSIS — Z01.810 PREOP CARDIOVASCULAR EXAM: Primary | ICD-10-CM

## 2021-02-25 DIAGNOSIS — I44.0 FIRST DEGREE AV BLOCK: ICD-10-CM

## 2021-02-25 DIAGNOSIS — Z76.0 MEDICATION REFILL: ICD-10-CM

## 2021-02-25 DIAGNOSIS — Z01.818 PREOP TESTING: ICD-10-CM

## 2021-02-25 DIAGNOSIS — I11.9 HYPERTENSIVE LEFT VENTRICULAR HYPERTROPHY, WITHOUT HEART FAILURE: ICD-10-CM

## 2021-02-25 DIAGNOSIS — I12.9 HYPERTENSION, RENAL DISEASE: ICD-10-CM

## 2021-02-25 DIAGNOSIS — N18.6 ESRD (END STAGE RENAL DISEASE): ICD-10-CM

## 2021-02-25 PROBLEM — R07.89 ATYPICAL CHEST PAIN: Status: RESOLVED | Noted: 2020-03-13 | Resolved: 2021-02-25

## 2021-02-25 PROCEDURE — 99214 OFFICE O/P EST MOD 30 MIN: CPT | Mod: S$PBB,,, | Performed by: INTERNAL MEDICINE

## 2021-02-25 PROCEDURE — 99999 PR PBB SHADOW E&M-EST. PATIENT-LVL III: CPT | Mod: PBBFAC,,, | Performed by: INTERNAL MEDICINE

## 2021-02-25 PROCEDURE — 93010 ELECTROCARDIOGRAM REPORT: CPT | Mod: ,,, | Performed by: INTERNAL MEDICINE

## 2021-02-25 PROCEDURE — 93010 EKG 12-LEAD: ICD-10-PCS | Mod: ,,, | Performed by: INTERNAL MEDICINE

## 2021-02-25 PROCEDURE — 93005 ELECTROCARDIOGRAM TRACING: CPT

## 2021-02-25 PROCEDURE — 99999 PR PBB SHADOW E&M-EST. PATIENT-LVL III: ICD-10-PCS | Mod: PBBFAC,,, | Performed by: INTERNAL MEDICINE

## 2021-02-25 PROCEDURE — 99213 OFFICE O/P EST LOW 20 MIN: CPT | Mod: PBBFAC,25 | Performed by: INTERNAL MEDICINE

## 2021-02-25 PROCEDURE — 99214 PR OFFICE/OUTPT VISIT, EST, LEVL IV, 30-39 MIN: ICD-10-PCS | Mod: S$PBB,,, | Performed by: INTERNAL MEDICINE

## 2021-02-25 RX ORDER — CLONIDINE HYDROCHLORIDE 0.3 MG/1
0.3 TABLET ORAL 3 TIMES DAILY
Qty: 90 TABLET | Refills: 2 | OUTPATIENT
Start: 2021-02-25 | End: 2021-02-25 | Stop reason: ALTCHOICE

## 2021-02-25 RX ORDER — CLONIDINE HYDROCHLORIDE 0.1 MG/1
0.1 TABLET ORAL 3 TIMES DAILY
COMMUNITY
End: 2021-08-27 | Stop reason: SDUPTHER

## 2021-03-02 ENCOUNTER — LAB VISIT (OUTPATIENT)
Dept: OTOLARYNGOLOGY | Facility: CLINIC | Age: 71
End: 2021-03-02
Payer: MEDICARE

## 2021-03-02 DIAGNOSIS — N18.6 ESRD (END STAGE RENAL DISEASE): Primary | ICD-10-CM

## 2021-03-02 DIAGNOSIS — Z01.818 PREOP TESTING: ICD-10-CM

## 2021-03-02 PROCEDURE — U0005 INFEC AGEN DETEC AMPLI PROBE: HCPCS

## 2021-03-02 PROCEDURE — U0003 INFECTIOUS AGENT DETECTION BY NUCLEIC ACID (DNA OR RNA); SEVERE ACUTE RESPIRATORY SYNDROME CORONAVIRUS 2 (SARS-COV-2) (CORONAVIRUS DISEASE [COVID-19]), AMPLIFIED PROBE TECHNIQUE, MAKING USE OF HIGH THROUGHPUT TECHNOLOGIES AS DESCRIBED BY CMS-2020-01-R: HCPCS

## 2021-03-03 LAB — SARS-COV-2 RNA RESP QL NAA+PROBE: NOT DETECTED

## 2021-03-04 ENCOUNTER — LAB VISIT (OUTPATIENT)
Dept: LAB | Facility: HOSPITAL | Age: 71
End: 2021-03-04
Attending: ORTHOPAEDIC SURGERY
Payer: MEDICARE

## 2021-03-04 DIAGNOSIS — Z01.818 PREOP TESTING: ICD-10-CM

## 2021-03-04 LAB
ABO + RH BLD: NORMAL
BLD GP AB SCN CELLS X3 SERPL QL: NORMAL

## 2021-03-04 PROCEDURE — 86900 BLOOD TYPING SEROLOGIC ABO: CPT | Performed by: ORTHOPAEDIC SURGERY

## 2021-03-04 PROCEDURE — 36415 COLL VENOUS BLD VENIPUNCTURE: CPT | Performed by: ORTHOPAEDIC SURGERY

## 2021-03-05 ENCOUNTER — HOSPITAL ENCOUNTER (OUTPATIENT)
Facility: HOSPITAL | Age: 71
Discharge: NURSING, REHAB OR LONG-TERM CARE | End: 2021-03-08
Attending: ORTHOPAEDIC SURGERY | Admitting: ORTHOPAEDIC SURGERY
Payer: MEDICARE

## 2021-03-05 ENCOUNTER — ANESTHESIA EVENT (OUTPATIENT)
Dept: SURGERY | Facility: HOSPITAL | Age: 71
End: 2021-03-05
Payer: MEDICARE

## 2021-03-05 ENCOUNTER — ANESTHESIA (OUTPATIENT)
Dept: SURGERY | Facility: HOSPITAL | Age: 71
End: 2021-03-05
Payer: MEDICARE

## 2021-03-05 DIAGNOSIS — N25.81 SECONDARY HYPERPARATHYROIDISM OF RENAL ORIGIN: ICD-10-CM

## 2021-03-05 DIAGNOSIS — M17.12 ARTHRITIS OF KNEE, LEFT: ICD-10-CM

## 2021-03-05 DIAGNOSIS — Z99.2 ESRD (END STAGE RENAL DISEASE) ON DIALYSIS: ICD-10-CM

## 2021-03-05 DIAGNOSIS — I10 ACCELERATED HYPERTENSION: ICD-10-CM

## 2021-03-05 DIAGNOSIS — M17.12 PRIMARY OSTEOARTHRITIS OF LEFT KNEE: Primary | ICD-10-CM

## 2021-03-05 DIAGNOSIS — N18.6 ESRD (END STAGE RENAL DISEASE) ON DIALYSIS: ICD-10-CM

## 2021-03-05 LAB
HCT VFR BLD AUTO: 36.1 % (ref 37–48.5)
HGB BLD-MCNC: 11.3 G/DL (ref 12–16)
POTASSIUM SERPL-SCNC: 5.2 MMOL/L (ref 3.5–5.1)

## 2021-03-05 PROCEDURE — 36000711: Performed by: ORTHOPAEDIC SURGERY

## 2021-03-05 PROCEDURE — 63600175 PHARM REV CODE 636 W HCPCS: Performed by: ORTHOPAEDIC SURGERY

## 2021-03-05 PROCEDURE — 94799 UNLISTED PULMONARY SVC/PX: CPT

## 2021-03-05 PROCEDURE — 99214 OFFICE O/P EST MOD 30 MIN: CPT | Mod: ,,, | Performed by: INTERNAL MEDICINE

## 2021-03-05 PROCEDURE — 37000009 HC ANESTHESIA EA ADD 15 MINS: Performed by: ORTHOPAEDIC SURGERY

## 2021-03-05 PROCEDURE — 85014 HEMATOCRIT: CPT | Performed by: ORTHOPAEDIC SURGERY

## 2021-03-05 PROCEDURE — 25000003 PHARM REV CODE 250: Performed by: NURSE PRACTITIONER

## 2021-03-05 PROCEDURE — 97161 PT EVAL LOW COMPLEX 20 MIN: CPT

## 2021-03-05 PROCEDURE — 99214 PR OFFICE/OUTPT VISIT, EST, LEVL IV, 30-39 MIN: ICD-10-PCS | Mod: ,,, | Performed by: INTERNAL MEDICINE

## 2021-03-05 PROCEDURE — 27447 TOTAL KNEE ARTHROPLASTY: CPT | Mod: LT,,, | Performed by: ORTHOPAEDIC SURGERY

## 2021-03-05 PROCEDURE — 25000003 PHARM REV CODE 250: Performed by: ORTHOPAEDIC SURGERY

## 2021-03-05 PROCEDURE — 71000033 HC RECOVERY, INTIAL HOUR: Performed by: ORTHOPAEDIC SURGERY

## 2021-03-05 PROCEDURE — 63600175 PHARM REV CODE 636 W HCPCS: Performed by: NURSE ANESTHETIST, CERTIFIED REGISTERED

## 2021-03-05 PROCEDURE — 36000710: Performed by: ORTHOPAEDIC SURGERY

## 2021-03-05 PROCEDURE — C1776 JOINT DEVICE (IMPLANTABLE): HCPCS | Performed by: ORTHOPAEDIC SURGERY

## 2021-03-05 PROCEDURE — 85018 HEMOGLOBIN: CPT | Performed by: ORTHOPAEDIC SURGERY

## 2021-03-05 PROCEDURE — 97530 THERAPEUTIC ACTIVITIES: CPT

## 2021-03-05 PROCEDURE — 25000003 PHARM REV CODE 250: Performed by: NURSE ANESTHETIST, CERTIFIED REGISTERED

## 2021-03-05 PROCEDURE — 27201423 OPTIME MED/SURG SUP & DEVICES STERILE SUPPLY: Performed by: ORTHOPAEDIC SURGERY

## 2021-03-05 PROCEDURE — 27447 PR TOTAL KNEE ARTHROPLASTY: ICD-10-PCS | Mod: LT,,, | Performed by: ORTHOPAEDIC SURGERY

## 2021-03-05 PROCEDURE — 71000039 HC RECOVERY, EACH ADD'L HOUR: Performed by: ORTHOPAEDIC SURGERY

## 2021-03-05 PROCEDURE — 84132 ASSAY OF SERUM POTASSIUM: CPT | Performed by: ORTHOPAEDIC SURGERY

## 2021-03-05 PROCEDURE — 37000008 HC ANESTHESIA 1ST 15 MINUTES: Performed by: ORTHOPAEDIC SURGERY

## 2021-03-05 PROCEDURE — C9290 INJ, BUPIVACAINE LIPOSOME: HCPCS | Performed by: ORTHOPAEDIC SURGERY

## 2021-03-05 PROCEDURE — 97167 OT EVAL HIGH COMPLEX 60 MIN: CPT

## 2021-03-05 PROCEDURE — C1713 ANCHOR/SCREW BN/BN,TIS/BN: HCPCS | Performed by: ORTHOPAEDIC SURGERY

## 2021-03-05 PROCEDURE — 63600175 PHARM REV CODE 636 W HCPCS: Performed by: ANESTHESIOLOGY

## 2021-03-05 DEVICE — INSERT TIBIAL PS #3 XPE 13MM: Type: IMPLANTABLE DEVICE | Site: KNEE | Status: FUNCTIONAL

## 2021-03-05 DEVICE — CEMENT BONE SMPLX HV GENTMYCN: Type: IMPLANTABLE DEVICE | Site: KNEE | Status: FUNCTIONAL

## 2021-03-05 DEVICE — STEM STRAIGHT PSA 14X75MM: Type: IMPLANTABLE DEVICE | Site: KNEE | Status: FUNCTIONAL

## 2021-03-05 DEVICE — IMPLANTABLE DEVICE: Type: IMPLANTABLE DEVICE | Site: KNEE | Status: FUNCTIONAL

## 2021-03-05 DEVICE — BASEPLATE TIBIAL CMA #3: Type: IMPLANTABLE DEVICE | Site: KNEE | Status: FUNCTIONAL

## 2021-03-05 DEVICE — PATELLA XPE SMALL 29MM: Type: IMPLANTABLE DEVICE | Site: KNEE | Status: FUNCTIONAL

## 2021-03-05 RX ORDER — GABAPENTIN 300 MG/1
300 CAPSULE ORAL 2 TIMES DAILY
Status: DISCONTINUED | OUTPATIENT
Start: 2021-03-05 | End: 2021-03-08 | Stop reason: HOSPADM

## 2021-03-05 RX ORDER — MORPHINE SULFATE 4 MG/ML
4 INJECTION, SOLUTION INTRAMUSCULAR; INTRAVENOUS ONCE
Status: DISCONTINUED | OUTPATIENT
Start: 2021-03-05 | End: 2021-03-05 | Stop reason: HOSPADM

## 2021-03-05 RX ORDER — BUPIVACAINE HYDROCHLORIDE AND EPINEPHRINE 2.5; 5 MG/ML; UG/ML
30 INJECTION, SOLUTION EPIDURAL; INFILTRATION; INTRACAUDAL; PERINEURAL ONCE
Status: DISCONTINUED | OUTPATIENT
Start: 2021-03-05 | End: 2021-03-05 | Stop reason: RX

## 2021-03-05 RX ORDER — CHLORHEXIDINE GLUCONATE ORAL RINSE 1.2 MG/ML
10 SOLUTION DENTAL
Status: DISCONTINUED | OUTPATIENT
Start: 2021-03-05 | End: 2021-03-05 | Stop reason: HOSPADM

## 2021-03-05 RX ORDER — ONDANSETRON 2 MG/ML
4 INJECTION INTRAMUSCULAR; INTRAVENOUS DAILY PRN
Status: DISCONTINUED | OUTPATIENT
Start: 2021-03-05 | End: 2021-03-05 | Stop reason: HOSPADM

## 2021-03-05 RX ORDER — CLONIDINE HYDROCHLORIDE 0.1 MG/1
0.1 TABLET ORAL 3 TIMES DAILY
Status: DISCONTINUED | OUTPATIENT
Start: 2021-03-05 | End: 2021-03-08 | Stop reason: HOSPADM

## 2021-03-05 RX ORDER — ACETAMINOPHEN 325 MG/1
650 TABLET ORAL EVERY 8 HOURS PRN
Status: DISCONTINUED | OUTPATIENT
Start: 2021-03-05 | End: 2021-03-05 | Stop reason: SDUPTHER

## 2021-03-05 RX ORDER — HYDROMORPHONE HYDROCHLORIDE 2 MG/ML
0.2 INJECTION, SOLUTION INTRAMUSCULAR; INTRAVENOUS; SUBCUTANEOUS EVERY 5 MIN PRN
Status: DISCONTINUED | OUTPATIENT
Start: 2021-03-05 | End: 2021-03-05 | Stop reason: HOSPADM

## 2021-03-05 RX ORDER — ACETAMINOPHEN 325 MG/1
650 TABLET ORAL EVERY 4 HOURS PRN
Status: DISCONTINUED | OUTPATIENT
Start: 2021-03-05 | End: 2021-03-08 | Stop reason: HOSPADM

## 2021-03-05 RX ORDER — BISACODYL 10 MG
10 SUPPOSITORY, RECTAL RECTAL DAILY PRN
Status: DISCONTINUED | OUTPATIENT
Start: 2021-03-05 | End: 2021-03-08 | Stop reason: HOSPADM

## 2021-03-05 RX ORDER — ONDANSETRON 2 MG/ML
INJECTION INTRAMUSCULAR; INTRAVENOUS
Status: DISCONTINUED | OUTPATIENT
Start: 2021-03-05 | End: 2021-03-05

## 2021-03-05 RX ORDER — ROCURONIUM BROMIDE 10 MG/ML
INJECTION, SOLUTION INTRAVENOUS
Status: DISCONTINUED | OUTPATIENT
Start: 2021-03-05 | End: 2021-03-05

## 2021-03-05 RX ORDER — LABETALOL HYDROCHLORIDE 5 MG/ML
INJECTION, SOLUTION INTRAVENOUS
Status: DISCONTINUED | OUTPATIENT
Start: 2021-03-05 | End: 2021-03-05

## 2021-03-05 RX ORDER — MORPHINE SULFATE 1 MG/ML
INJECTION, SOLUTION EPIDURAL; INTRATHECAL; INTRAVENOUS
Status: DISCONTINUED | OUTPATIENT
Start: 2021-03-05 | End: 2021-03-05 | Stop reason: HOSPADM

## 2021-03-05 RX ORDER — PROPOFOL 10 MG/ML
VIAL (ML) INTRAVENOUS
Status: DISCONTINUED | OUTPATIENT
Start: 2021-03-05 | End: 2021-03-05

## 2021-03-05 RX ORDER — OXYCODONE HYDROCHLORIDE 5 MG/1
10 TABLET ORAL
Status: DISCONTINUED | OUTPATIENT
Start: 2021-03-05 | End: 2021-03-08 | Stop reason: HOSPADM

## 2021-03-05 RX ORDER — ONDANSETRON 2 MG/ML
4 INJECTION INTRAMUSCULAR; INTRAVENOUS EVERY 12 HOURS PRN
Status: DISCONTINUED | OUTPATIENT
Start: 2021-03-05 | End: 2021-03-08 | Stop reason: HOSPADM

## 2021-03-05 RX ORDER — METOPROLOL TARTRATE 50 MG/1
50 TABLET ORAL EVERY 8 HOURS
Status: DISCONTINUED | OUTPATIENT
Start: 2021-03-05 | End: 2021-03-08 | Stop reason: HOSPADM

## 2021-03-05 RX ORDER — LIDOCAINE HYDROCHLORIDE 20 MG/ML
INJECTION INTRAVENOUS
Status: DISCONTINUED | OUTPATIENT
Start: 2021-03-05 | End: 2021-03-05

## 2021-03-05 RX ORDER — HYDROMORPHONE HYDROCHLORIDE 2 MG/ML
0.2 INJECTION, SOLUTION INTRAMUSCULAR; INTRAVENOUS; SUBCUTANEOUS EVERY 5 MIN PRN
Status: COMPLETED | OUTPATIENT
Start: 2021-03-05 | End: 2021-03-05

## 2021-03-05 RX ORDER — DEXAMETHASONE SODIUM PHOSPHATE 4 MG/ML
8 INJECTION, SOLUTION INTRA-ARTICULAR; INTRALESIONAL; INTRAMUSCULAR; INTRAVENOUS; SOFT TISSUE ONCE
Status: COMPLETED | OUTPATIENT
Start: 2021-03-05 | End: 2021-03-05

## 2021-03-05 RX ORDER — BUPIVACAINE HYDROCHLORIDE 2.5 MG/ML
INJECTION, SOLUTION EPIDURAL; INFILTRATION; INTRACAUDAL
Status: DISCONTINUED | OUTPATIENT
Start: 2021-03-05 | End: 2021-03-05 | Stop reason: HOSPADM

## 2021-03-05 RX ORDER — VANCOMYCIN HYDROCHLORIDE 1 G/20ML
INJECTION, POWDER, LYOPHILIZED, FOR SOLUTION INTRAVENOUS
Status: DISCONTINUED | OUTPATIENT
Start: 2021-03-05 | End: 2021-03-05 | Stop reason: HOSPADM

## 2021-03-05 RX ORDER — CEFAZOLIN SODIUM 2 G/50ML
2 SOLUTION INTRAVENOUS
Status: COMPLETED | OUTPATIENT
Start: 2021-03-05 | End: 2021-03-06

## 2021-03-05 RX ORDER — OXYCODONE HYDROCHLORIDE 5 MG/1
5 TABLET ORAL
Status: DISCONTINUED | OUTPATIENT
Start: 2021-03-05 | End: 2021-03-08 | Stop reason: HOSPADM

## 2021-03-05 RX ORDER — MIDAZOLAM HYDROCHLORIDE 1 MG/ML
INJECTION, SOLUTION INTRAMUSCULAR; INTRAVENOUS
Status: DISCONTINUED | OUTPATIENT
Start: 2021-03-05 | End: 2021-03-05

## 2021-03-05 RX ORDER — OXYCODONE AND ACETAMINOPHEN 10; 325 MG/1; MG/1
1 TABLET ORAL EVERY 8 HOURS PRN
Qty: 21 TABLET | Refills: 0 | Status: SHIPPED | OUTPATIENT
Start: 2021-03-05 | End: 2021-10-14

## 2021-03-05 RX ORDER — ZOLPIDEM TARTRATE 5 MG/1
5 TABLET ORAL NIGHTLY PRN
Status: DISCONTINUED | OUTPATIENT
Start: 2021-03-05 | End: 2021-03-08 | Stop reason: HOSPADM

## 2021-03-05 RX ORDER — SEVELAMER CARBONATE 800 MG/1
2400 TABLET, FILM COATED ORAL
Status: DISCONTINUED | OUTPATIENT
Start: 2021-03-05 | End: 2021-03-08 | Stop reason: HOSPADM

## 2021-03-05 RX ORDER — CHLORHEXIDINE GLUCONATE ORAL RINSE 1.2 MG/ML
10 SOLUTION DENTAL 2 TIMES DAILY
Status: DISCONTINUED | OUTPATIENT
Start: 2021-03-05 | End: 2021-03-08 | Stop reason: HOSPADM

## 2021-03-05 RX ORDER — PHENYLEPHRINE HYDROCHLORIDE 10 MG/ML
INJECTION INTRAVENOUS
Status: DISCONTINUED | OUTPATIENT
Start: 2021-03-05 | End: 2021-03-05

## 2021-03-05 RX ORDER — NEOSTIGMINE METHYLSULFATE 1 MG/ML
INJECTION, SOLUTION INTRAVENOUS
Status: DISCONTINUED | OUTPATIENT
Start: 2021-03-05 | End: 2021-03-05

## 2021-03-05 RX ORDER — TRANEXAMIC ACID 100 MG/ML
1000 INJECTION, SOLUTION INTRAVENOUS
Status: COMPLETED | OUTPATIENT
Start: 2021-03-05 | End: 2021-03-05

## 2021-03-05 RX ORDER — SODIUM CHLORIDE 9 MG/ML
INJECTION, SOLUTION INTRAVENOUS CONTINUOUS
Status: DISCONTINUED | OUTPATIENT
Start: 2021-03-05 | End: 2021-03-05

## 2021-03-05 RX ORDER — FENTANYL CITRATE 50 UG/ML
INJECTION, SOLUTION INTRAMUSCULAR; INTRAVENOUS
Status: DISCONTINUED | OUTPATIENT
Start: 2021-03-05 | End: 2021-03-05

## 2021-03-05 RX ORDER — SODIUM CHLORIDE 0.9 % (FLUSH) 0.9 %
10 SYRINGE (ML) INJECTION EVERY 6 HOURS
Status: DISCONTINUED | OUTPATIENT
Start: 2021-03-05 | End: 2021-03-08 | Stop reason: HOSPADM

## 2021-03-05 RX ORDER — CEFAZOLIN SODIUM 2 G/50ML
2 SOLUTION INTRAVENOUS
Status: COMPLETED | OUTPATIENT
Start: 2021-03-05 | End: 2021-03-05

## 2021-03-05 RX ORDER — DOCUSATE SODIUM 100 MG/1
100 CAPSULE, LIQUID FILLED ORAL 2 TIMES DAILY
Status: DISCONTINUED | OUTPATIENT
Start: 2021-03-05 | End: 2021-03-08 | Stop reason: HOSPADM

## 2021-03-05 RX ADMIN — HYDROMORPHONE HYDROCHLORIDE 0.2 MG: 2 INJECTION INTRAMUSCULAR; INTRAVENOUS; SUBCUTANEOUS at 11:03

## 2021-03-05 RX ADMIN — LIDOCAINE HYDROCHLORIDE 100 MG: 20 INJECTION, SOLUTION INTRAVENOUS at 08:03

## 2021-03-05 RX ADMIN — MIDAZOLAM HYDROCHLORIDE 2 MG: 1 INJECTION, SOLUTION INTRAMUSCULAR; INTRAVENOUS at 07:03

## 2021-03-05 RX ADMIN — PROPOFOL 100 MG: 10 INJECTION, EMULSION INTRAVENOUS at 08:03

## 2021-03-05 RX ADMIN — OXYCODONE HYDROCHLORIDE 10 MG: 5 TABLET ORAL at 04:03

## 2021-03-05 RX ADMIN — ACETAMINOPHEN 650 MG: 325 TABLET ORAL at 07:03

## 2021-03-05 RX ADMIN — SODIUM CHLORIDE: 0.9 INJECTION, SOLUTION INTRAVENOUS at 04:03

## 2021-03-05 RX ADMIN — CLONIDINE HYDROCHLORIDE 0.1 MG: 0.1 TABLET ORAL at 10:03

## 2021-03-05 RX ADMIN — CHLORHEXIDINE GLUCONATE 0.12% ORAL RINSE 10 ML: 1.2 LIQUID ORAL at 10:03

## 2021-03-05 RX ADMIN — LABETALOL HYDROCHLORIDE 15 MG: 5 INJECTION, SOLUTION INTRAVENOUS at 09:03

## 2021-03-05 RX ADMIN — HYDROMORPHONE HYDROCHLORIDE 0.2 MG: 2 INJECTION INTRAMUSCULAR; INTRAVENOUS; SUBCUTANEOUS at 10:03

## 2021-03-05 RX ADMIN — OXYCODONE HYDROCHLORIDE 10 MG: 5 TABLET ORAL at 08:03

## 2021-03-05 RX ADMIN — CEFAZOLIN SODIUM 2 G: 2 SOLUTION INTRAVENOUS at 07:03

## 2021-03-05 RX ADMIN — FENTANYL CITRATE 100 MCG: 50 INJECTION, SOLUTION INTRAMUSCULAR; INTRAVENOUS at 09:03

## 2021-03-05 RX ADMIN — HYDROMORPHONE HYDROCHLORIDE 0.2 MG: 2 INJECTION INTRAMUSCULAR; INTRAVENOUS; SUBCUTANEOUS at 12:03

## 2021-03-05 RX ADMIN — NEOSTIGMINE METHYLSULFATE 3 MG: 1 INJECTION INTRAVENOUS at 10:03

## 2021-03-05 RX ADMIN — ONDANSETRON 4 MG: 2 INJECTION, SOLUTION INTRAMUSCULAR; INTRAVENOUS at 10:03

## 2021-03-05 RX ADMIN — SEVELAMER CARBONATE 2400 MG: 800 TABLET, FILM COATED ORAL at 05:03

## 2021-03-05 RX ADMIN — DOCUSATE SODIUM 100 MG: 100 CAPSULE, LIQUID FILLED ORAL at 10:03

## 2021-03-05 RX ADMIN — CHLORHEXIDINE GLUCONATE 0.12% ORAL RINSE 10 ML: 1.2 LIQUID ORAL at 07:03

## 2021-03-05 RX ADMIN — CEFAZOLIN SODIUM 2 G: 2 SOLUTION INTRAVENOUS at 04:03

## 2021-03-05 RX ADMIN — TRANEXAMIC ACID 1000 MG: 100 INJECTION, SOLUTION INTRAVENOUS at 07:03

## 2021-03-05 RX ADMIN — ROCURONIUM BROMIDE 50 MG: 10 INJECTION, SOLUTION INTRAVENOUS at 08:03

## 2021-03-05 RX ADMIN — GLYCOPYRROLATE 0.4 MG: 0.2 INJECTION, SOLUTION INTRAMUSCULAR; INTRAVENOUS at 10:03

## 2021-03-05 RX ADMIN — PHENYLEPHRINE HYDROCHLORIDE 200 MCG: 10 INJECTION INTRAVENOUS at 09:03

## 2021-03-05 RX ADMIN — ONDANSETRON 4 MG: 2 INJECTION INTRAMUSCULAR; INTRAVENOUS at 03:03

## 2021-03-05 RX ADMIN — DEXAMETHASONE SODIUM PHOSPHATE 8 MG: 4 INJECTION, SOLUTION INTRA-ARTICULAR; INTRALESIONAL; INTRAMUSCULAR; INTRAVENOUS; SOFT TISSUE at 08:03

## 2021-03-05 RX ADMIN — METOPROLOL TARTRATE 50 MG: 50 TABLET, FILM COATED ORAL at 10:03

## 2021-03-05 RX ADMIN — DEXAMETHASONE SODIUM PHOSPHATE 8 MG: 4 INJECTION INTRA-ARTICULAR; INTRALESIONAL; INTRAMUSCULAR; INTRAVENOUS; SOFT TISSUE at 03:03

## 2021-03-06 LAB
HCT VFR BLD AUTO: 38.4 % (ref 37–48.5)
HGB BLD-MCNC: 12.1 G/DL (ref 12–16)

## 2021-03-06 PROCEDURE — 94761 N-INVAS EAR/PLS OXIMETRY MLT: CPT

## 2021-03-06 PROCEDURE — 85018 HEMOGLOBIN: CPT | Performed by: ORTHOPAEDIC SURGERY

## 2021-03-06 PROCEDURE — 99900035 HC TECH TIME PER 15 MIN (STAT)

## 2021-03-06 PROCEDURE — 97116 GAIT TRAINING THERAPY: CPT | Mod: CQ

## 2021-03-06 PROCEDURE — 36415 COLL VENOUS BLD VENIPUNCTURE: CPT | Performed by: ORTHOPAEDIC SURGERY

## 2021-03-06 PROCEDURE — 80100016 HC MAINTENANCE HEMODIALYSIS

## 2021-03-06 PROCEDURE — 25000003 PHARM REV CODE 250: Performed by: INTERNAL MEDICINE

## 2021-03-06 PROCEDURE — 25000003 PHARM REV CODE 250: Performed by: ORTHOPAEDIC SURGERY

## 2021-03-06 PROCEDURE — G0257 UNSCHED DIALYSIS ESRD PT HOS: HCPCS

## 2021-03-06 PROCEDURE — 25000003 PHARM REV CODE 250: Performed by: NURSE PRACTITIONER

## 2021-03-06 PROCEDURE — 97530 THERAPEUTIC ACTIVITIES: CPT | Mod: CQ

## 2021-03-06 PROCEDURE — A4216 STERILE WATER/SALINE, 10 ML: HCPCS | Performed by: ORTHOPAEDIC SURGERY

## 2021-03-06 PROCEDURE — 94799 UNLISTED PULMONARY SVC/PX: CPT

## 2021-03-06 PROCEDURE — 63600175 PHARM REV CODE 636 W HCPCS: Performed by: ORTHOPAEDIC SURGERY

## 2021-03-06 PROCEDURE — 85014 HEMATOCRIT: CPT | Performed by: ORTHOPAEDIC SURGERY

## 2021-03-06 RX ORDER — SODIUM CHLORIDE 9 MG/ML
INJECTION, SOLUTION INTRAVENOUS ONCE
Status: DISCONTINUED | OUTPATIENT
Start: 2021-03-06 | End: 2021-03-07

## 2021-03-06 RX ORDER — MUPIROCIN 20 MG/G
OINTMENT TOPICAL 2 TIMES DAILY
Status: DISCONTINUED | OUTPATIENT
Start: 2021-03-06 | End: 2021-03-08 | Stop reason: HOSPADM

## 2021-03-06 RX ADMIN — ONDANSETRON 4 MG: 2 INJECTION INTRAMUSCULAR; INTRAVENOUS at 08:03

## 2021-03-06 RX ADMIN — DOCUSATE SODIUM 100 MG: 100 CAPSULE, LIQUID FILLED ORAL at 08:03

## 2021-03-06 RX ADMIN — OXYCODONE HYDROCHLORIDE 10 MG: 5 TABLET ORAL at 08:03

## 2021-03-06 RX ADMIN — OXYCODONE HYDROCHLORIDE 10 MG: 5 TABLET ORAL at 04:03

## 2021-03-06 RX ADMIN — CHLORHEXIDINE GLUCONATE 0.12% ORAL RINSE 10 ML: 1.2 LIQUID ORAL at 08:03

## 2021-03-06 RX ADMIN — SEVELAMER CARBONATE 2400 MG: 800 TABLET, FILM COATED ORAL at 08:03

## 2021-03-06 RX ADMIN — Medication 10 ML: at 12:03

## 2021-03-06 RX ADMIN — MUPIROCIN: 20 OINTMENT TOPICAL at 08:03

## 2021-03-06 RX ADMIN — ACETAMINOPHEN 650 MG: 325 TABLET ORAL at 11:03

## 2021-03-06 RX ADMIN — GABAPENTIN 300 MG: 300 CAPSULE ORAL at 08:03

## 2021-03-06 RX ADMIN — CEFAZOLIN SODIUM 2 G: 2 SOLUTION INTRAVENOUS at 12:03

## 2021-03-06 RX ADMIN — ONDANSETRON 4 MG: 2 INJECTION INTRAMUSCULAR; INTRAVENOUS at 04:03

## 2021-03-06 RX ADMIN — OXYCODONE HYDROCHLORIDE 10 MG: 5 TABLET ORAL at 12:03

## 2021-03-06 RX ADMIN — CLONIDINE HYDROCHLORIDE 0.1 MG: 0.1 TABLET ORAL at 08:03

## 2021-03-06 RX ADMIN — METOPROLOL TARTRATE 50 MG: 50 TABLET, FILM COATED ORAL at 10:03

## 2021-03-06 RX ADMIN — SEVELAMER CARBONATE 2400 MG: 800 TABLET, FILM COATED ORAL at 11:03

## 2021-03-06 RX ADMIN — OXYCODONE HYDROCHLORIDE 10 MG: 5 TABLET ORAL at 03:03

## 2021-03-06 RX ADMIN — Medication 10 ML: at 11:03

## 2021-03-06 RX ADMIN — METOPROLOL TARTRATE 50 MG: 50 TABLET, FILM COATED ORAL at 08:03

## 2021-03-06 RX ADMIN — SEVELAMER CARBONATE 2400 MG: 800 TABLET, FILM COATED ORAL at 04:03

## 2021-03-07 LAB
ANION GAP SERPL CALC-SCNC: 16 MMOL/L (ref 8–16)
BUN SERPL-MCNC: 54 MG/DL (ref 8–23)
CALCIUM SERPL-MCNC: 8.6 MG/DL (ref 8.7–10.5)
CHLORIDE SERPL-SCNC: 93 MMOL/L (ref 95–110)
CO2 SERPL-SCNC: 24 MMOL/L (ref 23–29)
CREAT SERPL-MCNC: 8.6 MG/DL (ref 0.5–1.4)
EST. GFR  (AFRICAN AMERICAN): 5 ML/MIN/1.73 M^2
EST. GFR  (NON AFRICAN AMERICAN): 4 ML/MIN/1.73 M^2
GLUCOSE SERPL-MCNC: 110 MG/DL (ref 70–110)
HCT VFR BLD AUTO: 33.9 % (ref 37–48.5)
HGB BLD-MCNC: 10.8 G/DL (ref 12–16)
POTASSIUM SERPL-SCNC: 5.5 MMOL/L (ref 3.5–5.1)
SODIUM SERPL-SCNC: 133 MMOL/L (ref 136–145)

## 2021-03-07 PROCEDURE — 25000003 PHARM REV CODE 250: Performed by: NURSE PRACTITIONER

## 2021-03-07 PROCEDURE — 97530 THERAPEUTIC ACTIVITIES: CPT

## 2021-03-07 PROCEDURE — 99214 OFFICE O/P EST MOD 30 MIN: CPT | Mod: ,,, | Performed by: INTERNAL MEDICINE

## 2021-03-07 PROCEDURE — 25000003 PHARM REV CODE 250: Performed by: INTERNAL MEDICINE

## 2021-03-07 PROCEDURE — 36415 COLL VENOUS BLD VENIPUNCTURE: CPT | Performed by: NURSE PRACTITIONER

## 2021-03-07 PROCEDURE — 97116 GAIT TRAINING THERAPY: CPT

## 2021-03-07 PROCEDURE — 99214 PR OFFICE/OUTPT VISIT, EST, LEVL IV, 30-39 MIN: ICD-10-PCS | Mod: ,,, | Performed by: INTERNAL MEDICINE

## 2021-03-07 PROCEDURE — 85018 HEMOGLOBIN: CPT | Performed by: ORTHOPAEDIC SURGERY

## 2021-03-07 PROCEDURE — 36415 COLL VENOUS BLD VENIPUNCTURE: CPT | Performed by: ORTHOPAEDIC SURGERY

## 2021-03-07 PROCEDURE — 85014 HEMATOCRIT: CPT | Performed by: ORTHOPAEDIC SURGERY

## 2021-03-07 PROCEDURE — A4216 STERILE WATER/SALINE, 10 ML: HCPCS | Performed by: ORTHOPAEDIC SURGERY

## 2021-03-07 PROCEDURE — 94761 N-INVAS EAR/PLS OXIMETRY MLT: CPT

## 2021-03-07 PROCEDURE — 80048 BASIC METABOLIC PNL TOTAL CA: CPT | Performed by: NURSE PRACTITIONER

## 2021-03-07 PROCEDURE — 99900035 HC TECH TIME PER 15 MIN (STAT)

## 2021-03-07 PROCEDURE — 25000003 PHARM REV CODE 250: Performed by: ORTHOPAEDIC SURGERY

## 2021-03-07 PROCEDURE — 63600175 PHARM REV CODE 636 W HCPCS: Performed by: ORTHOPAEDIC SURGERY

## 2021-03-07 RX ADMIN — MUPIROCIN: 20 OINTMENT TOPICAL at 09:03

## 2021-03-07 RX ADMIN — SODIUM ZIRCONIUM CYCLOSILICATE 10 G: 10 POWDER, FOR SUSPENSION ORAL at 04:03

## 2021-03-07 RX ADMIN — CHLORHEXIDINE GLUCONATE 0.12% ORAL RINSE 10 ML: 1.2 LIQUID ORAL at 08:03

## 2021-03-07 RX ADMIN — METOPROLOL TARTRATE 50 MG: 50 TABLET, FILM COATED ORAL at 05:03

## 2021-03-07 RX ADMIN — OXYCODONE HYDROCHLORIDE 10 MG: 5 TABLET ORAL at 01:03

## 2021-03-07 RX ADMIN — OXYCODONE HYDROCHLORIDE 10 MG: 5 TABLET ORAL at 04:03

## 2021-03-07 RX ADMIN — Medication 10 ML: at 08:03

## 2021-03-07 RX ADMIN — ONDANSETRON 4 MG: 2 INJECTION INTRAMUSCULAR; INTRAVENOUS at 09:03

## 2021-03-07 RX ADMIN — METOPROLOL TARTRATE 50 MG: 50 TABLET, FILM COATED ORAL at 01:03

## 2021-03-07 RX ADMIN — METOPROLOL TARTRATE 50 MG: 50 TABLET, FILM COATED ORAL at 09:03

## 2021-03-07 RX ADMIN — ONDANSETRON 4 MG: 2 INJECTION INTRAMUSCULAR; INTRAVENOUS at 08:03

## 2021-03-07 RX ADMIN — MUPIROCIN: 20 OINTMENT TOPICAL at 08:03

## 2021-03-07 RX ADMIN — DOCUSATE SODIUM 100 MG: 100 CAPSULE, LIQUID FILLED ORAL at 08:03

## 2021-03-07 RX ADMIN — SEVELAMER CARBONATE 2400 MG: 800 TABLET, FILM COATED ORAL at 04:03

## 2021-03-07 RX ADMIN — SEVELAMER CARBONATE 2400 MG: 800 TABLET, FILM COATED ORAL at 07:03

## 2021-03-07 RX ADMIN — OXYCODONE HYDROCHLORIDE 10 MG: 5 TABLET ORAL at 07:03

## 2021-03-07 RX ADMIN — OXYCODONE HYDROCHLORIDE 10 MG: 5 TABLET ORAL at 09:03

## 2021-03-08 VITALS
BODY MASS INDEX: 39.27 KG/M2 | HEIGHT: 64 IN | TEMPERATURE: 99 F | RESPIRATION RATE: 18 BRPM | SYSTOLIC BLOOD PRESSURE: 148 MMHG | WEIGHT: 230 LBS | DIASTOLIC BLOOD PRESSURE: 75 MMHG | HEART RATE: 98 BPM | OXYGEN SATURATION: 94 %

## 2021-03-08 LAB
ALBUMIN SERPL BCP-MCNC: 2.8 G/DL (ref 3.5–5.2)
ANION GAP SERPL CALC-SCNC: 13 MMOL/L (ref 8–16)
BUN SERPL-MCNC: 64 MG/DL (ref 8–23)
CALCIUM SERPL-MCNC: 8.6 MG/DL (ref 8.7–10.5)
CHLORIDE SERPL-SCNC: 92 MMOL/L (ref 95–110)
CO2 SERPL-SCNC: 24 MMOL/L (ref 23–29)
CREAT SERPL-MCNC: 9.9 MG/DL (ref 0.5–1.4)
EST. GFR  (AFRICAN AMERICAN): 4 ML/MIN/1.73 M^2
EST. GFR  (NON AFRICAN AMERICAN): 4 ML/MIN/1.73 M^2
GLUCOSE SERPL-MCNC: 86 MG/DL (ref 70–110)
HCT VFR BLD AUTO: 33 % (ref 37–48.5)
HGB BLD-MCNC: 9.8 G/DL (ref 12–16)
PHOSPHATE SERPL-MCNC: 6.3 MG/DL (ref 2.7–4.5)
POTASSIUM SERPL-SCNC: 5.5 MMOL/L (ref 3.5–5.1)
SODIUM SERPL-SCNC: 129 MMOL/L (ref 136–145)

## 2021-03-08 PROCEDURE — 80069 RENAL FUNCTION PANEL: CPT | Performed by: INTERNAL MEDICINE

## 2021-03-08 PROCEDURE — 80100016 HC MAINTENANCE HEMODIALYSIS

## 2021-03-08 PROCEDURE — 25000003 PHARM REV CODE 250: Performed by: NURSE PRACTITIONER

## 2021-03-08 PROCEDURE — 99900035 HC TECH TIME PER 15 MIN (STAT)

## 2021-03-08 PROCEDURE — 25000003 PHARM REV CODE 250: Performed by: ORTHOPAEDIC SURGERY

## 2021-03-08 PROCEDURE — 36415 COLL VENOUS BLD VENIPUNCTURE: CPT | Performed by: INTERNAL MEDICINE

## 2021-03-08 PROCEDURE — 85018 HEMOGLOBIN: CPT | Performed by: ORTHOPAEDIC SURGERY

## 2021-03-08 PROCEDURE — 85014 HEMATOCRIT: CPT | Performed by: ORTHOPAEDIC SURGERY

## 2021-03-08 PROCEDURE — 63600175 PHARM REV CODE 636 W HCPCS: Performed by: INTERNAL MEDICINE

## 2021-03-08 PROCEDURE — G0257 UNSCHED DIALYSIS ESRD PT HOS: HCPCS

## 2021-03-08 RX ORDER — HEPARIN SODIUM 1000 [USP'U]/ML
2000 INJECTION, SOLUTION INTRAVENOUS; SUBCUTANEOUS ONCE
Status: COMPLETED | OUTPATIENT
Start: 2021-03-08 | End: 2021-03-08

## 2021-03-08 RX ADMIN — SEVELAMER CARBONATE 2400 MG: 800 TABLET, FILM COATED ORAL at 08:03

## 2021-03-08 RX ADMIN — DOCUSATE SODIUM 100 MG: 100 CAPSULE, LIQUID FILLED ORAL at 08:03

## 2021-03-08 RX ADMIN — CHLORHEXIDINE GLUCONATE 0.12% ORAL RINSE 10 ML: 1.2 LIQUID ORAL at 08:03

## 2021-03-08 RX ADMIN — HEPARIN SODIUM 2000 UNITS: 1000 INJECTION, SOLUTION INTRAVENOUS; SUBCUTANEOUS at 09:03

## 2021-03-08 RX ADMIN — EPOETIN ALFA-EPBX 10000 UNITS: 10000 INJECTION, SOLUTION INTRAVENOUS; SUBCUTANEOUS at 09:03

## 2021-03-08 RX ADMIN — MUPIROCIN: 20 OINTMENT TOPICAL at 08:03

## 2021-03-08 RX ADMIN — METOPROLOL TARTRATE 50 MG: 50 TABLET, FILM COATED ORAL at 01:03

## 2021-03-09 ENCOUNTER — TELEPHONE (OUTPATIENT)
Dept: ORTHOPEDICS | Facility: CLINIC | Age: 71
End: 2021-03-09

## 2021-03-18 ENCOUNTER — TELEPHONE (OUTPATIENT)
Dept: ORTHOPEDICS | Facility: CLINIC | Age: 71
End: 2021-03-18

## 2021-03-19 ENCOUNTER — TELEPHONE (OUTPATIENT)
Dept: ORTHOPEDICS | Facility: CLINIC | Age: 71
End: 2021-03-19

## 2021-03-23 ENCOUNTER — OFFICE VISIT (OUTPATIENT)
Dept: ORTHOPEDICS | Facility: CLINIC | Age: 71
End: 2021-03-23
Payer: MEDICARE

## 2021-03-23 ENCOUNTER — HOSPITAL ENCOUNTER (OUTPATIENT)
Dept: RADIOLOGY | Facility: HOSPITAL | Age: 71
Discharge: HOME | End: 2021-03-23
Attending: PHYSICIAN ASSISTANT
Payer: MEDICARE

## 2021-03-23 VITALS
DIASTOLIC BLOOD PRESSURE: 46 MMHG | BODY MASS INDEX: 39.27 KG/M2 | HEIGHT: 64 IN | HEART RATE: 58 BPM | SYSTOLIC BLOOD PRESSURE: 125 MMHG | WEIGHT: 230 LBS

## 2021-03-23 DIAGNOSIS — Z96.652 S/P TOTAL KNEE ARTHROPLASTY, LEFT: Primary | ICD-10-CM

## 2021-03-23 DIAGNOSIS — M79.89 PAIN AND SWELLING OF LEFT LOWER LEG: ICD-10-CM

## 2021-03-23 DIAGNOSIS — G89.18 POSTOPERATIVE PAIN: ICD-10-CM

## 2021-03-23 DIAGNOSIS — M79.662 PAIN AND SWELLING OF LEFT LOWER LEG: ICD-10-CM

## 2021-03-23 PROCEDURE — 93971 EXTREMITY STUDY: CPT | Mod: 26,LT,, | Performed by: RADIOLOGY

## 2021-03-23 PROCEDURE — 99999 PR PBB SHADOW E&M-EST. PATIENT-LVL IV: CPT | Mod: PBBFAC,,, | Performed by: PHYSICIAN ASSISTANT

## 2021-03-23 PROCEDURE — 99024 PR POST-OP FOLLOW-UP VISIT: ICD-10-PCS | Mod: POP,,, | Performed by: PHYSICIAN ASSISTANT

## 2021-03-23 PROCEDURE — 93971 US LOWER EXTREMITY VEINS LEFT: ICD-10-PCS | Mod: 26,LT,, | Performed by: RADIOLOGY

## 2021-03-23 PROCEDURE — 93971 EXTREMITY STUDY: CPT | Mod: TC,LT

## 2021-03-23 PROCEDURE — 99214 OFFICE O/P EST MOD 30 MIN: CPT | Mod: PBBFAC,25 | Performed by: PHYSICIAN ASSISTANT

## 2021-03-23 PROCEDURE — 99024 POSTOP FOLLOW-UP VISIT: CPT | Mod: POP,,, | Performed by: PHYSICIAN ASSISTANT

## 2021-03-23 PROCEDURE — 99999 PR PBB SHADOW E&M-EST. PATIENT-LVL IV: ICD-10-PCS | Mod: PBBFAC,,, | Performed by: PHYSICIAN ASSISTANT

## 2021-03-23 RX ORDER — HYDROCODONE BITARTRATE AND ACETAMINOPHEN 5; 325 MG/1; MG/1
1 TABLET ORAL EVERY 6 HOURS PRN
COMMUNITY
End: 2021-10-14

## 2021-03-23 RX ORDER — METHOCARBAMOL 500 MG/1
500 TABLET, FILM COATED ORAL 3 TIMES DAILY PRN
Qty: 60 TABLET | Refills: 0 | Status: SHIPPED | OUTPATIENT
Start: 2021-03-23

## 2021-03-23 RX ORDER — ONDANSETRON 4 MG/1
4 TABLET, FILM COATED ORAL EVERY 8 HOURS PRN
Qty: 30 TABLET | Refills: 1 | Status: ON HOLD | OUTPATIENT
Start: 2021-03-23 | End: 2023-05-20 | Stop reason: HOSPADM

## 2021-03-23 RX ORDER — OXYCODONE AND ACETAMINOPHEN 5; 325 MG/1; MG/1
1 TABLET ORAL EVERY 6 HOURS PRN
Qty: 28 TABLET | Refills: 0 | Status: SHIPPED | OUTPATIENT
Start: 2021-03-23 | End: 2021-10-14

## 2021-06-04 ENCOUNTER — TELEPHONE (OUTPATIENT)
Dept: ORTHOPEDICS | Facility: CLINIC | Age: 71
End: 2021-06-04

## 2021-06-04 ENCOUNTER — OFFICE VISIT (OUTPATIENT)
Dept: ORTHOPEDICS | Facility: CLINIC | Age: 71
End: 2021-06-04
Payer: MEDICARE

## 2021-06-04 ENCOUNTER — HOSPITAL ENCOUNTER (OUTPATIENT)
Dept: RADIOLOGY | Facility: HOSPITAL | Age: 71
Discharge: HOME | End: 2021-06-04
Attending: ORTHOPAEDIC SURGERY
Payer: MEDICARE

## 2021-06-04 VITALS
BODY MASS INDEX: 39.27 KG/M2 | DIASTOLIC BLOOD PRESSURE: 55 MMHG | SYSTOLIC BLOOD PRESSURE: 105 MMHG | HEIGHT: 64 IN | WEIGHT: 230 LBS | HEART RATE: 59 BPM

## 2021-06-04 DIAGNOSIS — Z96.652 STATUS POST TOTAL KNEE REPLACEMENT USING CEMENT, LEFT: ICD-10-CM

## 2021-06-04 DIAGNOSIS — M62.831 MUSCLE SPASM OF LEFT CALF: Primary | ICD-10-CM

## 2021-06-04 DIAGNOSIS — Z96.651 HISTORY OF TOTAL RIGHT KNEE REPLACEMENT: ICD-10-CM

## 2021-06-04 DIAGNOSIS — M17.12 ARTHRITIS OF KNEE, LEFT: ICD-10-CM

## 2021-06-04 PROCEDURE — 99213 OFFICE O/P EST LOW 20 MIN: CPT | Mod: PBBFAC,25 | Performed by: ORTHOPAEDIC SURGERY

## 2021-06-04 PROCEDURE — 73564 XR KNEE ORTHO LEFT WITH FLEXION: ICD-10-PCS | Mod: 26,LT,, | Performed by: RADIOLOGY

## 2021-06-04 PROCEDURE — 99024 PR POST-OP FOLLOW-UP VISIT: ICD-10-PCS | Mod: POP,,, | Performed by: ORTHOPAEDIC SURGERY

## 2021-06-04 PROCEDURE — 73562 XR KNEE ORTHO LEFT WITH FLEXION: ICD-10-PCS | Mod: 26,RT,, | Performed by: RADIOLOGY

## 2021-06-04 PROCEDURE — 73564 X-RAY EXAM KNEE 4 OR MORE: CPT | Mod: 26,LT,, | Performed by: RADIOLOGY

## 2021-06-04 PROCEDURE — 99999 PR PBB SHADOW E&M-EST. PATIENT-LVL III: ICD-10-PCS | Mod: PBBFAC,,, | Performed by: ORTHOPAEDIC SURGERY

## 2021-06-04 PROCEDURE — 99999 PR PBB SHADOW E&M-EST. PATIENT-LVL III: CPT | Mod: PBBFAC,,, | Performed by: ORTHOPAEDIC SURGERY

## 2021-06-04 PROCEDURE — 73564 X-RAY EXAM KNEE 4 OR MORE: CPT | Mod: TC,LT

## 2021-06-04 PROCEDURE — 73562 X-RAY EXAM OF KNEE 3: CPT | Mod: 26,RT,, | Performed by: RADIOLOGY

## 2021-06-04 PROCEDURE — 99024 POSTOP FOLLOW-UP VISIT: CPT | Mod: POP,,, | Performed by: ORTHOPAEDIC SURGERY

## 2021-06-04 RX ORDER — BACLOFEN 10 MG/1
10 TABLET ORAL 2 TIMES DAILY
Qty: 60 TABLET | Refills: 3 | Status: SHIPPED | OUTPATIENT
Start: 2021-06-04 | End: 2021-08-27

## 2021-06-24 DIAGNOSIS — Z96.652 STATUS POST TOTAL KNEE REPLACEMENT USING CEMENT, LEFT: Primary | ICD-10-CM

## 2021-06-24 DIAGNOSIS — Z96.651 HISTORY OF TOTAL RIGHT KNEE REPLACEMENT: ICD-10-CM

## 2021-07-14 ENCOUNTER — DOCUMENTATION ONLY (OUTPATIENT)
Dept: HEPATOLOGY | Facility: HOSPITAL | Age: 71
End: 2021-07-14

## 2021-07-24 DIAGNOSIS — N18.6 ESRD (END STAGE RENAL DISEASE): ICD-10-CM

## 2021-07-24 DIAGNOSIS — J32.9 OTHER SINUSITIS, UNSPECIFIED CHRONICITY: ICD-10-CM

## 2021-07-24 DIAGNOSIS — Z76.0 MEDICATION REFILL: ICD-10-CM

## 2021-07-24 DIAGNOSIS — I10 HYPERTENSION, UNSPECIFIED TYPE: ICD-10-CM

## 2021-07-24 RX ORDER — FLUTICASONE PROPIONATE 50 MCG
2 SPRAY, SUSPENSION (ML) NASAL DAILY
Qty: 48 ML | Refills: 0 | Status: SHIPPED | OUTPATIENT
Start: 2021-07-24 | End: 2021-10-26 | Stop reason: SDUPTHER

## 2021-07-24 RX ORDER — METOPROLOL TARTRATE 50 MG/1
50 TABLET ORAL EVERY 8 HOURS
Qty: 90 TABLET | Refills: 2 | Status: SHIPPED | OUTPATIENT
Start: 2021-07-24 | End: 2022-08-25 | Stop reason: SDUPTHER

## 2021-08-27 DIAGNOSIS — I10 HYPERTENSION, UNSPECIFIED TYPE: Primary | ICD-10-CM

## 2021-08-27 RX ORDER — CLONIDINE HYDROCHLORIDE 0.1 MG/1
0.1 TABLET ORAL 3 TIMES DAILY
Qty: 90 TABLET | Refills: 1 | Status: SHIPPED | OUTPATIENT
Start: 2021-08-27 | End: 2021-12-17

## 2021-09-05 ENCOUNTER — HOSPITAL ENCOUNTER (EMERGENCY)
Facility: HOSPITAL | Age: 71
Discharge: HOME | End: 2021-09-05
Attending: EMERGENCY MEDICINE
Payer: MEDICARE

## 2021-09-05 VITALS
DIASTOLIC BLOOD PRESSURE: 56 MMHG | OXYGEN SATURATION: 96 % | TEMPERATURE: 97 F | BODY MASS INDEX: 39.48 KG/M2 | SYSTOLIC BLOOD PRESSURE: 132 MMHG | RESPIRATION RATE: 18 BRPM | HEIGHT: 64 IN | HEART RATE: 55 BPM

## 2021-09-05 DIAGNOSIS — E87.70 HYPERVOLEMIA, UNSPECIFIED HYPERVOLEMIA TYPE: ICD-10-CM

## 2021-09-05 DIAGNOSIS — N18.6 ESRD NEEDING DIALYSIS: ICD-10-CM

## 2021-09-05 DIAGNOSIS — R06.02 SOB (SHORTNESS OF BREATH): ICD-10-CM

## 2021-09-05 DIAGNOSIS — Z99.2 ESRD NEEDING DIALYSIS: ICD-10-CM

## 2021-09-05 DIAGNOSIS — E87.5 ACUTE HYPERKALEMIA: Primary | ICD-10-CM

## 2021-09-05 LAB
ALBUMIN SERPL BCP-MCNC: 3.4 G/DL (ref 3.5–5.2)
ALP SERPL-CCNC: 121 U/L (ref 55–135)
ALT SERPL W/O P-5'-P-CCNC: 15 U/L (ref 10–44)
ANION GAP SERPL CALC-SCNC: 13 MMOL/L (ref 8–16)
AST SERPL-CCNC: 12 U/L (ref 10–40)
BASOPHILS # BLD AUTO: 0.08 K/UL (ref 0–0.2)
BASOPHILS NFR BLD: 0.9 % (ref 0–1.9)
BILIRUB SERPL-MCNC: 0.4 MG/DL (ref 0.1–1)
BNP SERPL-MCNC: 895 PG/ML (ref 0–99)
BUN SERPL-MCNC: 107 MG/DL (ref 8–23)
CALCIUM SERPL-MCNC: 9.8 MG/DL (ref 8.7–10.5)
CHLORIDE SERPL-SCNC: 101 MMOL/L (ref 95–110)
CO2 SERPL-SCNC: 18 MMOL/L (ref 23–29)
CREAT SERPL-MCNC: 15.2 MG/DL (ref 0.5–1.4)
CTP QC/QA: YES
DIFFERENTIAL METHOD: ABNORMAL
EOSINOPHIL # BLD AUTO: 0.8 K/UL (ref 0–0.5)
EOSINOPHIL NFR BLD: 8.8 % (ref 0–8)
ERYTHROCYTE [DISTWIDTH] IN BLOOD BY AUTOMATED COUNT: 15.4 % (ref 11.5–14.5)
EST. GFR  (AFRICAN AMERICAN): 2 ML/MIN/1.73 M^2
EST. GFR  (NON AFRICAN AMERICAN): 2 ML/MIN/1.73 M^2
GLUCOSE SERPL-MCNC: 95 MG/DL (ref 70–110)
HCT VFR BLD AUTO: 37.7 % (ref 37–48.5)
HGB BLD-MCNC: 11.6 G/DL (ref 12–16)
IMM GRANULOCYTES # BLD AUTO: 0.02 K/UL (ref 0–0.04)
IMM GRANULOCYTES NFR BLD AUTO: 0.2 % (ref 0–0.5)
LYMPHOCYTES # BLD AUTO: 3.2 K/UL (ref 1–4.8)
LYMPHOCYTES NFR BLD: 34.1 % (ref 18–48)
MAGNESIUM SERPL-MCNC: 3.3 MG/DL (ref 1.6–2.6)
MCH RBC QN AUTO: 30.2 PG (ref 27–31)
MCHC RBC AUTO-ENTMCNC: 30.8 G/DL (ref 32–36)
MCV RBC AUTO: 98 FL (ref 82–98)
MONOCYTES # BLD AUTO: 0.9 K/UL (ref 0.3–1)
MONOCYTES NFR BLD: 9.3 % (ref 4–15)
NEUTROPHILS # BLD AUTO: 4.4 K/UL (ref 1.8–7.7)
NEUTROPHILS NFR BLD: 46.7 % (ref 38–73)
NRBC BLD-RTO: 0 /100 WBC
PHOSPHATE SERPL-MCNC: 4.1 MG/DL (ref 2.7–4.5)
PLATELET # BLD AUTO: 273 K/UL (ref 150–450)
PMV BLD AUTO: 9.6 FL (ref 9.2–12.9)
POTASSIUM SERPL-SCNC: 8.4 MMOL/L (ref 3.5–5.1)
PROT SERPL-MCNC: 6.8 G/DL (ref 6–8.4)
RBC # BLD AUTO: 3.84 M/UL (ref 4–5.4)
SARS-COV-2 RDRP RESP QL NAA+PROBE: NEGATIVE
SODIUM SERPL-SCNC: 132 MMOL/L (ref 136–145)
TROPONIN I SERPL DL<=0.01 NG/ML-MCNC: 0.03 NG/ML (ref 0–0.03)
WBC # BLD AUTO: 9.32 K/UL (ref 3.9–12.7)

## 2021-09-05 PROCEDURE — 84484 ASSAY OF TROPONIN QUANT: CPT | Performed by: EMERGENCY MEDICINE

## 2021-09-05 PROCEDURE — 80053 COMPREHEN METABOLIC PANEL: CPT | Performed by: EMERGENCY MEDICINE

## 2021-09-05 PROCEDURE — 99284 PR EMERGENCY DEPT VISIT,LEVEL IV: ICD-10-PCS | Mod: ,,, | Performed by: INTERNAL MEDICINE

## 2021-09-05 PROCEDURE — 83880 ASSAY OF NATRIURETIC PEPTIDE: CPT | Performed by: EMERGENCY MEDICINE

## 2021-09-05 PROCEDURE — 96375 TX/PRO/DX INJ NEW DRUG ADDON: CPT | Mod: 59

## 2021-09-05 PROCEDURE — U0002 COVID-19 LAB TEST NON-CDC: HCPCS | Performed by: EMERGENCY MEDICINE

## 2021-09-05 PROCEDURE — 96365 THER/PROPH/DIAG IV INF INIT: CPT

## 2021-09-05 PROCEDURE — 93010 EKG 12-LEAD: ICD-10-PCS | Mod: ,,, | Performed by: INTERNAL MEDICINE

## 2021-09-05 PROCEDURE — 93005 ELECTROCARDIOGRAM TRACING: CPT

## 2021-09-05 PROCEDURE — 25000242 PHARM REV CODE 250 ALT 637 W/ HCPCS: Performed by: EMERGENCY MEDICINE

## 2021-09-05 PROCEDURE — 80100014 HC HEMODIALYSIS 1:1

## 2021-09-05 PROCEDURE — 93010 ELECTROCARDIOGRAM REPORT: CPT | Mod: ,,, | Performed by: INTERNAL MEDICINE

## 2021-09-05 PROCEDURE — 85025 COMPLETE CBC W/AUTO DIFF WBC: CPT | Performed by: EMERGENCY MEDICINE

## 2021-09-05 PROCEDURE — 84100 ASSAY OF PHOSPHORUS: CPT | Performed by: EMERGENCY MEDICINE

## 2021-09-05 PROCEDURE — 94640 AIRWAY INHALATION TREATMENT: CPT

## 2021-09-05 PROCEDURE — 63600175 PHARM REV CODE 636 W HCPCS: Performed by: EMERGENCY MEDICINE

## 2021-09-05 PROCEDURE — 25000003 PHARM REV CODE 250: Performed by: EMERGENCY MEDICINE

## 2021-09-05 PROCEDURE — 99285 EMERGENCY DEPT VISIT HI MDM: CPT | Mod: 25

## 2021-09-05 PROCEDURE — 83735 ASSAY OF MAGNESIUM: CPT | Performed by: EMERGENCY MEDICINE

## 2021-09-05 PROCEDURE — G0257 UNSCHED DIALYSIS ESRD PT HOS: HCPCS

## 2021-09-05 PROCEDURE — 99284 EMERGENCY DEPT VISIT MOD MDM: CPT | Mod: ,,, | Performed by: INTERNAL MEDICINE

## 2021-09-05 RX ORDER — SODIUM CHLORIDE 9 MG/ML
INJECTION, SOLUTION INTRAVENOUS
Status: CANCELLED | OUTPATIENT
Start: 2021-09-05

## 2021-09-05 RX ORDER — SODIUM CHLORIDE 9 MG/ML
INJECTION, SOLUTION INTRAVENOUS ONCE
Status: CANCELLED | OUTPATIENT
Start: 2021-09-05 | End: 2021-09-05

## 2021-09-05 RX ORDER — MUPIROCIN 20 MG/G
OINTMENT TOPICAL 2 TIMES DAILY
Status: DISCONTINUED | OUTPATIENT
Start: 2021-09-05 | End: 2021-09-05 | Stop reason: HOSPADM

## 2021-09-05 RX ORDER — ALBUTEROL SULFATE 0.83 MG/ML
10 SOLUTION RESPIRATORY (INHALATION)
Status: COMPLETED | OUTPATIENT
Start: 2021-09-05 | End: 2021-09-05

## 2021-09-05 RX ORDER — OXYCODONE AND ACETAMINOPHEN 5; 325 MG/1; MG/1
1 TABLET ORAL
Status: COMPLETED | OUTPATIENT
Start: 2021-09-05 | End: 2021-09-05

## 2021-09-05 RX ORDER — INDOMETHACIN 25 MG/1
50 CAPSULE ORAL
Status: COMPLETED | OUTPATIENT
Start: 2021-09-05 | End: 2021-09-05

## 2021-09-05 RX ADMIN — CALCIUM GLUCONATE 1 G: 98 INJECTION, SOLUTION INTRAVENOUS at 08:09

## 2021-09-05 RX ADMIN — SODIUM BICARBONATE 50 MEQ: 84 INJECTION, SOLUTION INTRAVENOUS at 08:09

## 2021-09-05 RX ADMIN — SODIUM ZIRCONIUM CYCLOSILICATE 10 G: 5 POWDER, FOR SUSPENSION ORAL at 08:09

## 2021-09-05 RX ADMIN — OXYCODONE HYDROCHLORIDE AND ACETAMINOPHEN 1 TABLET: 5; 325 TABLET ORAL at 07:09

## 2021-09-05 RX ADMIN — DEXTROSE MONOHYDRATE 25 G: 25 INJECTION, SOLUTION INTRAVENOUS at 08:09

## 2021-09-05 RX ADMIN — INSULIN HUMAN 8 UNITS: 100 INJECTION, SOLUTION PARENTERAL at 08:09

## 2021-09-05 RX ADMIN — ALBUTEROL SULFATE 10 MG: 2.5 SOLUTION RESPIRATORY (INHALATION) at 09:09

## 2021-09-17 ENCOUNTER — TELEPHONE (OUTPATIENT)
Dept: TRANSPLANT | Facility: CLINIC | Age: 71
End: 2021-09-17

## 2021-09-21 ENCOUNTER — TELEPHONE (OUTPATIENT)
Dept: TRANSPLANT | Facility: CLINIC | Age: 71
End: 2021-09-21

## 2021-09-28 ENCOUNTER — TELEPHONE (OUTPATIENT)
Dept: TRANSPLANT | Facility: HOSPITAL | Age: 71
End: 2021-09-28

## 2021-09-29 DIAGNOSIS — Z76.0 MEDICATION REFILL: ICD-10-CM

## 2021-09-29 DIAGNOSIS — N18.6 ESRD ON DIALYSIS: Primary | ICD-10-CM

## 2021-09-29 DIAGNOSIS — Z99.2 ESRD ON DIALYSIS: Primary | ICD-10-CM

## 2021-09-29 RX ORDER — FOLIC ACID/VIT B COMPLEX AND C 0.8 MG
1 TABLET ORAL DAILY
Qty: 30 TABLET | Refills: 6 | Status: SHIPPED | OUTPATIENT
Start: 2021-09-29 | End: 2021-10-29

## 2021-10-01 ENCOUNTER — TELEPHONE (OUTPATIENT)
Dept: NEPHROLOGY | Facility: CLINIC | Age: 71
End: 2021-10-01

## 2021-10-06 DIAGNOSIS — Z76.82 ORGAN TRANSPLANT CANDIDATE: Primary | ICD-10-CM

## 2021-10-12 ENCOUNTER — TELEPHONE (OUTPATIENT)
Dept: TRANSPLANT | Facility: CLINIC | Age: 71
End: 2021-10-12

## 2021-10-14 ENCOUNTER — OFFICE VISIT (OUTPATIENT)
Dept: INTERNAL MEDICINE | Facility: CLINIC | Age: 71
End: 2021-10-14
Payer: MEDICARE

## 2021-10-14 VITALS
BODY MASS INDEX: 39.43 KG/M2 | HEART RATE: 74 BPM | RESPIRATION RATE: 16 BRPM | WEIGHT: 229.75 LBS | SYSTOLIC BLOOD PRESSURE: 112 MMHG | TEMPERATURE: 97 F | DIASTOLIC BLOOD PRESSURE: 78 MMHG

## 2021-10-14 DIAGNOSIS — E53.8 B12 DEFICIENCY: Primary | ICD-10-CM

## 2021-10-14 PROBLEM — D72.829 LEUKOCYTOSIS: Status: RESOLVED | Noted: 2020-10-13 | Resolved: 2021-10-14

## 2021-10-14 PROBLEM — Z01.810 PREOP CARDIOVASCULAR EXAM: Status: RESOLVED | Noted: 2020-03-13 | Resolved: 2021-10-14

## 2021-10-14 PROBLEM — R11.10 VOMITING: Status: RESOLVED | Noted: 2018-07-19 | Resolved: 2021-10-14

## 2021-10-14 PROCEDURE — 99999 PR PBB SHADOW E&M-EST. PATIENT-LVL III: CPT | Mod: PBBFAC,,, | Performed by: NURSE PRACTITIONER

## 2021-10-14 PROCEDURE — 96372 THER/PROPH/DIAG INJ SC/IM: CPT | Mod: PBBFAC

## 2021-10-14 PROCEDURE — 99202 PR OFFICE/OUTPT VISIT, NEW, LEVL II, 15-29 MIN: ICD-10-PCS | Mod: S$PBB,,, | Performed by: NURSE PRACTITIONER

## 2021-10-14 PROCEDURE — 99202 OFFICE O/P NEW SF 15 MIN: CPT | Mod: S$PBB,,, | Performed by: NURSE PRACTITIONER

## 2021-10-14 PROCEDURE — 99213 OFFICE O/P EST LOW 20 MIN: CPT | Mod: PBBFAC | Performed by: NURSE PRACTITIONER

## 2021-10-14 PROCEDURE — 99999 PR PBB SHADOW E&M-EST. PATIENT-LVL III: ICD-10-PCS | Mod: PBBFAC,,, | Performed by: NURSE PRACTITIONER

## 2021-10-14 RX ORDER — CYANOCOBALAMIN 1000 UG/ML
1000 INJECTION, SOLUTION INTRAMUSCULAR; SUBCUTANEOUS ONCE
Qty: 1 ML | Refills: 0 | Status: SHIPPED | OUTPATIENT
Start: 2021-10-14 | End: 2021-10-14

## 2021-10-14 RX ORDER — CYANOCOBALAMIN 1000 UG/ML
1000 INJECTION, SOLUTION INTRAMUSCULAR; SUBCUTANEOUS ONCE
Status: COMPLETED | OUTPATIENT
Start: 2021-10-14 | End: 2021-10-14

## 2021-10-14 RX ADMIN — CYANOCOBALAMIN 1000 MCG: 1000 INJECTION, SOLUTION INTRAMUSCULAR at 09:10

## 2021-10-19 ENCOUNTER — IMMUNIZATION (OUTPATIENT)
Dept: PRIMARY CARE CLINIC | Facility: CLINIC | Age: 71
End: 2021-10-19
Payer: MEDICARE

## 2021-10-19 DIAGNOSIS — Z23 NEED FOR VACCINATION: Primary | ICD-10-CM

## 2021-10-19 PROCEDURE — 91301 COVID-19, MRNA, LNP-S, PF, 100 MCG/0.5 ML DOSE VACCINE: CPT | Mod: PBBFAC | Performed by: FAMILY MEDICINE

## 2021-10-19 PROCEDURE — 0013A COVID-19, MRNA, LNP-S, PF, 100 MCG/0.5 ML DOSE VACCINE: CPT | Mod: CV19,PBBFAC,NTX | Performed by: FAMILY MEDICINE

## 2021-10-21 DIAGNOSIS — E87.5 HYPERKALEMIA: Primary | ICD-10-CM

## 2021-10-21 DIAGNOSIS — N18.6 ESRD ON DIALYSIS: ICD-10-CM

## 2021-10-21 DIAGNOSIS — Z99.2 ESRD ON DIALYSIS: ICD-10-CM

## 2021-10-26 ENCOUNTER — TELEPHONE (OUTPATIENT)
Dept: TRANSPLANT | Facility: CLINIC | Age: 71
End: 2021-10-26
Payer: MEDICARE

## 2021-10-26 ENCOUNTER — TELEPHONE (OUTPATIENT)
Dept: PHARMACY | Facility: CLINIC | Age: 71
End: 2021-10-26
Payer: MEDICARE

## 2021-11-03 DIAGNOSIS — Z76.82 ORGAN TRANSPLANT CANDIDATE: Primary | ICD-10-CM

## 2021-11-23 ENCOUNTER — TELEPHONE (OUTPATIENT)
Dept: TRANSPLANT | Facility: CLINIC | Age: 71
End: 2021-11-23
Payer: MEDICARE

## 2021-12-17 ENCOUNTER — OFFICE VISIT (OUTPATIENT)
Dept: CARDIOLOGY | Facility: CLINIC | Age: 71
End: 2021-12-17
Payer: MEDICARE

## 2021-12-17 VITALS
DIASTOLIC BLOOD PRESSURE: 62 MMHG | SYSTOLIC BLOOD PRESSURE: 120 MMHG | HEIGHT: 64 IN | WEIGHT: 230.19 LBS | HEART RATE: 70 BPM | BODY MASS INDEX: 39.3 KG/M2 | RESPIRATION RATE: 16 BRPM

## 2021-12-17 DIAGNOSIS — R94.31 ABNORMAL ECG: ICD-10-CM

## 2021-12-17 DIAGNOSIS — R07.89 ATYPICAL CHEST PAIN: ICD-10-CM

## 2021-12-17 DIAGNOSIS — Z99.2 DIALYSIS PATIENT: ICD-10-CM

## 2021-12-17 DIAGNOSIS — Z99.2 ESRD (END STAGE RENAL DISEASE) ON DIALYSIS: Chronic | ICD-10-CM

## 2021-12-17 DIAGNOSIS — N18.6 ESRD (END STAGE RENAL DISEASE) ON DIALYSIS: Chronic | ICD-10-CM

## 2021-12-17 DIAGNOSIS — R06.02 SHORTNESS OF BREATH: ICD-10-CM

## 2021-12-17 DIAGNOSIS — E78.2 MIXED HYPERLIPIDEMIA: ICD-10-CM

## 2021-12-17 DIAGNOSIS — I12.9 HYPERTENSION, RENAL DISEASE: ICD-10-CM

## 2021-12-17 DIAGNOSIS — I47.19 ATRIAL TACHYCARDIA: ICD-10-CM

## 2021-12-17 DIAGNOSIS — I70.0 AORTIC ATHEROSCLEROSIS: ICD-10-CM

## 2021-12-17 DIAGNOSIS — I45.10 RBBB: ICD-10-CM

## 2021-12-17 DIAGNOSIS — I44.0 FIRST DEGREE AV BLOCK: ICD-10-CM

## 2021-12-17 DIAGNOSIS — I73.9 PERIPHERAL VASCULAR DISEASE: Primary | ICD-10-CM

## 2021-12-17 PROCEDURE — 99213 OFFICE O/P EST LOW 20 MIN: CPT | Mod: PBBFAC,TXP | Performed by: INTERNAL MEDICINE

## 2021-12-17 PROCEDURE — 99999 PR PBB SHADOW E&M-EST. PATIENT-LVL III: CPT | Mod: PBBFAC,TXP,, | Performed by: INTERNAL MEDICINE

## 2021-12-17 PROCEDURE — 99999 PR PBB SHADOW E&M-EST. PATIENT-LVL III: ICD-10-PCS | Mod: PBBFAC,TXP,, | Performed by: INTERNAL MEDICINE

## 2021-12-17 PROCEDURE — 99214 OFFICE O/P EST MOD 30 MIN: CPT | Mod: S$PBB,TXP,, | Performed by: INTERNAL MEDICINE

## 2021-12-17 PROCEDURE — 99214 PR OFFICE/OUTPT VISIT, EST, LEVL IV, 30-39 MIN: ICD-10-PCS | Mod: S$PBB,TXP,, | Performed by: INTERNAL MEDICINE

## 2022-01-03 ENCOUNTER — EXTERNAL HOSPITAL ADMISSION (OUTPATIENT)
Dept: ADMINISTRATIVE | Facility: CLINIC | Age: 72
End: 2022-01-03
Payer: MEDICARE

## 2022-01-03 ENCOUNTER — PATIENT OUTREACH (OUTPATIENT)
Dept: ADMINISTRATIVE | Facility: CLINIC | Age: 72
End: 2022-01-03
Payer: MEDICARE

## 2022-01-03 DIAGNOSIS — T82.590D MALFUNCTION OF ARTERIOVENOUS DIALYSIS FISTULA, SUBSEQUENT ENCOUNTER: Primary | ICD-10-CM

## 2022-01-03 NOTE — PATIENT INSTRUCTIONS
Patient Education       Hyperkalemia Discharge Instructions   About this topic   A high level of potassium in the blood is hyperkalemia. Potassium is an important mineral that your body needs to work the right way. It helps your body to:  · Keep blood pressure normal  · Keep muscles, like the heart, working the right way  · Stop too much calcium from being lost through your urine  · Control the normal flow of nutrients between body fluids and cells  In most cases, your kidneys get rid of extra potassium. If they are not working well enough, or get hurt, you could have too much potassium build up in your blood. Some health problems, or their treatment, can cause problems with your potassium level. These include:  · Kidney failure  · Certain drugs or supplements  · Diabetes and [place]'s disease  · Tumors  · Burns over large parts of the body or body trauma  · Problems that cause blood cell damage or fluid loss  · Bleeding in the stomach  · Damage to muscle  · Alcoholism and drug abuse  · Infection     What care is needed at home?   · Ask your doctor what you need to do when you go home. Make sure you ask questions if you do not understand what the doctor says. This way you will know what you need to do.  · Your doctor may give you drugs to lower your high potassium levels. Take your drugs as ordered by your doctor.  · Make sure your doctor knows all the drugs you are taking. Be sure to include all prescription and over-the-counter (OTC) drugs, and herbal supplements. Tell the doctor about any drug allergy. Bring a list of drugs you take with you.  · Ask your doctor before taking potassium supplements.  · Drink plenty of fluids when you are thirsty or as directed by your doctor.  · Your doctor will talk to you about other treatments needed for the cause of your hyperkalemia.  · Follow your doctor's instructions with care.  What follow-up care is needed?   Your doctor may ask you to make visits to the office to check  on your progress. Be sure to keep these visits. You may need more blood tests to recheck your potassium levels.  What drugs may be needed?   The doctor may order drugs to:  · Lower the amount of potassium in your blood  Will physical activity be limited?   You may have to limit your activity. Talk to your doctor about the right amount of activity for you.  What changes to diet are needed?   Talk to your doctor or dietitian about your personal diet plan. Ask if you need to limit certain foods high in potassium. Some of these foods include:  · Bananas and fruits  · Nuts, like almonds and peanuts  · Broccoli  · Potatoes  · Tomatoes  · Avocados  · Milk  · Sports drinks  · Green, leafy vegetables  · Herbs such as alfalfa, dandelion, horsetail, and nettle  When do I need to call the doctor?   Activate the emergency medical system right away if you have signs of a heart attack. Call 911 in the [place] or [place]. The sooner treatment begins, the better your chances for recovery. Call for emergency help right away if you have:  · Signs of heart attack:  ? Chest pain  ? Trouble breathing  ? Fast heartbeat  ? Feeling dizzy  ? Nausea  ? Vomiting  Call your doctor if you have:  · A fast, slow, or irregular heartbeat  · Lightheadedness, confusion, or fainting  · Muscle weakness  · Numbness or tingling in your arms or legs  Teach Back: Helping You Understand   The Teach Back Method helps you understand the information we are giving you. After you talk with the staff, tell them in your own words what you learned. This helps to make sure the staff has described each thing clearly. It also helps to explain things that may have been confusing. Before going home, make sure you can do these:  · I can tell you about my condition.  · I can tell you what changes I need to make with my diet.  · I can tell you what I will do if I have signs of a heart attack.  Where can I learn more?   [facility]  Service  https://www.nhs.uk/conditions/potassium-test/   National Institutes of Health  https://ods.od.nih.gov/factsheets/Potassium-Consumer/   National Kidney Foundation  https://www.kidney.org/sites/default/files/48-80-4042_FKH__PczGsx_Csuhhufjahgl-P.pdf   Last Reviewed Date   2021-07-19  Consumer Information Use and Disclaimer   This information is not specific medical advice and does not replace information you receive from your health care provider. This is only a brief summary of general information. It does NOT include all information about conditions, illnesses, injuries, tests, procedures, treatments, therapies, discharge instructions or life-style choices that may apply to you. You must talk with your health care provider for complete information about your health and treatment options. This information should not be used to decide whether or not to accept your health care providers advice, instructions or recommendations. Only your health care provider has the knowledge and training to provide advice that is right for you.  Copyright   Copyright © 2021 UpToDate, Inc. and its affiliates and/or licensors. All rights reserved.    Agustin teaching reviewed with Gillian Pitts . She verbalized understanding.    Education was provided based on the patient's discharge diagnosis using the attached Agustin patient education as a reference.

## 2022-01-03 NOTE — TELEPHONE ENCOUNTER
Pt states that she is needing a new walker (black). OPCM referral placed. Pt transferred to scheduling for a new PCP.

## 2022-01-03 NOTE — PROGRESS NOTES
C3 nurse spoke with Gillian Pitts for a TCC post hospital discharge follow up call. The patient does not have a scheduled HOSFU appointment with Primary Doctor No within 7 days post hospital discharge date 1.2.21. C3 nurse was unable to schedule HOSFU appointment in [facility].

## 2022-01-06 ENCOUNTER — PES CALL (OUTPATIENT)
Dept: HOME HEALTH SERVICES | Facility: CLINIC | Age: 72
End: 2022-01-06
Payer: MEDICARE

## 2022-01-12 ENCOUNTER — PES CALL (OUTPATIENT)
Dept: HOME HEALTH SERVICES | Facility: CLINIC | Age: 72
End: 2022-01-12
Payer: MEDICARE

## 2022-01-18 ENCOUNTER — OFFICE VISIT (OUTPATIENT)
Dept: HOME HEALTH SERVICES | Facility: CLINIC | Age: 72
End: 2022-01-18
Payer: MEDICARE

## 2022-01-18 DIAGNOSIS — N18.6 TYPE 2 DIABETES MELLITUS WITH CHRONIC KIDNEY DISEASE ON CHRONIC DIALYSIS, WITHOUT LONG-TERM CURRENT USE OF INSULIN: ICD-10-CM

## 2022-01-18 DIAGNOSIS — E11.22 TYPE 2 DIABETES MELLITUS WITH CHRONIC KIDNEY DISEASE ON CHRONIC DIALYSIS, WITHOUT LONG-TERM CURRENT USE OF INSULIN: ICD-10-CM

## 2022-01-18 DIAGNOSIS — I12.9 HYPERTENSION, RENAL DISEASE: Primary | ICD-10-CM

## 2022-01-18 DIAGNOSIS — Z99.2 DIALYSIS PATIENT: ICD-10-CM

## 2022-01-18 DIAGNOSIS — T82.590D MALFUNCTION OF ARTERIOVENOUS DIALYSIS FISTULA, SUBSEQUENT ENCOUNTER: ICD-10-CM

## 2022-01-18 DIAGNOSIS — Z99.2 TYPE 2 DIABETES MELLITUS WITH CHRONIC KIDNEY DISEASE ON CHRONIC DIALYSIS, WITHOUT LONG-TERM CURRENT USE OF INSULIN: ICD-10-CM

## 2022-01-18 PROCEDURE — 99350 HOME/RES VST EST HIGH MDM 60: CPT | Mod: NTX,,, | Performed by: NURSE PRACTITIONER

## 2022-01-18 PROCEDURE — 99350 PR HOME VISIT,ESTAB PATIENT,LEVEL IV: ICD-10-PCS | Mod: NTX,,, | Performed by: NURSE PRACTITIONER
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
## 2022-01-20 ENCOUNTER — TELEPHONE (OUTPATIENT)
Dept: TRANSPLANT | Facility: CLINIC | Age: 72
End: 2022-01-20
Payer: MEDICARE

## 2022-01-22 VITALS — RESPIRATION RATE: 18 BRPM | TEMPERATURE: 97 F | OXYGEN SATURATION: 95 % | HEART RATE: 58 BPM

## 2022-02-07 ENCOUNTER — TELEPHONE (OUTPATIENT)
Dept: CARDIOLOGY | Facility: CLINIC | Age: 72
End: 2022-02-07
Payer: MEDICARE

## 2022-02-07 NOTE — TELEPHONE ENCOUNTER
Please call pt and schedule f/u appt with any available cardiologist or mid level asap for hospital f/u.    Thanks    Dr Sims

## 2022-02-07 NOTE — TELEPHONE ENCOUNTER
Called patient to advise per Dr. Sims    Please call pt and schedule f/u appt with any available cardiologist or mid level asap for hospital f/u.     Thanks     Dr Sims    No answer lvm to return call

## 2022-02-16 ENCOUNTER — TELEPHONE (OUTPATIENT)
Dept: ORTHOPEDICS | Facility: CLINIC | Age: 72
End: 2022-02-16
Payer: MEDICARE

## 2022-02-16 NOTE — TELEPHONE ENCOUNTER
----- Message from Mary Jaime MA sent at 2/11/2022  5:08 PM CST -----  Contact: self [phone]    ----- Message -----  From: Subha Tanner  Sent: 2/11/2022   3:22 PM CST  To: [facility] Staff    Would like to consult with nurse regarding a grajeda, please call her at [phone]. Thanks ah

## 2022-02-22 DIAGNOSIS — Z96.651 HISTORY OF TOTAL RIGHT KNEE REPLACEMENT: ICD-10-CM

## 2022-02-22 DIAGNOSIS — Z96.652 STATUS POST TOTAL KNEE REPLACEMENT USING CEMENT, LEFT: Primary | ICD-10-CM

## 2022-07-04 ENCOUNTER — HOSPITAL ENCOUNTER (EMERGENCY)
Facility: HOSPITAL | Age: 72
Discharge: HOME | End: 2022-07-05
Attending: EMERGENCY MEDICINE
Payer: MEDICARE

## 2022-07-04 DIAGNOSIS — N18.6 ESRD (END STAGE RENAL DISEASE) ON DIALYSIS: Primary | ICD-10-CM

## 2022-07-04 DIAGNOSIS — J81.0 ACUTE PULMONARY EDEMA: ICD-10-CM

## 2022-07-04 DIAGNOSIS — R06.02 SHORTNESS OF BREATH: ICD-10-CM

## 2022-07-04 DIAGNOSIS — Z99.2 ESRD (END STAGE RENAL DISEASE) ON DIALYSIS: Primary | ICD-10-CM

## 2022-07-04 LAB
ALBUMIN SERPL BCP-MCNC: 4 G/DL (ref 3.5–5.2)
ALP SERPL-CCNC: 93 U/L (ref 55–135)
ALT SERPL W/O P-5'-P-CCNC: 10 U/L (ref 10–44)
ANION GAP SERPL CALC-SCNC: 21 MMOL/L (ref 8–16)
AST SERPL-CCNC: 16 U/L (ref 10–40)
BASOPHILS # BLD AUTO: 0.04 K/UL (ref 0–0.2)
BASOPHILS NFR BLD: 0.4 % (ref 0–1.9)
BILIRUB SERPL-MCNC: 0.5 MG/DL (ref 0.1–1)
BNP SERPL-MCNC: 1681 PG/ML (ref 0–99)
BUN SERPL-MCNC: 82 MG/DL (ref 8–23)
CALCIUM SERPL-MCNC: 9.1 MG/DL (ref 8.7–10.5)
CHLORIDE SERPL-SCNC: 101 MMOL/L (ref 95–110)
CO2 SERPL-SCNC: 19 MMOL/L (ref 23–29)
CREAT SERPL-MCNC: 14.6 MG/DL (ref 0.5–1.4)
DIFFERENTIAL METHOD: ABNORMAL
EOSINOPHIL # BLD AUTO: 0.3 K/UL (ref 0–0.5)
EOSINOPHIL NFR BLD: 2.6 % (ref 0–8)
ERYTHROCYTE [DISTWIDTH] IN BLOOD BY AUTOMATED COUNT: 15.1 % (ref 11.5–14.5)
EST. GFR  (AFRICAN AMERICAN): 3 ML/MIN/1.73 M^2
EST. GFR  (NON AFRICAN AMERICAN): 2 ML/MIN/1.73 M^2
GLUCOSE SERPL-MCNC: 82 MG/DL (ref 70–110)
HCT VFR BLD AUTO: 38.6 % (ref 37–48.5)
HGB BLD-MCNC: 12.5 G/DL (ref 12–16)
IMM GRANULOCYTES # BLD AUTO: 0.03 K/UL (ref 0–0.04)
IMM GRANULOCYTES NFR BLD AUTO: 0.3 % (ref 0–0.5)
LYMPHOCYTES # BLD AUTO: 2.9 K/UL (ref 1–4.8)
LYMPHOCYTES NFR BLD: 25.3 % (ref 18–48)
MCH RBC QN AUTO: 29.3 PG (ref 27–31)
MCHC RBC AUTO-ENTMCNC: 32.4 G/DL (ref 32–36)
MCV RBC AUTO: 90 FL (ref 82–98)
MONOCYTES # BLD AUTO: 1.1 K/UL (ref 0.3–1)
MONOCYTES NFR BLD: 9.9 % (ref 4–15)
NEUTROPHILS # BLD AUTO: 7 K/UL (ref 1.8–7.7)
NEUTROPHILS NFR BLD: 61.5 % (ref 38–73)
NRBC BLD-RTO: 0 /100 WBC
PLATELET # BLD AUTO: 202 K/UL (ref 150–450)
PMV BLD AUTO: 11 FL (ref 9.2–12.9)
POTASSIUM SERPL-SCNC: 5.2 MMOL/L (ref 3.5–5.1)
PROT SERPL-MCNC: 7.8 G/DL (ref 6–8.4)
RBC # BLD AUTO: 4.27 M/UL (ref 4–5.4)
SARS-COV-2 RDRP RESP QL NAA+PROBE: NEGATIVE
SODIUM SERPL-SCNC: 141 MMOL/L (ref 136–145)
TROPONIN I SERPL DL<=0.01 NG/ML-MCNC: 0.06 NG/ML (ref 0–0.03)
WBC # BLD AUTO: 11.33 K/UL (ref 3.9–12.7)

## 2022-07-04 PROCEDURE — 25000003 PHARM REV CODE 250: Performed by: EMERGENCY MEDICINE

## 2022-07-04 PROCEDURE — U0002 COVID-19 LAB TEST NON-CDC: HCPCS | Performed by: EMERGENCY MEDICINE

## 2022-07-04 PROCEDURE — 99291 CRITICAL CARE FIRST HOUR: CPT | Mod: 25

## 2022-07-04 PROCEDURE — 84484 ASSAY OF TROPONIN QUANT: CPT | Performed by: EMERGENCY MEDICINE

## 2022-07-04 PROCEDURE — 93010 ELECTROCARDIOGRAM REPORT: CPT | Mod: ,,, | Performed by: INTERNAL MEDICINE

## 2022-07-04 PROCEDURE — 93005 ELECTROCARDIOGRAM TRACING: CPT

## 2022-07-04 PROCEDURE — 83880 ASSAY OF NATRIURETIC PEPTIDE: CPT | Performed by: EMERGENCY MEDICINE

## 2022-07-04 PROCEDURE — 80053 COMPREHEN METABOLIC PANEL: CPT | Performed by: EMERGENCY MEDICINE

## 2022-07-04 PROCEDURE — 87340 HEPATITIS B SURFACE AG IA: CPT | Performed by: INTERNAL MEDICINE

## 2022-07-04 PROCEDURE — 85025 COMPLETE CBC W/AUTO DIFF WBC: CPT | Performed by: EMERGENCY MEDICINE

## 2022-07-04 PROCEDURE — 63600175 PHARM REV CODE 636 W HCPCS: Performed by: EMERGENCY MEDICINE

## 2022-07-04 PROCEDURE — 93010 EKG 12-LEAD: ICD-10-PCS | Mod: ,,, | Performed by: INTERNAL MEDICINE

## 2022-07-04 PROCEDURE — 80100014 HC HEMODIALYSIS 1:1

## 2022-07-04 PROCEDURE — 96374 THER/PROPH/DIAG INJ IV PUSH: CPT | Mod: 59

## 2022-07-04 PROCEDURE — G0257 UNSCHED DIALYSIS ESRD PT HOS: HCPCS

## 2022-07-04 PROCEDURE — 86706 HEP B SURFACE ANTIBODY: CPT | Performed by: INTERNAL MEDICINE

## 2022-07-04 RX ORDER — ACETAMINOPHEN 325 MG/1
650 TABLET ORAL
Status: COMPLETED | OUTPATIENT
Start: 2022-07-04 | End: 2022-07-04

## 2022-07-04 RX ORDER — FOLIC ACID/VIT B COMPLEX AND C 0.8 MG
1 TABLET ORAL DAILY
COMMUNITY
Start: 2022-05-24

## 2022-07-04 RX ORDER — HYDRALAZINE HYDROCHLORIDE 20 MG/ML
20 INJECTION INTRAMUSCULAR; INTRAVENOUS
Status: COMPLETED | OUTPATIENT
Start: 2022-07-04 | End: 2022-07-04

## 2022-07-04 RX ORDER — BUTALBITAL, ACETAMINOPHEN AND CAFFEINE 50; 325; 40 MG/1; MG/1; MG/1
1 TABLET ORAL
Status: COMPLETED | OUTPATIENT
Start: 2022-07-04 | End: 2022-07-04

## 2022-07-04 RX ORDER — LANOLIN ALCOHOL/MO/W.PET/CERES
1000 CREAM (GRAM) TOPICAL
COMMUNITY
Start: 2022-04-25

## 2022-07-04 RX ORDER — CETIRIZINE HYDROCHLORIDE 10 MG/1
10 TABLET ORAL
Status: ON HOLD | COMMUNITY
End: 2023-05-20 | Stop reason: HOSPADM

## 2022-07-04 RX ADMIN — BUTALBITAL, ACETAMINOPHEN AND CAFFEINE 1 TABLET: 50; 325; 40 TABLET ORAL at 07:07

## 2022-07-04 RX ADMIN — HYDRALAZINE HYDROCHLORIDE 20 MG: 20 INJECTION, SOLUTION INTRAMUSCULAR; INTRAVENOUS at 07:07

## 2022-07-04 RX ADMIN — NITROGLYCERIN 1 INCH: 20 OINTMENT TOPICAL at 07:07

## 2022-07-04 RX ADMIN — Medication 650 MG: at 09:07
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
## 2022-07-05 VITALS
HEIGHT: 64 IN | OXYGEN SATURATION: 96 % | HEART RATE: 71 BPM | TEMPERATURE: 98 F | BODY MASS INDEX: 38.98 KG/M2 | DIASTOLIC BLOOD PRESSURE: 79 MMHG | RESPIRATION RATE: 22 BRPM | WEIGHT: 228.31 LBS | SYSTOLIC BLOOD PRESSURE: 168 MMHG

## 2022-07-05 LAB — HBV SURFACE AG SERPL QL IA: NEGATIVE

## 2022-07-05 PROCEDURE — 25000003 PHARM REV CODE 250: Performed by: EMERGENCY MEDICINE

## 2022-07-05 RX ORDER — HYDROCODONE BITARTRATE AND ACETAMINOPHEN 5; 325 MG/1; MG/1
1 TABLET ORAL
Status: COMPLETED | OUTPATIENT
Start: 2022-07-05 | End: 2022-07-05

## 2022-07-05 RX ADMIN — HYDROCODONE BITARTRATE AND ACETAMINOPHEN 1 TABLET: 5; 325 TABLET ORAL at 01:07

## 2022-07-05 NOTE — NURSING
07/04/22 5169   Post-Hemodialysis Assessment   Blood Volume Processed (Liters) 48 L   Dialyzer Clearance Lightly streaked   Duration of Treatment 120 minutes   Total UF (mL) 3000 mL   Patient Response to Treatment Pt tolerated tx well

## 2022-07-07 LAB
HBV SURFACE AB SER QL IA: POSITIVE
HBV SURFACE AB SERPL IA-ACNC: 708 MIU/ML

## 2022-07-11 DIAGNOSIS — J32.9 OTHER SINUSITIS, UNSPECIFIED CHRONICITY: ICD-10-CM

## 2022-07-11 DIAGNOSIS — Z76.0 MEDICATION REFILL: Primary | ICD-10-CM

## 2022-07-11 DIAGNOSIS — E83.39 HYPERPHOSPHATEMIA: ICD-10-CM

## 2022-07-11 DIAGNOSIS — N83.209 CYST OF OVARY, UNSPECIFIED LATERALITY: ICD-10-CM

## 2022-07-11 RX ORDER — FLUTICASONE PROPIONATE 50 MCG
2 SPRAY, SUSPENSION (ML) NASAL DAILY
Qty: 48 ML | Refills: 0 | Status: ON HOLD | OUTPATIENT
Start: 2022-07-11 | End: 2023-05-20 | Stop reason: HOSPADM

## 2022-07-11 RX ORDER — SEVELAMER CARBONATE 800 MG/1
2400 TABLET, FILM COATED ORAL
Qty: 270 TABLET | Refills: 11 | Status: SHIPPED | OUTPATIENT
Start: 2022-07-11 | End: 2022-08-10 | Stop reason: ALTCHOICE

## 2022-07-12 ENCOUNTER — TELEPHONE (OUTPATIENT)
Dept: ORTHOPEDICS | Facility: CLINIC | Age: 72
End: 2022-07-12
Payer: MEDICARE

## 2022-07-12 NOTE — TELEPHONE ENCOUNTER
----- Message from Deborah Rich sent at 7/12/2022  4:29 PM CDT -----  .Type:  Sooner Apoointment Request    Caller is requesting a sooner appointment.  Caller declined first available appointment listed below.  Caller will not accept being placed on the waitlist and is requesting a message be sent to doctor.  Name of Caller:Pt   When is the first available appointment?09/26 Left   Symptoms:Knee pain   Would the patient rather a call back or a response via [facility]? Call back   Best Call Back Number:.[phone]    Additional Information:     Thanks bs

## 2022-07-12 NOTE — TELEPHONE ENCOUNTER
Spoke with patient and informed her that there are no sooner appointments available with Dr Aguilar at this time. Patient added to waitlist and verbalized understanding.

## 2022-07-18 DIAGNOSIS — Z76.0 MEDICATION REFILL: ICD-10-CM

## 2022-07-18 DIAGNOSIS — N18.6 ESRD ON DIALYSIS: ICD-10-CM

## 2022-07-18 DIAGNOSIS — Z99.2 ESRD ON DIALYSIS: ICD-10-CM

## 2022-07-18 DIAGNOSIS — Z96.653 HISTORY OF BILATERAL KNEE REPLACEMENT: ICD-10-CM

## 2022-07-18 DIAGNOSIS — I12.9 HYPERTENSION, RENAL DISEASE: Primary | ICD-10-CM

## 2022-07-18 RX ORDER — AMLODIPINE BESYLATE 5 MG/1
5 TABLET ORAL DAILY
Qty: 30 TABLET | Refills: 2 | Status: ON HOLD | OUTPATIENT
Start: 2022-07-18 | End: 2023-05-20 | Stop reason: HOSPADM

## 2022-08-10 DIAGNOSIS — E83.39 HYPERPHOSPHATEMIA: ICD-10-CM

## 2022-08-10 DIAGNOSIS — Z99.2 ESRD ON DIALYSIS: ICD-10-CM

## 2022-08-10 DIAGNOSIS — Z76.0 MEDICATION REFILL: Primary | ICD-10-CM

## 2022-08-10 DIAGNOSIS — N18.6 ESRD ON DIALYSIS: ICD-10-CM

## 2022-08-10 RX ORDER — SUCROFERRIC OXYHYDROXIDE 500 MG/1
1000 TABLET, CHEWABLE ORAL
Qty: 180 TABLET | Refills: 5 | Status: SHIPPED | OUTPATIENT
Start: 2022-08-10 | End: 2022-09-10

## 2022-08-11 ENCOUNTER — TELEPHONE (OUTPATIENT)
Dept: CARDIOLOGY | Facility: CLINIC | Age: 72
End: 2022-08-11
Payer: MEDICARE

## 2022-08-11 NOTE — TELEPHONE ENCOUNTER
Returned call     Duane ZAPIEN Staff  Caller: [phone] (Today,  8:59 AM)  Type:  Sooner Apoointment Request     Caller is requesting a sooner appointment.  Caller declined first available appointment listed below.  Caller will not accept being placed on the waitlist and is requesting a message be sent to doctor.   Name of Caller:Gillian   When is the first available appointment?9/2022   Symptoms:f/u   Would the patient rather a call back or a response via [facility]? Call back   Best Call Back Number:[phone]   Additional Information:     Spoke to patient, requesting sooner appt. Only wants to see Dr. Sims. Next available not until October. Doesn't want to placed on wait list. Patient wants to know if she can be worked in. Asked me to ask Dr. Sims

## 2022-08-25 DIAGNOSIS — N18.6 ESRD (END STAGE RENAL DISEASE): ICD-10-CM

## 2022-08-25 DIAGNOSIS — I10 HYPERTENSION, UNSPECIFIED TYPE: ICD-10-CM

## 2022-08-25 DIAGNOSIS — Z76.0 MEDICATION REFILL: ICD-10-CM

## 2022-08-25 RX ORDER — METOPROLOL TARTRATE 50 MG/1
50 TABLET ORAL EVERY 8 HOURS
Qty: 90 TABLET | Refills: 11 | Status: SHIPPED | OUTPATIENT
Start: 2022-08-25 | End: 2022-08-25 | Stop reason: SDUPTHER

## 2022-08-25 RX ORDER — METOPROLOL TARTRATE 50 MG/1
50 TABLET ORAL EVERY 8 HOURS
Qty: 90 TABLET | Refills: 11 | Status: SHIPPED | OUTPATIENT
Start: 2022-08-25 | End: 2023-03-08

## 2022-08-25 NOTE — TELEPHONE ENCOUNTER
----- Message from Duane Mckeon sent at 8/25/2022  4:48 PM CDT -----  Contact: metoprolol tartrate (LOPRESSOR) 50 MG tablet  Patient would like to consult with a nurse in regards to her prescriptions she stated it is for her metoprolol tartrate (LOPRESSOR) 50 MG tablet . Please call back at  [phone]. Thanks r,s

## 2022-09-23 ENCOUNTER — TELEPHONE (OUTPATIENT)
Dept: ORTHOPEDICS | Facility: CLINIC | Age: 72
End: 2022-09-23
Payer: MEDICARE

## 2022-09-23 NOTE — TELEPHONE ENCOUNTER
Called patient to let them know that we dont have those walkers in the office. On Monday we can talk to Dr Aguilar can order her one and we can get it ordered for her. Patient verbalized understanding.

## 2022-09-23 NOTE — TELEPHONE ENCOUNTER
----- Message from Radha Hollins sent at 9/23/2022  2:22 PM CDT -----  Contact: Gillian  Patient is calling to speak with someone regarding walker. Patient request a blue or black rollator walker upon arrival to visit on 9/26/22. Please give patient a call back at [phone] when possible.  Thank you,  GH

## 2022-09-26 ENCOUNTER — OFFICE VISIT (OUTPATIENT)
Dept: ORTHOPEDICS | Facility: CLINIC | Age: 72
End: 2022-09-26
Payer: MEDICARE

## 2022-09-26 ENCOUNTER — HOSPITAL ENCOUNTER (OUTPATIENT)
Dept: RADIOLOGY | Facility: HOSPITAL | Age: 72
Discharge: HOME | End: 2022-09-26
Attending: ORTHOPAEDIC SURGERY
Payer: MEDICARE

## 2022-09-26 VITALS — WEIGHT: 223.88 LBS | BODY MASS INDEX: 38.22 KG/M2 | HEIGHT: 64 IN

## 2022-09-26 DIAGNOSIS — Z96.651 HISTORY OF TOTAL RIGHT KNEE REPLACEMENT: Primary | ICD-10-CM

## 2022-09-26 DIAGNOSIS — M22.2X2 PATELLOFEMORAL SYNDROME, LEFT: Primary | ICD-10-CM

## 2022-09-26 DIAGNOSIS — Z96.652 HISTORY OF TOTAL LEFT KNEE REPLACEMENT: ICD-10-CM

## 2022-09-26 DIAGNOSIS — M79.89 PAIN AND SWELLING OF LEFT LOWER LEG: ICD-10-CM

## 2022-09-26 DIAGNOSIS — M79.662 PAIN AND SWELLING OF LEFT LOWER LEG: ICD-10-CM

## 2022-09-26 DIAGNOSIS — M62.831 MUSCLE SPASM OF LEFT CALF: ICD-10-CM

## 2022-09-26 DIAGNOSIS — Z96.651 HISTORY OF TOTAL RIGHT KNEE REPLACEMENT: ICD-10-CM

## 2022-09-26 DIAGNOSIS — M47.817 ARTHROPATHY OF LUMBOSACRAL FACET JOINT: ICD-10-CM

## 2022-09-26 DIAGNOSIS — Z96.652 STATUS POST TOTAL KNEE REPLACEMENT USING CEMENT, LEFT: ICD-10-CM

## 2022-09-26 PROCEDURE — 73564 XR KNEE ORTHO BILAT WITH FLEXION: ICD-10-PCS | Mod: 26,50,, | Performed by: RADIOLOGY

## 2022-09-26 PROCEDURE — 73564 X-RAY EXAM KNEE 4 OR MORE: CPT | Mod: 26,50,, | Performed by: RADIOLOGY

## 2022-09-26 PROCEDURE — 99213 PR OFFICE/OUTPT VISIT, EST, LEVL III, 20-29 MIN: ICD-10-PCS | Mod: S$PBB,,, | Performed by: ORTHOPAEDIC SURGERY

## 2022-09-26 PROCEDURE — 99999 PR PBB SHADOW E&M-EST. PATIENT-LVL III: ICD-10-PCS | Mod: PBBFAC,,, | Performed by: ORTHOPAEDIC SURGERY

## 2022-09-26 PROCEDURE — 99213 OFFICE O/P EST LOW 20 MIN: CPT | Mod: S$PBB,,, | Performed by: ORTHOPAEDIC SURGERY

## 2022-09-26 PROCEDURE — 73564 X-RAY EXAM KNEE 4 OR MORE: CPT | Mod: TC,50

## 2022-09-26 PROCEDURE — 99213 OFFICE O/P EST LOW 20 MIN: CPT | Mod: PBBFAC | Performed by: ORTHOPAEDIC SURGERY

## 2022-09-26 PROCEDURE — 99999 PR PBB SHADOW E&M-EST. PATIENT-LVL III: CPT | Mod: PBBFAC,,, | Performed by: ORTHOPAEDIC SURGERY
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
## 2022-09-26 NOTE — PATIENT INSTRUCTIONS
Your x-ray show excellent alignment except the left knee patella slightly tilted  I recommend returning to physical therap on the [place] for aquatic therapy   y to strengthen the legs and you wish to go to water therapy  Will send you to see therapist at [facility]    Your Rollator is broken down we will order new 1 for safety purpose  I will see you back in 3 months

## 2022-09-28 ENCOUNTER — TELEPHONE (OUTPATIENT)
Dept: ORTHOPEDICS | Facility: CLINIC | Age: 72
End: 2022-09-28
Payer: MEDICARE

## 2022-09-28 DIAGNOSIS — Z96.652 HISTORY OF TOTAL LEFT KNEE REPLACEMENT: ICD-10-CM

## 2022-09-28 DIAGNOSIS — M47.817 ARTHROPATHY OF LUMBOSACRAL FACET JOINT: ICD-10-CM

## 2022-09-28 DIAGNOSIS — Z96.651 HISTORY OF TOTAL RIGHT KNEE REPLACEMENT: Primary | ICD-10-CM

## 2022-09-28 NOTE — TELEPHONE ENCOUNTER
----- Message from Mikaela Gaona sent at 9/28/2022 10:00 AM CDT -----  Contact: Gillian French is needing a call back to get the status on her walker and she is also needing to discuss her medication. Please call her back at [phone]

## 2022-09-28 NOTE — TELEPHONE ENCOUNTER
Called and spoke with patient -- number given to patient for the DME dept and referral put in for pain mgmnt -- patient verbalized understanding and thankful for call

## 2022-09-30 ENCOUNTER — TELEPHONE (OUTPATIENT)
Dept: ORTHOPEDICS | Facility: CLINIC | Age: 72
End: 2022-09-30
Payer: MEDICARE

## 2022-09-30 NOTE — TELEPHONE ENCOUNTER
Patient reports that her walker/Rolator is broken - requesting for us to reach back out to the DME dept and see about getting her a new Rolator - patient thankful for call and verbalized understanding

## 2022-09-30 NOTE — TELEPHONE ENCOUNTER
----- Message from Jammie Mccann sent at 9/30/2022  3:34 PM CDT -----  Contact: Self/[phone]  Pt is calling in regards to receiving a call back to discuss an important matter. Please give her a call back at [phone]. Thank you s/g

## 2022-11-01 ENCOUNTER — HOSPITAL ENCOUNTER (EMERGENCY)
Facility: HOSPITAL | Age: 72
Discharge: HOME | End: 2022-11-01
Attending: EMERGENCY MEDICINE
Payer: MEDICARE

## 2022-11-01 VITALS
RESPIRATION RATE: 17 BRPM | TEMPERATURE: 97 F | OXYGEN SATURATION: 99 % | HEIGHT: 64 IN | SYSTOLIC BLOOD PRESSURE: 180 MMHG | HEART RATE: 51 BPM | DIASTOLIC BLOOD PRESSURE: 75 MMHG | BODY MASS INDEX: 38.43 KG/M2

## 2022-11-01 DIAGNOSIS — J40 BRONCHITIS: Primary | ICD-10-CM

## 2022-11-01 DIAGNOSIS — R05.9 COUGH: ICD-10-CM

## 2022-11-01 LAB
INFLUENZA A, MOLECULAR: NEGATIVE
INFLUENZA B, MOLECULAR: NEGATIVE
SARS-COV-2 RDRP RESP QL NAA+PROBE: NEGATIVE
SPECIMEN SOURCE: NORMAL

## 2022-11-01 PROCEDURE — 25000242 PHARM REV CODE 250 ALT 637 W/ HCPCS: Performed by: NURSE PRACTITIONER

## 2022-11-01 PROCEDURE — 94640 AIRWAY INHALATION TREATMENT: CPT

## 2022-11-01 PROCEDURE — 25000003 PHARM REV CODE 250: Performed by: NURSE PRACTITIONER

## 2022-11-01 PROCEDURE — 87502 INFLUENZA DNA AMP PROBE: CPT | Performed by: NURSE PRACTITIONER

## 2022-11-01 PROCEDURE — U0002 COVID-19 LAB TEST NON-CDC: HCPCS | Performed by: NURSE PRACTITIONER

## 2022-11-01 PROCEDURE — 99284 EMERGENCY DEPT VISIT MOD MDM: CPT | Mod: 25

## 2022-11-01 RX ORDER — CLONIDINE HYDROCHLORIDE 0.2 MG/1
0.2 TABLET ORAL
Status: COMPLETED | OUTPATIENT
Start: 2022-11-01 | End: 2022-11-01

## 2022-11-01 RX ORDER — AZITHROMYCIN 250 MG/1
250 TABLET, FILM COATED ORAL DAILY
Qty: 6 TABLET | Refills: 0 | Status: ON HOLD | OUTPATIENT
Start: 2022-11-01 | End: 2023-05-20 | Stop reason: HOSPADM

## 2022-11-01 RX ORDER — ALBUTEROL SULFATE 90 UG/1
1-2 AEROSOL, METERED RESPIRATORY (INHALATION) EVERY 6 HOURS PRN
Qty: 18 G | Refills: 0 | Status: SHIPPED | OUTPATIENT
Start: 2022-11-01 | End: 2022-12-01

## 2022-11-01 RX ORDER — HYDROCODONE BITARTRATE AND ACETAMINOPHEN 5; 325 MG/1; MG/1
1 TABLET ORAL
Status: COMPLETED | OUTPATIENT
Start: 2022-11-01 | End: 2022-11-01

## 2022-11-01 RX ORDER — IPRATROPIUM BROMIDE AND ALBUTEROL SULFATE 2.5; .5 MG/3ML; MG/3ML
3 SOLUTION RESPIRATORY (INHALATION)
Status: COMPLETED | OUTPATIENT
Start: 2022-11-01 | End: 2022-11-01

## 2022-11-01 RX ADMIN — CLONIDINE HYDROCHLORIDE 0.2 MG: 0.2 TABLET ORAL at 12:11

## 2022-11-01 RX ADMIN — IPRATROPIUM BROMIDE AND ALBUTEROL SULFATE 3 ML: 2.5; .5 SOLUTION RESPIRATORY (INHALATION) at 10:11

## 2022-11-01 RX ADMIN — HYDROCODONE BITARTRATE AND ACETAMINOPHEN 1 TABLET: 5; 325 TABLET ORAL at 12:11

## 2022-11-01 NOTE — FIRST PROVIDER EVALUATION
"Medical screening examination initiated.  I have conducted a focused provider triage encounter, findings are as follows:    Brief history of present illness:  71-year-old female with complaint of cough and congestion for the past 2-3 days.    Vitals:    11/01/22 0728   BP: (!) 179/74   Patient Position: Sitting   Pulse: 60   Resp: (!) 22   Temp: 97.4 °F (36.3 °C)   TempSrc: Oral   SpO2: 98%   Weight: (S)   Comment: please weigh patient   Height: 5' 4" (1.626 m)       Pertinent physical exam:  AAOX3  "
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
## 2022-11-12 ENCOUNTER — HOSPITAL ENCOUNTER (EMERGENCY)
Facility: HOSPITAL | Age: 72
Discharge: HOME | End: 2022-11-12
Attending: EMERGENCY MEDICINE
Payer: MEDICARE

## 2022-11-12 VITALS
OXYGEN SATURATION: 99 % | RESPIRATION RATE: 21 BRPM | DIASTOLIC BLOOD PRESSURE: 70 MMHG | SYSTOLIC BLOOD PRESSURE: 169 MMHG | TEMPERATURE: 98 F | HEART RATE: 74 BPM

## 2022-11-12 DIAGNOSIS — I10 HTN (HYPERTENSION): ICD-10-CM

## 2022-11-12 DIAGNOSIS — I16.9 HYPERTENSIVE CRISIS: Primary | ICD-10-CM

## 2022-11-12 DIAGNOSIS — R51.9 ACUTE NONINTRACTABLE HEADACHE, UNSPECIFIED HEADACHE TYPE: ICD-10-CM

## 2022-11-12 LAB
ALBUMIN SERPL BCP-MCNC: 4 G/DL (ref 3.5–5.2)
ALP SERPL-CCNC: 117 U/L (ref 55–135)
ALT SERPL W/O P-5'-P-CCNC: 21 U/L (ref 10–44)
ANION GAP SERPL CALC-SCNC: 14 MMOL/L (ref 8–16)
AST SERPL-CCNC: 21 U/L (ref 10–40)
BASOPHILS # BLD AUTO: 0.05 K/UL (ref 0–0.2)
BASOPHILS NFR BLD: 0.8 % (ref 0–1.9)
BILIRUB SERPL-MCNC: 0.4 MG/DL (ref 0.1–1)
BUN SERPL-MCNC: 16 MG/DL (ref 8–23)
CALCIUM SERPL-MCNC: 8.9 MG/DL (ref 8.7–10.5)
CHLORIDE SERPL-SCNC: 102 MMOL/L (ref 95–110)
CO2 SERPL-SCNC: 22 MMOL/L (ref 23–29)
CREAT SERPL-MCNC: 4.3 MG/DL (ref 0.5–1.4)
DIFFERENTIAL METHOD: ABNORMAL
EOSINOPHIL # BLD AUTO: 0.2 K/UL (ref 0–0.5)
EOSINOPHIL NFR BLD: 3.9 % (ref 0–8)
ERYTHROCYTE [DISTWIDTH] IN BLOOD BY AUTOMATED COUNT: 14.7 % (ref 11.5–14.5)
EST. GFR  (NO RACE VARIABLE): 10 ML/MIN/1.73 M^2
GLUCOSE SERPL-MCNC: 83 MG/DL (ref 70–110)
GROUP A STREP, MOLECULAR: NEGATIVE
HCT VFR BLD AUTO: 34.1 % (ref 37–48.5)
HGB BLD-MCNC: 11 G/DL (ref 12–16)
IMM GRANULOCYTES # BLD AUTO: 0.03 K/UL (ref 0–0.04)
IMM GRANULOCYTES NFR BLD AUTO: 0.5 % (ref 0–0.5)
LYMPHOCYTES # BLD AUTO: 2.3 K/UL (ref 1–4.8)
LYMPHOCYTES NFR BLD: 39 % (ref 18–48)
MAGNESIUM SERPL-MCNC: 2 MG/DL (ref 1.6–2.6)
MCH RBC QN AUTO: 30.6 PG (ref 27–31)
MCHC RBC AUTO-ENTMCNC: 32.3 G/DL (ref 32–36)
MCV RBC AUTO: 95 FL (ref 82–98)
MONOCYTES # BLD AUTO: 0.7 K/UL (ref 0.3–1)
MONOCYTES NFR BLD: 11.1 % (ref 4–15)
NEUTROPHILS # BLD AUTO: 2.7 K/UL (ref 1.8–7.7)
NEUTROPHILS NFR BLD: 44.7 % (ref 38–73)
NRBC BLD-RTO: 0 /100 WBC
PHOSPHATE SERPL-MCNC: 2.9 MG/DL (ref 2.7–4.5)
PLATELET # BLD AUTO: 226 K/UL (ref 150–450)
PMV BLD AUTO: 10.1 FL (ref 9.2–12.9)
POTASSIUM SERPL-SCNC: 3.9 MMOL/L (ref 3.5–5.1)
PROT SERPL-MCNC: 7.6 G/DL (ref 6–8.4)
RBC # BLD AUTO: 3.6 M/UL (ref 4–5.4)
SODIUM SERPL-SCNC: 138 MMOL/L (ref 136–145)
TROPONIN I SERPL DL<=0.01 NG/ML-MCNC: 0.09 NG/ML (ref 0–0.03)
WBC # BLD AUTO: 5.97 K/UL (ref 3.9–12.7)

## 2022-11-12 PROCEDURE — 96365 THER/PROPH/DIAG IV INF INIT: CPT

## 2022-11-12 PROCEDURE — 25000003 PHARM REV CODE 250: Performed by: EMERGENCY MEDICINE

## 2022-11-12 PROCEDURE — 84100 ASSAY OF PHOSPHORUS: CPT | Performed by: EMERGENCY MEDICINE

## 2022-11-12 PROCEDURE — 83735 ASSAY OF MAGNESIUM: CPT | Performed by: EMERGENCY MEDICINE

## 2022-11-12 PROCEDURE — 85025 COMPLETE CBC W/AUTO DIFF WBC: CPT | Performed by: EMERGENCY MEDICINE

## 2022-11-12 PROCEDURE — 63600175 PHARM REV CODE 636 W HCPCS: Performed by: EMERGENCY MEDICINE

## 2022-11-12 PROCEDURE — 93005 ELECTROCARDIOGRAM TRACING: CPT

## 2022-11-12 PROCEDURE — 87651 STREP A DNA AMP PROBE: CPT | Performed by: EMERGENCY MEDICINE

## 2022-11-12 PROCEDURE — 80053 COMPREHEN METABOLIC PANEL: CPT | Performed by: EMERGENCY MEDICINE

## 2022-11-12 PROCEDURE — 93010 ELECTROCARDIOGRAM REPORT: CPT | Mod: ,,, | Performed by: INTERNAL MEDICINE

## 2022-11-12 PROCEDURE — 93010 EKG 12-LEAD: ICD-10-PCS | Mod: ,,, | Performed by: INTERNAL MEDICINE

## 2022-11-12 PROCEDURE — 99291 CRITICAL CARE FIRST HOUR: CPT | Mod: 25

## 2022-11-12 PROCEDURE — 96375 TX/PRO/DX INJ NEW DRUG ADDON: CPT

## 2022-11-12 PROCEDURE — 84484 ASSAY OF TROPONIN QUANT: CPT | Performed by: EMERGENCY MEDICINE

## 2022-11-12 RX ORDER — CLONIDINE HYDROCHLORIDE 0.1 MG/1
0.2 TABLET ORAL 3 TIMES DAILY
Qty: 180 TABLET | Refills: 0 | Status: SHIPPED | OUTPATIENT
Start: 2022-11-12 | End: 2022-12-12

## 2022-11-12 RX ORDER — DIPHENHYDRAMINE HYDROCHLORIDE 50 MG/ML
25 INJECTION INTRAMUSCULAR; INTRAVENOUS
Status: COMPLETED | OUTPATIENT
Start: 2022-11-12 | End: 2022-11-12

## 2022-11-12 RX ORDER — HYDRALAZINE HYDROCHLORIDE 20 MG/ML
10 INJECTION INTRAMUSCULAR; INTRAVENOUS
Status: COMPLETED | OUTPATIENT
Start: 2022-11-12 | End: 2022-11-12

## 2022-11-12 RX ORDER — PROMETHAZINE HYDROCHLORIDE 25 MG/1
25 TABLET ORAL EVERY 6 HOURS PRN
Qty: 15 TABLET | Refills: 0 | Status: SHIPPED | OUTPATIENT
Start: 2022-11-12 | End: 2022-11-19

## 2022-11-12 RX ORDER — HYDROXYZINE PAMOATE 25 MG/1
25 CAPSULE ORAL
Status: COMPLETED | OUTPATIENT
Start: 2022-11-12 | End: 2022-11-12

## 2022-11-12 RX ORDER — HYDROMORPHONE HYDROCHLORIDE 1 MG/ML
0.5 INJECTION, SOLUTION INTRAMUSCULAR; INTRAVENOUS; SUBCUTANEOUS
Status: COMPLETED | OUTPATIENT
Start: 2022-11-12 | End: 2022-11-12

## 2022-11-12 RX ORDER — PROCHLORPERAZINE EDISYLATE 5 MG/ML
10 INJECTION INTRAMUSCULAR; INTRAVENOUS
Status: COMPLETED | OUTPATIENT
Start: 2022-11-12 | End: 2022-11-12

## 2022-11-12 RX ORDER — HYDRALAZINE HYDROCHLORIDE 20 MG/ML
20 INJECTION INTRAMUSCULAR; INTRAVENOUS
Status: DISCONTINUED | OUTPATIENT
Start: 2022-11-12 | End: 2022-11-12

## 2022-11-12 RX ORDER — PROMETHAZINE HYDROCHLORIDE 25 MG/1
25 SUPPOSITORY RECTAL EVERY 6 HOURS PRN
Qty: 10 SUPPOSITORY | Refills: 0 | Status: SHIPPED | OUTPATIENT
Start: 2022-11-12 | End: 2024-01-08

## 2022-11-12 RX ADMIN — PROMETHAZINE HYDROCHLORIDE 12.5 MG: 25 INJECTION INTRAMUSCULAR; INTRAVENOUS at 07:11

## 2022-11-12 RX ADMIN — DIPHENHYDRAMINE HYDROCHLORIDE 25 MG: 50 INJECTION, SOLUTION INTRAMUSCULAR; INTRAVENOUS at 07:11

## 2022-11-12 RX ADMIN — PROCHLORPERAZINE EDISYLATE 10 MG: 5 INJECTION INTRAMUSCULAR; INTRAVENOUS at 05:11

## 2022-11-12 RX ADMIN — HYDROXYZINE PAMOATE 25 MG: 25 CAPSULE ORAL at 06:11

## 2022-11-12 RX ADMIN — HYDRALAZINE HYDROCHLORIDE 10 MG: 20 INJECTION, SOLUTION INTRAMUSCULAR; INTRAVENOUS at 06:11

## 2022-11-12 RX ADMIN — HYDROMORPHONE HYDROCHLORIDE 0.5 MG: 1 INJECTION, SOLUTION INTRAMUSCULAR; INTRAVENOUS; SUBCUTANEOUS at 05:11
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
## 2022-12-06 ENCOUNTER — DOCUMENTATION ONLY (OUTPATIENT)
Dept: NEPHROLOGY | Facility: CLINIC | Age: 72
End: 2022-12-06
Payer: MEDICARE
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
## 2022-12-13 ENCOUNTER — TELEPHONE (OUTPATIENT)
Dept: PAIN MEDICINE | Facility: CLINIC | Age: 72
End: 2022-12-13
Payer: MEDICARE

## 2022-12-19 ENCOUNTER — TELEPHONE (OUTPATIENT)
Dept: PAIN MEDICINE | Facility: CLINIC | Age: 72
End: 2022-12-19
Payer: MEDICARE

## 2022-12-22 PROBLEM — I65.23 BILATERAL CAROTID ARTERY STENOSIS: Status: ACTIVE | Noted: 2022-12-22

## 2023-02-03 ENCOUNTER — TELEPHONE (OUTPATIENT)
Dept: CARDIOLOGY | Facility: CLINIC | Age: 73
End: 2023-02-03
Payer: MEDICARE

## 2023-02-03 NOTE — TELEPHONE ENCOUNTER
[facility] Staff  Caller: Gillian (Today,  8:21 AM)  Gillian would like a call back at [phone] in regards to getting another appointment with  due to having to cancel the one for today 2/3/23, because of emergency surgery.   Thanks   Am     Returned call no answer left vm to return call

## 2023-03-03 ENCOUNTER — HOSPITAL ENCOUNTER (EMERGENCY)
Facility: HOSPITAL | Age: 73
Discharge: HOME | End: 2023-03-04
Attending: EMERGENCY MEDICINE
Payer: MEDICARE

## 2023-03-03 ENCOUNTER — NURSE TRIAGE (OUTPATIENT)
Dept: ADMINISTRATIVE | Facility: CLINIC | Age: 73
End: 2023-03-03
Payer: MEDICARE

## 2023-03-03 DIAGNOSIS — E87.5 HYPERKALEMIA: ICD-10-CM

## 2023-03-03 DIAGNOSIS — N18.6 ESRD ON HEMODIALYSIS: ICD-10-CM

## 2023-03-03 DIAGNOSIS — Z99.2 ESRD ON HEMODIALYSIS: ICD-10-CM

## 2023-03-03 DIAGNOSIS — R52 PAIN: ICD-10-CM

## 2023-03-03 DIAGNOSIS — I10 HYPERTENSION, UNSPECIFIED TYPE: ICD-10-CM

## 2023-03-03 DIAGNOSIS — S83.105A KNEE DISLOCATION, LEFT, INITIAL ENCOUNTER: Primary | ICD-10-CM

## 2023-03-03 LAB
ALBUMIN SERPL BCP-MCNC: 3.7 G/DL (ref 3.5–5.2)
ALP SERPL-CCNC: 106 U/L (ref 55–135)
ALT SERPL W/O P-5'-P-CCNC: 9 U/L (ref 10–44)
ANION GAP SERPL CALC-SCNC: 13 MMOL/L (ref 8–16)
AST SERPL-CCNC: 17 U/L (ref 10–40)
BASOPHILS # BLD AUTO: 0.04 K/UL (ref 0–0.2)
BASOPHILS NFR BLD: 0.7 % (ref 0–1.9)
BILIRUB SERPL-MCNC: 0.3 MG/DL (ref 0.1–1)
BUN SERPL-MCNC: 37 MG/DL (ref 8–23)
CALCIUM SERPL-MCNC: 8.2 MG/DL (ref 8.7–10.5)
CHLORIDE SERPL-SCNC: 101 MMOL/L (ref 95–110)
CO2 SERPL-SCNC: 23 MMOL/L (ref 23–29)
CREAT SERPL-MCNC: 7.4 MG/DL (ref 0.5–1.4)
DIFFERENTIAL METHOD: ABNORMAL
EOSINOPHIL # BLD AUTO: 0.1 K/UL (ref 0–0.5)
EOSINOPHIL NFR BLD: 2.4 % (ref 0–8)
ERYTHROCYTE [DISTWIDTH] IN BLOOD BY AUTOMATED COUNT: 15.1 % (ref 11.5–14.5)
EST. GFR  (NO RACE VARIABLE): 5 ML/MIN/1.73 M^2
GLUCOSE SERPL-MCNC: 104 MG/DL (ref 70–110)
HCT VFR BLD AUTO: 36.2 % (ref 37–48.5)
HGB BLD-MCNC: 11.5 G/DL (ref 12–16)
IMM GRANULOCYTES # BLD AUTO: 0.01 K/UL (ref 0–0.04)
IMM GRANULOCYTES NFR BLD AUTO: 0.2 % (ref 0–0.5)
LYMPHOCYTES # BLD AUTO: 2.8 K/UL (ref 1–4.8)
LYMPHOCYTES NFR BLD: 48.1 % (ref 18–48)
MCH RBC QN AUTO: 30.3 PG (ref 27–31)
MCHC RBC AUTO-ENTMCNC: 31.8 G/DL (ref 32–36)
MCV RBC AUTO: 96 FL (ref 82–98)
MONOCYTES # BLD AUTO: 0.9 K/UL (ref 0.3–1)
MONOCYTES NFR BLD: 14.5 % (ref 4–15)
NEUTROPHILS # BLD AUTO: 2 K/UL (ref 1.8–7.7)
NEUTROPHILS NFR BLD: 34.1 % (ref 38–73)
NRBC BLD-RTO: 0 /100 WBC
PLATELET # BLD AUTO: 167 K/UL (ref 150–450)
PMV BLD AUTO: 10.5 FL (ref 9.2–12.9)
POCT GLUCOSE: 59 MG/DL (ref 70–110)
POCT GLUCOSE: 89 MG/DL (ref 70–110)
POTASSIUM SERPL-SCNC: 5.7 MMOL/L (ref 3.5–5.1)
PROT SERPL-MCNC: 6.8 G/DL (ref 6–8.4)
RBC # BLD AUTO: 3.79 M/UL (ref 4–5.4)
SODIUM SERPL-SCNC: 137 MMOL/L (ref 136–145)
WBC # BLD AUTO: 5.86 K/UL (ref 3.9–12.7)

## 2023-03-03 PROCEDURE — 63600175 PHARM REV CODE 636 W HCPCS: Performed by: EMERGENCY MEDICINE

## 2023-03-03 PROCEDURE — 25000003 PHARM REV CODE 250: Performed by: EMERGENCY MEDICINE

## 2023-03-03 PROCEDURE — 25500020 PHARM REV CODE 255: Performed by: EMERGENCY MEDICINE

## 2023-03-03 PROCEDURE — 82962 GLUCOSE BLOOD TEST: CPT | Mod: 91

## 2023-03-03 PROCEDURE — 96375 TX/PRO/DX INJ NEW DRUG ADDON: CPT

## 2023-03-03 PROCEDURE — 80053 COMPREHEN METABOLIC PANEL: CPT | Performed by: EMERGENCY MEDICINE

## 2023-03-03 PROCEDURE — 96361 HYDRATE IV INFUSION ADD-ON: CPT

## 2023-03-03 PROCEDURE — 93010 ELECTROCARDIOGRAM REPORT: CPT | Mod: ,,, | Performed by: INTERNAL MEDICINE

## 2023-03-03 PROCEDURE — 93005 ELECTROCARDIOGRAM TRACING: CPT

## 2023-03-03 PROCEDURE — 93010 EKG 12-LEAD: ICD-10-PCS | Mod: ,,, | Performed by: INTERNAL MEDICINE

## 2023-03-03 PROCEDURE — 96376 TX/PRO/DX INJ SAME DRUG ADON: CPT

## 2023-03-03 PROCEDURE — 85025 COMPLETE CBC W/AUTO DIFF WBC: CPT | Performed by: EMERGENCY MEDICINE

## 2023-03-03 PROCEDURE — 29505 APPLICATION LONG LEG SPLINT: CPT | Mod: LT

## 2023-03-03 PROCEDURE — 96365 THER/PROPH/DIAG IV INF INIT: CPT

## 2023-03-03 RX ORDER — CLONIDINE HYDROCHLORIDE 0.3 MG/1
0.3 TABLET ORAL
Status: COMPLETED | OUTPATIENT
Start: 2023-03-03 | End: 2023-03-03

## 2023-03-03 RX ORDER — HYDRALAZINE HYDROCHLORIDE 25 MG/1
50 TABLET, FILM COATED ORAL ONCE
Status: COMPLETED | OUTPATIENT
Start: 2023-03-03 | End: 2023-03-03

## 2023-03-03 RX ORDER — DEXTROSE MONOHYDRATE 100 MG/ML
INJECTION, SOLUTION INTRAVENOUS
Status: COMPLETED | OUTPATIENT
Start: 2023-03-03 | End: 2023-03-04

## 2023-03-03 RX ORDER — HYDRALAZINE HYDROCHLORIDE 25 MG/1
25 TABLET, FILM COATED ORAL ONCE
Status: DISCONTINUED | OUTPATIENT
Start: 2023-03-03 | End: 2023-03-03

## 2023-03-03 RX ORDER — FENTANYL CITRATE 50 UG/ML
25 INJECTION, SOLUTION INTRAMUSCULAR; INTRAVENOUS
Status: COMPLETED | OUTPATIENT
Start: 2023-03-03 | End: 2023-03-03

## 2023-03-03 RX ORDER — ONDANSETRON 2 MG/ML
4 INJECTION INTRAMUSCULAR; INTRAVENOUS
Status: COMPLETED | OUTPATIENT
Start: 2023-03-03 | End: 2023-03-03

## 2023-03-03 RX ORDER — CALCIUM GLUCONATE 20 MG/ML
1 INJECTION, SOLUTION INTRAVENOUS
Status: COMPLETED | OUTPATIENT
Start: 2023-03-03 | End: 2023-03-03

## 2023-03-03 RX ORDER — CLONIDINE HYDROCHLORIDE 0.1 MG/1
0.1 TABLET ORAL
Status: DISCONTINUED | OUTPATIENT
Start: 2023-03-03 | End: 2023-03-03

## 2023-03-03 RX ADMIN — SODIUM ZIRCONIUM CYCLOSILICATE 10 G: 5 POWDER, FOR SUSPENSION ORAL at 09:03

## 2023-03-03 RX ADMIN — HYDRALAZINE HYDROCHLORIDE 50 MG: 25 TABLET, FILM COATED ORAL at 09:03

## 2023-03-03 RX ADMIN — CLONIDINE HYDROCHLORIDE 0.3 MG: 0.3 TABLET ORAL at 09:03

## 2023-03-03 RX ADMIN — IOHEXOL 100 ML: 350 INJECTION, SOLUTION INTRAVENOUS at 10:03

## 2023-03-03 RX ADMIN — ONDANSETRON 4 MG: 2 INJECTION INTRAMUSCULAR; INTRAVENOUS at 09:03

## 2023-03-03 RX ADMIN — CALCIUM GLUCONATE 1 G: 20 INJECTION, SOLUTION INTRAVENOUS at 09:03

## 2023-03-03 RX ADMIN — INSULIN HUMAN 5 UNITS: 100 INJECTION, SOLUTION PARENTERAL at 09:03

## 2023-03-03 RX ADMIN — DEXTROSE MONOHYDRATE: 100 INJECTION, SOLUTION INTRAVENOUS at 11:03

## 2023-03-03 RX ADMIN — DEXTROSE MONOHYDRATE 250 ML: 100 INJECTION, SOLUTION INTRAVENOUS at 09:03

## 2023-03-03 RX ADMIN — FENTANYL CITRATE 25 MCG: 50 INJECTION, SOLUTION INTRAMUSCULAR; INTRAVENOUS at 09:03

## 2023-03-03 NOTE — TELEPHONE ENCOUNTER
"Reason for Disposition   Can't stand (bear weight) or walk    Additional Information   Negative: Serious injury with multiple fractures (broken bones)   Negative: [1] Major bleeding (e.g., actively dripping or spurting) AND [2] can't be stopped   Negative: Looks like a dislocated joint or knee cap (crooked or deformed)   Negative: Bullet wound, stabbed by knife, or other serious penetrating wound   Negative: Sounds like a life-threatening emergency to the triager   Negative: Wound looks infected    Protocols used: Knee Injury-A-    Gillian states she was walking just prior to call and she states where her knee is on her right leg it appears "" from the leg. She says she can not walk or bear weight on her leg. She had a right knee replacement on that leg. Recommended to have someone bring her to ED. She agrees to be evaluated in ED.  "

## 2023-03-04 VITALS
DIASTOLIC BLOOD PRESSURE: 75 MMHG | TEMPERATURE: 98 F | OXYGEN SATURATION: 96 % | WEIGHT: 230 LBS | HEART RATE: 64 BPM | SYSTOLIC BLOOD PRESSURE: 182 MMHG | BODY MASS INDEX: 39.48 KG/M2 | RESPIRATION RATE: 18 BRPM

## 2023-03-04 PROBLEM — S83.105A KNEE DISLOCATION, LEFT, INITIAL ENCOUNTER: Status: ACTIVE | Noted: 2023-03-04

## 2023-03-04 LAB
ANION GAP SERPL CALC-SCNC: 12 MMOL/L (ref 8–16)
BNP SERPL-MCNC: 566 PG/ML (ref 0–99)
BUN SERPL-MCNC: 43 MG/DL (ref 8–23)
CALCIUM SERPL-MCNC: 8 MG/DL (ref 8.7–10.5)
CHLORIDE SERPL-SCNC: 102 MMOL/L (ref 95–110)
CO2 SERPL-SCNC: 20 MMOL/L (ref 23–29)
CREAT SERPL-MCNC: 6.9 MG/DL (ref 0.5–1.4)
EST. GFR  (NO RACE VARIABLE): 6 ML/MIN/1.73 M^2
GLUCOSE SERPL-MCNC: 92 MG/DL (ref 70–110)
POCT GLUCOSE: 167 MG/DL (ref 70–110)
POTASSIUM SERPL-SCNC: 5 MMOL/L (ref 3.5–5.1)
SODIUM SERPL-SCNC: 134 MMOL/L (ref 136–145)

## 2023-03-04 PROCEDURE — 63600175 PHARM REV CODE 636 W HCPCS: Performed by: EMERGENCY MEDICINE

## 2023-03-04 PROCEDURE — 80100016 HC MAINTENANCE HEMODIALYSIS

## 2023-03-04 PROCEDURE — 83880 ASSAY OF NATRIURETIC PEPTIDE: CPT | Performed by: EMERGENCY MEDICINE

## 2023-03-04 PROCEDURE — 99285 EMERGENCY DEPT VISIT HI MDM: CPT | Mod: 25

## 2023-03-04 PROCEDURE — 25000003 PHARM REV CODE 250: Performed by: EMERGENCY MEDICINE

## 2023-03-04 PROCEDURE — 25500020 PHARM REV CODE 255: Performed by: EMERGENCY MEDICINE

## 2023-03-04 PROCEDURE — 99283 EMERGENCY DEPT VISIT LOW MDM: CPT | Mod: ,,, | Performed by: ORTHOPAEDIC SURGERY

## 2023-03-04 PROCEDURE — 99283 PR EMERGENCY DEPT VISIT,LEVEL III: ICD-10-PCS | Mod: ,,, | Performed by: ORTHOPAEDIC SURGERY

## 2023-03-04 PROCEDURE — 29505 APPLICATION LONG LEG SPLINT: CPT | Mod: LT

## 2023-03-04 PROCEDURE — 82962 GLUCOSE BLOOD TEST: CPT

## 2023-03-04 PROCEDURE — 80048 BASIC METABOLIC PNL TOTAL CA: CPT | Performed by: EMERGENCY MEDICINE

## 2023-03-04 PROCEDURE — G0257 UNSCHED DIALYSIS ESRD PT HOS: HCPCS

## 2023-03-04 RX ORDER — HYDROMORPHONE HYDROCHLORIDE 2 MG/ML
1 INJECTION, SOLUTION INTRAMUSCULAR; INTRAVENOUS; SUBCUTANEOUS
Status: COMPLETED | OUTPATIENT
Start: 2023-03-04 | End: 2023-03-04

## 2023-03-04 RX ORDER — ONDANSETRON 2 MG/ML
8 INJECTION INTRAMUSCULAR; INTRAVENOUS
Status: COMPLETED | OUTPATIENT
Start: 2023-03-04 | End: 2023-03-04

## 2023-03-04 RX ORDER — HYDRALAZINE HYDROCHLORIDE 20 MG/ML
10 INJECTION INTRAMUSCULAR; INTRAVENOUS
Status: COMPLETED | OUTPATIENT
Start: 2023-03-04 | End: 2023-03-04

## 2023-03-04 RX ORDER — HYDROCODONE BITARTRATE AND ACETAMINOPHEN 5; 325 MG/1; MG/1
1 TABLET ORAL EVERY 8 HOURS PRN
Qty: 18 TABLET | Refills: 0 | Status: SHIPPED | OUTPATIENT
Start: 2023-03-04 | End: 2023-03-11

## 2023-03-04 RX ORDER — FENTANYL CITRATE 50 UG/ML
50 INJECTION, SOLUTION INTRAMUSCULAR; INTRAVENOUS
Status: COMPLETED | OUTPATIENT
Start: 2023-03-04 | End: 2023-03-04

## 2023-03-04 RX ADMIN — HYDROMORPHONE HYDROCHLORIDE 1 MG: 2 INJECTION INTRAMUSCULAR; INTRAVENOUS; SUBCUTANEOUS at 12:03

## 2023-03-04 RX ADMIN — ONDANSETRON 8 MG: 2 INJECTION INTRAMUSCULAR; INTRAVENOUS at 12:03

## 2023-03-04 RX ADMIN — IOHEXOL 100 ML: 350 INJECTION, SOLUTION INTRAVENOUS at 09:03

## 2023-03-04 RX ADMIN — HYDROMORPHONE HYDROCHLORIDE 1 MG: 2 INJECTION INTRAMUSCULAR; INTRAVENOUS; SUBCUTANEOUS at 09:03

## 2023-03-04 RX ADMIN — BENZOCAINE, BUTAMBEN, AND TETRACAINE HYDROCHLORIDE 1 SPRAY: .028; .004; .004 AEROSOL, SPRAY TOPICAL at 08:03

## 2023-03-04 RX ADMIN — FENTANYL CITRATE 50 MCG: 50 INJECTION, SOLUTION INTRAMUSCULAR; INTRAVENOUS at 02:03

## 2023-03-04 RX ADMIN — HYDRALAZINE HYDROCHLORIDE 10 MG: 20 INJECTION, SOLUTION INTRAMUSCULAR; INTRAVENOUS at 09:03

## 2023-03-04 NOTE — ED NOTES
Pt resting in ED stretcher comfortably, skin warm and dry, RR even and unlabored. PATRICKS. NADN.
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
## 2023-03-04 NOTE — PROGRESS NOTES
03/04/23 1526   Post-Hemodialysis Assessment   Blood Volume Processed (Liters) 48 L   Dialyzer Clearance Lightly streaked   Duration of Treatment 120 minutes   Total UF (mL) 3000 mL   Patient Response to Treatment Pt tolerated tx well

## 2023-03-04 NOTE — ED NOTES
MD Ekta notified of Pt's blood glucose, verbal order to start d10 500ml bolus given. Pt gcs 15, skin warm and dry, rr even and unlabored. BEBETO. [facility].
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
## 2023-03-04 NOTE — ED NOTES
Pt is AAO x 4, hypertensive on monitor, pt denies HA, CP, dizziness, or SOB. Respirations e/u, skin warm and dry, Call light within reach, will continue to monitor.

## 2023-03-04 NOTE — ED NOTES
Addended by: RODGER MENON on: 12/26/2019 11:45 AM     Modules accepted: Orders     Bed: 10  Expected date:   Expected time:   Means of arrival:   Comments:
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
## 2023-03-06 ENCOUNTER — TELEPHONE (OUTPATIENT)
Dept: ORTHOPEDICS | Facility: CLINIC | Age: 73
End: 2023-03-06
Payer: MEDICARE

## 2023-03-06 NOTE — TELEPHONE ENCOUNTER
Spoke with patient and informed her that I would check with the provider on tomorrow to see who she needs to follow up with. Understanding verbalized.

## 2023-03-06 NOTE — TELEPHONE ENCOUNTER
----- Message from Rona Martinez LPN sent at 3/6/2023  2:13 PM CST -----  Contact: Gillian Grimaldo do louisa want to see her sooner? - she is a [facility] patient and does have an appt with him on 03/27/23  ----- Message -----  From: Daria Tolbert  Sent: 3/6/2023   1:58 PM CST  To: Jeff [facility] Staff    Gillian needs a call back at [phone], Regards to her recent hospital visit over the weekend for her leg.    Thanks  Td

## 2023-03-07 ENCOUNTER — TELEPHONE (OUTPATIENT)
Dept: ORTHOPEDICS | Facility: CLINIC | Age: 73
End: 2023-03-07
Payer: MEDICARE

## 2023-03-07 NOTE — TELEPHONE ENCOUNTER
----- Message from Mary Jaime MA sent at 3/6/2023  3:20 PM CST -----  Contact: Gillian    ----- Message -----  From: Rona Martinez LPN  Sent: 3/6/2023   2:13 PM CST  To: [facility]    Hey do yall want to see her sooner? - she is a [facility] patient and does have an appt with him on 03/27/23  ----- Message -----  From: Daria Tolbert  Sent: 3/6/2023   1:58 PM CST  To: Jeff French needs a call back at [phone], Regards to her recent hospital visit over the weekend for her leg.    Thanks  Td

## 2023-03-23 ENCOUNTER — TELEPHONE (OUTPATIENT)
Dept: CARDIOLOGY | Facility: CLINIC | Age: 73
End: 2023-03-23
Payer: MEDICAID

## 2023-03-23 NOTE — TELEPHONE ENCOUNTER
Attempted to reach pt to make an appt. No answer and left a vm      ----- Message from Aruna Urena sent at 3/23/2023  1:20 PM CDT -----  Contact: Gillian  Patient is calling to speak with the nurse regarding appt. Reports needing annual appt. Please give patient a call back at .[phone].

## 2023-03-27 ENCOUNTER — TELEPHONE (OUTPATIENT)
Dept: ORTHOPEDICS | Facility: CLINIC | Age: 73
End: 2023-03-27
Payer: MEDICAID
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
## 2023-03-27 NOTE — TELEPHONE ENCOUNTER
Called patient to let her know to call us when she is discharged and we will work on getting her worked back in to Dr. Aguilar schedule when he is back in clinic in April.

## 2023-05-18 ENCOUNTER — HOSPITAL ENCOUNTER (INPATIENT)
Facility: HOSPITAL | Age: 73
LOS: 1 days | Discharge: HOME | DRG: 280 | End: 2023-05-20
Attending: EMERGENCY MEDICINE | Admitting: INTERNAL MEDICINE
Payer: MEDICARE

## 2023-05-18 DIAGNOSIS — I21.4 NSTEMI (NON-ST ELEVATED MYOCARDIAL INFARCTION): ICD-10-CM

## 2023-05-18 DIAGNOSIS — R06.02 SHORTNESS OF BREATH: ICD-10-CM

## 2023-05-18 DIAGNOSIS — J81.0 ACUTE PULMONARY EDEMA: ICD-10-CM

## 2023-05-18 DIAGNOSIS — N18.5 CHRONIC RENAL FAILURE, STAGE 5: ICD-10-CM

## 2023-05-18 DIAGNOSIS — R79.89 ELEVATED TROPONIN: ICD-10-CM

## 2023-05-18 DIAGNOSIS — R07.9 CHEST PAIN: ICD-10-CM

## 2023-05-18 DIAGNOSIS — N18.6 ESRD (END STAGE RENAL DISEASE): ICD-10-CM

## 2023-05-18 DIAGNOSIS — I16.1 HYPERTENSIVE EMERGENCY: Primary | ICD-10-CM

## 2023-05-18 DIAGNOSIS — I50.9 CONGESTIVE HEART FAILURE, UNSPECIFIED HF CHRONICITY, UNSPECIFIED HEART FAILURE TYPE: ICD-10-CM

## 2023-05-18 DIAGNOSIS — I10 UNCONTROLLED HYPERTENSION: ICD-10-CM

## 2023-05-18 LAB
ALBUMIN SERPL BCP-MCNC: 3.6 G/DL (ref 3.5–5.2)
ALP SERPL-CCNC: 106 U/L (ref 55–135)
ALT SERPL W/O P-5'-P-CCNC: 11 U/L (ref 10–44)
ANION GAP SERPL CALC-SCNC: 15 MMOL/L (ref 8–16)
AST SERPL-CCNC: 19 U/L (ref 10–40)
BASOPHILS # BLD AUTO: 0.07 K/UL (ref 0–0.2)
BASOPHILS NFR BLD: 0.8 % (ref 0–1.9)
BILIRUB SERPL-MCNC: 0.5 MG/DL (ref 0.1–1)
BNP SERPL-MCNC: 1491 PG/ML (ref 0–99)
BUN SERPL-MCNC: 69 MG/DL (ref 8–23)
CALCIUM SERPL-MCNC: 8.4 MG/DL (ref 8.7–10.5)
CHLORIDE SERPL-SCNC: 103 MMOL/L (ref 95–110)
CO2 SERPL-SCNC: 21 MMOL/L (ref 23–29)
CREAT SERPL-MCNC: 8.3 MG/DL (ref 0.5–1.4)
DIFFERENTIAL METHOD: ABNORMAL
EOSINOPHIL # BLD AUTO: 0.3 K/UL (ref 0–0.5)
EOSINOPHIL NFR BLD: 2.7 % (ref 0–8)
ERYTHROCYTE [DISTWIDTH] IN BLOOD BY AUTOMATED COUNT: 15.9 % (ref 11.5–14.5)
EST. GFR  (NO RACE VARIABLE): 5 ML/MIN/1.73 M^2
GLUCOSE SERPL-MCNC: 138 MG/DL (ref 70–110)
HCT VFR BLD AUTO: 26.8 % (ref 37–48.5)
HGB BLD-MCNC: 8.2 G/DL (ref 12–16)
IMM GRANULOCYTES # BLD AUTO: 0.04 K/UL (ref 0–0.04)
IMM GRANULOCYTES NFR BLD AUTO: 0.4 % (ref 0–0.5)
LACTATE SERPL-SCNC: 0.9 MMOL/L (ref 0.5–2.2)
LYMPHOCYTES # BLD AUTO: 2 K/UL (ref 1–4.8)
LYMPHOCYTES NFR BLD: 21.8 % (ref 18–48)
MCH RBC QN AUTO: 29.2 PG (ref 27–31)
MCHC RBC AUTO-ENTMCNC: 30.6 G/DL (ref 32–36)
MCV RBC AUTO: 95 FL (ref 82–98)
MONOCYTES # BLD AUTO: 0.8 K/UL (ref 0.3–1)
MONOCYTES NFR BLD: 8.5 % (ref 4–15)
NEUTROPHILS # BLD AUTO: 6 K/UL (ref 1.8–7.7)
NEUTROPHILS NFR BLD: 65.8 % (ref 38–73)
NRBC BLD-RTO: 0 /100 WBC
PLATELET # BLD AUTO: 276 K/UL (ref 150–450)
PMV BLD AUTO: 10.4 FL (ref 9.2–12.9)
POTASSIUM SERPL-SCNC: 4.7 MMOL/L (ref 3.5–5.1)
PROT SERPL-MCNC: 6.8 G/DL (ref 6–8.4)
RBC # BLD AUTO: 2.81 M/UL (ref 4–5.4)
SODIUM SERPL-SCNC: 139 MMOL/L (ref 136–145)
TROPONIN I SERPL DL<=0.01 NG/ML-MCNC: 0.05 NG/ML (ref 0–0.03)
TROPONIN I SERPL DL<=0.01 NG/ML-MCNC: 0.2 NG/ML (ref 0–0.03)
TROPONIN I SERPL DL<=0.01 NG/ML-MCNC: 0.37 NG/ML (ref 0–0.03)
WBC # BLD AUTO: 9.14 K/UL (ref 3.9–12.7)

## 2023-05-18 PROCEDURE — 85025 COMPLETE CBC W/AUTO DIFF WBC: CPT | Performed by: EMERGENCY MEDICINE

## 2023-05-18 PROCEDURE — 84484 ASSAY OF TROPONIN QUANT: CPT | Performed by: EMERGENCY MEDICINE

## 2023-05-18 PROCEDURE — G0378 HOSPITAL OBSERVATION PER HR: HCPCS

## 2023-05-18 PROCEDURE — 83880 ASSAY OF NATRIURETIC PEPTIDE: CPT | Performed by: EMERGENCY MEDICINE

## 2023-05-18 PROCEDURE — 99291 CRITICAL CARE FIRST HOUR: CPT

## 2023-05-18 PROCEDURE — 25000003 PHARM REV CODE 250: Performed by: EMERGENCY MEDICINE

## 2023-05-18 PROCEDURE — 80053 COMPREHEN METABOLIC PANEL: CPT | Performed by: EMERGENCY MEDICINE

## 2023-05-18 PROCEDURE — 99215 OFFICE O/P EST HI 40 MIN: CPT | Mod: ,,, | Performed by: INTERNAL MEDICINE

## 2023-05-18 PROCEDURE — 96376 TX/PRO/DX INJ SAME DRUG ADON: CPT

## 2023-05-18 PROCEDURE — 96375 TX/PRO/DX INJ NEW DRUG ADDON: CPT

## 2023-05-18 PROCEDURE — 63600175 PHARM REV CODE 636 W HCPCS: Performed by: INTERNAL MEDICINE

## 2023-05-18 PROCEDURE — 99215 PR OFFICE/OUTPT VISIT, EST, LEVL V, 40-54 MIN: ICD-10-PCS | Mod: ,,, | Performed by: INTERNAL MEDICINE

## 2023-05-18 PROCEDURE — 93005 ELECTROCARDIOGRAM TRACING: CPT

## 2023-05-18 PROCEDURE — 25000003 PHARM REV CODE 250: Performed by: INTERNAL MEDICINE

## 2023-05-18 PROCEDURE — 36415 COLL VENOUS BLD VENIPUNCTURE: CPT | Performed by: INTERNAL MEDICINE

## 2023-05-18 PROCEDURE — 93010 ELECTROCARDIOGRAM REPORT: CPT | Mod: ,,, | Performed by: STUDENT IN AN ORGANIZED HEALTH CARE EDUCATION/TRAINING PROGRAM

## 2023-05-18 PROCEDURE — 96372 THER/PROPH/DIAG INJ SC/IM: CPT | Performed by: INTERNAL MEDICINE

## 2023-05-18 PROCEDURE — 83605 ASSAY OF LACTIC ACID: CPT | Performed by: EMERGENCY MEDICINE

## 2023-05-18 PROCEDURE — 96365 THER/PROPH/DIAG IV INF INIT: CPT

## 2023-05-18 PROCEDURE — 63600175 PHARM REV CODE 636 W HCPCS: Performed by: EMERGENCY MEDICINE

## 2023-05-18 PROCEDURE — 93010 EKG 12-LEAD: ICD-10-PCS | Mod: ,,, | Performed by: STUDENT IN AN ORGANIZED HEALTH CARE EDUCATION/TRAINING PROGRAM

## 2023-05-18 PROCEDURE — 84484 ASSAY OF TROPONIN QUANT: CPT | Mod: 91 | Performed by: INTERNAL MEDICINE

## 2023-05-18 PROCEDURE — 87040 BLOOD CULTURE FOR BACTERIA: CPT | Performed by: EMERGENCY MEDICINE

## 2023-05-18 PROCEDURE — 25000242 PHARM REV CODE 250 ALT 637 W/ HCPCS: Performed by: EMERGENCY MEDICINE

## 2023-05-18 PROCEDURE — G0257 UNSCHED DIALYSIS ESRD PT HOS: HCPCS

## 2023-05-18 PROCEDURE — 94640 AIRWAY INHALATION TREATMENT: CPT | Mod: XB

## 2023-05-18 RX ORDER — CLONIDINE HYDROCHLORIDE 0.1 MG/1
0.1 TABLET ORAL 4 TIMES DAILY
Status: DISCONTINUED | OUTPATIENT
Start: 2023-05-18 | End: 2023-05-20 | Stop reason: HOSPADM

## 2023-05-18 RX ORDER — LABETALOL HYDROCHLORIDE 5 MG/ML
20 INJECTION, SOLUTION INTRAVENOUS
Status: COMPLETED | OUTPATIENT
Start: 2023-05-18 | End: 2023-05-18

## 2023-05-18 RX ORDER — ACETAMINOPHEN 325 MG/1
650 TABLET ORAL EVERY 4 HOURS PRN
Status: DISCONTINUED | OUTPATIENT
Start: 2023-05-18 | End: 2023-05-20 | Stop reason: HOSPADM

## 2023-05-18 RX ORDER — ONDANSETRON 2 MG/ML
4 INJECTION INTRAMUSCULAR; INTRAVENOUS
Status: COMPLETED | OUTPATIENT
Start: 2023-05-18 | End: 2023-05-18

## 2023-05-18 RX ORDER — AMLODIPINE BESYLATE 10 MG/1
10 TABLET ORAL DAILY
Status: DISCONTINUED | OUTPATIENT
Start: 2023-05-19 | End: 2023-05-20 | Stop reason: HOSPADM

## 2023-05-18 RX ORDER — CLONIDINE HYDROCHLORIDE 0.3 MG/1
0.3 TABLET ORAL
Status: COMPLETED | OUTPATIENT
Start: 2023-05-18 | End: 2023-05-18

## 2023-05-18 RX ORDER — HYDRALAZINE HYDROCHLORIDE 20 MG/ML
10 INJECTION INTRAMUSCULAR; INTRAVENOUS EVERY 8 HOURS PRN
Status: DISCONTINUED | OUTPATIENT
Start: 2023-05-18 | End: 2023-05-20 | Stop reason: HOSPADM

## 2023-05-18 RX ORDER — METOPROLOL TARTRATE 50 MG/1
50 TABLET ORAL
Status: COMPLETED | OUTPATIENT
Start: 2023-05-18 | End: 2023-05-18

## 2023-05-18 RX ORDER — HYDRALAZINE HYDROCHLORIDE 50 MG/1
50 TABLET, FILM COATED ORAL EVERY 8 HOURS
Status: DISCONTINUED | OUTPATIENT
Start: 2023-05-18 | End: 2023-05-20 | Stop reason: HOSPADM

## 2023-05-18 RX ORDER — ASPIRIN 325 MG
325 TABLET ORAL
Status: COMPLETED | OUTPATIENT
Start: 2023-05-18 | End: 2023-05-18

## 2023-05-18 RX ORDER — AMLODIPINE BESYLATE 5 MG/1
10 TABLET ORAL
Status: COMPLETED | OUTPATIENT
Start: 2023-05-18 | End: 2023-05-18

## 2023-05-18 RX ORDER — GLUCAGON 1 MG
1 KIT INJECTION
Status: DISCONTINUED | OUTPATIENT
Start: 2023-05-18 | End: 2023-05-20 | Stop reason: HOSPADM

## 2023-05-18 RX ORDER — SODIUM CHLORIDE 0.9 % (FLUSH) 0.9 %
10 SYRINGE (ML) INJECTION EVERY 12 HOURS PRN
Status: DISCONTINUED | OUTPATIENT
Start: 2023-05-18 | End: 2023-05-20 | Stop reason: HOSPADM

## 2023-05-18 RX ORDER — ONDANSETRON 2 MG/ML
4 INJECTION INTRAMUSCULAR; INTRAVENOUS EVERY 8 HOURS PRN
Status: DISCONTINUED | OUTPATIENT
Start: 2023-05-18 | End: 2023-05-20 | Stop reason: HOSPADM

## 2023-05-18 RX ORDER — NALOXONE HCL 0.4 MG/ML
0.4 VIAL (ML) INJECTION
Status: DISCONTINUED | OUTPATIENT
Start: 2023-05-18 | End: 2023-05-20 | Stop reason: HOSPADM

## 2023-05-18 RX ORDER — HYDRALAZINE HYDROCHLORIDE 20 MG/ML
20 INJECTION INTRAMUSCULAR; INTRAVENOUS
Status: COMPLETED | OUTPATIENT
Start: 2023-05-18 | End: 2023-05-18

## 2023-05-18 RX ORDER — SEVELAMER CARBONATE 800 MG/1
3200 TABLET, FILM COATED ORAL
Status: DISCONTINUED | OUTPATIENT
Start: 2023-05-18 | End: 2023-05-20 | Stop reason: HOSPADM

## 2023-05-18 RX ORDER — FENTANYL CITRATE 50 UG/ML
50 INJECTION, SOLUTION INTRAMUSCULAR; INTRAVENOUS
Status: COMPLETED | OUTPATIENT
Start: 2023-05-18 | End: 2023-05-18

## 2023-05-18 RX ORDER — IPRATROPIUM BROMIDE AND ALBUTEROL SULFATE 2.5; .5 MG/3ML; MG/3ML
3 SOLUTION RESPIRATORY (INHALATION)
Status: COMPLETED | OUTPATIENT
Start: 2023-05-18 | End: 2023-05-18

## 2023-05-18 RX ORDER — METOPROLOL TARTRATE 50 MG/1
50 TABLET ORAL 3 TIMES DAILY
Status: DISCONTINUED | OUTPATIENT
Start: 2023-05-18 | End: 2023-05-20 | Stop reason: HOSPADM

## 2023-05-18 RX ORDER — HEPARIN SODIUM 5000 [USP'U]/ML
5000 INJECTION, SOLUTION INTRAVENOUS; SUBCUTANEOUS EVERY 8 HOURS
Status: DISCONTINUED | OUTPATIENT
Start: 2023-05-18 | End: 2023-05-20 | Stop reason: HOSPADM

## 2023-05-18 RX ADMIN — AMLODIPINE BESYLATE 10 MG: 5 TABLET ORAL at 10:05

## 2023-05-18 RX ADMIN — CLONIDINE HYDROCHLORIDE 0.3 MG: 0.3 TABLET ORAL at 03:05

## 2023-05-18 RX ADMIN — METOPROLOL TARTRATE 50 MG: 50 TABLET, FILM COATED ORAL at 10:05

## 2023-05-18 RX ADMIN — HYDRALAZINE HYDROCHLORIDE 20 MG: 20 INJECTION, SOLUTION INTRAMUSCULAR; INTRAVENOUS at 03:05

## 2023-05-18 RX ADMIN — HEPARIN SODIUM 5000 UNITS: 5000 INJECTION INTRAVENOUS; SUBCUTANEOUS at 10:05

## 2023-05-18 RX ADMIN — HYDRALAZINE HYDROCHLORIDE 20 MG: 20 INJECTION, SOLUTION INTRAMUSCULAR; INTRAVENOUS at 10:05

## 2023-05-18 RX ADMIN — IPRATROPIUM BROMIDE AND ALBUTEROL SULFATE 3 ML: .5; 3 SOLUTION RESPIRATORY (INHALATION) at 09:05

## 2023-05-18 RX ADMIN — ASPIRIN 325 MG: 325 TABLET ORAL at 05:05

## 2023-05-18 RX ADMIN — FENTANYL CITRATE 50 MCG: 50 INJECTION INTRAMUSCULAR; INTRAVENOUS at 10:05

## 2023-05-18 RX ADMIN — ONDANSETRON 4 MG: 2 INJECTION INTRAMUSCULAR; INTRAVENOUS at 09:05

## 2023-05-18 RX ADMIN — ONDANSETRON 4 MG: 2 INJECTION INTRAMUSCULAR; INTRAVENOUS at 10:05

## 2023-05-18 RX ADMIN — FENTANYL CITRATE 50 MCG: 50 INJECTION INTRAMUSCULAR; INTRAVENOUS at 09:05

## 2023-05-18 RX ADMIN — LABETALOL HYDROCHLORIDE 20 MG: 5 INJECTION INTRAVENOUS at 04:05

## 2023-05-18 RX ADMIN — HYDRALAZINE HYDROCHLORIDE 50 MG: 50 TABLET ORAL at 10:05

## 2023-05-18 RX ADMIN — CLONIDINE HYDROCHLORIDE 0.1 MG: 0.1 TABLET ORAL at 10:05

## 2023-05-18 RX ADMIN — CEFTRIAXONE 1 G: 1 INJECTION, POWDER, FOR SOLUTION INTRAMUSCULAR; INTRAVENOUS at 08:05

## 2023-05-18 RX ADMIN — CLONIDINE HYDROCHLORIDE 0.3 MG: 0.3 TABLET ORAL at 10:05

## 2023-05-18 NOTE — HPI
The patient is a 71 yo female with past medical history of anemia, atrial tachycardia, ESRD, hypertension, and peripheral vascular disease who presented to the ED with chest pain and shortness of breath. She has dialysis on TTS. She was scheduled for 0900 today but felt too weak to go to dialysis. She came to the ED for further evaluation. She reports urgent care diagnosed her with pneumonia last week and prescribed antibiotics. Those antibiotics increased her thirst and mad her feel like the pill was getting stuck in her chest. She reports she increased her water intake. She reports she also had a headache. All of her symptoms have resolved since dialysis today. She reports she is no longer having chest pain or shortness of breath. Repeat troponin with increase. Hospital medicine consulted. Patient placed in observation.
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
## 2023-05-18 NOTE — ASSESSMENT & PLAN NOTE
69 y/o female with ESRD on chronic HD pq MWF presented with chronic back pain:                      ESRD (end stage renal disease)     Renal: HD q MWF schedule  S/p HD yesterday as outpt, finished about 2 hours of treatment  K normal  Acid base no issues  Good O2 sat  No indications for HD today  May return to outpt HD unit  OK to d/c from our view  HTN: BP controlled  Access: Left arm AVF.             Back pain:     Resolved with toradol and NSAIDs  Chronic, not new, not worse  Likely musculoskeletal     Noted narcotic seeking behavior  Advised pt of side effects and risk of narcotic use and dependence, discouraged pt to use narcotics.           Plans and recommendations:  As discussed above.
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
## 2023-05-18 NOTE — ASSESSMENT & PLAN NOTE
Chest pain resolved  Likely from demand due to volume overload and uncontrolled hypertension  Trend  Obtain echo to evaluate wall motion  Cardiology consult in am
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
## 2023-05-18 NOTE — PLAN OF CARE
Duration: 4 hours    Stable/unstable: stable    Ultrafiltration volume: 3.5 liters net    Notes: Tolerated, No access issues, Dr. Acuña visited multiple times during hd.
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
## 2023-05-18 NOTE — HPI
Thank you for referring the pt to us. H/o and chart were reviewed. Pt was seen and examined this am and was revisited at least twice and re-evaluated during HD. Pt is a 73 y/o female with ESRD on chronic HD q TTS at [facility] who felt SOB and chest pressure this am. Pt did not go to her HD unit and instead came to ER. Pt has no other c/o's, no CP, no fever, n o cough. Pt admits to drinking a lot of water yesterday. PT usually does not miss HD and is compliant. On revisit during HD, pt was feeling progressively better. At the end of HD she no longer had chest pressure or SOB, and BP has improved. Pt felt ready to go home.

## 2023-05-19 ENCOUNTER — TELEPHONE (OUTPATIENT)
Dept: CARDIOLOGY | Facility: CLINIC | Age: 73
End: 2023-05-19
Payer: MEDICARE

## 2023-05-19 ENCOUNTER — TELEPHONE (OUTPATIENT)
Dept: CARDIOLOGY | Facility: HOSPITAL | Age: 73
End: 2023-05-19
Payer: MEDICARE

## 2023-05-19 LAB
ALBUMIN SERPL BCP-MCNC: 3.2 G/DL (ref 3.5–5.2)
ALP SERPL-CCNC: 95 U/L (ref 55–135)
ALT SERPL W/O P-5'-P-CCNC: 9 U/L (ref 10–44)
ANION GAP SERPL CALC-SCNC: 15 MMOL/L (ref 8–16)
AORTIC ROOT ANNULUS: 3.44 CM
ASCENDING AORTA: 3.12 CM
AST SERPL-CCNC: 22 U/L (ref 10–40)
AV INDEX (PROSTH): 0.65
AV MEAN GRADIENT: 10 MMHG
AV PEAK GRADIENT: 20 MMHG
AV VALVE AREA: 2.02 CM2
AV VELOCITY RATIO: 0.62
BASOPHILS # BLD AUTO: 0.06 K/UL (ref 0–0.2)
BASOPHILS # BLD AUTO: 0.07 K/UL (ref 0–0.2)
BASOPHILS NFR BLD: 0.8 % (ref 0–1.9)
BASOPHILS NFR BLD: 0.9 % (ref 0–1.9)
BILIRUB SERPL-MCNC: 0.4 MG/DL (ref 0.1–1)
BSA FOR ECHO PROCEDURE: 2.09 M2
BUN SERPL-MCNC: 39 MG/DL (ref 8–23)
CALCIUM SERPL-MCNC: 8.8 MG/DL (ref 8.7–10.5)
CHLORIDE SERPL-SCNC: 101 MMOL/L (ref 95–110)
CO2 SERPL-SCNC: 22 MMOL/L (ref 23–29)
CREAT SERPL-MCNC: 5.6 MG/DL (ref 0.5–1.4)
CV ECHO LV RWT: 0.45 CM
DIFFERENTIAL METHOD: ABNORMAL
DIFFERENTIAL METHOD: ABNORMAL
DOP CALC AO PEAK VEL: 2.24 M/S
DOP CALC AO VTI: 49.2 CM
DOP CALC LVOT AREA: 3.1 CM2
DOP CALC LVOT DIAMETER: 1.99 CM
DOP CALC LVOT PEAK VEL: 1.38 M/S
DOP CALC LVOT STROKE VOLUME: 99.17 CM3
DOP CALC MV VTI: 50.4 CM
DOP CALC RVOT PEAK VEL: 0.98 M/S
DOP CALC RVOT VTI: 24.8 CM
DOP CALCLVOT PEAK VEL VTI: 31.9 CM
E WAVE DECELERATION TIME: 228.79 MSEC
E/A RATIO: 1.11
E/E' RATIO: 24 M/S
ECHO LV POSTERIOR WALL: 1.08 CM (ref 0.6–1.1)
EJECTION FRACTION: 65 %
EOSINOPHIL # BLD AUTO: 0.3 K/UL (ref 0–0.5)
EOSINOPHIL # BLD AUTO: 0.4 K/UL (ref 0–0.5)
EOSINOPHIL NFR BLD: 3.5 % (ref 0–8)
EOSINOPHIL NFR BLD: 5.5 % (ref 0–8)
ERYTHROCYTE [DISTWIDTH] IN BLOOD BY AUTOMATED COUNT: 15.6 % (ref 11.5–14.5)
ERYTHROCYTE [DISTWIDTH] IN BLOOD BY AUTOMATED COUNT: 15.7 % (ref 11.5–14.5)
EST. GFR  (NO RACE VARIABLE): 8 ML/MIN/1.73 M^2
FRACTIONAL SHORTENING: 35 % (ref 28–44)
GLUCOSE SERPL-MCNC: 87 MG/DL (ref 70–110)
HCT VFR BLD AUTO: 23.8 % (ref 37–48.5)
HCT VFR BLD AUTO: 26.1 % (ref 37–48.5)
HGB BLD-MCNC: 7.5 G/DL (ref 12–16)
HGB BLD-MCNC: 8.4 G/DL (ref 12–16)
IMM GRANULOCYTES # BLD AUTO: 0.02 K/UL (ref 0–0.04)
IMM GRANULOCYTES # BLD AUTO: 0.14 K/UL (ref 0–0.04)
IMM GRANULOCYTES NFR BLD AUTO: 0.3 % (ref 0–0.5)
IMM GRANULOCYTES NFR BLD AUTO: 1.8 % (ref 0–0.5)
INTERVENTRICULAR SEPTUM: 1.36 CM (ref 0.6–1.1)
IVC DIAMETER: 1.79 CM
IVRT: 83.73 MSEC
LA MAJOR: 4.92 CM
LA MINOR: 5.25 CM
LA WIDTH: 5.2 CM
LEFT ATRIUM SIZE: 5.66 CM
LEFT ATRIUM VOLUME INDEX: 63.2 ML/M2
LEFT ATRIUM VOLUME: 127.08 CM3
LEFT INTERNAL DIMENSION IN SYSTOLE: 3.09 CM (ref 2.1–4)
LEFT VENTRICLE DIASTOLIC VOLUME INDEX: 52.86 ML/M2
LEFT VENTRICLE DIASTOLIC VOLUME: 106.24 ML
LEFT VENTRICLE MASS INDEX: 111 G/M2
LEFT VENTRICLE SYSTOLIC VOLUME INDEX: 18.7 ML/M2
LEFT VENTRICLE SYSTOLIC VOLUME: 37.49 ML
LEFT VENTRICULAR INTERNAL DIMENSION IN DIASTOLE: 4.78 CM (ref 3.5–6)
LEFT VENTRICULAR MASS: 222.87 G
LV LATERAL E/E' RATIO: 20.57 M/S
LV SEPTAL E/E' RATIO: 28.8 M/S
LVOT MG: 4.3 MMHG
LVOT MV: 0.98 CM/S
LYMPHOCYTES # BLD AUTO: 2.6 K/UL (ref 1–4.8)
LYMPHOCYTES # BLD AUTO: 3.1 K/UL (ref 1–4.8)
LYMPHOCYTES NFR BLD: 33.2 % (ref 18–48)
LYMPHOCYTES NFR BLD: 40.7 % (ref 18–48)
MCH RBC QN AUTO: 29.2 PG (ref 27–31)
MCH RBC QN AUTO: 30 PG (ref 27–31)
MCHC RBC AUTO-ENTMCNC: 31.5 G/DL (ref 32–36)
MCHC RBC AUTO-ENTMCNC: 32.2 G/DL (ref 32–36)
MCV RBC AUTO: 93 FL (ref 82–98)
MCV RBC AUTO: 93 FL (ref 82–98)
MONOCYTES # BLD AUTO: 1.1 K/UL (ref 0.3–1)
MONOCYTES # BLD AUTO: 1.1 K/UL (ref 0.3–1)
MONOCYTES NFR BLD: 13.4 % (ref 4–15)
MONOCYTES NFR BLD: 14.5 % (ref 4–15)
MV MEAN GRADIENT: 5 MMHG
MV PEAK A VEL: 1.3 M/S
MV PEAK E VEL: 1.44 M/S
MV PEAK GRADIENT: 10 MMHG
MV STENOSIS PRESSURE HALF TIME: 70.74 MS
MV VALVE AREA BY CONTINUITY EQUATION: 1.97 CM2
MV VALVE AREA P 1/2 METHOD: 3.11 CM2
NEUTROPHILS # BLD AUTO: 2.9 K/UL (ref 1.8–7.7)
NEUTROPHILS # BLD AUTO: 3.7 K/UL (ref 1.8–7.7)
NEUTROPHILS NFR BLD: 38.2 % (ref 38–73)
NEUTROPHILS NFR BLD: 47.2 % (ref 38–73)
NRBC BLD-RTO: 0 /100 WBC
NRBC BLD-RTO: 0 /100 WBC
PISA TR MAX VEL: 4.55 M/S
PLATELET # BLD AUTO: 224 K/UL (ref 150–450)
PLATELET # BLD AUTO: 244 K/UL (ref 150–450)
PMV BLD AUTO: 10.9 FL (ref 9.2–12.9)
PMV BLD AUTO: 11.3 FL (ref 9.2–12.9)
POCT GLUCOSE: 101 MG/DL (ref 70–110)
POCT GLUCOSE: 88 MG/DL (ref 70–110)
POTASSIUM SERPL-SCNC: 5.7 MMOL/L (ref 3.5–5.1)
PROT SERPL-MCNC: 6.2 G/DL (ref 6–8.4)
PULM VEIN S/D RATIO: 0.85
PV MEAN GRADIENT: 2.4 MMHG
PV PEAK D VEL: 0.73 M/S
PV PEAK S VEL: 0.62 M/S
PV PEAK VELOCITY: 1.25 CM/S
RA MAJOR: 3.53 CM
RA PRESSURE: 3 MMHG
RBC # BLD AUTO: 2.57 M/UL (ref 4–5.4)
RBC # BLD AUTO: 2.8 M/UL (ref 4–5.4)
RIGHT VENTRICULAR END-DIASTOLIC DIMENSION: 3.31 CM
SODIUM SERPL-SCNC: 138 MMOL/L (ref 136–145)
STJ: 2.76 CM
TDI LATERAL: 0.07 M/S
TDI SEPTAL: 0.05 M/S
TDI: 0.06 M/S
TR MAX PG: 83 MMHG
TROPONIN I SERPL DL<=0.01 NG/ML-MCNC: 0.17 NG/ML (ref 0–0.03)
TROPONIN I SERPL DL<=0.01 NG/ML-MCNC: 0.17 NG/ML (ref 0–0.03)
TROPONIN I SERPL DL<=0.01 NG/ML-MCNC: 0.33 NG/ML (ref 0–0.03)
TV REST PULMONARY ARTERY PRESSURE: 86 MMHG
WBC # BLD AUTO: 7.55 K/UL (ref 3.9–12.7)
WBC # BLD AUTO: 7.9 K/UL (ref 3.9–12.7)

## 2023-05-19 PROCEDURE — 25000003 PHARM REV CODE 250: Performed by: INTERNAL MEDICINE

## 2023-05-19 PROCEDURE — 36415 COLL VENOUS BLD VENIPUNCTURE: CPT | Performed by: STUDENT IN AN ORGANIZED HEALTH CARE EDUCATION/TRAINING PROGRAM

## 2023-05-19 PROCEDURE — 85025 COMPLETE CBC W/AUTO DIFF WBC: CPT | Performed by: INTERNAL MEDICINE

## 2023-05-19 PROCEDURE — 21400001 HC TELEMETRY ROOM

## 2023-05-19 PROCEDURE — 63600175 PHARM REV CODE 636 W HCPCS: Performed by: INTERNAL MEDICINE

## 2023-05-19 PROCEDURE — 84484 ASSAY OF TROPONIN QUANT: CPT | Mod: 91 | Performed by: STUDENT IN AN ORGANIZED HEALTH CARE EDUCATION/TRAINING PROGRAM

## 2023-05-19 PROCEDURE — 96372 THER/PROPH/DIAG INJ SC/IM: CPT | Performed by: INTERNAL MEDICINE

## 2023-05-19 PROCEDURE — 85025 COMPLETE CBC W/AUTO DIFF WBC: CPT | Mod: 91 | Performed by: STUDENT IN AN ORGANIZED HEALTH CARE EDUCATION/TRAINING PROGRAM

## 2023-05-19 PROCEDURE — 36415 COLL VENOUS BLD VENIPUNCTURE: CPT | Performed by: INTERNAL MEDICINE

## 2023-05-19 PROCEDURE — 90935 HEMODIALYSIS ONE EVALUATION: CPT

## 2023-05-19 PROCEDURE — G0257 UNSCHED DIALYSIS ESRD PT HOS: HCPCS

## 2023-05-19 PROCEDURE — 99222 PR INITIAL HOSPITAL CARE,LEVL II: ICD-10-PCS | Mod: 25,,, | Performed by: STUDENT IN AN ORGANIZED HEALTH CARE EDUCATION/TRAINING PROGRAM

## 2023-05-19 PROCEDURE — 99222 1ST HOSP IP/OBS MODERATE 55: CPT | Mod: 25,,, | Performed by: STUDENT IN AN ORGANIZED HEALTH CARE EDUCATION/TRAINING PROGRAM

## 2023-05-19 PROCEDURE — 80053 COMPREHEN METABOLIC PANEL: CPT | Performed by: INTERNAL MEDICINE

## 2023-05-19 RX ORDER — MUPIROCIN 20 MG/G
OINTMENT TOPICAL 2 TIMES DAILY
Status: DISCONTINUED | OUTPATIENT
Start: 2023-05-19 | End: 2023-05-20 | Stop reason: HOSPADM

## 2023-05-19 RX ORDER — SODIUM CHLORIDE 9 MG/ML
INJECTION, SOLUTION INTRAVENOUS ONCE
Status: DISCONTINUED | OUTPATIENT
Start: 2023-05-19 | End: 2023-05-20 | Stop reason: HOSPADM

## 2023-05-19 RX ADMIN — HYDRALAZINE HYDROCHLORIDE 50 MG: 50 TABLET ORAL at 10:05

## 2023-05-19 RX ADMIN — CLONIDINE HYDROCHLORIDE 0.1 MG: 0.1 TABLET ORAL at 10:05

## 2023-05-19 RX ADMIN — SEVELAMER CARBONATE 3200 MG: 800 TABLET, FILM COATED ORAL at 12:05

## 2023-05-19 RX ADMIN — METOPROLOL TARTRATE 50 MG: 50 TABLET, FILM COATED ORAL at 08:05

## 2023-05-19 RX ADMIN — HEPARIN SODIUM 5000 UNITS: 5000 INJECTION INTRAVENOUS; SUBCUTANEOUS at 06:05

## 2023-05-19 RX ADMIN — HEPARIN SODIUM 5000 UNITS: 5000 INJECTION INTRAVENOUS; SUBCUTANEOUS at 10:05

## 2023-05-19 RX ADMIN — ONDANSETRON 4 MG: 2 INJECTION INTRAMUSCULAR; INTRAVENOUS at 08:05

## 2023-05-19 RX ADMIN — AMLODIPINE BESYLATE 10 MG: 10 TABLET ORAL at 08:05

## 2023-05-19 RX ADMIN — HYDRALAZINE HYDROCHLORIDE 50 MG: 50 TABLET ORAL at 02:05

## 2023-05-19 RX ADMIN — METOPROLOL TARTRATE 50 MG: 50 TABLET, FILM COATED ORAL at 10:05

## 2023-05-19 RX ADMIN — CLONIDINE HYDROCHLORIDE 0.1 MG: 0.1 TABLET ORAL at 08:05

## 2023-05-19 RX ADMIN — ACETAMINOPHEN 650 MG: 325 TABLET ORAL at 07:05

## 2023-05-19 RX ADMIN — METOPROLOL TARTRATE 50 MG: 50 TABLET, FILM COATED ORAL at 02:05

## 2023-05-19 RX ADMIN — MUPIROCIN: 20 OINTMENT TOPICAL at 10:05

## 2023-05-19 RX ADMIN — HYDRALAZINE HYDROCHLORIDE 50 MG: 50 TABLET ORAL at 06:05

## 2023-05-19 RX ADMIN — SEVELAMER CARBONATE 3200 MG: 800 TABLET, FILM COATED ORAL at 08:05

## 2023-05-19 RX ADMIN — CLONIDINE HYDROCHLORIDE 0.1 MG: 0.1 TABLET ORAL at 12:05

## 2023-05-19 RX ADMIN — HEPARIN SODIUM 5000 UNITS: 5000 INJECTION INTRAVENOUS; SUBCUTANEOUS at 02:05

## 2023-05-19 NOTE — PLAN OF CARE
Patient updated on plan of care. Fall and safety precautions in place. Vitals every 4 hours. Bed alarm on. Patient remained free from falls. Education provided; questions answered encouraged. Pt is being dialysized today and tomorrow prior to discharge. Troponin lab levels are trending down. Dialysis pulled off 2.5 L.

## 2023-05-19 NOTE — NURSING
05/19/23 1820        Hemodialysis AV Fistula Left upper arm   No Placement Date or Time found.   Present Prior to Hospital Arrival?: Yes  Location: Left upper arm   Needle Size 16ga   Site Assessment Clean;Dry;Intact;No redness;No swelling;No warmth;No drainage   Patency Present;Thrill;Bruit   Status Deaccessed   Flows Good   Dressing Intervention First dressing   Dressing Status Clean;Dry;Intact   Site Condition No complications   Dressing Gauze   Post-Hemodialysis Assessment   Blood Volume Processed (Liters) 41.6 L   Dialyzer Clearance Lightly streaked   Duration of Treatment 120 minutes   Total UF (mL) 2500 mL   Patient Response to Treatment Tolerated well   Post-Hemodialysis Comments Treatment completed. NET fluid removed = 2.5 liters. No issues during tx. No complaints.

## 2023-05-19 NOTE — PROGRESS NOTES
NEPHROLOGY HEMODIALYSIS NOTE    Gillian Jones is a 72 y.o. female currently on hemodialysis for removal of uremic toxins and volume.    Blood pressure is stable  Ultrafiltration goal is 2.5 L.    Labs have been reviewed and the dialysate bath has been adjusted.    There are no symptoms of hypotension, chest pain, dyspnea, nausea or vomiting.She tells me her gain at dialysis clinic is usually 2L, at most 2.5L. Recent dx of 'pneumonia'     Labs:      Recent Labs   Lab 05/18/23  0855 05/19/23  0529    138   K 4.7 5.7*    101   CO2 21* 22*   BUN 69* 39*   CREATININE 8.3* 5.6*   CALCIUM 8.4* 8.8       Recent Labs   Lab 05/18/23  0855 05/19/23  0529   WBC 9.14 7.90   HGB 8.2* 7.5*   HCT 26.8* 23.8*    224       Assessment/Plan:    HD today for removal of volume.  Dose Epo today

## 2023-05-19 NOTE — ASSESSMENT & PLAN NOTE
Still seems to be volume overloaded based on echo: CXR with edema, echo with High PASP, moderate TR  Consider follow up session with HD

## 2023-05-19 NOTE — TELEPHONE ENCOUNTER
Zofia ZAPIEN Staff  Caller: Patient (2 days ago,  8:53 AM)  Gillian Pitts would like a call back at [phone], in regards to a f/u appointment that was supposed to be scheduled for her. She states she was told she would get a call back, but never did.      Called patient to advise per Dr. Sims no  answer lvm to return call
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
## 2023-05-19 NOTE — HPI
73 yo F with PMH ESRD, HTN, PVD, atrial tach who comes in with shortness of breath and right sided chest pain. Patient reports PNA and taking antibiotics recently. Denies syncope, dizziness.   BnP 1,491, troponin peaked at 0.37.  Hgb trended down 7.5  Echo with EF 60%, PASP 86 mmhg, moderate TR, mild AS  SOB improved after HD session on last night.

## 2023-05-19 NOTE — SUBJECTIVE & OBJECTIVE
Interval History: No acute events overnight. Doing well this AM with no complaints at our encounter. SOB has improved  (stable on room air at this encounter) Denies CP, Abdominal pain, fevers/chills.    Echo did show elevated pulmonary artery pressure. Discussed with specialists, recommend dialysis today and again in the AM.       Review of Systems  Objective:     Vital Signs (Most Recent):  Temp: 97.1 °F (36.2 °C) (05/19/23 1506)  Pulse: 87 (05/19/23 1509)  Resp: 18 (05/19/23 1506)  BP: (!) 129/91 (05/19/23 1506)  SpO2: 95 % (05/19/23 1506) Vital Signs (24h Range):  Temp:  [96.4 °F (35.8 °C)-98.4 °F (36.9 °C)] 97.1 °F (36.2 °C)  Pulse:  [56-87] 87  Resp:  [14-18] 18  SpO2:  [90 %-99 %] 95 %  BP: (129-190)/(60-91) 129/91     Weight: 97.1 kg (214 lb)  Body mass index is 36.73 kg/m².    Intake/Output Summary (Last 24 hours) at 5/19/2023 1716  Last data filed at 5/19/2023 1344  Gross per 24 hour   Intake 240 ml   Output --   Net 240 ml         Physical Exam      GEN: No acute distress, pleasant, body habitus normal  HEENT: atraumatic and normocephalic  CARDS: regular rate and rhythm, no m/g, pulses palpable in LE  PULM: breathing comfortably on room air, chest symmetric, nonlabored, no abnormal breath sounds on auscultation  ABD: nontender, nondistended, soft, no organomegaly, BS+  Neuro: Alert and oriented x3, CN's I-IX grossly intact, sensation and motor intact; follows directions and answers questions appropriately    Significant Labs: BMP:   Recent Labs   Lab 05/19/23  0529   GLU 87      K 5.7*      CO2 22*   BUN 39*   CREATININE 5.6*   CALCIUM 8.8     CBC:   Recent Labs   Lab 05/18/23  0855 05/19/23  0529   WBC 9.14 7.90   HGB 8.2* 7.5*   HCT 26.8* 23.8*    224     CMP:   Recent Labs   Lab 05/18/23  0855 05/19/23  0529    138   K 4.7 5.7*    101   CO2 21* 22*   * 87   BUN 69* 39*   CREATININE 8.3* 5.6*   CALCIUM 8.4* 8.8   PROT 6.8 6.2   ALBUMIN 3.6 3.2*   BILITOT 0.5 0.4    ALKPHOS 106 95   AST 19 22   ALT 11 9*   ANIONGAP 15 15     Cardiac Markers:   Recent Labs   Lab 05/18/23  0855   BNP 1,491*     Coagulation: No results for input(s): PT, INR, APTT in the last 48 hours.  Lactic Acid:   Recent Labs   Lab 05/18/23  0855   LACTATE 0.9     Magnesium: No results for input(s): MG in the last 48 hours.  Troponin:   Recent Labs   Lab 05/18/23  2342 05/19/23  1036 05/19/23  1401   TROPONINI 0.329* 0.168* 0.171*     TSH: No results for input(s): TSH in the last 4320 hours.  Urine Studies:   No results for input(s): COLORU, APPEARANCEUA, PHUR, SPECGRAV, PROTEINUA, GLUCUA, KETONESU, BILIRUBINUA, OCCULTUA, NITRITE, UROBILINOGEN, LEUKOCYTESUR, RBCUA, WBCUA, BACTERIA, SQUAMEPITHEL, HYALINECASTS in the last 48 hours.    Invalid input(s): WRIGHTSUR      Significant Imaging: I have reviewed all pertinent imaging results/findings within the past 24 hours.  Imaging Results              X-Ray Chest AP Portable (SOB) (Final result)  Result time 05/18/23 08:42:28      Final result by Junaid Decker MD (05/18/23 08:42:28)                   Impression:      See above      Electronically signed by: Junaid Decker MD  Date:    05/18/2023  Time:    08:42               Narrative:    EXAMINATION:  XR CHEST AP PORTABLE    CLINICAL HISTORY:  SOB;    FINDINGS:  Single view of the chest.  Comparison 11/01/2022    Cardiomegaly with pulmonary vascular congestion and basilar interstitial edema suspected.  No consolidation.  Cannot exclude trace left pleural effusion.  No pneumothorax.  Bones appear intact.  Postoperative changes seen within the lower neck and cervical spine.

## 2023-05-19 NOTE — ED NOTES
Report given to Lyndon (Tele). Per Lyndon pt's room isn't cleaned as of yet. This nurse will re-check room status in 1 hour. Charge nurse notified
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
## 2023-05-19 NOTE — PROGRESS NOTES
[facility] Telemetry ([facility])  [facility] Medicine  Progress Note    Patient Name: Gillian Jones  MRN: [id number]  Patient Class: OP- Observation   Admission Date: 5/18/2023  Length of Stay: 0 days  Attending Physician: Temo Ford MD  Primary Care Provider: Primary Doctor No        Subjective:     Principal Problem:Elevated troponin        HPI:  The patient is a 73 yo female with past medical history of anemia, atrial tachycardia, ESRD, hypertension, and peripheral vascular disease who presented to the ED with chest pain and shortness of breath. She has dialysis on TTS. She was scheduled for 0900 today but felt too weak to go to dialysis. She came to the ED for further evaluation. She reports urgent care diagnosed her with pneumonia last week and prescribed antibiotics. Those antibiotics increased her thirst and mad her feel like the pill was getting stuck in her chest. She reports she increased her water intake. She reports she also had a headache. All of her symptoms have resolved since dialysis today. She reports she is no longer having chest pain or shortness of breath. Repeat troponin with increase. Hospital medicine consulted. Patient placed in observation.      Overview/Hospital Course:  No notes on file    Interval History: No acute events overnight. Doing well this AM with no complaints at our encounter. SOB has improved  (stable on room air at this encounter) Denies CP, Abdominal pain, fevers/chills.    Echo did show elevated pulmonary artery pressure. Discussed with specialists, recommend dialysis today and again in the AM.       Review of Systems  Objective:     Vital Signs (Most Recent):  Temp: 97.1 °F (36.2 °C) (05/19/23 1506)  Pulse: 87 (05/19/23 1509)  Resp: 18 (05/19/23 1506)  BP: (!) 129/91 (05/19/23 1506)  SpO2: 95 % (05/19/23 1506) Vital Signs (24h Range):  Temp:  [96.4 °F (35.8 °C)-98.4 °F (36.9 °C)] 97.1 °F (36.2 °C)  Pulse:  [56-87] 87  Resp:  [14-18] 18  SpO2:  [90 %-99 %] 95 %  BP:  (129-190)/(60-91) 129/91     Weight: 97.1 kg (214 lb)  Body mass index is 36.73 kg/m².    Intake/Output Summary (Last 24 hours) at 5/19/2023 1716  Last data filed at 5/19/2023 1344  Gross per 24 hour   Intake 240 ml   Output --   Net 240 ml         Physical Exam      GEN: No acute distress, pleasant, body habitus normal  HEENT: atraumatic and normocephalic  CARDS: regular rate and rhythm, no m/g, pulses palpable in LE  PULM: breathing comfortably on room air, chest symmetric, nonlabored, no abnormal breath sounds on auscultation  ABD: nontender, nondistended, soft, no organomegaly, BS+  Neuro: Alert and oriented x3, CN's I-IX grossly intact, sensation and motor intact; follows directions and answers questions appropriately    Significant Labs: BMP:   Recent Labs   Lab 05/19/23  0529   GLU 87      K 5.7*      CO2 22*   BUN 39*   CREATININE 5.6*   CALCIUM 8.8     CBC:   Recent Labs   Lab 05/18/23  0855 05/19/23  0529   WBC 9.14 7.90   HGB 8.2* 7.5*   HCT 26.8* 23.8*    224     CMP:   Recent Labs   Lab 05/18/23  0855 05/19/23  0529    138   K 4.7 5.7*    101   CO2 21* 22*   * 87   BUN 69* 39*   CREATININE 8.3* 5.6*   CALCIUM 8.4* 8.8   PROT 6.8 6.2   ALBUMIN 3.6 3.2*   BILITOT 0.5 0.4   ALKPHOS 106 95   AST 19 22   ALT 11 9*   ANIONGAP 15 15     Cardiac Markers:   Recent Labs   Lab 05/18/23  0855   BNP 1,491*     Coagulation: No results for input(s): PT, INR, APTT in the last 48 hours.  Lactic Acid:   Recent Labs   Lab 05/18/23  0855   LACTATE 0.9     Magnesium: No results for input(s): MG in the last 48 hours.  Troponin:   Recent Labs   Lab 05/18/23  2342 05/19/23  1036 05/19/23  1401   TROPONINI 0.329* 0.168* 0.171*     TSH: No results for input(s): TSH in the last 4320 hours.  Urine Studies:   No results for input(s): COLORU, APPEARANCEUA, PHUR, SPECGRAV, PROTEINUA, GLUCUA, KETONESU, BILIRUBINUA, OCCULTUA, NITRITE, UROBILINOGEN, LEUKOCYTESUR, RBCUA, WBCUA, BACTERIA,  SQUAMEPITHEL, HYALINECASTS in the last 48 hours.    Invalid input(s): WRIGHTSUR      Significant Imaging: I have reviewed all pertinent imaging results/findings within the past 24 hours.  Imaging Results              X-Ray Chest AP Portable (SOB) (Final result)  Result time 05/18/23 08:42:28      Final result by Junaid Decker MD (05/18/23 08:42:28)                   Impression:      See above      Electronically signed by: Junaid Decker MD  Date:    05/18/2023  Time:    08:42               Narrative:    EXAMINATION:  XR CHEST AP PORTABLE    CLINICAL HISTORY:  SOB;    FINDINGS:  Single view of the chest.  Comparison 11/01/2022    Cardiomegaly with pulmonary vascular congestion and basilar interstitial edema suspected.  No consolidation.  Cannot exclude trace left pleural effusion.  No pneumothorax.  Bones appear intact.  Postoperative changes seen within the lower neck and cervical spine.                                          Assessment/Plan:      * Elevated troponin  Chest pain resolved  Likely from demand due to volume overload and uncontrolled hypertension  Trend  Obtain echo to evaluate wall motion  Cardiology consult in am    05/19/2023  Discussed with Cardiology. No specific interventions from a Cardiology perspective, Recommends following up outpatient in their clinic.       Hypertension, renal disease  Continue home antihypertensives  Prn IV hydralazine  Monitor blood pressure      ESRD (end stage renal disease)  Dialysis TTS  Received dialysis in ED today  Continue home binders    05/19/2023  Further dialysis later today and again in the AM      Anemia in chronic kidney disease, on chronic dialysis  No signs of bleeding  Repeat labs in am  Decrease today likely dilutional     05/19/2023  downtrending  Repeat this evening and again in the AM to gauge trend  Low threshold to transfuse for hgb < 7.0      VTE Risk Mitigation (From admission, onward)         Ordered     heparin (porcine) injection 5,000  Units  Every 8 hours         05/18/23 1756     IP VTE HIGH RISK PATIENT  Once         05/18/23 1756     Place sequential compression device  Until discontinued         05/18/23 1756                Discharge Planning   DARRIN:      Code Status: Full Code   Is the patient medically ready for discharge?:     Reason for patient still in hospital (select all that apply): Patient trending condition, Consult recommendations and PT / OT recommendations                     Temo Ford MD  Department of Hospital Medicine   [facility] Telemetry ([facility])

## 2023-05-20 VITALS
HEART RATE: 70 BPM | DIASTOLIC BLOOD PRESSURE: 69 MMHG | HEIGHT: 64 IN | RESPIRATION RATE: 16 BRPM | OXYGEN SATURATION: 95 % | SYSTOLIC BLOOD PRESSURE: 151 MMHG | TEMPERATURE: 97 F | BODY MASS INDEX: 36.32 KG/M2 | WEIGHT: 212.75 LBS

## 2023-05-20 LAB
ALBUMIN SERPL BCP-MCNC: 3.5 G/DL (ref 3.5–5.2)
ALP SERPL-CCNC: 115 U/L (ref 55–135)
ALT SERPL W/O P-5'-P-CCNC: 12 U/L (ref 10–44)
ANION GAP SERPL CALC-SCNC: 10 MMOL/L (ref 8–16)
AST SERPL-CCNC: 17 U/L (ref 10–40)
BASOPHILS # BLD AUTO: 0.06 K/UL (ref 0–0.2)
BASOPHILS NFR BLD: 0.8 % (ref 0–1.9)
BILIRUB SERPL-MCNC: 0.4 MG/DL (ref 0.1–1)
BUN SERPL-MCNC: 36 MG/DL (ref 8–23)
CALCIUM SERPL-MCNC: 8.5 MG/DL (ref 8.7–10.5)
CHLORIDE SERPL-SCNC: 99 MMOL/L (ref 95–110)
CO2 SERPL-SCNC: 25 MMOL/L (ref 23–29)
CREAT SERPL-MCNC: 5.4 MG/DL (ref 0.5–1.4)
DIFFERENTIAL METHOD: ABNORMAL
EOSINOPHIL # BLD AUTO: 0.4 K/UL (ref 0–0.5)
EOSINOPHIL NFR BLD: 5.2 % (ref 0–8)
ERYTHROCYTE [DISTWIDTH] IN BLOOD BY AUTOMATED COUNT: 15.8 % (ref 11.5–14.5)
EST. GFR  (NO RACE VARIABLE): 8 ML/MIN/1.73 M^2
GLUCOSE SERPL-MCNC: 83 MG/DL (ref 70–110)
HCT VFR BLD AUTO: 27 % (ref 37–48.5)
HGB BLD-MCNC: 8.1 G/DL (ref 12–16)
IMM GRANULOCYTES # BLD AUTO: 0.01 K/UL (ref 0–0.04)
IMM GRANULOCYTES NFR BLD AUTO: 0.1 % (ref 0–0.5)
LYMPHOCYTES # BLD AUTO: 3.3 K/UL (ref 1–4.8)
LYMPHOCYTES NFR BLD: 42.2 % (ref 18–48)
MCH RBC QN AUTO: 28.4 PG (ref 27–31)
MCHC RBC AUTO-ENTMCNC: 30 G/DL (ref 32–36)
MCV RBC AUTO: 95 FL (ref 82–98)
MONOCYTES # BLD AUTO: 1.1 K/UL (ref 0.3–1)
MONOCYTES NFR BLD: 14 % (ref 4–15)
NEUTROPHILS # BLD AUTO: 2.9 K/UL (ref 1.8–7.7)
NEUTROPHILS NFR BLD: 37.7 % (ref 38–73)
NRBC BLD-RTO: 0 /100 WBC
PLATELET # BLD AUTO: 245 K/UL (ref 150–450)
PMV BLD AUTO: 11 FL (ref 9.2–12.9)
POTASSIUM SERPL-SCNC: 4.7 MMOL/L (ref 3.5–5.1)
PROT SERPL-MCNC: 6.5 G/DL (ref 6–8.4)
RBC # BLD AUTO: 2.85 M/UL (ref 4–5.4)
SODIUM SERPL-SCNC: 134 MMOL/L (ref 136–145)
WBC # BLD AUTO: 7.7 K/UL (ref 3.9–12.7)

## 2023-05-20 PROCEDURE — 90935 HEMODIALYSIS ONE EVALUATION: CPT | Mod: ,,, | Performed by: INTERNAL MEDICINE

## 2023-05-20 PROCEDURE — 36415 COLL VENOUS BLD VENIPUNCTURE: CPT | Performed by: INTERNAL MEDICINE

## 2023-05-20 PROCEDURE — 90935 PR HEMODIALYSIS, ONE EVALUATION: ICD-10-PCS | Mod: ,,, | Performed by: INTERNAL MEDICINE

## 2023-05-20 PROCEDURE — 63600175 PHARM REV CODE 636 W HCPCS: Performed by: INTERNAL MEDICINE

## 2023-05-20 PROCEDURE — 80053 COMPREHEN METABOLIC PANEL: CPT | Performed by: INTERNAL MEDICINE

## 2023-05-20 PROCEDURE — 90935 HEMODIALYSIS ONE EVALUATION: CPT

## 2023-05-20 PROCEDURE — 80100016 HC MAINTENANCE HEMODIALYSIS

## 2023-05-20 PROCEDURE — 25000003 PHARM REV CODE 250: Performed by: INTERNAL MEDICINE

## 2023-05-20 PROCEDURE — 85025 COMPLETE CBC W/AUTO DIFF WBC: CPT | Performed by: INTERNAL MEDICINE

## 2023-05-20 RX ADMIN — CLONIDINE HYDROCHLORIDE 0.1 MG: 0.1 TABLET ORAL at 01:05

## 2023-05-20 RX ADMIN — HYDRALAZINE HYDROCHLORIDE 50 MG: 50 TABLET ORAL at 05:05

## 2023-05-20 RX ADMIN — METOPROLOL TARTRATE 50 MG: 50 TABLET, FILM COATED ORAL at 03:05

## 2023-05-20 RX ADMIN — SEVELAMER CARBONATE 3200 MG: 800 TABLET, FILM COATED ORAL at 08:05

## 2023-05-20 RX ADMIN — ONDANSETRON 4 MG: 2 INJECTION INTRAMUSCULAR; INTRAVENOUS at 05:05

## 2023-05-20 RX ADMIN — METOPROLOL TARTRATE 50 MG: 50 TABLET, FILM COATED ORAL at 08:05

## 2023-05-20 RX ADMIN — MUPIROCIN: 20 OINTMENT TOPICAL at 08:05

## 2023-05-20 RX ADMIN — CLONIDINE HYDROCHLORIDE 0.1 MG: 0.1 TABLET ORAL at 08:05

## 2023-05-20 RX ADMIN — HYDRALAZINE HYDROCHLORIDE 50 MG: 50 TABLET ORAL at 01:05

## 2023-05-20 RX ADMIN — AMLODIPINE BESYLATE 10 MG: 10 TABLET ORAL at 08:05

## 2023-05-20 RX ADMIN — HEPARIN SODIUM 5000 UNITS: 5000 INJECTION INTRAVENOUS; SUBCUTANEOUS at 05:05

## 2023-05-20 NOTE — DISCHARGE SUMMARY
[facility] ([facility])  [facility] Medicine  Discharge Summary      Patient Name: Gillian Jones  MRN: [id number]  VILLA: [id number]  Patient Class: IP- Inpatient  Admission Date: 5/18/2023  Hospital Length of Stay: 1 days  Discharge Date and Time:  05/20/2023 5:03 PM  Attending Physician: Temo Ford MD   Discharging Provider: Temo Ford MD  Primary Care Provider: Primary Doctor Guerda    Primary Care Team: Networked reference to record PCT     HPI:   The patient is a 73 yo female with past medical history of anemia, atrial tachycardia, ESRD, hypertension, and peripheral vascular disease who presented to the ED with chest pain and shortness of breath. She has dialysis on TTS. She was scheduled for 0900 today but felt too weak to go to dialysis. She came to the ED for further evaluation. She reports urgent care diagnosed her with pneumonia last week and prescribed antibiotics. Those antibiotics increased her thirst and mad her feel like the pill was getting stuck in her chest. She reports she increased her water intake. She reports she also had a headache. All of her symptoms have resolved since dialysis today. She reports she is no longer having chest pain or shortness of breath. Repeat troponin with increase. Hospital medicine consulted. Patient placed in observation.      * No surgery found *      Hospital Course:   During her stay, an echocardiogram revealed elevated pulmonary artery pressure. In light of her renal status, nephrology recommended dialysis on admission day and again the following morning. The patient initially refused the morning dialysis session on 5/20/2023, citing discomfort. She was transferred back to her room for rest, with a plan to perform a shorter (2-hour) dialysis run later in the morning. A chest x-ray ordered by the renal team returned unremarkable. She tolerated dialysis well when it was performed later in the day.    After her successful dialysis treatment and  "stabilization of symptoms, the nephrology team approved her discharge. Her next hemodialysis appointment was scheduled for Tuesday, 5/23/2023. Despite declining home health services and assistance with locating a Primary Care Provider, the patient reassured that her son would provide necessary assistance at home. The patient is scheduled for her next hemodialysis session on Tuesday, 5/23/2023. A detailed discussion was held with the patient and her son regarding her medical condition, treatment plan, and the importance of adherence to the hemodialysis schedule. They understood and agreed with the plan.    BP (!) 151/69   Pulse 70   Temp 97.3 °F (36.3 °C)   Resp 16   Ht 5' 4" (1.626 m)   Wt 96.5 kg (212 lb 11.9 oz)   LMP  (LMP Unknown)   SpO2 95%   BMI 36.52 kg/m²   PHYS EXAM  GEN: No acute distress, pleasant, body habitus obese  HEENT: atraumatic and normocephalic  CARDS: regular rate and rhythm, no m/g, pulses palpable in LE  PULM: breathing comfortably on room air, chest symmetric, nonlabored, no abnormal breath sounds on auscultation  ABD: nontender, nondistended, soft, no organomegaly, BS+  Neuro: Alert and oriented x3, CN's I-IX grossly intact, sensation and motor intact; follows directions and answers questions appropriately         Goals of Care Treatment Preferences:  Code Status: Full Code      Consults:   Consults (From admission, onward)        Status Ordering Provider     Inpatient consult to Cardiology  Once        Provider:  Prince Lauren MD    Completed KAROLINE ODONNELL          No new Assessment & Plan notes have been filed under this hospital service since the last note was generated.  Service: Hospital Medicine    Final Active Diagnoses:    Diagnosis Date Noted POA    PRINCIPAL PROBLEM:  Elevated troponin [R77.8] 07/24/2020 Yes    ESRD (end stage renal disease) [N18.6]  Yes    Hypertension, renal disease [I12.9]  Yes    Anemia in chronic kidney disease, on chronic dialysis " [N18.6, D63.1, Z99.2]  Not Applicable      Problems Resolved During this Admission:       Discharged Condition: good    Disposition: Home or Self Care    Follow Up:    Patient Instructions:   No discharge procedures on file.    Significant Diagnostic Studies:   BMP: Recent Labs   Lab 05/20/23  0537   GLU 83   *   K 4.7   CL 99   CO2 25   BUN 36*   CREATININE 5.4*   CALCIUM 8.5*     CBC:   Recent Labs   Lab 05/19/23  0529 05/19/23  1951/23  0537   WBC 7.90 7.55 7.70   HGB 7.5* 8.4* 8.1*   HCT 23.8* 26.1* 27.0*    244 245     CMP:   Recent Labs   Lab 05/19/23  0529 05/20/23  0537    134*   K 5.7* 4.7    99   CO2 22* 25   GLU 87 83   BUN 39* 36*   CREATININE 5.6* 5.4*   CALCIUM 8.8 8.5*   PROT 6.2 6.5   ALBUMIN 3.2* 3.5   BILITOT 0.4 0.4   ALKPHOS 95 115   AST 22 17   ALT 9* 12   ANIONGAP 15 10     Cardiac Markers: No results for input(s): CKMB, MYOGLOBIN, BNP, TROPISTAT in the last 48 hours.  Coagulation: No results for input(s): PT, INR, APTT in the last 48 hours.  Lactic Acid: No results for input(s): LACTATE in the last 48 hours.  Magnesium: No results for input(s): MG in the last 48 hours.  Troponin:   Recent Labs   Lab 05/18/23  2342 05/19/23  1036 05/19/23  1401   TROPONINI 0.329* 0.168* 0.171*     TSH: No results for input(s): TSH in the last 4320 hours.  Urine Studies:   No results for input(s): COLORU, APPEARANCEUA, PHUR, SPECGRAV, PROTEINUA, GLUCUA, KETONESU, BILIRUBINUA, OCCULTUA, NITRITE, UROBILINOGEN, LEUKOCYTESUR, RBCUA, WBCUA, BACTERIA, SQUAMEPITHEL, HYALINECASTS in the last 48 hours.    Invalid input(s): WRIGHTSUR      Pending Diagnostic Studies:     None         Medications:  Reconciled Home Medications:      Medication List      CHANGE how you take these medications    amLODIPine 10 MG tablet  Commonly known as: NORVASC  TAKE 1 TABLET BY MOUTH THREE TIMES DAILY  What changed: Another medication with the same name was removed. Continue taking this medication, and follow  the directions you see here.     cetirizine 10 MG tablet  Commonly known as: ZYRTEC  Take 1 tablet (10 mg total) by mouth once daily.  What changed: Another medication with the same name was removed. Continue taking this medication, and follow the directions you see here.     cloNIDine 0.1 MG tablet  Commonly known as: CATAPRES  Take 1 tablet by mouth four times daily  What changed: Another medication with the same name was removed. Continue taking this medication, and follow the directions you see here.     hydrALAZINE 25 MG tablet  Commonly known as: APRESOLINE  TAKE 1 TABLET BY MOUTH THREE TIMES DAILY  What changed: Another medication with the same name was removed. Continue taking this medication, and follow the directions you see here.        CONTINUE taking these medications    acetaminophen 325 MG tablet  Commonly known as: TYLENOL  Take 650 mg by mouth every 4 (four) hours as needed.     albuterol 90 mcg/actuation inhaler  Commonly known as: PROVENTIL/VENTOLIN HFA  Inhale 1-2 puffs into the lungs every 6 (six) hours as needed for Wheezing. Rescue     cyanocobalamin 1000 MCG tablet  Commonly known as: VITAMIN B-12  Take 1,000 mcg by mouth every 7 days.     fluticasone 27.5 mcg/actuation nasal spray  Commonly known as: VERAMYST  2 sprays by Nasal route daily as needed.     methocarbamoL 500 MG Tab  Commonly known as: ROBAXIN  Take 1 tablet (500 mg total) by mouth 3 (three) times daily as needed (muscle spasms).     metoprolol tartrate 50 MG tablet  Commonly known as: LOPRESSOR  Take 1 tablet (50 mg total) by mouth 3 (three) times daily.     promethazine 25 MG suppository  Commonly known as: PHENERGAN  Place 1 suppository (25 mg total) rectally every 6 (six) hours as needed for Nausea.     PATEL-LISSA 0.8 mg Tab  Generic drug: B complex-vitamin C-folic acid  Take 1 tablet by mouth once daily.     RENVELA 800 mg Tab  Generic drug: sevelamer carbonate  Take 4 tablets by mouth three times a day with each meal      sodium polystyrene sulfonate 15 gram/60 mL Susp 0.25 g/mL oral liquid  Commonly known as: Kayexalate  Take two 15-gram bottles and ADD 60 mL of water into EACH bottle of powder and shake well. Drink both bottles immediately upon mixing. Do this every 6 hours as needed for high potassium.     VELPHORO 500 mg Chew  Generic drug: sucroferric oxyhydroxide  Chew and swallow 1 tablet by mouth 3 times daily with meals     VELTASSA 8.4 gram Pwpk  Generic drug: patiromer calcium sorbitex  Take 1 packet everyday as directed.        STOP taking these medications    azithromycin 250 MG tablet  Commonly known as: Z-BLAYNE     fluticasone propionate 50 mcg/actuation nasal spray  Commonly known as: FLONASE     ondansetron 4 MG tablet  Commonly known as: ZOFRAN     ondansetron 4 MG Tbdl  Commonly known as: ZOFRAN-ODT            Indwelling Lines/Drains at time of discharge:   Lines/Drains/Airways     Central Venous Catheter Line  Duration                Hemodialysis AV Graft Left upper arm -- days          Drain  Duration                Hemodialysis AV Fistula Left upper arm -- days         Hemodialysis AV Fistula Left upper arm -- days                Time spent on the discharge of patient: 25 minutes  of time spent on discharge including examining patient, providing discharge instructions, arranging follow-up and documentation.           Temo Ford MD  Department of Hospital Medicine  [facility] ([facility])

## 2023-05-20 NOTE — PLAN OF CARE
[facility] (Hospital)  Initial Discharge Assessment       Primary Care Provider: Primary Doctor No    Admission Diagnosis: Acute pulmonary edema [J81.0]  NSTEMI (non-ST elevated myocardial infarction) [I21.4]  Uncontrolled hypertension [I10]  Chest pain [R07.9]  Hypertensive emergency [I16.1]  Chronic renal failure, stage 5 [N18.5]  Congestive heart failure, unspecified HF chronicity, unspecified heart failure type [I50.9]    Admission Date: 5/18/2023  Expected Discharge Date:          Payor: MEDICARE / Plan: MEDICARE PART A & B / Product Type: Government /     Extended Emergency Contact Information  Primary Emergency Contact: Daniela Mejia  Mobile Phone: [phone]  Relation: Daughter  Preferred language: English   needed? No  Secondary Emergency Contact: Kentrell Salazar  Address: [address]  Home Phone: [phone]  Mobile Phone: [phone]  Relation: Daughter    Discharge Plan A: Home with family  Discharge Plan B: Home      [facility] - [place] - [address]  [address]  [address]  [place] [address]  Phone: [phone] Fax: [phone]    [facility] [address] [address]  Phone: [phone] Fax: [phone]    [facility] [address] [facility] [employer] - [place], [place] - [address]  [address] [facility] [address]  Phone: [phone] Fax: [phone]      Initial Assessment (most recent)       Adult Discharge Assessment - 05/20/23 0859          Discharge Assessment    Assessment Type Discharge Planning Assessment     Confirmed/corrected address, phone number and insurance Yes     Confirmed Demographics Correct on Facesheet     Source of Information patient     People in Home alone     Facility Arrived From: Home     Do you expect to return to your current living situation? Yes     Do you have help at home or someone to help you manage your care at home?  No     Prior to hospitilization cognitive status: Alert/Oriented     Current cognitive status: Alert/Oriented     Walking or Climbing Stairs ambulation difficulty, requires equipment     Equipment Currently Used at Home rollator     Readmission within 30 days? No     Patient currently being followed by outpatient case management? No     Do you currently have service(s) that help you manage your care at home? Yes     Name and Contact number of agency [facility]     Is the pt/caregiver preference to resume services with current agency Yes     Do you take prescription medications? Yes     Do you have prescription coverage? Yes     Do you have any problems affording any of your prescribed medications? No     Is the patient taking medications as prescribed? yes     Who is going to help you get home at discharge? Son     How do you get to doctors appointments? health plan transportation     Are you on dialysis? Yes     Dialysis Name and Scheduled days [facility]     Do you take coumadin? No     Discharge Plan A Home with family     Discharge Plan B Home     DME Needed Upon Discharge  none     Discharge Plan discussed with: Patient                        Patient declined home health. Patient stated her son came to  to assist her at home. Patient declined assistance with locating a PCP.

## 2023-05-20 NOTE — PLAN OF CARE
[facility] (Hospital)  Discharge Final Note    Primary Care Provider: Primary Doctor No    Expected Discharge Date: 5/20/2023    Final Discharge Note (most recent)       Final Note - 05/20/23 1313          Final Note    Assessment Type Final Discharge Note     Anticipated Discharge Disposition Home or Self Care        Post-Acute Status    Discharge Delays None known at this time                     Important Message from Medicare               Pt has no discharge needs at the time of chart review.

## 2023-05-20 NOTE — PROGRESS NOTES
NEPHROLOGY HEMODIALYSIS NOTE    Gillian Jones is a 72 y.o. female currently on hemodialysis for removal of uremic toxins and volume.    Blood pressure is stable    Ultrafiltration goal is 0 L.    Labs have been reviewed and the dialysate bath has been adjusted.    There are no symptoms of hypotension, chest pain, dyspnea, nausea or vomiting.    Labs:      Recent Labs   Lab 05/18/23  0855 05/19/23  0529 05/20/23  0537    138 134*   K 4.7 5.7* 4.7    101 99   CO2 21* 22* 25   BUN 69* 39* 36*   CREATININE 8.3* 5.6* 5.4*   CALCIUM 8.4* 8.8 8.5*       Recent Labs   Lab 05/19/23  0529 05/19/23  1951/23  0537   WBC 7.90 7.55 7.70   HGB 7.5* 8.4* 8.1*   HCT 23.8* 26.1* 27.0*    244 245       Assessment/Plan:    HD today for removal of uremic toxins and volume.  OK to discharge home after HD today  Next outpatient HD is Tuesday

## 2023-05-20 NOTE — HOSPITAL COURSE
"During her stay, an echocardiogram revealed elevated pulmonary artery pressure. In light of her renal status, nephrology recommended dialysis on admission day and again the following morning. The patient initially refused the morning dialysis session on 5/20/2023, citing discomfort. She was transferred back to her room for rest, with a plan to perform a shorter (2-hour) dialysis run later in the morning. A chest x-ray ordered by the renal team returned unremarkable. She tolerated dialysis well when it was performed later in the day.    After her successful dialysis treatment and stabilization of symptoms, the nephrology team approved her discharge. Her next hemodialysis appointment was scheduled for Tuesday, 5/23/2023. Despite declining home health services and assistance with locating a Primary Care Provider, the patient reassured that her son would provide necessary assistance at home. The patient is scheduled for her next hemodialysis session on Tuesday, 5/23/2023. A detailed discussion was held with the patient and her son regarding her medical condition, treatment plan, and the importance of adherence to the hemodialysis schedule. They understood and agreed with the plan.    BP (!) 151/69   Pulse 70   Temp 97.3 °F (36.3 °C)   Resp 16   Ht 5' 4" (1.626 m)   Wt 96.5 kg (212 lb 11.9 oz)   LMP  (LMP Unknown)   SpO2 95%   BMI 36.52 kg/m²   PHYS EXAM  GEN: No acute distress, pleasant, body habitus obese  HEENT: atraumatic and normocephalic  CARDS: regular rate and rhythm, no m/g, pulses palpable in LE  PULM: breathing comfortably on room air, chest symmetric, nonlabored, no abnormal breath sounds on auscultation  ABD: nontender, nondistended, soft, no organomegaly, BS+  Neuro: Alert and oriented x3, CN's I-IX grossly intact, sensation and motor intact; follows directions and answers questions appropriately    "

## 2023-05-20 NOTE — CARE UPDATE
Pt refused dialysis this morning stating she felt 'sick' She was transferred back to room and will plan for HD a little later this morning, 2 hour run. Check CXR

## 2023-05-20 NOTE — NURSING
Dialysis nurse called this morning ready for patient. Patient transported to HD by float nurse and another nurse. Patient voiced concerns earlier in shift about having HD again. Explained to patient to voice concerns about HD with provider and if feeling unwell to voice any concerns to dialysis nurse.    During transport to HD, patient unhappy about going due to just going to sleep. Patient taken by float and assisting nurse. When patient arrived to HD she refused treatment and stated she was not feeling well and having chest pain.     Notified HM.

## 2023-05-20 NOTE — PLAN OF CARE
A235/A235 BERNA Jones is a 72 y.o.female admitted on 5/18/2023 for Elevated troponin   Code Status: Full Code  Lead Monitored: Lead II Rhythm: normal sinus rhythm      Shift Summary:  NAEON. Patient did not rest well overnight. Patient voiced concerns about HD, ultimately patient refused HD this morning. Notified HM. Expressed to patient need to voice concerns with day time providers. PRN zofran given overnight. No other significant events or changes to plan of care.   Chart check completed. Continue current plan of care.      Problem: Device-Related Complication Risk (Hemodialysis)  Goal: Safe, Effective Therapy Delivery  Outcome: Ongoing, Progressing     Problem: Hemodynamic Instability (Hemodialysis)  Goal: Effective Tissue Perfusion  Outcome: Ongoing, Progressing     Problem: Infection (Hemodialysis)  Goal: Absence of Infection Signs and Symptoms  Outcome: Ongoing, Progressing     Problem: Adult Inpatient Plan of Care  Goal: Plan of Care Review  Outcome: Ongoing, Progressing  Goal: Patient-Specific Goal (Individualized)  Outcome: Ongoing, Progressing  Goal: Absence of Hospital-Acquired Illness or Injury  Outcome: Ongoing, Progressing  Goal: Optimal Comfort and Wellbeing  Outcome: Ongoing, Progressing  Goal: Readiness for Transition of Care  Outcome: Ongoing, Progressing     Problem: Fall Injury Risk  Goal: Absence of Fall and Fall-Related Injury  Outcome: Ongoing, Progressing

## 2023-05-23 LAB
BACTERIA BLD CULT: NORMAL
BACTERIA BLD CULT: NORMAL

## 2023-05-25 ENCOUNTER — HOSPITAL ENCOUNTER (INPATIENT)
Facility: HOSPITAL | Age: 73
LOS: 4 days | Discharge: HOME | DRG: 280 | End: 2023-05-30
Attending: EMERGENCY MEDICINE | Admitting: SPECIALIST
Payer: MEDICARE

## 2023-05-25 DIAGNOSIS — R10.10 UPPER ABDOMINAL PAIN: ICD-10-CM

## 2023-05-25 DIAGNOSIS — D63.1 ANEMIA IN CHRONIC KIDNEY DISEASE, ON CHRONIC DIALYSIS: ICD-10-CM

## 2023-05-25 DIAGNOSIS — I16.1 HYPERTENSIVE EMERGENCY: ICD-10-CM

## 2023-05-25 DIAGNOSIS — J81.0 ACUTE PULMONARY EDEMA: ICD-10-CM

## 2023-05-25 DIAGNOSIS — Z99.2 ESRD (END STAGE RENAL DISEASE) ON DIALYSIS: Primary | ICD-10-CM

## 2023-05-25 DIAGNOSIS — Z99.2 ANEMIA IN CHRONIC KIDNEY DISEASE, ON CHRONIC DIALYSIS: ICD-10-CM

## 2023-05-25 DIAGNOSIS — R79.89 ELEVATED TROPONIN: ICD-10-CM

## 2023-05-25 DIAGNOSIS — R07.9 CHEST PAIN: ICD-10-CM

## 2023-05-25 DIAGNOSIS — N18.6 ESRD (END STAGE RENAL DISEASE) ON DIALYSIS: Primary | ICD-10-CM

## 2023-05-25 DIAGNOSIS — N18.6 ANEMIA IN CHRONIC KIDNEY DISEASE, ON CHRONIC DIALYSIS: ICD-10-CM

## 2023-05-25 DIAGNOSIS — I21.4 NSTEMI (NON-ST ELEVATED MYOCARDIAL INFARCTION): ICD-10-CM

## 2023-05-25 LAB
ALBUMIN SERPL BCP-MCNC: 3.9 G/DL (ref 3.5–5.2)
ALP SERPL-CCNC: 123 U/L (ref 55–135)
ALT SERPL W/O P-5'-P-CCNC: 9 U/L (ref 10–44)
ANION GAP SERPL CALC-SCNC: 13 MMOL/L (ref 8–16)
ANISOCYTOSIS BLD QL SMEAR: SLIGHT
AST SERPL-CCNC: 21 U/L (ref 10–40)
BASOPHILS # BLD AUTO: 0.06 K/UL (ref 0–0.2)
BASOPHILS NFR BLD: 0.6 % (ref 0–1.9)
BILIRUB SERPL-MCNC: 0.4 MG/DL (ref 0.1–1)
BNP SERPL-MCNC: 1215 PG/ML (ref 0–99)
BUN SERPL-MCNC: 24 MG/DL (ref 8–23)
CALCIUM SERPL-MCNC: 8.8 MG/DL (ref 8.7–10.5)
CHLORIDE SERPL-SCNC: 99 MMOL/L (ref 95–110)
CO2 SERPL-SCNC: 25 MMOL/L (ref 23–29)
CREAT SERPL-MCNC: 4 MG/DL (ref 0.5–1.4)
DIFFERENTIAL METHOD: ABNORMAL
EOSINOPHIL # BLD AUTO: 0.1 K/UL (ref 0–0.5)
EOSINOPHIL NFR BLD: 1.2 % (ref 0–8)
ERYTHROCYTE [DISTWIDTH] IN BLOOD BY AUTOMATED COUNT: 15.7 % (ref 11.5–14.5)
EST. GFR  (NO RACE VARIABLE): 11 ML/MIN/1.73 M^2
GLUCOSE SERPL-MCNC: 147 MG/DL (ref 70–110)
HCT VFR BLD AUTO: 25.7 % (ref 37–48.5)
HGB BLD-MCNC: 8.1 G/DL (ref 12–16)
IMM GRANULOCYTES # BLD AUTO: 0.04 K/UL (ref 0–0.04)
IMM GRANULOCYTES NFR BLD AUTO: 0.4 % (ref 0–0.5)
LIPASE SERPL-CCNC: 123 U/L (ref 4–60)
LYMPHOCYTES # BLD AUTO: 2.8 K/UL (ref 1–4.8)
LYMPHOCYTES NFR BLD: 30.3 % (ref 18–48)
MCH RBC QN AUTO: 29.7 PG (ref 27–31)
MCHC RBC AUTO-ENTMCNC: 31.5 G/DL (ref 32–36)
MCV RBC AUTO: 94 FL (ref 82–98)
MONOCYTES # BLD AUTO: 1 K/UL (ref 0.3–1)
MONOCYTES NFR BLD: 11.1 % (ref 4–15)
NEUTROPHILS # BLD AUTO: 5.3 K/UL (ref 1.8–7.7)
NEUTROPHILS NFR BLD: 56.4 % (ref 38–73)
NRBC BLD-RTO: 0 /100 WBC
PLATELET # BLD AUTO: 257 K/UL (ref 150–450)
PLATELET BLD QL SMEAR: ABNORMAL
PMV BLD AUTO: 10.8 FL (ref 9.2–12.9)
POIKILOCYTOSIS BLD QL SMEAR: SLIGHT
POTASSIUM SERPL-SCNC: 4.2 MMOL/L (ref 3.5–5.1)
PROT SERPL-MCNC: 7.3 G/DL (ref 6–8.4)
RBC # BLD AUTO: 2.73 M/UL (ref 4–5.4)
SODIUM SERPL-SCNC: 137 MMOL/L (ref 136–145)
STOMATOCYTES BLD QL SMEAR: PRESENT
TARGETS BLD QL SMEAR: ABNORMAL
TROPONIN I SERPL DL<=0.01 NG/ML-MCNC: 0.05 NG/ML (ref 0–0.03)
TROPONIN I SERPL DL<=0.01 NG/ML-MCNC: 0.11 NG/ML (ref 0–0.03)
WBC # BLD AUTO: 9.37 K/UL (ref 3.9–12.7)

## 2023-05-25 PROCEDURE — 93005 ELECTROCARDIOGRAM TRACING: CPT

## 2023-05-25 PROCEDURE — 80053 COMPREHEN METABOLIC PANEL: CPT | Performed by: EMERGENCY MEDICINE

## 2023-05-25 PROCEDURE — 25000003 PHARM REV CODE 250: Performed by: EMERGENCY MEDICINE

## 2023-05-25 PROCEDURE — 99291 CRITICAL CARE FIRST HOUR: CPT

## 2023-05-25 PROCEDURE — 84484 ASSAY OF TROPONIN QUANT: CPT | Mod: 91 | Performed by: EMERGENCY MEDICINE

## 2023-05-25 PROCEDURE — 83880 ASSAY OF NATRIURETIC PEPTIDE: CPT | Performed by: EMERGENCY MEDICINE

## 2023-05-25 PROCEDURE — 83690 ASSAY OF LIPASE: CPT | Performed by: EMERGENCY MEDICINE

## 2023-05-25 PROCEDURE — 96372 THER/PROPH/DIAG INJ SC/IM: CPT | Performed by: EMERGENCY MEDICINE

## 2023-05-25 PROCEDURE — 85025 COMPLETE CBC W/AUTO DIFF WBC: CPT | Performed by: EMERGENCY MEDICINE

## 2023-05-25 PROCEDURE — 36415 COLL VENOUS BLD VENIPUNCTURE: CPT | Performed by: EMERGENCY MEDICINE

## 2023-05-25 PROCEDURE — 96375 TX/PRO/DX INJ NEW DRUG ADDON: CPT

## 2023-05-25 PROCEDURE — 96365 THER/PROPH/DIAG IV INF INIT: CPT

## 2023-05-25 PROCEDURE — 63600175 PHARM REV CODE 636 W HCPCS: Performed by: EMERGENCY MEDICINE

## 2023-05-25 PROCEDURE — 93010 ELECTROCARDIOGRAM REPORT: CPT | Mod: ,,, | Performed by: INTERNAL MEDICINE

## 2023-05-25 PROCEDURE — 84484 ASSAY OF TROPONIN QUANT: CPT | Performed by: EMERGENCY MEDICINE

## 2023-05-25 PROCEDURE — 93010 EKG 12-LEAD: ICD-10-PCS | Mod: ,,, | Performed by: INTERNAL MEDICINE

## 2023-05-25 RX ORDER — METOPROLOL TARTRATE 1 MG/ML
5 INJECTION, SOLUTION INTRAVENOUS
Status: DISCONTINUED | OUTPATIENT
Start: 2023-05-25 | End: 2023-05-25

## 2023-05-25 RX ORDER — MEPERIDINE HYDROCHLORIDE 50 MG/ML
25 INJECTION INTRAMUSCULAR; INTRAVENOUS; SUBCUTANEOUS
Status: COMPLETED | OUTPATIENT
Start: 2023-05-25 | End: 2023-05-25

## 2023-05-25 RX ORDER — FUROSEMIDE 10 MG/ML
100 INJECTION INTRAMUSCULAR; INTRAVENOUS
Status: COMPLETED | OUTPATIENT
Start: 2023-05-25 | End: 2023-05-25

## 2023-05-25 RX ORDER — LABETALOL HYDROCHLORIDE 5 MG/ML
20 INJECTION, SOLUTION INTRAVENOUS
Status: ACTIVE | OUTPATIENT
Start: 2023-05-25 | End: 2023-05-26

## 2023-05-25 RX ORDER — ONDANSETRON 2 MG/ML
4 INJECTION INTRAMUSCULAR; INTRAVENOUS
Status: COMPLETED | OUTPATIENT
Start: 2023-05-25 | End: 2023-05-25

## 2023-05-25 RX ORDER — LABETALOL HYDROCHLORIDE 5 MG/ML
20 INJECTION, SOLUTION INTRAVENOUS
Status: COMPLETED | OUTPATIENT
Start: 2023-05-25 | End: 2023-05-25

## 2023-05-25 RX ORDER — LABETALOL HYDROCHLORIDE 5 MG/ML
20 INJECTION, SOLUTION INTRAVENOUS
Status: DISCONTINUED | OUTPATIENT
Start: 2023-05-25 | End: 2023-05-25

## 2023-05-25 RX ORDER — MEPERIDINE HYDROCHLORIDE 50 MG/ML
25 INJECTION INTRAMUSCULAR; INTRAVENOUS; SUBCUTANEOUS
Status: DISCONTINUED | OUTPATIENT
Start: 2023-05-25 | End: 2023-05-25

## 2023-05-25 RX ORDER — HYDRALAZINE HYDROCHLORIDE 20 MG/ML
10 INJECTION INTRAMUSCULAR; INTRAVENOUS
Status: COMPLETED | OUTPATIENT
Start: 2023-05-25 | End: 2023-05-25

## 2023-05-25 RX ORDER — NITROGLYCERIN 20 MG/100ML
0-400 INJECTION INTRAVENOUS CONTINUOUS
Status: DISCONTINUED | OUTPATIENT
Start: 2023-05-25 | End: 2023-05-26

## 2023-05-25 RX ORDER — ONDANSETRON 2 MG/ML
4 INJECTION INTRAMUSCULAR; INTRAVENOUS
Status: DISCONTINUED | OUTPATIENT
Start: 2023-05-25 | End: 2023-05-25

## 2023-05-25 RX ORDER — MORPHINE SULFATE 4 MG/ML
2 INJECTION, SOLUTION INTRAMUSCULAR; INTRAVENOUS
Status: COMPLETED | OUTPATIENT
Start: 2023-05-25 | End: 2023-05-25

## 2023-05-25 RX ORDER — MEPERIDINE HYDROCHLORIDE 50 MG/ML
12.5 INJECTION INTRAMUSCULAR; INTRAVENOUS; SUBCUTANEOUS
Status: COMPLETED | OUTPATIENT
Start: 2023-05-25 | End: 2023-05-25

## 2023-05-25 RX ADMIN — NITROGLYCERIN 1 INCH: 20 OINTMENT TOPICAL at 07:05

## 2023-05-25 RX ADMIN — NITROGLYCERIN 2 INCH: 20 OINTMENT TOPICAL at 08:05

## 2023-05-25 RX ADMIN — LABETALOL HYDROCHLORIDE 20 MG: 5 INJECTION INTRAVENOUS at 08:05

## 2023-05-25 RX ADMIN — MEPERIDINE HYDROCHLORIDE 12.5 MG: 50 INJECTION INTRAMUSCULAR; INTRAVENOUS; SUBCUTANEOUS at 07:05

## 2023-05-25 RX ADMIN — HYDRALAZINE HYDROCHLORIDE 10 MG: 20 INJECTION, SOLUTION INTRAMUSCULAR; INTRAVENOUS at 07:05

## 2023-05-25 RX ADMIN — NITROGLYCERIN 20 MCG/MIN: 20 INJECTION INTRAVENOUS at 09:05

## 2023-05-25 RX ADMIN — FUROSEMIDE 100 MG: 10 INJECTION, SOLUTION INTRAMUSCULAR; INTRAVENOUS at 08:05

## 2023-05-25 RX ADMIN — PROMETHAZINE HYDROCHLORIDE 12.5 MG: 25 INJECTION INTRAMUSCULAR; INTRAVENOUS at 07:05

## 2023-05-25 RX ADMIN — MEPERIDINE HYDROCHLORIDE 25 MG: 50 INJECTION INTRAMUSCULAR; INTRAVENOUS; SUBCUTANEOUS at 07:05

## 2023-05-25 RX ADMIN — MORPHINE SULFATE 2 MG: 4 INJECTION INTRAVENOUS at 10:05

## 2023-05-25 RX ADMIN — ONDANSETRON 4 MG: 2 INJECTION INTRAMUSCULAR; INTRAVENOUS at 07:05
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
## 2023-05-25 NOTE — PHYSICIAN QUERY
PT Name: Gillian Jones  MR #: [id number]     DOCUMENTATION CLARIFICATION     CDS/: Sunni Baker RN, CCDS             Contact information: [email or web address]  This form is a permanent document in the medical record.     Query Date: May 25, 2023    By submitting this query, we are merely seeking further clarification of documentation to reflect the severity of illness of your patient. Please utilize your independent clinical judgment when addressing the question(s) below.    The Medical Record contains the following:   Indicators   Supporting Clinical Findings Location in Medical Record   X Hypertension or hypertensive documented Hypertensive Emergency  (Hypertensive urgency and pulmonary edema).    Hypertension, renal disease 5/18 ed md note      5/18 h/p, 5/19 prog note  5/20 discharge summary   X Acute/Chronic condition(s) ESRD patient that came in with CP and SOB.  Hypertensive  NSTEMI  Chf, acute pulm edema    Hypertension, renal disease  Elevated troponin- Likely from demand due to volume overload and uncontrolled hypertension  Esrd, aocd 5/18 ed md note          5/18 h/p, 5/19 prog note  5/20 discharge summary   X Vital signs  268/158,  237/109, 239/102, 248/110,  235/105, 231/93, 211/88,  199/86,  190/84  170/78 5/18 ed note    Lab findings      Radiology findings     X Treatment/Medication metoprolol tartrate (LOPRESSOR) tablet 50 mg (has no administration in time range)   hydrALAZINE tablet 50 mg (has no administration in time range)   cloNIDine tablet 0.1 mg (has no administration in time range)   amLODIPine tablet 10 mg (has no administration in time range)   hydrALAZINE injection 10 mg (has no administration in time range)     Continue home antihypertensives  Prn IV hydralazine 5/18 ed note                      5/18 h/p    Other       The clinical guidelines noted are only a system guideline. It does not replace the provider's clinical judgment.  Provider, please clarify type of  Hypertension.  [  x ] Essential (primary) hypertension   [   ] Hypertensive emergency - Severe hypertension (SBP>180 and/or DBP>120mmHg) with signs or symptoms of new or worsening end-organ damage (i.e. hypertensive encephalopathy, retinal hemorrhage, papilledema, acute kidney injury, stroke, heart attack, heart failure, kidney failure)   [   ] Hypertensive urgency - Severe asymptomatic hypertension (SBP>180 and/or DBP>120mmHg) without signs or symptoms of acute end-organ damage. Can have a mild headache.   [   ] Other cardiovascular condition (please specify): __________       Please document in your progress notes daily for the duration of treatment until resolved, and include in your discharge summary.    References:    MIGNON Zelaya MD, PhD, & CRISTO Lozano MD, FACP, FCCP, FCCM, FRSM. (2020, June 25). Evaluation and treatment of hypertensive emergencies in adults (BEATRICE Marks MD, MIGNON Sanchez MD, & CRISTO Jeter MD, MSc, Eds.). Retrieved October 22, 2020, from https://www.[employer]/contents/evaluation-and-treatment-wd-ccbvpwriyjms-govxwkoabth-in-adults?search=hypertensive%20emergency&source=search_result&selectedTitle=1~150&usage_type=default&display_rank=1    CRISTO Lozano MD, FACP, FCCP, FCCM, FRSM, & MIGNON Zelaya MD, PhD. (2020, October 14). Management of severe asymptomatic hypertension (hypertensive urgencies) in adults (BEATRICE Marks MD, MIGNON Sanchez MD, & CRISTO Jeter MD, MSc, Eds.). Retrieved October 22, 2020, from https://www.[employer]/contents/management-of-severe-asymptomatic-hypertension-hypertensive-urgencies-in-adults?search=hypertensive%20urgency&source=search_result&selectedTitle=1~41&usage_type=default&display_rank=1    Form No. [id number]

## 2023-05-25 NOTE — PHYSICIAN QUERY
PT Name: Gillian Jones  MR #: [id number]     DOCUMENTATION CLARIFICATION      CDS/: Sunni Baker RN, CCDS              Contact information:  [email or web address]  This form is a permanent document in the medical record.    Query Date: May 25, 2023    By submitting this query, we are merely seeking further clarification of documentation to reflect the severity of illness of your patient. Please utilize your independent clinical judgment when addressing the question(s) below.     The Medical Record contains the following:   Indicators   Supporting Clinical Findings Location in Medical Record   X Chest Pain, Angina ESRD patient that came in with CP and SOB.  Hypertensive 5/18 ed md note    Coronary Artery Disease     X EKG Sinus rhythm with 1st degree A-V block  5/18 ekg   X Troponin 0.048-->0.201-->0.372-->0.329-->0.168 5/18- 5/19 lab   X Echo Results The left ventricle is normal in size with concentric hypertrophy and normal systolic function.  Severe left atrial enlargement.  Grade II left ventricular diastolic dysfunction.  The estimated PA systolic pressure is 86 mmHg.  Normal right ventricular size with normal right ventricular systolic function.  There is pulmonary hypertension.  Normal central venous pressure (3 mmHg).  Moderate tricuspid regurgitation.  Mild pulmonic regurgitation.  The estimated ejection fraction is 65%.  There is mild aortic valve stenosis.  Aortic valve area is 2.02 cm2; peak velocity is 2.24 m/s; mean gradient is 10 mmHg.    5/19 echo    Angiography     X Documentation of acute cardiac condition NSTEMI    Elevated troponin  Likely from demand due to volume overload and uncontrolled hypertension    Elevated troponin 5/18 ed md note    5/18 h/p, 5/19 prog note,         5/19 prog note   X Medication/Treatment Discussed with Cardiology. No specific interventions from a Cardiology perspective 5/19 prog note   X Other Repeat troponin with increase. Hospital medicine consulted.  5/19 prog  note      Provider, please clarify the cardiac condition.  [ x  ] NSTEMI   [   ] NSTEMI/Myocardial Infarction Type 2 due to (please specify): __________   [   ] Demand Ischemia   [   ] Elevated troponin only (without corresponding diagnosis)   [   ] Other Cardiac Diagnosis (please specify): _______________       Please document in your progress notes daily for the duration of treatment until resolved, and include in your discharge summary.    Form No. [id number]

## 2023-05-26 PROBLEM — I16.1 HYPERTENSIVE EMERGENCY: Status: ACTIVE | Noted: 2019-01-26

## 2023-05-26 LAB
ABO + RH BLD: NORMAL
ALBUMIN SERPL BCP-MCNC: 3.4 G/DL (ref 3.5–5.2)
ALP SERPL-CCNC: 97 U/L (ref 55–135)
ALT SERPL W/O P-5'-P-CCNC: 9 U/L (ref 10–44)
ANION GAP SERPL CALC-SCNC: 10 MMOL/L (ref 8–16)
AST SERPL-CCNC: 19 U/L (ref 10–40)
BASOPHILS # BLD AUTO: 0.05 K/UL (ref 0–0.2)
BASOPHILS NFR BLD: 0.5 % (ref 0–1.9)
BILIRUB SERPL-MCNC: 0.4 MG/DL (ref 0.1–1)
BLD GP AB SCN CELLS X3 SERPL QL: NORMAL
BUN SERPL-MCNC: 26 MG/DL (ref 8–23)
CALCIUM SERPL-MCNC: 8.4 MG/DL (ref 8.7–10.5)
CHLORIDE SERPL-SCNC: 100 MMOL/L (ref 95–110)
CO2 SERPL-SCNC: 25 MMOL/L (ref 23–29)
CREAT SERPL-MCNC: 4.5 MG/DL (ref 0.5–1.4)
DIFFERENTIAL METHOD: ABNORMAL
EOSINOPHIL # BLD AUTO: 0 K/UL (ref 0–0.5)
EOSINOPHIL NFR BLD: 0.4 % (ref 0–8)
ERYTHROCYTE [DISTWIDTH] IN BLOOD BY AUTOMATED COUNT: 15.7 % (ref 11.5–14.5)
EST. GFR  (NO RACE VARIABLE): 10 ML/MIN/1.73 M^2
GLUCOSE SERPL-MCNC: 101 MG/DL (ref 70–110)
HCT VFR BLD AUTO: 22.9 % (ref 37–48.5)
HGB BLD-MCNC: 7.3 G/DL (ref 12–16)
IMM GRANULOCYTES # BLD AUTO: 0.04 K/UL (ref 0–0.04)
IMM GRANULOCYTES NFR BLD AUTO: 0.4 % (ref 0–0.5)
LIPASE SERPL-CCNC: 63 U/L (ref 4–60)
LYMPHOCYTES # BLD AUTO: 2.6 K/UL (ref 1–4.8)
LYMPHOCYTES NFR BLD: 26.5 % (ref 18–48)
MAGNESIUM SERPL-MCNC: 2 MG/DL (ref 1.6–2.6)
MCH RBC QN AUTO: 30.3 PG (ref 27–31)
MCHC RBC AUTO-ENTMCNC: 31.9 G/DL (ref 32–36)
MCV RBC AUTO: 95 FL (ref 82–98)
MONOCYTES # BLD AUTO: 1 K/UL (ref 0.3–1)
MONOCYTES NFR BLD: 10.1 % (ref 4–15)
NEUTROPHILS # BLD AUTO: 6.1 K/UL (ref 1.8–7.7)
NEUTROPHILS NFR BLD: 62.1 % (ref 38–73)
NRBC BLD-RTO: 0 /100 WBC
PHOSPHATE SERPL-MCNC: 3.2 MG/DL (ref 2.7–4.5)
PLATELET # BLD AUTO: 180 K/UL (ref 150–450)
PLATELET BLD QL SMEAR: ABNORMAL
PMV BLD AUTO: 11.6 FL (ref 9.2–12.9)
POTASSIUM SERPL-SCNC: 4.4 MMOL/L (ref 3.5–5.1)
PROT SERPL-MCNC: 6.3 G/DL (ref 6–8.4)
RBC # BLD AUTO: 2.41 M/UL (ref 4–5.4)
SODIUM SERPL-SCNC: 135 MMOL/L (ref 136–145)
SPECIMEN OUTDATE: NORMAL
TROPONIN I SERPL DL<=0.01 NG/ML-MCNC: 0.26 NG/ML (ref 0–0.03)
TROPONIN I SERPL DL<=0.01 NG/ML-MCNC: 0.29 NG/ML (ref 0–0.03)
WBC # BLD AUTO: 9.76 K/UL (ref 3.9–12.7)

## 2023-05-26 PROCEDURE — 25000242 PHARM REV CODE 250 ALT 637 W/ HCPCS: Performed by: NURSE PRACTITIONER

## 2023-05-26 PROCEDURE — 25000003 PHARM REV CODE 250: Performed by: NURSE PRACTITIONER

## 2023-05-26 PROCEDURE — 84100 ASSAY OF PHOSPHORUS: CPT | Performed by: NURSE PRACTITIONER

## 2023-05-26 PROCEDURE — 99223 PR INITIAL HOSPITAL CARE,LEVL III: ICD-10-PCS | Mod: ,,, | Performed by: INTERNAL MEDICINE

## 2023-05-26 PROCEDURE — 80053 COMPREHEN METABOLIC PANEL: CPT | Performed by: NURSE PRACTITIONER

## 2023-05-26 PROCEDURE — 99223 1ST HOSP IP/OBS HIGH 75: CPT | Mod: ,,, | Performed by: INTERNAL MEDICINE

## 2023-05-26 PROCEDURE — 25000003 PHARM REV CODE 250: Performed by: INTERNAL MEDICINE

## 2023-05-26 PROCEDURE — 63600175 PHARM REV CODE 636 W HCPCS: Performed by: SPECIALIST

## 2023-05-26 PROCEDURE — 85025 COMPLETE CBC W/AUTO DIFF WBC: CPT | Performed by: NURSE PRACTITIONER

## 2023-05-26 PROCEDURE — 27000221 HC OXYGEN, UP TO 24 HOURS

## 2023-05-26 PROCEDURE — 94761 N-INVAS EAR/PLS OXIMETRY MLT: CPT

## 2023-05-26 PROCEDURE — 25000003 PHARM REV CODE 250: Performed by: PHYSICIAN ASSISTANT

## 2023-05-26 PROCEDURE — 83735 ASSAY OF MAGNESIUM: CPT | Performed by: NURSE PRACTITIONER

## 2023-05-26 PROCEDURE — 86920 COMPATIBILITY TEST SPIN: CPT | Performed by: INTERNAL MEDICINE

## 2023-05-26 PROCEDURE — 11000001 HC ACUTE MED/SURG PRIVATE ROOM

## 2023-05-26 PROCEDURE — 86900 BLOOD TYPING SEROLOGIC ABO: CPT | Performed by: INTERNAL MEDICINE

## 2023-05-26 PROCEDURE — 84484 ASSAY OF TROPONIN QUANT: CPT | Mod: 91 | Performed by: NURSE PRACTITIONER

## 2023-05-26 PROCEDURE — 83690 ASSAY OF LIPASE: CPT | Performed by: NURSE PRACTITIONER

## 2023-05-26 PROCEDURE — 25000003 PHARM REV CODE 250: Performed by: HOSPITALIST

## 2023-05-26 PROCEDURE — 36415 COLL VENOUS BLD VENIPUNCTURE: CPT | Performed by: NURSE PRACTITIONER

## 2023-05-26 RX ORDER — CLONIDINE HYDROCHLORIDE 0.1 MG/1
0.1 TABLET ORAL 4 TIMES DAILY
Status: DISCONTINUED | OUTPATIENT
Start: 2023-05-26 | End: 2023-05-26

## 2023-05-26 RX ORDER — METOPROLOL TARTRATE 50 MG/1
50 TABLET ORAL 3 TIMES DAILY
Status: DISCONTINUED | OUTPATIENT
Start: 2023-05-26 | End: 2023-05-26

## 2023-05-26 RX ORDER — HYDROCODONE BITARTRATE AND ACETAMINOPHEN 500; 5 MG/1; MG/1
TABLET ORAL
Status: DISCONTINUED | OUTPATIENT
Start: 2023-05-26 | End: 2023-05-30 | Stop reason: HOSPADM

## 2023-05-26 RX ORDER — CARVEDILOL 12.5 MG/1
25 TABLET ORAL 2 TIMES DAILY
Status: DISCONTINUED | OUTPATIENT
Start: 2023-05-26 | End: 2023-05-30 | Stop reason: HOSPADM

## 2023-05-26 RX ORDER — HYDRALAZINE HYDROCHLORIDE 50 MG/1
50 TABLET, FILM COATED ORAL EVERY 8 HOURS
Status: DISCONTINUED | OUTPATIENT
Start: 2023-05-26 | End: 2023-05-27

## 2023-05-26 RX ORDER — ACETAMINOPHEN 325 MG/1
650 TABLET ORAL EVERY 4 HOURS PRN
Status: DISCONTINUED | OUTPATIENT
Start: 2023-05-26 | End: 2023-05-30 | Stop reason: HOSPADM

## 2023-05-26 RX ORDER — SODIUM CHLORIDE 0.9 % (FLUSH) 0.9 %
10 SYRINGE (ML) INJECTION
Status: DISCONTINUED | OUTPATIENT
Start: 2023-05-26 | End: 2023-05-30 | Stop reason: HOSPADM

## 2023-05-26 RX ORDER — CETIRIZINE HYDROCHLORIDE 5 MG/1
5 TABLET ORAL
Status: DISCONTINUED | OUTPATIENT
Start: 2023-05-27 | End: 2023-05-30 | Stop reason: HOSPADM

## 2023-05-26 RX ORDER — ENOXAPARIN SODIUM 100 MG/ML
30 INJECTION SUBCUTANEOUS EVERY 24 HOURS
Status: DISCONTINUED | OUTPATIENT
Start: 2023-05-26 | End: 2023-05-26

## 2023-05-26 RX ORDER — SEVELAMER CARBONATE 800 MG/1
1600 TABLET, FILM COATED ORAL
Status: DISCONTINUED | OUTPATIENT
Start: 2023-05-26 | End: 2023-05-30 | Stop reason: HOSPADM

## 2023-05-26 RX ORDER — HEPARIN SODIUM,PORCINE/D5W 25000/250
0-40 INTRAVENOUS SOLUTION INTRAVENOUS CONTINUOUS
Status: DISCONTINUED | OUTPATIENT
Start: 2023-05-26 | End: 2023-05-27

## 2023-05-26 RX ORDER — NICARDIPINE HYDROCHLORIDE 0.2 MG/ML
0-15 INJECTION INTRAVENOUS CONTINUOUS
Status: DISCONTINUED | OUTPATIENT
Start: 2023-05-26 | End: 2023-05-26

## 2023-05-26 RX ORDER — BISACODYL 5 MG
5 TABLET, DELAYED RELEASE (ENTERIC COATED) ORAL ONCE
Status: COMPLETED | OUTPATIENT
Start: 2023-05-26 | End: 2023-05-26

## 2023-05-26 RX ORDER — CLONIDINE HYDROCHLORIDE 0.1 MG/1
0.1 TABLET ORAL 3 TIMES DAILY
Status: DISCONTINUED | OUTPATIENT
Start: 2023-05-26 | End: 2023-05-28

## 2023-05-26 RX ORDER — SODIUM CHLORIDE 9 MG/ML
INJECTION, SOLUTION INTRAVENOUS ONCE
Status: CANCELLED | OUTPATIENT
Start: 2023-05-26 | End: 2023-05-26

## 2023-05-26 RX ORDER — LACTULOSE 10 G/15ML
30 SOLUTION ORAL ONCE
Status: COMPLETED | OUTPATIENT
Start: 2023-05-26 | End: 2023-05-26

## 2023-05-26 RX ORDER — DOCUSATE SODIUM 100 MG/1
200 CAPSULE, LIQUID FILLED ORAL 2 TIMES DAILY
Status: DISCONTINUED | OUTPATIENT
Start: 2023-05-26 | End: 2023-05-30 | Stop reason: HOSPADM

## 2023-05-26 RX ORDER — MUPIROCIN 20 MG/G
OINTMENT TOPICAL 2 TIMES DAILY
Status: DISCONTINUED | OUTPATIENT
Start: 2023-05-26 | End: 2023-05-30 | Stop reason: HOSPADM

## 2023-05-26 RX ORDER — POLYETHYLENE GLYCOL 3350 17 G/17G
17 POWDER, FOR SOLUTION ORAL 2 TIMES DAILY
Status: DISCONTINUED | OUTPATIENT
Start: 2023-05-26 | End: 2023-05-26

## 2023-05-26 RX ORDER — IPRATROPIUM BROMIDE AND ALBUTEROL SULFATE 2.5; .5 MG/3ML; MG/3ML
3 SOLUTION RESPIRATORY (INHALATION) EVERY 4 HOURS PRN
Status: DISCONTINUED | OUTPATIENT
Start: 2023-05-26 | End: 2023-05-30 | Stop reason: HOSPADM

## 2023-05-26 RX ORDER — FLUTICASONE PROPIONATE 50 MCG
2 SPRAY, SUSPENSION (ML) NASAL DAILY
Status: DISCONTINUED | OUTPATIENT
Start: 2023-05-26 | End: 2023-05-30 | Stop reason: HOSPADM

## 2023-05-26 RX ORDER — ACETAMINOPHEN 325 MG/1
650 TABLET ORAL
Status: ACTIVE | OUTPATIENT
Start: 2023-05-26 | End: 2023-05-26

## 2023-05-26 RX ORDER — FENTANYL CITRATE 50 UG/ML
50 INJECTION, SOLUTION INTRAMUSCULAR; INTRAVENOUS ONCE
Status: COMPLETED | OUTPATIENT
Start: 2023-05-26 | End: 2023-05-26

## 2023-05-26 RX ORDER — DOCUSATE SODIUM 100 MG/1
100 CAPSULE, LIQUID FILLED ORAL DAILY
Status: DISCONTINUED | OUTPATIENT
Start: 2023-05-26 | End: 2023-05-26

## 2023-05-26 RX ORDER — CETIRIZINE HYDROCHLORIDE 10 MG/1
10 TABLET ORAL DAILY
Status: DISCONTINUED | OUTPATIENT
Start: 2023-05-26 | End: 2023-05-26

## 2023-05-26 RX ORDER — LACTULOSE 10 G/15ML
15 SOLUTION ORAL DAILY
Status: DISCONTINUED | OUTPATIENT
Start: 2023-05-27 | End: 2023-05-27

## 2023-05-26 RX ADMIN — NEPHROCAP 1 CAPSULE: 1 CAP ORAL at 08:05

## 2023-05-26 RX ADMIN — HYDRALAZINE HYDROCHLORIDE 50 MG: 50 TABLET ORAL at 06:05

## 2023-05-26 RX ADMIN — NITROGLYCERIN 0.5 INCH: 20 OINTMENT TOPICAL at 05:05

## 2023-05-26 RX ADMIN — POLYETHYLENE GLYCOL 3350 17 G: 17 POWDER, FOR SOLUTION ORAL at 10:05

## 2023-05-26 RX ADMIN — CLONIDINE HYDROCHLORIDE 0.1 MG: 0.1 TABLET ORAL at 08:05

## 2023-05-26 RX ADMIN — CARVEDILOL 25 MG: 12.5 TABLET, FILM COATED ORAL at 09:05

## 2023-05-26 RX ADMIN — DOCUSATE SODIUM 100 MG: 100 CAPSULE, LIQUID FILLED ORAL at 08:05

## 2023-05-26 RX ADMIN — NICARDIPINE HYDROCHLORIDE 5 MG/HR: 0.2 INJECTION, SOLUTION INTRAVENOUS at 02:05

## 2023-05-26 RX ADMIN — CLONIDINE HYDROCHLORIDE 0.1 MG: 0.1 TABLET ORAL at 03:05

## 2023-05-26 RX ADMIN — SODIUM ZIRCONIUM CYCLOSILICATE 10 G: 5 POWDER, FOR SUSPENSION ORAL at 08:05

## 2023-05-26 RX ADMIN — CETIRIZINE HYDROCHLORIDE 10 MG: 10 TABLET, FILM COATED ORAL at 08:05

## 2023-05-26 RX ADMIN — ACETAMINOPHEN 650 MG: 325 TABLET ORAL at 09:05

## 2023-05-26 RX ADMIN — LACTULOSE 30 G: 20 SOLUTION ORAL at 08:05

## 2023-05-26 RX ADMIN — HYDRALAZINE HYDROCHLORIDE 50 MG: 50 TABLET ORAL at 09:05

## 2023-05-26 RX ADMIN — MUPIROCIN: 20 OINTMENT TOPICAL at 10:05

## 2023-05-26 RX ADMIN — ACETAMINOPHEN 650 MG: 325 TABLET ORAL at 08:05

## 2023-05-26 RX ADMIN — METOPROLOL TARTRATE 50 MG: 50 TABLET, FILM COATED ORAL at 08:05

## 2023-05-26 RX ADMIN — CLONIDINE HYDROCHLORIDE 0.1 MG: 0.1 TABLET ORAL at 09:05

## 2023-05-26 RX ADMIN — DOCUSATE SODIUM 200 MG: 100 CAPSULE, LIQUID FILLED ORAL at 09:05

## 2023-05-26 RX ADMIN — HYDRALAZINE HYDROCHLORIDE 50 MG: 50 TABLET ORAL at 03:05

## 2023-05-26 RX ADMIN — CLONIDINE HYDROCHLORIDE 0.1 MG: 0.1 TABLET ORAL at 01:05

## 2023-05-26 RX ADMIN — NITROGLYCERIN 0.5 INCH: 20 OINTMENT TOPICAL at 12:05

## 2023-05-26 RX ADMIN — BISACODYL 5 MG: 5 TABLET, COATED ORAL at 04:05

## 2023-05-26 RX ADMIN — CLONIDINE HYDROCHLORIDE 0.1 MG: 0.1 TABLET ORAL at 12:05

## 2023-05-26 RX ADMIN — FENTANYL CITRATE 50 MCG: 50 INJECTION, SOLUTION INTRAMUSCULAR; INTRAVENOUS at 01:05

## 2023-05-26 RX ADMIN — FLUTICASONE PROPIONATE 100 MCG: 50 SPRAY, METERED NASAL at 08:05
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
## 2023-05-26 NOTE — PROGRESS NOTES
Patient arrived to ICU bed 18 via stretcher from ER accompanied by nurse. Transferred to bed. Heart monitor in place, vital signs taken. 2L O2 via NC. Nitro gtt infusing from ER and stopped. Pt changed to Cardene gtt per FRANCO Burnette NP.  Patient is aaox4 with no distress noted. Pt denies CP, headache, and nausea. Full HTT assessment as charted.   Patient has been orientated to room, call light, fall precautions, and board. No questions or concerns at this time.

## 2023-05-26 NOTE — ASSESSMENT & PLAN NOTE
-BP uncontrolled upon admission  -Appreciate nephrology rec's-agree with transition to Coreg  -Continue hydralazine, titrate as needed  -Add nitropaste  -Continue Clonidine for now, wean as tolerated
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
## 2023-05-26 NOTE — ASSESSMENT & PLAN NOTE
· BP as high as 249/115, was given multiple IV medications while in ED without much improvement  · NTG infusion, started per ED due to mild chest pain along with elevated troponin, wean as able -- it is causing her a severe headache, she is not having any chest pain currently  · Patient reports the dialysis nurses instruct her not to take her usual BP meds of Norvasc, clonidine, and metoprolol prior to HD. Case discussed with Dr. Kinsey who recommends changing the Metoprolol to Coreg since Coreg does not dialyze off. Also since rebound hypertension is expected with Clonidine, recommends tapering off Clonidine  · Off pressors, monitor hemodynamic trends and adjust meds as appropriate

## 2023-05-26 NOTE — SUBJECTIVE & OBJECTIVE
Gillian Jones is a 72-year-old female with past medical history of ESRD on dialysis TThSat, hypertension, CHF, anemia, bilateral carotid stenosis, polycystic kidney disease, chronic pain, and secondary hyperparathyroidism who presented to the emergency room for evaluation of chest pain following dialysis today. She reported similar pain following dialysis on Tuesday as well and stated it was relieved with oxygen. EMS gave her ASA and she reported one episode of vomiting afterwards. She denied shortness of breath. She stated the chest pain is moderate in nature, pressure-like, located on right upper chest, with associated upper abdominal pain. She stated her last bowel movement was about one week ago. She was noted to be very hypertensive and was given multiple IV medications without much improvement. She was started on NTG infusion, which has helped BP but is causing a terrible headache. Troponin is mildly elevated, BNP is elevated, CXR reveals pulmonary edema, EKG SR 1st deg AVB - no ST elevation or concerning changes noted when compared to previous. Tridil is currently being titrated for blood pressure control. Of note, patient states that she did not take any of her oral blood pressure medications today, stating that she is instructed by dialysis center not to take the medications until after dialysis session is over and she ended up coming to ED right after dialysis.    Interval history:  5/26: Patient off Cardene infusion this am. Patient reports she does not take her BP meds on dialysis days which likely contributes to her rebound hypertension following HD. Case discussed this morning with Dr. Kinsey and appreciate recommendations. No further complaints of chest pain this morning; endorses constipation and epigastric discomfort. Stable for ICU step down.   Objective:     Vital Signs (Most Recent):  Temp: 97.7 °F (36.5 °C) (05/26/23 1101)  Pulse: (!) 58 (05/26/23 1101)  Resp: 20 (05/26/23 1101)  BP: (!)  154/70 (05/26/23 1030)  SpO2: 100 % (05/26/23 1101) Vital Signs (24h Range):  Temp:  [97.6 °F (36.4 °C)-98.4 °F (36.9 °C)] 97.7 °F (36.5 °C)  Pulse:  [] 58  Resp:  [11-35] 20  SpO2:  [84 %-100 %] 100 %  BP: (132-249)/() 154/70   Weight: 97.6 kg (215 lb 2.7 oz);  Body mass index is 36.93 kg/m².    Intake/Output Summary (Last 24 hours) at 5/26/2023 1105  Last data filed at 5/26/2023 0838  Gross per 24 hour   Intake 356.99 ml   Output --   Net 356.99 ml      Physical Exam  Vitals and nursing note reviewed.   Constitutional:       Appearance: She is obese.   HENT:      Head: Normocephalic.   Eyes:      Conjunctiva/sclera: Conjunctivae normal.   Cardiovascular:      Rate and Rhythm: Normal rate.   Pulmonary:      Effort: Pulmonary effort is normal.      Breath sounds: Normal breath sounds.   Abdominal:      Palpations: Abdomen is soft.   Musculoskeletal:         General: Normal range of motion.      Cervical back: Normal range of motion and neck supple.   Skin:     General: Skin is warm and dry.   Neurological:      Mental Status: She is alert and oriented to person, place, and time.   Psychiatric:         Mood and Affect: Mood normal.       Review of Systems: Negative except as indicated in HPI    Significant Labs:  CBC/Anemia Profile:  Recent Labs   Lab 05/25/23 1928 05/26/23  0407   WBC 9.37 9.76   HGB 8.1* 7.3*   HCT 25.7* 22.9*    180   MCV 94 95   RDW 15.7* 15.7*   Chemistries:  Recent Labs   Lab 05/25/23 1928 05/26/23  0407    135*   K 4.2 4.4   CL 99 100   CO2 25 25   BUN 24* 26*   CREATININE 4.0* 4.5*   CALCIUM 8.8 8.4*   ALBUMIN 3.9 3.4*   PROT 7.3 6.3   BILITOT 0.4 0.4   ALKPHOS 123 97   ALT 9* 9*   AST 21 19   MG  --  2.0   PHOS  --  3.2

## 2023-05-26 NOTE — HPI
Patient is 72 year old female with past medical history of ESRD on dialysis T, Th, Sat, hypertension, CHF, anemia, bilateral carotid stenosis, polycystic kidney disease, chronic pain, and secondary hyperparathyroidism who presented to ED for evaluation of chest pain after dialysis. She reports that she had the same type of pain after dialysis on Tuesday as well -- states that it was relieved with oxygen. EMS gave her ASA, and she reports one episode of vomiting afterwards. She denies shortness of breath. She states that chest pain is moderate, pressure, located on right upper chest, with associated upper abdominal pain. She states that last BM was about one week ago. She was noted to be very hypertensive and was given multiple IV medications without much improvement. She was started on NTG infusion, which has helped BP but is causing a terrible headache. Troponin is mildly elevated, BNP is elevated, CXR reveals pulmonary edema, EKG SR 1st deg AVB - no ST elevation or concerning changes noted when compared to previous. She was given labetalol, Lasix 100 mg IV,   She was also given IV hydralazine, demerol, Zofran, Phenergan, and NTG ointment while in ED.  She is being admitted to ICU for management of hypertensive emergency.    5/26/23  Patient weaned off of cardene drip this morning, restarted on her home medications. Stable for ICU downgrade to telemetry.

## 2023-05-26 NOTE — ED NOTES
Pt pulled out IV by accident. Denies pain to site. Dried blood noted. No trauma noted. Charge nurse notified. Charge nurse @ bedside attempting to initiate IV.

## 2023-05-26 NOTE — ED NOTES
Pt difficult IV stick. Requesting medication IM before ultrasound guided IV insertion. MD notified. New orders received.

## 2023-05-26 NOTE — ASSESSMENT & PLAN NOTE
Dialysis on T, Th, Sat  Nephrology following  May require additional dialysis session due to fluid overload  Monitor BUN, creatinine, potassium

## 2023-05-26 NOTE — CONSULTS
[place] - Intensive Care ([facility])  [facility] Medicine  Consult Note    Patient Name: Gillian Jones  MRN: [id number]  Admission Date: 5/25/2023  Hospital Length of Stay: 0 days  Attending Physician: Chilo Ortiz MD   Primary Care Provider: Primary Doctor No           Patient information was obtained from patient, past medical records, ER records and primary team.       Subjective:     Principal Problem: Hypertensive emergency    Chief Complaint:   Chief Complaint   Patient presents with    Chest Pain     Pt presents to ed via ems, c/o chest pain that started after dialysis today, pt states she had the same pain after dialysis Tuesday and was relieved with O2, pt had 1 episode of vomiting after ems gave ASA, denies sob, pt also c/o some upper abdominal pain and states her LBM was last thursday        HPI: Patient is 72 year old female with past medical history of ESRD on dialysis T, Th, Sat, hypertension, CHF, anemia, bilateral carotid stenosis, polycystic kidney disease, chronic pain, and secondary hyperparathyroidism who presented to ED for evaluation of chest pain after dialysis. She reports that she had the same type of pain after dialysis on Tuesday as well -- states that it was relieved with oxygen. EMS gave her ASA, and she reports one episode of vomiting afterwards. She denies shortness of breath. She states that chest pain is moderate, pressure, located on right upper chest, with associated upper abdominal pain. She states that last BM was about one week ago. She was noted to be very hypertensive and was given multiple IV medications without much improvement. She was started on NTG infusion, which has helped BP but is causing a terrible headache. Troponin is mildly elevated, BNP is elevated, CXR reveals pulmonary edema, EKG SR 1st deg AVB - no ST elevation or concerning changes noted when compared to previous. She was given labetalol, Lasix 100 mg IV,   She was also given IV hydralazine, demerol, Zofran,  Phenergan, and NTG ointment while in ED.  She is being admitted to ICU for management of hypertensive emergency.    5/26/23  Patient weaned off of cardene drip this morning, restarted on her home medications. Stable for ICU downgrade to telemetry.      No new subjective & objective note has been filed under this hospital service since the last note was generated.    Assessment/Plan:     * Hypertensive emergency  BP as high as 249/115, was given multiple IV medications while in ED without much improvement  NTG infusion, started per ED due to mild chest pain along with elevated troponin, wean as able -- it is causing her a severe headache, she is not having any chest pain currently  Did not take her usual BP medications today (Norvasc, clonidine, metoprolol) -- she states that dialysis nurses instruct her not to take it until after dialysis, she apparently came to ED after dialysis   Will give clonidine now and resume the rest of her BP medications in AM  Troponin mildly elevated, 0.047 -> 0.111 --> 0.288  Cardiac monitoring ordered    5/26  Weaned off cardene drip, restarted on home PO regimen  Cardiology following, treating as NSTEMI    NSTEMI (non-ST elevated myocardial infarction)  Cardiology following  Heparin drip to be started      Atypical chest pain  Reports chest pain was on left upper chest near IV site  She denies chest pain altogether at this time  PRN pain medication ordered  Trending troponin level    ESRD (end stage renal disease)  Dialysis on T, Th, Sat  Nephrology following  May require additional dialysis session due to fluid overload  Monitor BUN, creatinine, potassium    Anemia in chronic kidney disease, on chronic dialysis  Hgb stable, 8.1 (appears to be at baseline)  Denies any type of bleeding  Monitor labs    Chronic back pain  PRN pain medication ordered, adjust regimen as needed for pain control  One dose IV fentanyl ordered      VTE Risk Mitigation (From admission, onward)         Ordered      heparin 25,000 units in dextrose 5% (100 units/ml) IV bolus from bag - ADDITIONAL PRN BOLUS - 60 units/kg (max bolus 4000 units)  As needed (PRN)        Question:  Heparin Infusion Adjustment (DO NOT MODIFY ANSWER)  Answer:  [email or web address]\Images\Pharmacy\HeparinInfusions\heparin LOW INTENSITY nomogram for [facility] [id number]    05/26/23 1052     heparin 25,000 units in dextrose 5% (100 units/ml) IV bolus from bag - ADDITIONAL PRN BOLUS - 30 units/kg (max bolus 4000 units)  As needed (PRN)        Question:  Heparin Infusion Adjustment (DO NOT MODIFY ANSWER)  Answer:  [email or web address]\Images\Pharmacy\HeparinInfusions\heparin LOW INTENSITY nomogram for [facility] [id number]    05/26/23 1052     heparin 25,000 units in dextrose 5% (100 units/ml) IV bolus from bag INITIAL BOLUS (max bolus 4000 units)  Once        Question:  Heparin Infusion Adjustment (DO NOT MODIFY ANSWER)  Answer:  [email or web address]\epic\Images\Pharmacy\HeparinInfusions\heparin LOW INTENSITY nomogram for [facility] [id number]    05/26/23 1052     heparin 25,000 units in dextrose 5% 250 mL (100 units/mL) infusion LOW INTENSITY nomogram - [facility]  Continuous        Question Answer Comment   Heparin Infusion Adjustment (DO NOT MODIFY ANSWER) [email or web address]\epic\Images\Pharmacy\HeparinInfusions\heparin LOW INTENSITY nomogram for [facility] [id number]    Begin at (in units/kg/hr) 12        05/26/23 1052     IP VTE HIGH RISK PATIENT  Once         05/26/23 0053     Place sequential compression device  Until discontinued         05/26/23 0053                  Thank you for your consult. I will follow-up with patient. Please contact us if you have any additional questions.    Chilo Ortiz MD  Department of Hospital Medicine   [facility] - Intensive Care ([facility])

## 2023-05-26 NOTE — ASSESSMENT & PLAN NOTE
Dialysis on T, Th, Sat  Consult nephrology  May require additional dialysis session due to fluid overload  Monitor BUN, creatinine, potassium

## 2023-05-26 NOTE — ASSESSMENT & PLAN NOTE
· Likely underlying demand ischemia  · Cardiology consulted and following; follow recs  · Monitor for continued complaints of chest pain  · Continue cardiac monitoring for ischemic changes  · Plan for heparin infusion per Cardiology; however, patient refusing due to blood draws  · Consider treatment dose Lovenox to avoid frequent lab draws

## 2023-05-26 NOTE — ASSESSMENT & PLAN NOTE
BP as high as 249/115, was given multiple IV medications while in ED without much improvement  NTG infusion, started per ED due to mild chest pain along with elevated troponin, wean as able -- it is causing her a severe headache, she is not having any chest pain currently  Did not take her usual BP medications today (Norvasc, clonidine, metoprolol) -- she states that dialysis nurses instruct her not to take it until after dialysis, she apparently came to ED after dialysis   Will give clonidine now and resume the rest of her BP medications in AM  Troponin mildly elevated, 0.047 -> 0.111 -- likely related to demand ischemia  Cardiac monitoring ordered

## 2023-05-26 NOTE — ASSESSMENT & PLAN NOTE
BP as high as 249/115, was given multiple IV medications while in ED without much improvement  NTG infusion, started per ED due to mild chest pain along with elevated troponin, wean as able -- it is causing her a severe headache, she is not having any chest pain currently  Did not take her usual BP medications today (Norvasc, clonidine, metoprolol) -- she states that dialysis nurses instruct her not to take it until after dialysis, she apparently came to ED after dialysis   Will give clonidine now and resume the rest of her BP medications in AM  Troponin mildly elevated, 0.047 -> 0.111 --> 0.288  Cardiac monitoring ordered    5/26  Weaned off cardene drip, restarted on home PO regimen  Cardiology following, treating as NSTEMI

## 2023-05-26 NOTE — ASSESSMENT & PLAN NOTE
Reports chest pain was on left upper chest near IV site  She denies chest pain altogether at this time  PRN pain medication ordered  Trending troponin level

## 2023-05-26 NOTE — ED NOTES
Attempt to call report in to nurse Stone unsuccessful. Will tod this nurse back b/c she is assisting another pt
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
## 2023-05-26 NOTE — PLAN OF CARE
Problem: Adult Inpatient Plan of Care  Goal: Plan of Care Review  Outcome: Ongoing, Progressing  Goal: Patient-Specific Goal (Individualized)  Outcome: Ongoing, Progressing  Goal: Absence of Hospital-Acquired Illness or Injury  Outcome: Ongoing, Progressing  Goal: Optimal Comfort and Wellbeing  Outcome: Ongoing, Progressing  Goal: Readiness for Transition of Care  Outcome: Ongoing, Progressing     Problem: Skin Injury Risk Increased  Goal: Skin Health and Integrity  Outcome: Ongoing, Progressing     Problem: Device-Related Complication Risk (Hemodialysis)  Goal: Safe, Effective Therapy Delivery  Outcome: Ongoing, Progressing     Problem: Hypertension Acute  Goal: Blood Pressure Within Desired Range  Outcome: Ongoing, Progressing     Problem: Hemodynamic Instability (Hemodialysis)  Goal: Effective Tissue Perfusion  Outcome: Ongoing, Progressing     Problem: Infection (Hemodialysis)  Goal: Absence of Infection Signs and Symptoms  Outcome: Ongoing, Progressing

## 2023-05-26 NOTE — HPI
Ms. Jones is a 72 year old female whose conditions include ESRD on HD, HTN, diastolic CHF, anemia, bilateral carotid stenosis, PCKD, chronic pain, and secondary hyperaparathyroidsim who presented to [facility] ED yesterday due to chest pain that onset after dialysis. Patient complained of right-sided chest heaviness/pressure, intermittent since Tuesday. Associated symptoms included abdominal discomfort/constipation, nausea, and vomiting. Patient denied any associated fever, chills, SOB, palpitations, near syncope, or syncope. Initial workup in ED revealed uncontrolled HTN (240/115), and troponin of 0.047>0.111, BNP of 1215. CXR showed findings concerning for pulmonary edema and patient was subsequently placed on a Tridil drip and admitted to ICU. Cardiology consulted to assist with management. Patient seen and examined today, resting in bed. Feels ok. Still complains of right-sided chest discomfort. States it burns at times and is GERD-like in nature and she feels her severe constipation is the root of her symptoms. Denies any prior history of CAD or MI. Tridil drip provided relief but patient could not tolerate due to headache. She states her BP is generally well-controlled on her home medications. Chart reviewed. Troponin 0.047>0.111>0.258. H/H 7.3/22.9, transfusion planned tomorrow. Echo pending. Will need ischemic evaluation once BP controlled. EKG reviewed, no ST elevation, dynamic ischemic changes noted.

## 2023-05-26 NOTE — NURSING TRANSFER
Nursing Transfer Note      5/26/2023     Reason patient is being transferred: Stepdown from ICU    Transfer To: Tele Room 231 from ICU 18    Transfer via wheelchair    Transfer with  to O2    Transported by TEA, RN    Telemetry Box not ordered    Any special needs or follow-up needed: Cards, Nephro, HM    Chart send with patient: Yes    Notified: daughter    1  Upon arrival to floor: patient oriented to room, call bell in reach, and bed in lowest position

## 2023-05-26 NOTE — CHAPLAIN
Initial visit with patient.  Visited with patient to assess for spiritual and emotional needs.  Pt was doing okay but did ask for prayer.  I prayed for her before leaving and spiritual care remains available as needed.    Chaplain Rafy Royal M.Div., [facility]

## 2023-05-26 NOTE — ASSESSMENT & PLAN NOTE
· Chronic HD on TuThSat  · Patient was offered additional HD today; however, declined  · Nephrology following  · Monitor lytes and correct supplement as appropriate

## 2023-05-26 NOTE — HPI
Gillian Jones is a 72-year-old female with past medical history of ESRD on dialysis TThSat, hypertension, CHF, anemia, bilateral carotid stenosis, polycystic kidney disease, chronic pain, and secondary hyperparathyroidism who presented to the emergency room for evaluation of chest pain following dialysis today. She reported similar pain following dialysis on Tuesday as well and stated it was relieved with oxygen. EMS gave her ASA and she reported one episode of vomiting afterwards. She denied shortness of breath. She stated the chest pain is moderate in nature, pressure-like, located on right upper chest, with associated upper abdominal pain. She stated her last bowel movement was about one week ago. She was noted to be very hypertensive and was given multiple IV medications without much improvement. She was started on NTG infusion, which has helped BP but is causing a terrible headache. Troponin is mildly elevated, BNP is elevated, CXR reveals pulmonary edema, EKG SR 1st deg AVB - no ST elevation or concerning changes noted when compared to previous. Tridil is currently being titrated for blood pressure control. Of note, patient states that she did not take any of her oral blood pressure medications today, stating that she is instructed by dialysis center not to take the medications until after dialysis session is over and she ended up coming to ED right after dialysis.

## 2023-05-26 NOTE — PROGRESS NOTES
[facility]  Critical Care Progress Note    Patient Name: Gillian Jones   MRN: [id number]  Admission Date: 5/25/2023   Hospital Length of Stay: 0 days  Code Status: Full Code    Attending Provider: Chilo Ortiz MD    Principal Problem: Hypertensive emergency  Subjective:   History of present illness:  Gillian Jones is a 72-year-old female with past medical history of ESRD on dialysis TThSat, hypertension, CHF, anemia, bilateral carotid stenosis, polycystic kidney disease, chronic pain, and secondary hyperparathyroidism who presented to the emergency room for evaluation of chest pain following dialysis today. She reported similar pain following dialysis on Tuesday as well and stated it was relieved with oxygen. EMS gave her ASA and she reported one episode of vomiting afterwards. She denied shortness of breath. She stated the chest pain is moderate in nature, pressure-like, located on right upper chest, with associated upper abdominal pain. She stated her last bowel movement was about one week ago. She was noted to be very hypertensive and was given multiple IV medications without much improvement. She was started on NTG infusion, which has helped BP but is causing a terrible headache. Troponin is mildly elevated, BNP is elevated, CXR reveals pulmonary edema, EKG SR 1st deg AVB - no ST elevation or concerning changes noted when compared to previous. Tridil is currently being titrated for blood pressure control. Of note, patient states that she did not take any of her oral blood pressure medications today, stating that she is instructed by dialysis center not to take the medications until after dialysis session is over and she ended up coming to ED right after dialysis.    Interval history:   5/26: Patient off Cardene infusion this am. Patient reports she does not take her BP meds on dialysis days which likely contributes to her rebound hypertension following HD. Case discussed  this morning with Dr. Kinsey and appreciate recommendations. No further complaints of chest pain this morning; endorses constipation and epigastric discomfort. Stable for ICU step down.   Objective:     Vital Signs (Most Recent):  Temp: 97.7 °F (36.5 °C) (05/26/23 1101)  Pulse: (!) 58 (05/26/23 1101)  Resp: 20 (05/26/23 1101)  BP: (!) 154/70 (05/26/23 1030)  SpO2: 100 % (05/26/23 1101) Vital Signs (24h Range):  Temp:  [97.6 °F (36.4 °C)-98.4 °F (36.9 °C)] 97.7 °F (36.5 °C)  Pulse:  [] 58  Resp:  [11-35] 20  SpO2:  [84 %-100 %] 100 %  BP: (132-249)/() 154/70   Weight: 97.6 kg (215 lb 2.7 oz);  Body mass index is 36.93 kg/m².    Intake/Output Summary (Last 24 hours) at 5/26/2023 1105  Last data filed at 5/26/2023 0838  Gross per 24 hour   Intake 356.99 ml   Output --   Net 356.99 ml      Physical Exam  Vitals and nursing note reviewed.   Constitutional:       Appearance: She is obese.   HENT:      Head: Normocephalic.   Eyes:      Conjunctiva/sclera: Conjunctivae normal.   Cardiovascular:      Rate and Rhythm: Normal rate.   Pulmonary:      Effort: Pulmonary effort is normal.      Breath sounds: Normal breath sounds.   Abdominal:      Palpations: Abdomen is soft.   Musculoskeletal:         General: Normal range of motion.      Cervical back: Normal range of motion and neck supple.   Skin:     General: Skin is warm and dry.   Neurological:      Mental Status: She is alert and oriented to person, place, and time.   Psychiatric:         Mood and Affect: Mood normal.       Review of Systems: Negative except as indicated in HPI    Significant Labs:  CBC/Anemia Profile:  Recent Labs   Lab 05/25/23 1928 05/26/23  0407   WBC 9.37 9.76   HGB 8.1* 7.3*   HCT 25.7* 22.9*    180   MCV 94 95   RDW 15.7* 15.7*   Chemistries:  Recent Labs   Lab 05/25/23 1928 05/26/23  0407    135*   K 4.2 4.4   CL 99 100   CO2 25 25   BUN 24* 26*   CREATININE 4.0* 4.5*   CALCIUM 8.8 8.4*   ALBUMIN 3.9 3.4*   PROT 7.3 6.3    BILITOT 0.4 0.4   ALKPHOS 123 97   ALT 9* 9*   AST 21 19   MG  --  2.0   PHOS  --  3.2     Assessment/Plan:   Hypertensive emergency  · BP as high as 249/115, was given multiple IV medications while in ED without much improvement  · NTG infusion, started per ED due to mild chest pain along with elevated troponin, wean as able -- it is causing her a severe headache, she is not having any chest pain currently  · Patient reports the dialysis nurses instruct her not to take her usual BP meds of Norvasc, clonidine, and metoprolol prior to HD. Case discussed with Dr. Kinsey who recommends changing the Metoprolol to Coreg since Coreg does not dialyze off. Also since rebound hypertension is expected with Clonidine, recommends tapering off Clonidine  · Off pressors, monitor hemodynamic trends and adjust meds as appropriate    NSTEMI (non-ST elevated myocardial infarction)  Atypical chest pain  · Likely underlying demand ischemia  · Cardiology consulted and following; follow recs  · Monitor for continued complaints of chest pain  · Continue cardiac monitoring for ischemic changes  · Plan for heparin infusion per Cardiology; however, patient refusing due to blood draws  · Consider treatment dose Lovenox to avoid frequent lab draws     ESRD (end stage renal disease)  · Chronic HD on TuThSat  · Patient was offered additional HD today; however, declined  · Nephrology following  · Monitor lytes and correct supplement as appropriate    Anemia in chronic kidney disease, on chronic dialysis  · No evidence of acute bleeding; however, decreased H&H this morning  · Monitor for bleeding  · Monitor H&H trends  · Plan for transfusion with HD per renal tomorrow    Chronic back pain  · PRN pain medication ordered, adjust regimen as needed for pain control     Luiza Martin NP  Pulmonary and Critical Care  [facility]
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
## 2023-05-26 NOTE — ASSESSMENT & PLAN NOTE
-Patient who presents with atypical CP symptoms/troponin elevation  -Elevated BP likely contributing to clinical picture  -Will treat as NSTEMI   -Allergic to ASA  -Continue BB, hydralazine  -Add statin  -Start heparin gtt, monitor H/H closely  -Will consider ischemic workup once BP controlled-MPI stress test vs [facility]

## 2023-05-26 NOTE — PLAN OF CARE
Pt admitted to ICU this shift. POC discussed w/pt, VU. NSR w/1 AVB on cardiac monitor. Cardene weaned off @  0430. SBPs 130s-150s. Pt's home PO antihypertensives restarted. Pt denies any CP, headache, or N/V. Pt anuric. Turns independently in bed. Fall precautions in place, bed alarm on. Pt slept easily through shift. Attempted to call pt's daughter Susu per pt request this AM to update on pt, but no ans to call.

## 2023-05-26 NOTE — ASSESSMENT & PLAN NOTE
· No evidence of acute bleeding; however, decreased H&H this morning  · Monitor for bleeding  · Monitor H&H trends  · Plan for transfusion with HD per renal tomorrow

## 2023-05-26 NOTE — ASSESSMENT & PLAN NOTE
PRN pain medication ordered, adjust regimen as needed for pain control  One dose IV fentanyl ordered
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
## 2023-05-27 LAB
ANION GAP SERPL CALC-SCNC: 10 MMOL/L (ref 8–16)
ANISOCYTOSIS BLD QL SMEAR: SLIGHT
BASOPHILS # BLD AUTO: 0.07 K/UL (ref 0–0.2)
BASOPHILS NFR BLD: 1 % (ref 0–1.9)
BLD PROD TYP BPU: NORMAL
BLOOD UNIT EXPIRATION DATE: NORMAL
BLOOD UNIT TYPE CODE: 5100
BLOOD UNIT TYPE: NORMAL
BUN SERPL-MCNC: 36 MG/DL (ref 8–23)
CALCIUM SERPL-MCNC: 8.2 MG/DL (ref 8.7–10.5)
CHLORIDE SERPL-SCNC: 99 MMOL/L (ref 95–110)
CO2 SERPL-SCNC: 24 MMOL/L (ref 23–29)
CODING SYSTEM: NORMAL
CREAT SERPL-MCNC: 6.3 MG/DL (ref 0.5–1.4)
CROSSMATCH INTERPRETATION: NORMAL
DIFFERENTIAL METHOD: ABNORMAL
DISPENSE STATUS: NORMAL
EOSINOPHIL # BLD AUTO: 0.3 K/UL (ref 0–0.5)
EOSINOPHIL NFR BLD: 4.5 % (ref 0–8)
ERYTHROCYTE [DISTWIDTH] IN BLOOD BY AUTOMATED COUNT: 16.3 % (ref 11.5–14.5)
EST. GFR  (NO RACE VARIABLE): 7 ML/MIN/1.73 M^2
GLUCOSE SERPL-MCNC: 83 MG/DL (ref 70–110)
HCT VFR BLD AUTO: 24.8 % (ref 37–48.5)
HGB BLD-MCNC: 7.6 G/DL (ref 12–16)
HYPOCHROMIA BLD QL SMEAR: ABNORMAL
IMM GRANULOCYTES # BLD AUTO: 0.02 K/UL (ref 0–0.04)
IMM GRANULOCYTES NFR BLD AUTO: 0.3 % (ref 0–0.5)
LYMPHOCYTES # BLD AUTO: 2.4 K/UL (ref 1–4.8)
LYMPHOCYTES NFR BLD: 33.2 % (ref 18–48)
MCH RBC QN AUTO: 29.7 PG (ref 27–31)
MCHC RBC AUTO-ENTMCNC: 30.6 G/DL (ref 32–36)
MCV RBC AUTO: 97 FL (ref 82–98)
MONOCYTES # BLD AUTO: 0.9 K/UL (ref 0.3–1)
MONOCYTES NFR BLD: 11.7 % (ref 4–15)
NEUTROPHILS # BLD AUTO: 3.6 K/UL (ref 1.8–7.7)
NEUTROPHILS NFR BLD: 49.3 % (ref 38–73)
NRBC BLD-RTO: 0 /100 WBC
NUM UNITS TRANS PACKED RBC: NORMAL
PLATELET # BLD AUTO: 236 K/UL (ref 150–450)
PLATELET BLD QL SMEAR: ABNORMAL
PMV BLD AUTO: 10.9 FL (ref 9.2–12.9)
POTASSIUM SERPL-SCNC: 5.3 MMOL/L (ref 3.5–5.1)
RBC # BLD AUTO: 2.56 M/UL (ref 4–5.4)
SODIUM SERPL-SCNC: 133 MMOL/L (ref 136–145)
WBC # BLD AUTO: 7.33 K/UL (ref 3.9–12.7)

## 2023-05-27 PROCEDURE — 25000003 PHARM REV CODE 250: Performed by: INTERNAL MEDICINE

## 2023-05-27 PROCEDURE — 25000003 PHARM REV CODE 250: Performed by: HOSPITALIST

## 2023-05-27 PROCEDURE — 80100016 HC MAINTENANCE HEMODIALYSIS

## 2023-05-27 PROCEDURE — 85025 COMPLETE CBC W/AUTO DIFF WBC: CPT | Performed by: INTERNAL MEDICINE

## 2023-05-27 PROCEDURE — 94760 N-INVAS EAR/PLS OXIMETRY 1: CPT

## 2023-05-27 PROCEDURE — 99233 SBSQ HOSP IP/OBS HIGH 50: CPT | Mod: ,,, | Performed by: INTERNAL MEDICINE

## 2023-05-27 PROCEDURE — 25000003 PHARM REV CODE 250: Performed by: NURSE PRACTITIONER

## 2023-05-27 PROCEDURE — 20000000 HC ICU ROOM

## 2023-05-27 PROCEDURE — 63600175 PHARM REV CODE 636 W HCPCS: Performed by: INTERNAL MEDICINE

## 2023-05-27 PROCEDURE — 25000003 PHARM REV CODE 250: Performed by: PHYSICIAN ASSISTANT

## 2023-05-27 PROCEDURE — P9016 RBC LEUKOCYTES REDUCED: HCPCS | Performed by: INTERNAL MEDICINE

## 2023-05-27 PROCEDURE — 63600175 PHARM REV CODE 636 W HCPCS: Performed by: NURSE PRACTITIONER

## 2023-05-27 PROCEDURE — 25000242 PHARM REV CODE 250 ALT 637 W/ HCPCS: Performed by: NURSE PRACTITIONER

## 2023-05-27 PROCEDURE — 80048 BASIC METABOLIC PNL TOTAL CA: CPT | Performed by: HOSPITALIST

## 2023-05-27 PROCEDURE — 36415 COLL VENOUS BLD VENIPUNCTURE: CPT | Performed by: INTERNAL MEDICINE

## 2023-05-27 PROCEDURE — 99233 PR SUBSEQUENT HOSPITAL CARE,LEVL III: ICD-10-PCS | Mod: ,,, | Performed by: INTERNAL MEDICINE

## 2023-05-27 RX ORDER — HYDRALAZINE HYDROCHLORIDE 20 MG/ML
10 INJECTION INTRAMUSCULAR; INTRAVENOUS EVERY 8 HOURS PRN
Status: DISCONTINUED | OUTPATIENT
Start: 2023-05-27 | End: 2023-05-28

## 2023-05-27 RX ORDER — ENOXAPARIN SODIUM 150 MG/ML
1 INJECTION SUBCUTANEOUS EVERY 24 HOURS
Status: DISCONTINUED | OUTPATIENT
Start: 2023-05-27 | End: 2023-05-28

## 2023-05-27 RX ORDER — HYDRALAZINE HYDROCHLORIDE 20 MG/ML
10 INJECTION INTRAMUSCULAR; INTRAVENOUS ONCE
Status: COMPLETED | OUTPATIENT
Start: 2023-05-27 | End: 2023-05-27

## 2023-05-27 RX ORDER — CLONIDINE HYDROCHLORIDE 0.1 MG/1
0.1 TABLET ORAL ONCE
Status: DISCONTINUED | OUTPATIENT
Start: 2023-05-27 | End: 2023-05-27

## 2023-05-27 RX ORDER — HYDRALAZINE HYDROCHLORIDE 50 MG/1
100 TABLET, FILM COATED ORAL EVERY 8 HOURS
Status: DISCONTINUED | OUTPATIENT
Start: 2023-05-27 | End: 2023-05-30 | Stop reason: HOSPADM

## 2023-05-27 RX ORDER — LACTULOSE 10 G/15ML
30 SOLUTION ORAL DAILY
Status: COMPLETED | OUTPATIENT
Start: 2023-05-28 | End: 2023-05-29

## 2023-05-27 RX ORDER — ATORVASTATIN CALCIUM 40 MG/1
40 TABLET, FILM COATED ORAL NIGHTLY
Status: DISCONTINUED | OUTPATIENT
Start: 2023-05-27 | End: 2023-05-30 | Stop reason: HOSPADM

## 2023-05-27 RX ORDER — HYDRALAZINE HYDROCHLORIDE 20 MG/ML
10 INJECTION INTRAMUSCULAR; INTRAVENOUS ONCE
Status: DISCONTINUED | OUTPATIENT
Start: 2023-05-27 | End: 2023-05-27

## 2023-05-27 RX ORDER — PRAZOSIN HYDROCHLORIDE 1 MG/1
1 CAPSULE ORAL 3 TIMES DAILY
Status: DISCONTINUED | OUTPATIENT
Start: 2023-05-27 | End: 2023-05-28

## 2023-05-27 RX ORDER — ENOXAPARIN SODIUM 150 MG/ML
1 INJECTION SUBCUTANEOUS EVERY 24 HOURS
Status: DISCONTINUED | OUTPATIENT
Start: 2023-05-27 | End: 2023-05-27

## 2023-05-27 RX ADMIN — CETIRIZINE HYDROCHLORIDE 5 MG: 5 TABLET ORAL at 12:05

## 2023-05-27 RX ADMIN — CLONIDINE HYDROCHLORIDE 0.1 MG: 0.1 TABLET ORAL at 08:05

## 2023-05-27 RX ADMIN — HYDRALAZINE HYDROCHLORIDE 50 MG: 50 TABLET ORAL at 05:05

## 2023-05-27 RX ADMIN — ENOXAPARIN SODIUM 105 MG: 120 INJECTION SUBCUTANEOUS at 12:05

## 2023-05-27 RX ADMIN — HYDRALAZINE HYDROCHLORIDE 100 MG: 50 TABLET ORAL at 10:05

## 2023-05-27 RX ADMIN — NITROGLYCERIN 0.5 INCH: 20 OINTMENT TOPICAL at 12:05

## 2023-05-27 RX ADMIN — SEVELAMER CARBONATE 1600 MG: 800 TABLET, FILM COATED ORAL at 12:05

## 2023-05-27 RX ADMIN — CARVEDILOL 25 MG: 12.5 TABLET, FILM COATED ORAL at 10:05

## 2023-05-27 RX ADMIN — NITROGLYCERIN 0.5 INCH: 20 OINTMENT TOPICAL at 11:05

## 2023-05-27 RX ADMIN — DOCUSATE SODIUM 200 MG: 100 CAPSULE, LIQUID FILLED ORAL at 12:05

## 2023-05-27 RX ADMIN — CLONIDINE HYDROCHLORIDE 0.1 MG: 0.1 TABLET ORAL at 02:05

## 2023-05-27 RX ADMIN — NEPHROCAP 1 CAPSULE: 1 CAP ORAL at 12:05

## 2023-05-27 RX ADMIN — SEVELAMER CARBONATE 1600 MG: 800 TABLET, FILM COATED ORAL at 05:05

## 2023-05-27 RX ADMIN — NITROGLYCERIN 0.5 INCH: 20 OINTMENT TOPICAL at 05:05

## 2023-05-27 RX ADMIN — CLONIDINE HYDROCHLORIDE 0.1 MG: 0.1 TABLET ORAL at 10:05

## 2023-05-27 RX ADMIN — MUPIROCIN: 20 OINTMENT TOPICAL at 08:05

## 2023-05-27 RX ADMIN — HYDRALAZINE HYDROCHLORIDE 10 MG: 20 INJECTION, SOLUTION INTRAMUSCULAR; INTRAVENOUS at 05:05

## 2023-05-27 RX ADMIN — HYDRALAZINE HYDROCHLORIDE 10 MG: 20 INJECTION, SOLUTION INTRAMUSCULAR; INTRAVENOUS at 12:05

## 2023-05-27 RX ADMIN — HYDRALAZINE HYDROCHLORIDE 75 MG: 50 TABLET ORAL at 02:05

## 2023-05-27 RX ADMIN — SODIUM ZIRCONIUM CYCLOSILICATE 10 G: 5 POWDER, FOR SUSPENSION ORAL at 12:05

## 2023-05-27 RX ADMIN — PRAZOSIN HYDROCHLORIDE 1 MG: 1 CAPSULE ORAL at 08:05

## 2023-05-27 RX ADMIN — ATORVASTATIN CALCIUM 40 MG: 40 TABLET, FILM COATED ORAL at 08:05

## 2023-05-27 RX ADMIN — DOCUSATE SODIUM 200 MG: 100 CAPSULE, LIQUID FILLED ORAL at 08:05

## 2023-05-27 RX ADMIN — MUPIROCIN: 20 OINTMENT TOPICAL at 12:05

## 2023-05-27 RX ADMIN — FLUTICASONE PROPIONATE 100 MCG: 50 SPRAY, METERED NASAL at 12:05

## 2023-05-27 RX ADMIN — HYDRALAZINE HYDROCHLORIDE 10 MG: 20 INJECTION, SOLUTION INTRAMUSCULAR; INTRAVENOUS at 08:05

## 2023-05-27 RX ADMIN — CARVEDILOL 25 MG: 12.5 TABLET, FILM COATED ORAL at 08:05

## 2023-05-27 NOTE — ASSESSMENT & PLAN NOTE
BP as high as 249/115, was given multiple IV medications while in ED without much improvement. NTG infusion, started per ED due to mild chest pain along with elevated troponin but pt unable to tolerate due to headaches. Pt reports that she did not take her usual BP medications on HD day (Norvasc, clonidine, metoprolol) -- she states that dialysis nurses instruct her not to take it until after dialysis   - Pt weaned of cardene drip 05/26.   - Nephrology following- BP regimen adjusted with plans to taper clonidine to avoid rebound HTN on HD days   - Continue Clonidine taper per renal   - Continue Nitro patch, Coreg, hydral   - monitor BP
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
## 2023-05-27 NOTE — ASSESSMENT & PLAN NOTE
ESRD on Dialysis on T, Th, Sat  Nephrology following- continue HD per scheduled   Monitor BMP, strict Is and OS   Renally dose meds; avoid nephrotoxic agents

## 2023-05-27 NOTE — PLAN OF CARE
Duration: 3.5 hours    Stable/unstable: stable    Ultrafiltration volume: 3 liters net    Notes: Tolerated, No access issues, Adm. 1 unit prbcs with hd, Dr. Kinsey visited during hd.

## 2023-05-27 NOTE — ASSESSMENT & PLAN NOTE
Pt with chest pain in setting of HTN emergency. Trop 0.047--> 0.111--> 0.258--> 0.288  Cardiology consulted and following   Heparing drip was ordered but not started as pt refused sticks for PTT monitoring. Will transition to tx dose lovenox in the interim   Pt allergic to ASA  Start Atorva 40 qhs   Cardiology considering ischemic work up with stress test vs. LHC pending improvement in BP trends

## 2023-05-27 NOTE — PROGRESS NOTES
[facility] - Telemetry ([facility])  [facility] Medicine  Progress Note    Patient Name: Gillian Jones  MRN: [id number]  Patient Class: IP- Inpatient   Admission Date: 5/25/2023  Length of Stay: 1 days  Attending Physician: Iza Washington MD  Primary Care Provider: Primary Doctor No        Subjective:     Principal Problem:Hypertensive emergency        HPI:  Patient is 72 year old female with past medical history of ESRD on dialysis T, Th, Sat, hypertension, CHF, anemia, bilateral carotid stenosis, polycystic kidney disease, chronic pain, and secondary hyperparathyroidism who presented to ED for evaluation of chest pain after dialysis. She reports that she had the same type of pain after dialysis on Tuesday as well -- states that it was relieved with oxygen. EMS gave her ASA, and she reports one episode of vomiting afterwards. She denies shortness of breath. She states that chest pain is moderate, pressure, located on right upper chest, with associated upper abdominal pain. She states that last BM was about one week ago. She was noted to be very hypertensive and was given multiple IV medications without much improvement. She was started on NTG infusion, which has helped BP but is causing a terrible headache. Troponin is mildly elevated, BNP is elevated, CXR reveals pulmonary edema, EKG SR 1st deg AVB - no ST elevation or concerning changes noted when compared to previous. She was given labetalol, Lasix 100 mg IV,   She was also given IV hydralazine, demerol, Zofran, Phenergan, and NTG ointment while in ED.  She is being admitted to ICU for management of hypertensive emergency.    5/26/23  Patient weaned off of cardene drip this morning, restarted on her home medications. Stable for ICU downgrade to telemetry.      Overview/Hospital Course:  No notes on file    No new subjective & objective note has been filed under this hospital service since the last note was generated.      Assessment/Plan:      * Hypertensive  emergency  BP as high as 249/115, was given multiple IV medications while in ED without much improvement. NTG infusion, started per ED due to mild chest pain along with elevated troponin but pt unable to tolerate due to headaches. Pt reports that she did not take her usual BP medications on HD day (Norvasc, clonidine, metoprolol) -- she states that dialysis nurses instruct her not to take it until after dialysis   - Pt weaned of cardene drip 05/26.   - Nephrology following- BP regimen adjusted with plans to taper clonidine to avoid rebound HTN on HD days   - Continue Clonidine taper per renal   - Continue Nitro patch, Coreg, hydral   - monitor BP     NSTEMI (non-ST elevated myocardial infarction)  Pt with chest pain in setting of HTN emergency. Trop 0.047--> 0.111--> 0.258--> 0.288  Cardiology consulted and following   Heparing drip was ordered but not started as pt refused sticks for PTT monitoring. Will transition to tx dose lovenox in the interim   Pt allergic to ASA  Start Atorva 40 qhs   Cardiology considering ischemic work up with stress test vs. LHC pending improvement in BP trends            Atypical chest pain  Resolved, see plan above     ESRD (end stage renal disease)  ESRD on Dialysis on T, Th, Sat  Nephrology following- continue HD per scheduled   Monitor BMP, strict Is and OS   Renally dose meds; avoid nephrotoxic agents     Anemia in chronic kidney disease, on chronic dialysis  1 u PRBC transfusion ordered today for Hgb 7.6  EPO per renal   Monitor CBC     Chronic back pain  PRN pain medication ordered, adjust regimen as needed for pain control  One dose IV fentanyl ordered      VTE Risk Mitigation (From admission, onward)           Ordered     enoxaparin injection 105 mg  Every 24 hours         05/27/23 0919     IP VTE HIGH RISK PATIENT  Once         05/26/23 0053     Place sequential compression device  Until discontinued         05/26/23 0053                    Discharge Planning   DARRIN:      Code  Status: Full Code   Is the patient medically ready for discharge?: No    Reason for patient still in hospital (select all that apply): Patient trending condition and Consult recommendations                     Iza Washington MD  Department of Hospital Medicine   [facility] ([facility])

## 2023-05-27 NOTE — PLAN OF CARE
A231/A231 BERNA Jones is a 72 y.o.female admitted on 5/25/2023 for Hypertensive emergency   Code Status: Full Code MRN: [id number]   Review of patient's allergies indicates:   Allergen Reactions    Contrast media      Cannot take because of kidneys    Iodinated contrast media Other (See Comments)     Kidney shut down    Morphine Other (See Comments)     Severe sedation    Povidone-iodine      pt stated iodine will shut down her kidney    Ace inhibitors     Aspirin     Baclofen Other (See Comments)     Confusion    Codeine     Iodine and iodide containing products      Kidneys shut down     Past Medical History:   Diagnosis Date    Anemia in CKD (chronic kidney disease)     Bilateral carotid artery stenosis 12/22/2022    Burn 1972    Encounter for blood transfusion     ESRD on dialysis since 2/24/16     Essential hypertension     Ovarian cyst     Polycystic kidney disease     dialysis Mon./Wed./Fri.    Secondary hyperparathyroidism of renal origin       PRN meds    sodium chloride, , Q24H PRN  acetaminophen, 650 mg, Q4H PRN  albuterol-ipratropium, 3 mL, Q4H PRN  pneumoc 20-robert conj-dip cr(PF), 0.5 mL, vaccine x 1 dose  sodium chloride 0.9%, 10 mL, PRN      Pt taken to dialysis and 3 L removed while also given blood. Pt had elevated blood pressure. Dr. Washington notified and MAR updated with orders. Meds given per MAR. I confirmed to hold noon Hydralazine dose with Dr. Washington and give Hydralazine at night pending blood pressure. Provider confirmed. Blood pressure came back elevated without improvement from afternoon dose of Hydralazine. Dr. Washington notified and new orders placed.   Cardiac monitoring continues. No falls reported during shift and hourly rounding is complete. Chart check completed. Will continue plan of care.      Orientation: oriented x 4  [place] Coma Scale Score: 15     Lead Monitored: Lead II Rhythm: normal sinus rhythm    Cardiac/Telemetry Box Number: [id number]  VTE Required Core Measure: (SCDs)  Sequential compression device initiated/maintained Last Bowel Movement: 05/26/23  Diet renal  Voiding Characteristics: patient on hemodialysis  Danielito Score: 18  Fall Risk Score: 12  Accucheck []   Freq?      Lines/Drains/Airways       Central Venous Catheter Line  Duration                  Hemodialysis AV Graft Left upper arm -- days              Drain  Duration                  Hemodialysis AV Fistula  Left upper arm -- days         Hemodialysis AV Fistula Left upper arm -- days              Peripheral Intravenous Line  Duration                  Peripheral IV - Single Lumen 05/25/23 2056 20 G Medial;Right Breast 1 day

## 2023-05-27 NOTE — NURSING
Phlebotomy at bedside attempting to collect lab, I attempted to assist but pt only allow draw in certain area and refused to let us try elsewhere besides anterior right hand. Unable to obtain aptt.

## 2023-05-27 NOTE — SUBJECTIVE & OBJECTIVE
Interval History: NAEON. BP trends improved. Pt seen and examined during HD. She reports she feels better today. Chest pain has resolved. Had headaches yesterday evening which have resolved as well. Has not had a BM since admission.     Pt being transfused 1u PRBC per nephrology today. Heparin drip not started at PTT was not drawn yesterday, will change to tx dose lovenox, discussed with pharmacy     Review of Systems  Objective:     Vital Signs (Most Recent):  Temp: 98.1 °F (36.7 °C) (05/27/23 0731)  Pulse: 76 (05/27/23 0731)  Resp: 16 (05/27/23 0731)  BP: (!) 156/70 (05/27/23 0731)  SpO2: (!) 93 % (05/27/23 0731) Vital Signs (24h Range):  Temp:  [97.7 °F (36.5 °C)-98.5 °F (36.9 °C)] 98.1 °F (36.7 °C)  Pulse:  [58-83] 76  Resp:  [11-22] 16  SpO2:  [93 %-100 %] 93 %  BP: (116-178)/(50-87) 156/70     Weight: 108.5 kg (239 lb 3.2 oz)  Body mass index is 41.06 kg/m².  No intake or output data in the 24 hours ending 05/27/23 1014      Physical Exam  Vitals and nursing note reviewed. Exam conducted with a chaperone present.   Constitutional:       General: She is not in acute distress.     Appearance: She is obese. She is not ill-appearing.   Cardiovascular:      Rate and Rhythm: Normal rate and regular rhythm.      Heart sounds: No murmur heard.    No friction rub. No gallop.   Pulmonary:      Effort: Pulmonary effort is normal.      Breath sounds: Normal breath sounds. No wheezing, rhonchi or rales.   Abdominal:      General: Bowel sounds are normal. There is no distension.      Palpations: Abdomen is soft.      Tenderness: There is no abdominal tenderness. There is no guarding or rebound.   Musculoskeletal:      Right lower leg: No edema.      Left lower leg: No edema.   Lymphadenopathy:      Cervical: No cervical adenopathy.   Neurological:      Mental Status: She is alert and oriented to person, place, and time. Mental status is at baseline.           Significant Labs: All pertinent labs within the past 24 hours  have been reviewed.    Significant Imaging: I have reviewed all pertinent imaging results/findings within the past 24 hours.

## 2023-05-27 NOTE — PLAN OF CARE
[facility] - Telemetry (Hospital)  Initial Discharge Assessment       Primary Care Provider: Primary Doctor No    Admission Diagnosis: Acute pulmonary edema [J81.0]  ESRD (end stage renal disease) on dialysis [N18.6, Z99.2]  Upper abdominal pain [R10.10]  Chest pain [R07.9]  Hypertensive emergency [I16.1]    Admission Date: 5/25/2023  Expected Discharge Date:     Transition of Care Barriers: None    Payor: MEDICARE / Plan: MEDICARE PART A & B / Product Type: Government /     Extended Emergency Contact Information  Primary Emergency Contact: Daniela Mejia  Mobile Phone: [phone]  Relation: Daughter  Preferred language: English   needed? No  Secondary Emergency Contact: Kentrell Salazar  Address: [address], [place] [address] [place] [address]  Home Phone: [phone]  Mobile Phone: [phone]  Relation: Daughter    Discharge Plan A: Home         [facility] (ESRD Bundle Only) - [place] - [address]  [address]  [address]  [facility] [address]  Phone: [phone] Fax: [phone]    [facility]  [address]  [place] [address]  Phone: [phone] Fax: [phone]    [facility] - [place], [place] - [address] [facility]  [address] [facility] [address]  Phone: [phone] Fax: [phone]      Initial Assessment (most recent)       Adult Discharge Assessment - 05/27/23 1207          Discharge Assessment    Assessment Type Discharge Planning Assessment     Confirmed/corrected address, phone number and insurance Yes     Confirmed Demographics Correct on Facesheet     Source of Information patient     Communicated DARRIN with patient/caregiver Date not available/Unable to determine     Reason For Admission chest pain during dialysis     People in Home alone     Facility Arrived From: dialysis unit     Do you expect to return to your current living situation? Yes     Do you have help at home or someone to help you manage your  care at home? No     Prior to hospitilization cognitive status: Alert/Oriented     Current cognitive status: Alert/Oriented     Dressing/Bathing bathing difficulty, requires equipment     Dressing/Bathing Management use rollator     Home Layout Able to live on 1st floor     Equipment Currently Used at Home rollator     Readmission within 30 days? No     Patient currently being followed by outpatient case management? No     Do you currently have service(s) that help you manage your care at home? No     Do you take prescription medications? Yes     Do you have prescription coverage? Yes     Coverage Medicaid     Do you have any problems affording any of your prescribed medications? No     Is the patient taking medications as prescribed? yes     Who is going to help you get home at discharge? Will need transportation     How do you get to doctors appointments? health plan transportation     Are you on dialysis? Yes     Dialysis Name and Scheduled days [facility]     Do you take coumadin? No     Discharge Plan A Home     DME Needed Upon Discharge  none     Discharge Plan discussed with: Patient     Transition of Care Barriers None                      Met with patient for discharge assessment.  Patient lives alone and is independent with ADL's with assistive equipment.  She has a daughter who can assist if needed.  She attends dialysis at [facility].  Currently no discharge needs.

## 2023-05-27 NOTE — NURSING
Received lying in bed awake and alert talking with daughter, assessment per flowsheet, plan of care discussed, verbalized understanding. Will continue to monitor.

## 2023-05-27 NOTE — PROGRESS NOTES
PT BP sustaining 220-240 systolic despite PRN medications. She is asymptomatic. Denies HA, CP, SOB.  Will increase PO Hydral to 100 TID and add Prazosin but pt will likely require Cardene drip for BP control, will transfer back to ICU. Discussed with ICU team, transfer orders placed.     Iza Washington MD   [facility] Medicine

## 2023-05-28 LAB
ANION GAP SERPL CALC-SCNC: 10 MMOL/L (ref 8–16)
BASOPHILS # BLD AUTO: 0.06 K/UL (ref 0–0.2)
BASOPHILS NFR BLD: 0.8 % (ref 0–1.9)
BUN SERPL-MCNC: 25 MG/DL (ref 8–23)
CALCIUM SERPL-MCNC: 8.4 MG/DL (ref 8.7–10.5)
CHLORIDE SERPL-SCNC: 98 MMOL/L (ref 95–110)
CO2 SERPL-SCNC: 23 MMOL/L (ref 23–29)
CREAT SERPL-MCNC: 5 MG/DL (ref 0.5–1.4)
DIFFERENTIAL METHOD: ABNORMAL
EOSINOPHIL # BLD AUTO: 0.4 K/UL (ref 0–0.5)
EOSINOPHIL NFR BLD: 4.7 % (ref 0–8)
ERYTHROCYTE [DISTWIDTH] IN BLOOD BY AUTOMATED COUNT: 15.6 % (ref 11.5–14.5)
EST. GFR  (NO RACE VARIABLE): 9 ML/MIN/1.73 M^2
GLUCOSE SERPL-MCNC: 86 MG/DL (ref 70–110)
HCT VFR BLD AUTO: 25.8 % (ref 37–48.5)
HGB BLD-MCNC: 8 G/DL (ref 12–16)
IMM GRANULOCYTES # BLD AUTO: 0.02 K/UL (ref 0–0.04)
IMM GRANULOCYTES NFR BLD AUTO: 0.3 % (ref 0–0.5)
LYMPHOCYTES # BLD AUTO: 2 K/UL (ref 1–4.8)
LYMPHOCYTES NFR BLD: 25.9 % (ref 18–48)
MCH RBC QN AUTO: 29.3 PG (ref 27–31)
MCHC RBC AUTO-ENTMCNC: 31 G/DL (ref 32–36)
MCV RBC AUTO: 95 FL (ref 82–98)
MONOCYTES # BLD AUTO: 1 K/UL (ref 0.3–1)
MONOCYTES NFR BLD: 13.1 % (ref 4–15)
NEUTROPHILS # BLD AUTO: 4.4 K/UL (ref 1.8–7.7)
NEUTROPHILS NFR BLD: 55.2 % (ref 38–73)
NRBC BLD-RTO: 0 /100 WBC
PLATELET # BLD AUTO: 231 K/UL (ref 150–450)
PMV BLD AUTO: 10.9 FL (ref 9.2–12.9)
POTASSIUM SERPL-SCNC: 4.2 MMOL/L (ref 3.5–5.1)
RBC # BLD AUTO: 2.73 M/UL (ref 4–5.4)
SODIUM SERPL-SCNC: 131 MMOL/L (ref 136–145)
WBC # BLD AUTO: 7.88 K/UL (ref 3.9–12.7)

## 2023-05-28 PROCEDURE — 99233 PR SUBSEQUENT HOSPITAL CARE,LEVL III: ICD-10-PCS | Mod: ,,, | Performed by: INTERNAL MEDICINE

## 2023-05-28 PROCEDURE — 11000001 HC ACUTE MED/SURG PRIVATE ROOM

## 2023-05-28 PROCEDURE — 25000003 PHARM REV CODE 250: Performed by: NURSE PRACTITIONER

## 2023-05-28 PROCEDURE — 21400001 HC TELEMETRY ROOM

## 2023-05-28 PROCEDURE — 94761 N-INVAS EAR/PLS OXIMETRY MLT: CPT

## 2023-05-28 PROCEDURE — 25000003 PHARM REV CODE 250: Performed by: INTERNAL MEDICINE

## 2023-05-28 PROCEDURE — 80048 BASIC METABOLIC PNL TOTAL CA: CPT | Performed by: INTERNAL MEDICINE

## 2023-05-28 PROCEDURE — 27000221 HC OXYGEN, UP TO 24 HOURS

## 2023-05-28 PROCEDURE — 99233 SBSQ HOSP IP/OBS HIGH 50: CPT | Mod: ,,, | Performed by: INTERNAL MEDICINE

## 2023-05-28 PROCEDURE — 25000003 PHARM REV CODE 250: Performed by: PHYSICIAN ASSISTANT

## 2023-05-28 PROCEDURE — 63600175 PHARM REV CODE 636 W HCPCS: Performed by: NURSE PRACTITIONER

## 2023-05-28 PROCEDURE — 63600175 PHARM REV CODE 636 W HCPCS: Performed by: INTERNAL MEDICINE

## 2023-05-28 PROCEDURE — 85025 COMPLETE CBC W/AUTO DIFF WBC: CPT | Performed by: NURSE PRACTITIONER

## 2023-05-28 PROCEDURE — 36415 COLL VENOUS BLD VENIPUNCTURE: CPT | Performed by: INTERNAL MEDICINE

## 2023-05-28 RX ORDER — PROCHLORPERAZINE EDISYLATE 5 MG/ML
2.5 INJECTION INTRAMUSCULAR; INTRAVENOUS EVERY 6 HOURS PRN
Status: DISCONTINUED | OUTPATIENT
Start: 2023-05-28 | End: 2023-05-30 | Stop reason: HOSPADM

## 2023-05-28 RX ORDER — LACTULOSE 10 G/15ML
20 SOLUTION ORAL DAILY
Status: DISCONTINUED | OUTPATIENT
Start: 2023-05-28 | End: 2023-05-28 | Stop reason: SDUPTHER

## 2023-05-28 RX ORDER — HYDRALAZINE HYDROCHLORIDE 20 MG/ML
10 INJECTION INTRAMUSCULAR; INTRAVENOUS EVERY 6 HOURS PRN
Status: DISCONTINUED | OUTPATIENT
Start: 2023-05-28 | End: 2023-05-30 | Stop reason: HOSPADM

## 2023-05-28 RX ORDER — METOPROLOL TARTRATE 1 MG/ML
5 INJECTION, SOLUTION INTRAVENOUS EVERY 6 HOURS PRN
Status: DISCONTINUED | OUTPATIENT
Start: 2023-05-28 | End: 2023-05-30 | Stop reason: HOSPADM

## 2023-05-28 RX ORDER — CLONIDINE HYDROCHLORIDE 0.1 MG/1
0.1 TABLET ORAL 2 TIMES DAILY
Status: DISCONTINUED | OUTPATIENT
Start: 2023-05-28 | End: 2023-05-30 | Stop reason: HOSPADM

## 2023-05-28 RX ORDER — ALPRAZOLAM 0.5 MG/1
0.5 TABLET ORAL 2 TIMES DAILY PRN
Status: DISCONTINUED | OUTPATIENT
Start: 2023-05-28 | End: 2023-05-30 | Stop reason: HOSPADM

## 2023-05-28 RX ORDER — ENOXAPARIN SODIUM 100 MG/ML
1 INJECTION SUBCUTANEOUS EVERY 24 HOURS
Status: DISCONTINUED | OUTPATIENT
Start: 2023-05-28 | End: 2023-05-30 | Stop reason: HOSPADM

## 2023-05-28 RX ORDER — ISOSORBIDE MONONITRATE 30 MG/1
30 TABLET, EXTENDED RELEASE ORAL DAILY
Status: DISCONTINUED | OUTPATIENT
Start: 2023-05-28 | End: 2023-05-30 | Stop reason: HOSPADM

## 2023-05-28 RX ORDER — PRAZOSIN HYDROCHLORIDE 1 MG/1
2 CAPSULE ORAL EVERY 8 HOURS
Status: DISCONTINUED | OUTPATIENT
Start: 2023-05-28 | End: 2023-05-30 | Stop reason: HOSPADM

## 2023-05-28 RX ORDER — AMLODIPINE BESYLATE 10 MG/1
10 TABLET ORAL 2 TIMES DAILY
Status: DISCONTINUED | OUTPATIENT
Start: 2023-05-28 | End: 2023-05-29

## 2023-05-28 RX ORDER — AMLODIPINE BESYLATE 5 MG/1
5 TABLET ORAL 2 TIMES DAILY
Status: DISCONTINUED | OUTPATIENT
Start: 2023-05-28 | End: 2023-05-28

## 2023-05-28 RX ORDER — METOPROLOL TARTRATE 1 MG/ML
5 INJECTION, SOLUTION INTRAVENOUS ONCE AS NEEDED
Status: COMPLETED | OUTPATIENT
Start: 2023-05-28 | End: 2023-05-28

## 2023-05-28 RX ORDER — ONDANSETRON 2 MG/ML
4 INJECTION INTRAMUSCULAR; INTRAVENOUS EVERY 8 HOURS PRN
Status: DISCONTINUED | OUTPATIENT
Start: 2023-05-28 | End: 2023-05-30 | Stop reason: HOSPADM

## 2023-05-28 RX ORDER — HYDRALAZINE HYDROCHLORIDE 20 MG/ML
20 INJECTION INTRAMUSCULAR; INTRAVENOUS EVERY 4 HOURS PRN
Status: DISCONTINUED | OUTPATIENT
Start: 2023-05-28 | End: 2023-05-28

## 2023-05-28 RX ORDER — ALPRAZOLAM 0.5 MG/1
0.5 TABLET ORAL NIGHTLY PRN
Status: DISCONTINUED | OUTPATIENT
Start: 2023-05-28 | End: 2023-05-28

## 2023-05-28 RX ORDER — AMLODIPINE BESYLATE 5 MG/1
5 TABLET ORAL DAILY
Status: DISCONTINUED | OUTPATIENT
Start: 2023-05-28 | End: 2023-05-28

## 2023-05-28 RX ADMIN — FLUTICASONE PROPIONATE 100 MCG: 50 SPRAY, METERED NASAL at 09:05

## 2023-05-28 RX ADMIN — PROCHLORPERAZINE EDISYLATE 2.5 MG: 5 INJECTION INTRAMUSCULAR; INTRAVENOUS at 02:05

## 2023-05-28 RX ADMIN — HYDRALAZINE HYDROCHLORIDE 100 MG: 50 TABLET ORAL at 02:05

## 2023-05-28 RX ADMIN — MUPIROCIN: 20 OINTMENT TOPICAL at 09:05

## 2023-05-28 RX ADMIN — CLONIDINE HYDROCHLORIDE 0.1 MG: 0.1 TABLET ORAL at 02:05

## 2023-05-28 RX ADMIN — CARVEDILOL 25 MG: 12.5 TABLET, FILM COATED ORAL at 09:05

## 2023-05-28 RX ADMIN — HYDRALAZINE HYDROCHLORIDE 10 MG: 20 INJECTION, SOLUTION INTRAMUSCULAR; INTRAVENOUS at 10:05

## 2023-05-28 RX ADMIN — AMLODIPINE BESYLATE 5 MG: 5 TABLET ORAL at 04:05

## 2023-05-28 RX ADMIN — HYDRALAZINE HYDROCHLORIDE 100 MG: 50 TABLET ORAL at 09:05

## 2023-05-28 RX ADMIN — METOPROLOL TARTRATE 5 MG: 1 INJECTION, SOLUTION INTRAVENOUS at 01:05

## 2023-05-28 RX ADMIN — HYDRALAZINE HYDROCHLORIDE 20 MG: 20 INJECTION, SOLUTION INTRAMUSCULAR; INTRAVENOUS at 12:05

## 2023-05-28 RX ADMIN — CLONIDINE HYDROCHLORIDE 0.1 MG: 0.1 TABLET ORAL at 09:05

## 2023-05-28 RX ADMIN — PRAZOSIN HYDROCHLORIDE 2 MG: 1 CAPSULE ORAL at 09:05

## 2023-05-28 RX ADMIN — DOCUSATE SODIUM 200 MG: 100 CAPSULE, LIQUID FILLED ORAL at 09:05

## 2023-05-28 RX ADMIN — ACETAMINOPHEN 650 MG: 325 TABLET ORAL at 03:05

## 2023-05-28 RX ADMIN — ISOSORBIDE MONONITRATE 30 MG: 30 TABLET, EXTENDED RELEASE ORAL at 10:05

## 2023-05-28 RX ADMIN — AMLODIPINE BESYLATE 10 MG: 10 TABLET ORAL at 09:05

## 2023-05-28 RX ADMIN — METOPROLOL TARTRATE 5 MG: 1 INJECTION, SOLUTION INTRAVENOUS at 12:05

## 2023-05-28 RX ADMIN — ATORVASTATIN CALCIUM 40 MG: 40 TABLET, FILM COATED ORAL at 09:05

## 2023-05-28 RX ADMIN — LACTULOSE 30 G: 20 SOLUTION ORAL at 09:05

## 2023-05-28 RX ADMIN — ONDANSETRON 4 MG: 2 INJECTION INTRAMUSCULAR; INTRAVENOUS at 12:05

## 2023-05-28 RX ADMIN — SEVELAMER CARBONATE 1600 MG: 800 TABLET, FILM COATED ORAL at 07:05

## 2023-05-28 RX ADMIN — ENOXAPARIN SODIUM 100 MG: 100 INJECTION SUBCUTANEOUS at 12:05

## 2023-05-28 RX ADMIN — NEPHROCAP 1 CAPSULE: 1 CAP ORAL at 09:05

## 2023-05-28 RX ADMIN — SODIUM ZIRCONIUM CYCLOSILICATE 10 G: 5 POWDER, FOR SUSPENSION ORAL at 09:05

## 2023-05-28 RX ADMIN — HYDRALAZINE HYDROCHLORIDE 100 MG: 50 TABLET ORAL at 06:05

## 2023-05-28 RX ADMIN — PRAZOSIN HYDROCHLORIDE 2 MG: 1 CAPSULE ORAL at 02:05

## 2023-05-28 RX ADMIN — ALPRAZOLAM 0.5 MG: 0.5 TABLET ORAL at 03:05

## 2023-05-28 RX ADMIN — NITROGLYCERIN 0.5 INCH: 20 OINTMENT TOPICAL at 06:05

## 2023-05-28 RX ADMIN — PRAZOSIN HYDROCHLORIDE 2 MG: 1 CAPSULE ORAL at 03:05

## 2023-05-28 NOTE — SUBJECTIVE & OBJECTIVE
Interval History: BP creeping up throughout the day, BP medications being adjusted with down-titration of clonidine to avoid rebound hypertension -- asymptomatic with elevated BP up to 227/102 despite oral regimen and one dose of IV hydralazine 10 mg      Objective:     Vital Signs (Most Recent):  Temp: 98.3 °F (36.8 °C) (05/27/23 1859)  Pulse: 85 (05/27/23 1859)  Resp: 18 (05/27/23 1859)  BP: (!) 227/102 (05/27/23 1859)  SpO2: 99 % (05/27/23 1859) Vital Signs (24h Range):  Temp:  [98.1 °F (36.7 °C)-98.6 °F (37 °C)] 98.3 °F (36.8 °C)  Pulse:  [68-89] 85  Resp:  [16-20] 18  SpO2:  [93 %-99 %] 99 %  BP: (116-272)/() 227/102     Weight: 108.5 kg (239 lb 3.2 oz)  Body mass index is 41.06 kg/m².      Intake/Output Summary (Last 24 hours) at 5/27/2023 2032  Last data filed at 5/27/2023 1218  Gross per 24 hour   Intake 300 ml   Output 3800 ml   Net -3500 ml        Physical Exam  Vitals and nursing note reviewed.   Constitutional:       General: She is not in acute distress.     Appearance: She is obese. She is not ill-appearing, toxic-appearing or diaphoretic.   HENT:      Head: Normocephalic and atraumatic.      Nose: Nose normal.      Mouth/Throat:      Mouth: Mucous membranes are moist.      Pharynx: Oropharynx is clear.   Eyes:      Extraocular Movements: Extraocular movements intact.      Pupils: Pupils are equal, round, and reactive to light.   Cardiovascular:      Rate and Rhythm: Normal rate and regular rhythm.      Heart sounds: Normal heart sounds.      Comments: AV fistula LUE +thrill  Pulmonary:      Effort: Pulmonary effort is normal. No respiratory distress.      Breath sounds: Normal breath sounds. No wheezing or rales.   Chest:      Chest wall: No tenderness.   Abdominal:      General: Bowel sounds are normal. There is no distension.      Palpations: Abdomen is soft.      Tenderness: There is no abdominal tenderness. There is no guarding.      Comments: obese   Genitourinary:     Comments:  deferred  Musculoskeletal:         General: Normal range of motion.      Cervical back: Normal range of motion and neck supple.   Skin:     General: Skin is warm and dry.      Capillary Refill: Capillary refill takes less than 2 seconds.   Neurological:      General: No focal deficit present.      Mental Status: She is alert and oriented to person, place, and time.   Psychiatric:         Mood and Affect: Mood normal.         Behavior: Behavior normal.         Thought Content: Thought content normal.         Review of Systems   Constitutional:  Negative for chills, diaphoresis, fatigue and fever.   Eyes: Negative.    Respiratory: Negative.  Negative for cough, shortness of breath and wheezing.    Cardiovascular: Negative.  Negative for chest pain and palpitations.   Gastrointestinal: Negative.  Negative for abdominal pain, nausea and vomiting.   Endocrine: Negative.    Genitourinary: Negative.    Musculoskeletal: Negative.    Skin: Negative.    Neurological: Negative.  Negative for dizziness, facial asymmetry, speech difficulty, weakness, light-headedness, numbness and headaches.   Hematological: Negative.    Psychiatric/Behavioral: Negative.  Negative for agitation. The patient is not nervous/anxious.    All other systems reviewed and are negative.    Vents: N/A -- 2 L/min NC  Oxygen Concentration (%): 28 (05/26/23 2041)    Lines/Drains/Airways       Central Venous Catheter Line  Duration                  Hemodialysis AV Graft Left upper arm -- days              Drain  Duration                  Hemodialysis AV Fistula  Left upper arm -- days         Hemodialysis AV Fistula Left upper arm -- days              Peripheral Intravenous Line  Duration                  Peripheral IV - Single Lumen 05/25/23 2056 20 G Medial;Right Breast 1 day                    Significant Labs:    CBC/Anemia Profile:  Recent Labs   Lab 05/26/23  0407 05/27/23  0726   WBC 9.76 7.33   HGB 7.3* 7.6*   HCT 22.9* 24.8*    236   MCV 95 97    RDW 15.7* 16.3*        Chemistries:  Recent Labs   Lab 05/26/23  0407 05/27/23  0551   * 133*   K 4.4 5.3*    99   CO2 25 24   BUN 26* 36*   CREATININE 4.5* 6.3*   CALCIUM 8.4* 8.2*   ALBUMIN 3.4*  --    PROT 6.3  --    BILITOT 0.4  --    ALKPHOS 97  --    ALT 9*  --    AST 19  --    MG 2.0  --    PHOS 3.2  --        Troponin:   Recent Labs   Lab 05/25/23  2224 05/26/23  0407 05/26/23  0817   TROPONINI 0.111* 0.258* 0.288*     All pertinent labs within the past 24 hours have been reviewed.    Significant Imaging:  I have reviewed all pertinent imaging results/findings within the past 24 hours.

## 2023-05-28 NOTE — ASSESSMENT & PLAN NOTE
·  Dr. Kinsey who recommended changing the Metoprolol to Coreg since Coreg does not dialyze off. Also since rebound hypertension is expected with Clonidine, recommends tapering off Clonidine  · 5/27 -- titration of medications in progress with plan to wean down clonidine to avoid rebound hypertension and now on Coreg 25 mg BID, NTG patch, hydralazine (increased to 100 mg every 8 hours) -- today after dialysis, BP remained elevated all afternoon, up to 227/102 despite one dose of IV hydralazine 10 mg in addition to scheduled medications, transferred to ICU for closer monitoring of BP and continued titration of medications, possible requirement of Cardene infusion, currently 204/98 with multiple medications due at this time   · 5/28: Nitro paste BID discontinued. IMDUR added to help optimize BP. Trying to prevent re-initiating Cardene gtt. Nuke stress test pending, possibly today? Case discussed with Joaquim

## 2023-05-28 NOTE — PLAN OF CARE
Pt's VS stable throughout shift. BP managed with scheduled PO antihypertensives and PRN's, see MAR. AAOx4. Room air; no resp issues. PRN xanax given for C/O anxiety; effective. Pt updated on status and plan of care. Transfer orders in place.

## 2023-05-28 NOTE — ASSESSMENT & PLAN NOTE
-Patient who presents with atypical CP symptoms/troponin elevation  -Elevated BP likely contributing to clinical picture  -Will treat as NSTEMI   -Allergic to ASA  -Continue BB, hydralazine  -Add statin  -Start heparin gtt, monitor H/H closely  -Will consider ischemic workup once BP controlled-MPI stress test vs [facility]    05/27/2023  No recurrent rx. Off heparin gtt  Continue BP control  Nuke stress test in AM    05/28  Nuke stress test pending

## 2023-05-28 NOTE — ASSESSMENT & PLAN NOTE
Pt with chest pain in setting of HTN emergency. Trop 0.047--> 0.111--> 0.258--> 0.288  Cardiology consulted and following   Heparing drip was ordered but not started as pt refused sticks for PTT monitoring.Transitioned to subcu lovenox in the interim   Pt allergic to ASA  Continue Atorva 40 qhs   Nuclear stress test pending

## 2023-05-28 NOTE — ASSESSMENT & PLAN NOTE
·  Dr. Kinsey who recommended changing the Metoprolol to Coreg since Coreg does not dialyze off. Also since rebound hypertension is expected with Clonidine, recommends tapering off Clonidine  · 5/27 -- titration of medications in progress with plan to wean down clonidine to avoid rebound hypertension and now on Coreg 25 mg BID, NTG patch, hydralazine (increased to 100 mg every 8 hours) -- today after dialysis, BP remained elevated all afternoon, up to 227/102 despite one dose of IV hydralazine 10 mg in addition to scheduled medications, transferred to ICU for closer monitoring of BP and continued titration of medications, possible requirement of Cardene infusion, currently 204/98 with multiple medications due at this time

## 2023-05-28 NOTE — PROGRESS NOTES
[facility] ([facility])  [facility] Medicine  Progress Note    Patient Name: Gillian Jones  MRN: [id number]  Patient Class: IP- Inpatient   Admission Date: 5/25/2023  Length of Stay: 2 days  Attending Physician: Chris Osorio MD  Primary Care Provider: Primary Doctor No        Subjective:     Principal Problem:Hypertensive emergency        HPI:  Patient is 72 year old female with past medical history of ESRD on dialysis T, Th, Sat, hypertension, CHF, anemia, bilateral carotid stenosis, polycystic kidney disease, chronic pain, and secondary hyperparathyroidism who presented to ED for evaluation of chest pain after dialysis. She reports that she had the same type of pain after dialysis on Tuesday as well -- states that it was relieved with oxygen. EMS gave her ASA, and she reports one episode of vomiting afterwards. She denies shortness of breath. She states that chest pain is moderate, pressure, located on right upper chest, with associated upper abdominal pain. She states that last BM was about one week ago. She was noted to be very hypertensive and was given multiple IV medications without much improvement. She was started on NTG infusion, which has helped BP but is causing a terrible headache. Troponin is mildly elevated, BNP is elevated, CXR reveals pulmonary edema, EKG SR 1st deg AVB - no ST elevation or concerning changes noted when compared to previous. She was given labetalol, Lasix 100 mg IV,   She was also given IV hydralazine, demerol, Zofran, Phenergan, and NTG ointment while in ED.  She is being admitted to ICU for management of hypertensive emergency.    5/26/23  Patient weaned off of cardene drip this morning, restarted on her home medications. Stable for ICU downgrade to telemetry.      Overview/Hospital Course:  5/26/23: Patient weaned off of cardene drip this morning, restarted on her home medications. Stable for ICU downgrade to telemetry  05/27: Underwent HD, clonidine tapered per  "renal recs. Heparin changed to sq lovenox as pt refused PTT draws. Transferred back to ICU due to BP sustaining 220-240 systolic although pt was asymptomatic.   05/28: Did not require initiation of cardene, BP meds adjusted further: Norvasc, Imdur added. Prazosin dose increased. BP trends improved. Pt deemed stable for transfer back to floor. NM stress delayed per cardiology       Interval History: BP improved. PT deemed stable for step down to floor. Pt seen while in ICU. She became nauseous earlier today while in stress test, test was aborted. Pt received compazine but became jittery then received dose of Xanax. Pt sleeping comfortably, reports she feels "queasy" when awoken. Denies CP, SOB, HA.     Review of Systems  Objective:     Vital Signs (Most Recent):  Temp: 98 °F (36.7 °C) (05/28/23 1501)  Pulse: 69 (05/28/23 1704)  Resp: (!) 41 (05/28/23 1536)  BP: (!) 142/65 (05/28/23 1704)  SpO2: 96 % (05/28/23 1704) Vital Signs (24h Range):  Temp:  [97.7 °F (36.5 °C)-99.3 °F (37.4 °C)] 98 °F (36.7 °C)  Pulse:  [67-91] 69  Resp:  [12-52] 41  SpO2:  [91 %-100 %] 96 %  BP: (142-227)/() 142/65     Weight: 97.2 kg (214 lb 4.6 oz)  Body mass index is 36.78 kg/m².    Intake/Output Summary (Last 24 hours) at 5/28/2023 6515  Last data filed at 5/28/2023 0923  Gross per 24 hour   Intake 320 ml   Output --   Net 320 ml         Physical Exam  Vitals and nursing note reviewed.   Constitutional:       General: She is not in acute distress.     Appearance: She is obese.   Cardiovascular:      Rate and Rhythm: Normal rate and regular rhythm.      Heart sounds: No murmur heard.    No friction rub. No gallop.   Pulmonary:      Effort: Pulmonary effort is normal.      Breath sounds: Normal breath sounds. No wheezing, rhonchi or rales.      Comments: On RA   Abdominal:      General: Bowel sounds are normal. There is no distension.      Palpations: Abdomen is soft.      Tenderness: There is no abdominal tenderness. There is no " guarding.   Musculoskeletal:      Right lower leg: No edema.      Left lower leg: No edema.   Skin:     General: Skin is warm and dry.      Comments: CLAUDIAE AVF in place    Neurological:      Mental Status: She is alert and oriented to person, place, and time. Mental status is at baseline.           Significant Labs: All pertinent labs within the past 24 hours have been reviewed.    Significant Imaging: I have reviewed all pertinent imaging results/findings within the past 24 hours.      Assessment/Plan:      * Hypertensive emergency  BP as high as 249/115, was given multiple IV medications while in ED without much improvement. NTG infusion, started per ED due to mild chest pain along with elevated troponin but pt unable to tolerate due to headaches. Pt reports that she did not take her usual BP medications on HD day (Norvasc, clonidine, metoprolol) -- she states that dialysis nurses instruct her not to take it until after dialysis   - Pt weaned of cardene drip 05/26. Transferred back to ICU on 05/27 due to persistently elevated BP   - Nephrology following- BP regimen adjusted with plans to taper clonidine to avoid rebound HTN on HD days   - Continue Clonidine taper per renal   - Continue Imdur, prazosin, Coreg, hydral, Norvasc   - monitor BP     NSTEMI (non-ST elevated myocardial infarction)  Pt with chest pain in setting of HTN emergency. Trop 0.047--> 0.111--> 0.258--> 0.288  Cardiology consulted and following   Heparing drip was ordered but not started as pt refused sticks for PTT monitoring.Transitioned to subcu lovenox in the interim   Pt allergic to ASA  Continue Atorva 40 qhs   Nuclear stress test pending            Atypical chest pain  Resolved, see plan above     ESRD (end stage renal disease)  ESRD on Dialysis on T, Th, Sat  Nephrology following- continue HD per schedule  Monitor BMP, strict Is and OS   Renally dose meds; avoid nephrotoxic agents     Anemia in chronic kidney disease, on chronic  dialysis  05/27- S/p 1 u PRBC transfusion ordered today for Hgb 7.6  EPO per renal   Monitor CBC     Chronic back pain  PRN pain medication ordered, adjust regimen as needed for pain control  One dose IV fentanyl ordered      VTE Risk Mitigation (From admission, onward)         Ordered     enoxaparin injection 100 mg  Every 24 hours         05/28/23 0831     IP VTE HIGH RISK PATIENT  Once         05/26/23 0053     Place sequential compression device  Until discontinued         05/26/23 0053                Discharge Planning   DARRIN:      Code Status: Full Code   Is the patient medically ready for discharge?: No    Reason for patient still in hospital (select all that apply): Patient trending condition and Treatment  Discharge Plan A: Home         Iza Washington MD  Department of Hospital Medicine

## 2023-05-28 NOTE — PROGRESS NOTES
[facility] ([facility])  Critical Care Medicine  Progress Note    Patient Name: Gillian Jones  MRN: [id number]  Admission Date: 5/25/2023  Hospital Length of Stay: 1 days  Code Status: Full Code  Attending Provider: Chris Osorio MD  Primary Care Provider: Primary Doctor No   Principal Problem: Hypertensive emergency    Subjective:     HPI:  Gillian Jones is a 72-year-old female with past medical history of ESRD on dialysis TThSat, hypertension, CHF, anemia, bilateral carotid stenosis, polycystic kidney disease, chronic pain, and secondary hyperparathyroidism who presented to the emergency room for evaluation of chest pain following dialysis today. She reported similar pain following dialysis on Tuesday as well and stated it was relieved with oxygen. EMS gave her ASA and she reported one episode of vomiting afterwards. She denied shortness of breath. She stated the chest pain is moderate in nature, pressure-like, located on right upper chest, with associated upper abdominal pain. She stated her last bowel movement was about one week ago. She was noted to be very hypertensive and was given multiple IV medications without much improvement. She was started on NTG infusion, which has helped BP but is causing a terrible headache. Troponin is mildly elevated, BNP is elevated, CXR reveals pulmonary edema, EKG SR 1st deg AVB - no ST elevation or concerning changes noted when compared to previous. Tridil is currently being titrated for blood pressure control. Of note, patient states that she did not take any of her oral blood pressure medications today, stating that she is instructed by dialysis center not to take the medications until after dialysis session is over and she ended up coming to ED right after dialysis.    Interval history:   5/26: Patient off Cardene infusion this am. Patient reports she does not take her BP meds on dialysis days which likely contributes to her rebound hypertension following  HD. Case discussed this morning with Dr. Kinsey and appreciate recommendations. No further complaints of chest pain this morning; endorses constipation and epigastric discomfort. Stable for ICU step down.    5/27: Evening -- BP rising despite regimen (which has been adjusted) and one dose IV hydralazine -- as high as 227/102, patient asymptomatic, transferred to ICU for closer monitoring of BP and possibly Cardene infusion    5/27/2023  Interval History: BP creeping up throughout the day, BP medications being adjusted with down-titration of clonidine to avoid rebound hypertension -- asymptomatic with elevated BP up to 227/102 despite oral regimen and one dose of IV hydralazine 10 mg      Objective:     Vital Signs (Most Recent):  Temp: 98.3 °F (36.8 °C) (05/27/23 1859)  Pulse: 85 (05/27/23 1859)  Resp: 18 (05/27/23 1859)  BP: (!) 227/102 (05/27/23 1859)  SpO2: 99 % (05/27/23 1859) Vital Signs (24h Range):  Temp:  [98.1 °F (36.7 °C)-98.6 °F (37 °C)] 98.3 °F (36.8 °C)  Pulse:  [68-89] 85  Resp:  [16-20] 18  SpO2:  [93 %-99 %] 99 %  BP: (116-272)/() 227/102     Weight: 108.5 kg (239 lb 3.2 oz)  Body mass index is 41.06 kg/m².      Intake/Output Summary (Last 24 hours) at 5/27/2023 2032  Last data filed at 5/27/2023 1218  Gross per 24 hour   Intake 300 ml   Output 3800 ml   Net -3500 ml        Physical Exam  Vitals and nursing note reviewed.   Constitutional:       General: She is not in acute distress.     Appearance: She is obese. She is not ill-appearing, toxic-appearing or diaphoretic.   HENT:      Head: Normocephalic and atraumatic.      Nose: Nose normal.      Mouth/Throat:      Mouth: Mucous membranes are moist.      Pharynx: Oropharynx is clear.   Eyes:      Extraocular Movements: Extraocular movements intact.      Pupils: Pupils are equal, round, and reactive to light.   Cardiovascular:      Rate and Rhythm: Normal rate and regular rhythm.      Heart sounds: Normal heart sounds.      Comments: AV  fistula LUE +thrill  Pulmonary:      Effort: Pulmonary effort is normal. No respiratory distress.      Breath sounds: Normal breath sounds. No wheezing or rales.   Chest:      Chest wall: No tenderness.   Abdominal:      General: Bowel sounds are normal. There is no distension.      Palpations: Abdomen is soft.      Tenderness: There is no abdominal tenderness. There is no guarding.      Comments: obese   Genitourinary:     Comments: deferred  Musculoskeletal:         General: Normal range of motion.      Cervical back: Normal range of motion and neck supple.   Skin:     General: Skin is warm and dry.      Capillary Refill: Capillary refill takes less than 2 seconds.   Neurological:      General: No focal deficit present.      Mental Status: She is alert and oriented to person, place, and time.   Psychiatric:         Mood and Affect: Mood normal.         Behavior: Behavior normal.         Thought Content: Thought content normal.         Review of Systems   Constitutional:  Negative for chills, diaphoresis, fatigue and fever.   Eyes: Negative.    Respiratory: Negative.  Negative for cough, shortness of breath and wheezing.    Cardiovascular: Negative.  Negative for chest pain and palpitations.   Gastrointestinal: Negative.  Negative for abdominal pain, nausea and vomiting.   Endocrine: Negative.    Genitourinary: Negative.    Musculoskeletal: Negative.    Skin: Negative.    Neurological: Negative.  Negative for dizziness, facial asymmetry, speech difficulty, weakness, light-headedness, numbness and headaches.   Hematological: Negative.    Psychiatric/Behavioral: Negative.  Negative for agitation. The patient is not nervous/anxious.    All other systems reviewed and are negative.    Vents: N/A -- 2 L/min NC  Oxygen Concentration (%): 28 (05/26/23 2041)    Lines/Drains/Airways       Central Venous Catheter Line  Duration                  Hemodialysis AV Graft Left upper arm -- days              Drain  Duration                   Hemodialysis AV Fistula  Left upper arm -- days         Hemodialysis AV Fistula Left upper arm -- days              Peripheral Intravenous Line  Duration                  Peripheral IV - Single Lumen 05/25/23 2056 20 G Medial;Right Breast 1 day                    Significant Labs:    CBC/Anemia Profile:  Recent Labs   Lab 05/26/23 0407 05/27/23  0726   WBC 9.76 7.33   HGB 7.3* 7.6*   HCT 22.9* 24.8*    236   MCV 95 97   RDW 15.7* 16.3*        Chemistries:  Recent Labs   Lab 05/26/23 0407 05/27/23  0551   * 133*   K 4.4 5.3*    99   CO2 25 24   BUN 26* 36*   CREATININE 4.5* 6.3*   CALCIUM 8.4* 8.2*   ALBUMIN 3.4*  --    PROT 6.3  --    BILITOT 0.4  --    ALKPHOS 97  --    ALT 9*  --    AST 19  --    MG 2.0  --    PHOS 3.2  --        Troponin:   Recent Labs   Lab 05/25/23 2224 05/26/23 0407 05/26/23  0817   TROPONINI 0.111* 0.258* 0.288*     All pertinent labs within the past 24 hours have been reviewed.    Significant Imaging:  I have reviewed all pertinent imaging results/findings within the past 24 hours.    Assessment/Plan:     Cardiac/Vascular  * Hypertensive emergency  ·  Dr. Kinsey who recommended changing the Metoprolol to Coreg since Coreg does not dialyze off. Also since rebound hypertension is expected with Clonidine, recommends tapering off Clonidine  · 5/27 -- titration of medications in progress with plan to wean down clonidine to avoid rebound hypertension and now on Coreg 25 mg BID, NTG patch, hydralazine (increased to 100 mg every 8 hours) -- today after dialysis, BP remained elevated all afternoon, up to 227/102 despite one dose of IV hydralazine 10 mg in addition to scheduled medications, transferred to ICU for closer monitoring of BP and continued titration of medications, possible requirement of Cardene infusion, currently 204/98 with multiple medications due at this time     NSTEMI (non-ST elevated myocardial infarction)  · Likely due to demand  ischemia  · Cardiology seeing patient, follow recommendations  · No complaints of chest pain today  · Continue cardiac monitoring to assess for ischemic changes  · Lovenox 1 mg/kg every 24 hours, as patient refused heparin infusion due to frequent lab sticks     Renal/  ESRD (end stage renal disease)  · Chronic HD on TuThSat -- had dialysis today  · Nephrology following  · Monitor lytes and correct supplement as appropriate    Oncology  Anemia in chronic kidney disease, on chronic dialysis  · Hgb 7.6, was transfused one unit PRBC today (5/27)  · No active signs of bleeding    Orthopedic  Chronic back pain  · PRN pain medication ordered, adjust regimen as needed for pain control    Other  Atypical chest pain  · See NSTEMI -- no chest pain at this time, no worrisome EKG changes noted    Critical Care Time: 44 minutes  Critical secondary to Patient has a condition that poses threat to life and bodily function: accelerated hypertension requiring frequent BP checks and ongoing titration of antihypertensive agents      Critical care was time spent personally by me on the following activities: development of treatment plan with patient or surrogate and bedside caregivers, discussions with consultants, evaluation of patient's response to treatment, examination of patient, ordering and performing treatments and interventions, ordering and review of laboratory studies, ordering and review of radiographic studies, pulse oximetry, re-evaluation of patient's condition. This critical care time did not overlap with that of any other provider or involve time for any procedures.     Case discussed with Dr. Iza Washington (hospital medicine) and with Dr. Chris Osorio (critical care)    More Burnette NP  Critical Care Medicine  '[place] - [facility] ([facility])

## 2023-05-28 NOTE — HOSPITAL COURSE
Interval history:  5/26: Patient off Cardene infusion this am. Patient reports she does not take her BP meds on dialysis days which likely contributes to her rebound hypertension following HD. Case discussed this morning with Dr. Kinsey and appreciate recommendations. No further complaints of chest pain this morning; endorses constipation and epigastric discomfort. Stable for ICU step down.   5/27: Evening -- BP rising despite regimen (which has been adjusted) and one dose IV hydralazine -- as high as 227/102, patient asymptomatic, transferred to ICU for closer monitoring of BP and possibly Cardene infusion  5/28: Nitro paste BID discontinued. IMDUR added to help optimize BP. Trying to prevent re-initiating Cardene gtt. Nuke stress test pending, possibly today? Case discussed with Joaquim

## 2023-05-28 NOTE — PROGRESS NOTES
[facility] ([facility])  Cardiology  Progress Note    Patient Name: Gillian Jones  MRN: [id number]  Admission Date: 5/25/2023  Hospital Length of Stay: 2 days  Code Status: Full Code   Attending Physician: Chris Osorio MD   Primary Care Physician: Primary Doctor No  Expected Discharge Date:   Principal Problem:Hypertensive emergency    Subjective:     Hospital Course:   Ms. Jones is a 72 year old female whose conditions include ESRD on HD, HTN, diastolic CHF, anemia, bilateral carotid stenosis, PCKD, chronic pain, and secondary hyperaparathyroidsim who presented to [facility] ED yesterday due to chest pain that onset after dialysis. Patient complained of right-sided chest heaviness/pressure, intermittent since Tuesday. Associated symptoms included abdominal discomfort/constipation, nausea, and vomiting. Patient denied any associated fever, chills, SOB, palpitations, near syncope, or syncope. Initial workup in ED revealed uncontrolled HTN (240/115), and troponin of 0.047>0.111, BNP of 1215. CXR showed findings concerning for pulmonary edema and patient was subsequently placed on a Tridil drip and admitted to ICU. Cardiology consulted to assist with management. Patient seen and examined today, resting in bed. Feels ok. Still complains of right-sided chest discomfort. States it burns at times and is GERD-like in nature and she feels her severe constipation is the root of her symptoms. Denies any prior history of CAD or MI. Tridil drip provided relief but patient could not tolerate due to headache. She states her BP is generally well-controlled on her home medications. Chart reviewed. Troponin 0.047>0.111>0.258. H/H 7.3/22.9, transfusion planned tomorrow. Echo pending. Will need ischemic evaluation once BP controlled. EKG reviewed, no ST elevation, dynamic ischemic changes noted.    05/27 saw pt in the HD. BP high and no chest pain.SOB improved after HD.    05/28 transferred back to ICU due to high BOP > 220  mmHG. No chest pain and dyspnea. Pt c/o nausea in the [facility] stress test room, will postpone stress part in AM.      ROS  Objective:     Vital Signs (Most Recent):  Temp: 97.7 °F (36.5 °C) (05/28/23 1101)  Pulse: 82 (05/28/23 1101)  Resp: (!) 21 (05/28/23 1101)  BP: (!) 193/74 (05/28/23 1101)  SpO2: 96 % (05/28/23 1101) Vital Signs (24h Range):  Temp:  [97.7 °F (36.5 °C)-99.3 °F (37.4 °C)] 97.7 °F (36.5 °C)  Pulse:  [70-91] 82  Resp:  [12-29] 21  SpO2:  [91 %-100 %] 96 %  BP: (139-256)/() 193/74     Weight: 97.2 kg (214 lb 4.6 oz)  Body mass index is 36.78 kg/m².     SpO2: 96 %         Intake/Output Summary (Last 24 hours) at 5/28/2023 1119  Last data filed at 5/28/2023 0923  Gross per 24 hour   Intake 620 ml   Output 3800 ml   Net -3180 ml       Lines/Drains/Airways       Central Venous Catheter Line  Duration                  Hemodialysis AV Graft Left upper arm -- days              Drain  Duration                  Hemodialysis AV Fistula  Left upper arm -- days         Hemodialysis AV Fistula Left upper arm -- days              Peripheral Intravenous Line  Duration                  Peripheral IV - Single Lumen 05/27/23 2125 20 G Anterior;Right Other <1 day                       Physical Exam  Vitals and nursing note reviewed.   Constitutional:       General: She is not in acute distress.     Appearance: Normal appearance. She is well-developed. She is not diaphoretic.   HENT:      Head: Normocephalic and atraumatic.   Eyes:      General:         Right eye: No discharge.         Left eye: No discharge.      Pupils: Pupils are equal, round, and reactive to light.   Neck:      Thyroid: No thyromegaly.      Vascular: No JVD.      Trachea: No tracheal deviation.   Cardiovascular:      Rate and Rhythm: Normal rate and regular rhythm.      Heart sounds: Normal heart sounds, S1 normal and S2 normal. No murmur heard.  Pulmonary:      Effort: Pulmonary effort is normal. No respiratory distress.      Breath sounds: No  wheezing.      Comments: Diminished BS at bases  Abdominal:      General: There is no distension.      Palpations: Abdomen is soft.      Tenderness: There is no rebound.   Musculoskeletal:      Cervical back: Neck supple.      Right lower leg: No edema.      Left lower leg: No edema.   Skin:     General: Skin is warm and dry.      Findings: No erythema.   Neurological:      General: No focal deficit present.      Mental Status: She is alert and oriented to person, place, and time.   Psychiatric:         Mood and Affect: Mood normal.         Behavior: Behavior normal.         Thought Content: Thought content normal.          Significant Labs: ABG: No results for input(s): PH, PCO2, HCO3, POCSATURATED, BE in the last 48 hours., Blood Culture: No results for input(s): LABBLOO in the last 48 hours., BMP:   Recent Labs   Lab 05/27/23  0551 05/28/23  0417   GLU 83 86   * 131*   K 5.3* 4.2   CL 99 98   CO2 24 23   BUN 36* 25*   CREATININE 6.3* 5.0*   CALCIUM 8.2* 8.4*   , CMP   Recent Labs   Lab 05/27/23  0551 05/28/23  0417   * 131*   K 5.3* 4.2   CL 99 98   CO2 24 23   GLU 83 86   BUN 36* 25*   CREATININE 6.3* 5.0*   CALCIUM 8.2* 8.4*   ANIONGAP 10 10   , CBC   Recent Labs   Lab 05/27/23  0726 05/28/23 0417   WBC 7.33 7.88   HGB 7.6* 8.0*   HCT 24.8* 25.8*    231   , INR No results for input(s): INR, PROTIME in the last 48 hours., Lipid Panel No results for input(s): CHOL, HDL, LDLCALC, TRIG, CHOLHDL in the last 48 hours., and Troponin No results for input(s): TROPONINI in the last 48 hours.    Significant Imaging: EKG:      Assessment and Plan:       * Hypertensive emergency  -BP uncontrolled upon admission  -Appreciate nephrology rec's-agree with transition to Coreg  -Continue hydralazine, titrate as needed  -Add nitropaste  -Continue Clonidine for now, wean as tolerated    05/27  BP high  Continue coreg hydralazine clonidine  Add amlodipine     05/28  Increase Amlodipine to 5 mg bid  Continue coreg  hydralazine clonidine and imdur  Nuke stress test pending     NSTEMI (non-ST elevated myocardial infarction)  -Patient who presents with atypical CP symptoms/troponin elevation  -Elevated BP likely contributing to clinical picture  -Will treat as NSTEMI   -Allergic to ASA  -Continue BB, hydralazine  -Add statin  -Start heparin gtt, monitor H/H closely  -Will consider ischemic workup once BP controlled-MPI stress test vs [facility]    05/27/2023  No recurrent rx. Off heparin gtt  Continue BP control  Nuke stress test in AM    05/28  Nuke stress test pending    Atypical chest pain  -See plan under NSTEMI    ESRD (end stage renal disease)  -Mgmt as per nephrology    Anemia in chronic kidney disease, on chronic dialysis  -H/H 7.3./22.9  -Transfusion planned tmw        VTE Risk Mitigation (From admission, onward)         Ordered     enoxaparin injection 100 mg  Every 24 hours         05/28/23 0831     IP VTE HIGH RISK PATIENT  Once         05/26/23 0053     Place sequential compression device  Until discontinued         05/26/23 0053                Guzman Looney MD  Cardiology  [facility] ([facility])

## 2023-05-28 NOTE — ASSESSMENT & PLAN NOTE
· Likely due to demand ischemia  · Cardiology seeing patient, follow recommendations  · No complaints of chest pain today  · Continue cardiac monitoring to assess for ischemic changes  · Lovenox 1 mg/kg every 24 hours, as patient refused heparin infusion due to frequent lab sticks   · Plan for Nuke stress test today

## 2023-05-28 NOTE — ASSESSMENT & PLAN NOTE
· Likely due to demand ischemia  · Cardiology seeing patient, follow recommendations  · No complaints of chest pain today  · Continue cardiac monitoring to assess for ischemic changes  · Lovenox 1 mg/kg every 24 hours, as patient refused heparin infusion due to frequent lab sticks

## 2023-05-28 NOTE — SUBJECTIVE & OBJECTIVE
Interval History: Denies chest pain, HA, fever, chills, n/v.       Objective:     Vital Signs (Most Recent):  Temp: 97.7 °F (36.5 °C) (05/28/23 1101)  Pulse: 77 (05/28/23 1130)  Resp: 20 (05/28/23 1130)  BP: (!) 186/74 (05/28/23 1130)  SpO2: 96 % (05/28/23 1130) Vital Signs (24h Range):  Temp:  [97.7 °F (36.5 °C)-99.3 °F (37.4 °C)] 97.7 °F (36.5 °C)  Pulse:  [70-91] 77  Resp:  [12-29] 20  SpO2:  [91 %-100 %] 96 %  BP: (139-243)/() 186/74     Weight: 97.2 kg (214 lb 4.6 oz)  Body mass index is 36.78 kg/m².      Intake/Output Summary (Last 24 hours) at 5/28/2023 1142  Last data filed at 5/28/2023 0923  Gross per 24 hour   Intake 320 ml   Output 3800 ml   Net -3480 ml        Physical Exam  Vitals and nursing note reviewed. Exam conducted with a chaperone present.   Constitutional:       General: She is not in acute distress.     Appearance: She is obese. She is not ill-appearing.   Cardiovascular:      Rate and Rhythm: Normal rate and regular rhythm.      Heart sounds: No murmur heard.    No friction rub. No gallop.   Pulmonary:      Effort: Pulmonary effort is normal.      Breath sounds: Normal breath sounds. No wheezing, rhonchi or rales.   Abdominal:      General: Bowel sounds are normal. There is no distension.      Palpations: Abdomen is soft.      Tenderness: There is no abdominal tenderness. There is no guarding or rebound.   Musculoskeletal:      Right lower leg: No edema.      Left lower leg: No edema.   Lymphadenopathy:      Cervical: No cervical adenopathy.   Neurological:      Mental Status: She is alert and oriented to person, place, and time. Mental status is at baseline.         Review of Systems    Vents:  Oxygen Concentration (%): 28 (05/28/23 0729)    Lines/Drains/Airways       Central Venous Catheter Line  Duration                  Hemodialysis AV Graft Left upper arm -- days              Drain  Duration                  Hemodialysis AV Fistula  Left upper arm -- days         Hemodialysis AV  Fistula Left upper arm -- days              Peripheral Intravenous Line  Duration                  Peripheral IV - Single Lumen 05/27/23 2125 20 G Anterior;Right Other <1 day                    Significant Labs:    CBC/Anemia Profile:  Recent Labs   Lab 05/27/23  0726 05/28/23  0417   WBC 7.33 7.88   HGB 7.6* 8.0*   HCT 24.8* 25.8*    231   MCV 97 95   RDW 16.3* 15.6*        Chemistries:  Recent Labs   Lab 05/27/23  0551 05/28/23  0417   * 131*   K 5.3* 4.2   CL 99 98   CO2 24 23   BUN 36* 25*   CREATININE 6.3* 5.0*   CALCIUM 8.2* 8.4*       All pertinent labs within the past 24 hours have been reviewed.    Significant Imaging:  I have reviewed all pertinent imaging results/findings within the past 24 hours.

## 2023-05-28 NOTE — ASSESSMENT & PLAN NOTE
· Chronic HD on TuThSat   · Nephrology following  · Monitor lytes and correct supplement as appropriate

## 2023-05-28 NOTE — TREATMENT PLAN
/102, despite scheduled BP medications and one dose of hydralazine 10 mg IV given at 1714.  Patient being transferred to ICU at this time for closer monitoring of BP.    Initial plan in ICU -- will give extra hydralazine 10 mg IV x one dose.  Will give night time medications a little bit early.  If no improvement, may require Cardene infusion.

## 2023-05-28 NOTE — ASSESSMENT & PLAN NOTE
-BP uncontrolled upon admission  -Appreciate nephrology rec's-agree with transition to Coreg  -Continue hydralazine, titrate as needed  -Add nitropaste  -Continue Clonidine for now, wean as tolerated    05/27  BP high  Continue coreg hydralazine clonidine  Add amlodipine

## 2023-05-28 NOTE — PROGRESS NOTES
[facility] ([facility])  Cardiology  Progress Note    Patient Name: Gillian Jones  MRN: [id number]  Admission Date: 5/25/2023  Hospital Length of Stay: 1 days  Code Status: Full Code   Attending Physician: Chris Osorio MD   Primary Care Physician: Primary Doctor No  Expected Discharge Date:   Principal Problem:Hypertensive emergency    Subjective:     Hospital Course:   Ms. Jones is a 72 year old female whose conditions include ESRD on HD, HTN, diastolic CHF, anemia, bilateral carotid stenosis, PCKD, chronic pain, and secondary hyperaparathyroidsim who presented to [facility] ED yesterday due to chest pain that onset after dialysis. Patient complained of right-sided chest heaviness/pressure, intermittent since Tuesday. Associated symptoms included abdominal discomfort/constipation, nausea, and vomiting. Patient denied any associated fever, chills, SOB, palpitations, near syncope, or syncope. Initial workup in ED revealed uncontrolled HTN (240/115), and troponin of 0.047>0.111, BNP of 1215. CXR showed findings concerning for pulmonary edema and patient was subsequently placed on a Tridil drip and admitted to ICU. Cardiology consulted to assist with management. Patient seen and examined today, resting in bed. Feels ok. Still complains of right-sided chest discomfort. States it burns at times and is GERD-like in nature and she feels her severe constipation is the root of her symptoms. Denies any prior history of CAD or MI. Tridil drip provided relief but patient could not tolerate due to headache. She states her BP is generally well-controlled on her home medications. Chart reviewed. Troponin 0.047>0.111>0.258. H/H 7.3/22.9, transfusion planned tomorrow. Echo pending. Will need ischemic evaluation once BP controlled. EKG reviewed, no ST elevation, dynamic ischemic changes noted.    05/27 saw pt in the HD. BP high and no chest pain.SOB improved after HD.      No new subjective & objective note has been filed  under this hospital service since the last note was generated.    Assessment and Plan:       * Hypertensive emergency  -BP uncontrolled upon admission  -Appreciate nephrology rec's-agree with transition to Coreg  -Continue hydralazine, titrate as needed  -Add nitropaste  -Continue Clonidine for now, wean as tolerated    05/27  BP high  Continue coreg hydralazine clonidine  Add amlodipine     NSTEMI (non-ST elevated myocardial infarction)  -Patient who presents with atypical CP symptoms/troponin elevation  -Elevated BP likely contributing to clinical picture  -Will treat as NSTEMI   -Allergic to ASA  -Continue BB, hydralazine  -Add statin  -Start heparin gtt, monitor H/H closely  -Will consider ischemic workup once BP controlled-MPI stress test vs [facility]    05/27/2023  No recurrent rx. Off heparin gtt  Continue BP control  Nuke stress test in AM    Atypical chest pain  -See plan under NSTEMI    ESRD (end stage renal disease)  -Mgmt as per nephrology    Anemia in chronic kidney disease, on chronic dialysis  -H/H 7.3./22.9  -Transfusion planned tmw        VTE Risk Mitigation (From admission, onward)         Ordered     enoxaparin injection 105 mg  Every 24 hours         05/27/23 0919     IP VTE HIGH RISK PATIENT  Once         05/26/23 0053     Place sequential compression device  Until discontinued         05/26/23 0053                Guzman Looney MD  Cardiology  [facility] ([facility])

## 2023-05-28 NOTE — ASSESSMENT & PLAN NOTE
-Patient who presents with atypical CP symptoms/troponin elevation  -Elevated BP likely contributing to clinical picture  -Will treat as NSTEMI   -Allergic to ASA  -Continue BB, hydralazine  -Add statin  -Start heparin gtt, monitor H/H closely  -Will consider ischemic workup once BP controlled-MPI stress test vs [facility]    05/27/2023  No recurrent rx. Off heparin gtt  Continue BP control  Nuke stress test in AM

## 2023-05-28 NOTE — ASSESSMENT & PLAN NOTE
ESRD on Dialysis on T, Th, Sat  Nephrology following- continue HD per schedule  Monitor BMP, strict Is and OS   Renally dose meds; avoid nephrotoxic agents

## 2023-05-28 NOTE — PROGRESS NOTES
[facility] ([facility])  Critical Care Medicine  Progress Note    Patient Name: Gillian Jones  MRN: [id number]  Admission Date: 5/25/2023  Hospital Length of Stay: 2 days  Code Status: Full Code  Attending Provider: Chris Osorio MD  Primary Care Provider: Primary Doctor No   Principal Problem: Hypertensive emergency    Subjective:     HPI:  Gillian Jones is a 72-year-old female with past medical history of ESRD on dialysis TThSat, hypertension, CHF, anemia, bilateral carotid stenosis, polycystic kidney disease, chronic pain, and secondary hyperparathyroidism who presented to the emergency room for evaluation of chest pain following dialysis today. She reported similar pain following dialysis on Tuesday as well and stated it was relieved with oxygen. EMS gave her ASA and she reported one episode of vomiting afterwards. She denied shortness of breath. She stated the chest pain is moderate in nature, pressure-like, located on right upper chest, with associated upper abdominal pain. She stated her last bowel movement was about one week ago. She was noted to be very hypertensive and was given multiple IV medications without much improvement. She was started on NTG infusion, which has helped BP but is causing a terrible headache. Troponin is mildly elevated, BNP is elevated, CXR reveals pulmonary edema, EKG SR 1st deg AVB - no ST elevation or concerning changes noted when compared to previous. Tridil is currently being titrated for blood pressure control. Of note, patient states that she did not take any of her oral blood pressure medications today, stating that she is instructed by dialysis center not to take the medications until after dialysis session is over and she ended up coming to ED right after dialysis.             Hospital/ICU Course:  Interval history:   5/26: Patient off Cardene infusion this am. Patient reports she does not take her BP meds on dialysis days which likely contributes to  her rebound hypertension following HD. Case discussed this morning with Dr. Kinsey and appreciate recommendations. No further complaints of chest pain this morning; endorses constipation and epigastric discomfort. Stable for ICU step down.    5/27: Evening -- BP rising despite regimen (which has been adjusted) and one dose IV hydralazine -- as high as 227/102, patient asymptomatic, transferred to ICU for closer monitoring of BP and possibly Cardene infusion   5/28: Nitro paste BID discontinued. IMDUR added to help optimize BP. Trying to prevent re-initiating Cardene gtt. Nuke stress test pending, possibly today? Case discussed with Cards      Interval History: Denies chest pain, HA, fever, chills, n/v.       Objective:     Vital Signs (Most Recent):  Temp: 97.7 °F (36.5 °C) (05/28/23 1101)  Pulse: 77 (05/28/23 1130)  Resp: 20 (05/28/23 1130)  BP: (!) 186/74 (05/28/23 1130)  SpO2: 96 % (05/28/23 1130) Vital Signs (24h Range):  Temp:  [97.7 °F (36.5 °C)-99.3 °F (37.4 °C)] 97.7 °F (36.5 °C)  Pulse:  [70-91] 77  Resp:  [12-29] 20  SpO2:  [91 %-100 %] 96 %  BP: (139-243)/() 186/74     Weight: 97.2 kg (214 lb 4.6 oz)  Body mass index is 36.78 kg/m².      Intake/Output Summary (Last 24 hours) at 5/28/2023 1142  Last data filed at 5/28/2023 0923  Gross per 24 hour   Intake 320 ml   Output 3800 ml   Net -3480 ml        Physical Exam  Vitals and nursing note reviewed. Exam conducted with a chaperone present.   Constitutional:       General: She is not in acute distress.     Appearance: She is obese. She is not ill-appearing.   Cardiovascular:      Rate and Rhythm: Normal rate and regular rhythm.      Heart sounds: No murmur heard.    No friction rub. No gallop.   Pulmonary:      Effort: Pulmonary effort is normal.      Breath sounds: Normal breath sounds. No wheezing, rhonchi or rales.   Abdominal:      General: Bowel sounds are normal. There is no distension.      Palpations: Abdomen is soft.      Tenderness: There  is no abdominal tenderness. There is no guarding or rebound.   Musculoskeletal:      Right lower leg: No edema.      Left lower leg: No edema.   Lymphadenopathy:      Cervical: No cervical adenopathy.   Neurological:      Mental Status: She is alert and oriented to person, place, and time. Mental status is at baseline.         Review of Systems    Vents:  Oxygen Concentration (%): 28 (05/28/23 0729)    Lines/Drains/Airways       Central Venous Catheter Line  Duration                  Hemodialysis AV Graft Left upper arm -- days              Drain  Duration                  Hemodialysis AV Fistula  Left upper arm -- days         Hemodialysis AV Fistula Left upper arm -- days              Peripheral Intravenous Line  Duration                  Peripheral IV - Single Lumen 05/27/23 2125 20 G Anterior;Right Other <1 day                    Significant Labs:    CBC/Anemia Profile:  Recent Labs   Lab 05/27/23  0726 05/28/23  0417   WBC 7.33 7.88   HGB 7.6* 8.0*   HCT 24.8* 25.8*    231   MCV 97 95   RDW 16.3* 15.6*        Chemistries:  Recent Labs   Lab 05/27/23  0551 05/28/23 0417   * 131*   K 5.3* 4.2   CL 99 98   CO2 24 23   BUN 36* 25*   CREATININE 6.3* 5.0*   CALCIUM 8.2* 8.4*       All pertinent labs within the past 24 hours have been reviewed.    Significant Imaging:  I have reviewed all pertinent imaging results/findings within the past 24 hours.      ABG  No results for input(s): PH, PO2, PCO2, HCO3, BE in the last 168 hours.  Assessment/Plan:     Cardiac/Vascular  * Hypertensive emergency  ·  Dr. Kinsey who recommended changing the Metoprolol to Coreg since Coreg does not dialyze off. Also since rebound hypertension is expected with Clonidine, recommends tapering off Clonidine  · 5/27 -- titration of medications in progress with plan to wean down clonidine to avoid rebound hypertension and now on Coreg 25 mg BID, NTG patch, hydralazine (increased to 100 mg every 8 hours) -- today after dialysis, BP  remained elevated all afternoon, up to 227/102 despite one dose of IV hydralazine 10 mg in addition to scheduled medications, transferred to ICU for closer monitoring of BP and continued titration of medications, possible requirement of Cardene infusion, currently 204/98 with multiple medications due at this time   · 5/28: Nitro paste BID discontinued. IMDUR added to help optimize BP. Trying to prevent re-initiating Cardene gtt. Nuke stress test pending, possibly today? Case discussed with Joaquim    NSTEMI (non-ST elevated myocardial infarction)  · Likely due to demand ischemia  · Cardiology seeing patient, follow recommendations  · No complaints of chest pain today  · Continue cardiac monitoring to assess for ischemic changes  · Lovenox 1 mg/kg every 24 hours, as patient refused heparin infusion due to frequent lab sticks   · Plan for Nuke stress test today    Renal/  ESRD (end stage renal disease)  · Chronic HD on TuThSat   · Nephrology following  · Monitor lytes and correct supplement as appropriate    Oncology  Anemia in chronic kidney disease, on chronic dialysis  · Hgb 7.6, was transfused one unit PRBC today (5/27)  · No active signs of bleeding    Orthopedic  Chronic back pain  · PRN pain medication ordered, adjust regimen as needed for pain control    Other  Atypical chest pain  · See NSTEMI -- no chest pain at this time      DVT ppx: Lovenox  GI ppx: not applicable  Disp/plan for the day: Nuke test today, optimize BP    Critical Care Time: 45 minutes  Critical secondary to hypertensive emergency     Critical care was time spent personally by me on the following activities: development of treatment plan with patient or surrogate and bedside caregivers, discussions with consultants, evaluation of patient's response to treatment, examination of patient, ordering and performing treatments and interventions, ordering and review of laboratory studies, ordering and review of radiographic studies, pulse oximetry,  re-evaluation of patient's condition. This critical care time did not overlap with that of any other provider or involve time for any procedures.     Ban Masterson NP  Critical Care Medicine  [facility] ([facility])

## 2023-05-28 NOTE — ASSESSMENT & PLAN NOTE
· Chronic HD on TuThSat -- had dialysis today  · Nephrology following  · Monitor lytes and correct supplement as appropriate
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
## 2023-05-28 NOTE — PLAN OF CARE
Problem: Adult Inpatient Plan of Care  Goal: Plan of Care Review  Outcome: Ongoing, Progressing  Pt received from Tele unit around 2025. AAOx4, no complaints of pain or discomfort. Afebrile. NSR with 1st degree AVB on monitor. Multiple scheduled and PRN medications given throughout night for hypertension- see MAR. PIV saline locked. Pt repositions independently in bed. POC reviewed with pt, verbalized understanding. Will continue with current POC and update as needed.

## 2023-05-28 NOTE — SUBJECTIVE & OBJECTIVE
"Interval History: BP improved. PT deemed stable for step down to floor. Pt seen while in ICU. She became nauseous earlier today while in stress test, test was aborted. Pt received compazine but became jittery then received dose of Xanax. Pt sleeping comfortably, reports she feels "queasy" when awoken. Denies CP, SOB, HA.     Review of Systems  Objective:     Vital Signs (Most Recent):  Temp: 98 °F (36.7 °C) (05/28/23 1501)  Pulse: 69 (05/28/23 1704)  Resp: (!) 41 (05/28/23 1536)  BP: (!) 142/65 (05/28/23 1704)  SpO2: 96 % (05/28/23 1704) Vital Signs (24h Range):  Temp:  [97.7 °F (36.5 °C)-99.3 °F (37.4 °C)] 98 °F (36.7 °C)  Pulse:  [67-91] 69  Resp:  [12-52] 41  SpO2:  [91 %-100 %] 96 %  BP: (142-227)/() 142/65     Weight: 97.2 kg (214 lb 4.6 oz)  Body mass index is 36.78 kg/m².    Intake/Output Summary (Last 24 hours) at 5/28/2023 1718  Last data filed at 5/28/2023 0923  Gross per 24 hour   Intake 320 ml   Output --   Net 320 ml         Physical Exam  Vitals and nursing note reviewed.   Constitutional:       General: She is not in acute distress.     Appearance: She is obese.   Cardiovascular:      Rate and Rhythm: Normal rate and regular rhythm.      Heart sounds: No murmur heard.    No friction rub. No gallop.   Pulmonary:      Effort: Pulmonary effort is normal.      Breath sounds: Normal breath sounds. No wheezing, rhonchi or rales.      Comments: On RA   Abdominal:      General: Bowel sounds are normal. There is no distension.      Palpations: Abdomen is soft.      Tenderness: There is no abdominal tenderness. There is no guarding.   Musculoskeletal:      Right lower leg: No edema.      Left lower leg: No edema.   Skin:     General: Skin is warm and dry.      Comments: CLAUDIAE PHILLIPF in place    Neurological:      Mental Status: She is alert and oriented to person, place, and time. Mental status is at baseline.           Significant Labs: All pertinent labs within the past 24 hours have been " reviewed.    Significant Imaging: I have reviewed all pertinent imaging results/findings within the past 24 hours.

## 2023-05-28 NOTE — NURSING
Dr. Washington at bedside speaking with pt about elevated BP and recommendations for transfer to ICU. Pt verbalized understanding.

## 2023-05-28 NOTE — HOSPITAL COURSE
Ms. Jones is a 72 year old female whose conditions include ESRD on HD, HTN, diastolic CHF, anemia, bilateral carotid stenosis, PCKD, chronic pain, and secondary hyperaparathyroidsim who presented to [facility] ED yesterday due to chest pain that onset after dialysis. Patient complained of right-sided chest heaviness/pressure, intermittent since Tuesday. Associated symptoms included abdominal discomfort/constipation, nausea, and vomiting. Patient denied any associated fever, chills, SOB, palpitations, near syncope, or syncope. Initial workup in ED revealed uncontrolled HTN (240/115), and troponin of 0.047>0.111, BNP of 1215. CXR showed findings concerning for pulmonary edema and patient was subsequently placed on a Tridil drip and admitted to ICU. Cardiology consulted to assist with management. Patient seen and examined today, resting in bed. Feels ok. Still complains of right-sided chest discomfort. States it burns at times and is GERD-like in nature and she feels her severe constipation is the root of her symptoms. Denies any prior history of CAD or MI. Tridil drip provided relief but patient could not tolerate due to headache. She states her BP is generally well-controlled on her home medications. Chart reviewed. Troponin 0.047>0.111>0.258. H/H 7.3/22.9, transfusion planned tomorrow. Echo pending. Will need ischemic evaluation once BP controlled. EKG reviewed, no ST elevation, dynamic ischemic changes noted.    05/27 saw pt in the HD. BP high and no chest pain.SOB improved after HD.    05/28 transferred back to ICU due to high BOP > 220 mmHG. No chest pain and dyspnea. Pt c/o nausea in the [facility] stress test room, will postpone stress part in AM.    5/29/23-Patient seen and examined today, resting in bed. Only complains of headache after stress test. No CP/SOB. Labs reviewed. H/H 7.8/25.4. MPI stress test pending.    5/30/23 Pt seen and examined today, in dialysis. Denies any Cp, sob at this time. Nuclear stress test  significant for scarring. labs reviewed, chart reviewed.

## 2023-05-28 NOTE — SUBJECTIVE & OBJECTIVE
ROS  Objective:     Vital Signs (Most Recent):  Temp: 97.7 °F (36.5 °C) (05/28/23 1101)  Pulse: 82 (05/28/23 1101)  Resp: (!) 21 (05/28/23 1101)  BP: (!) 193/74 (05/28/23 1101)  SpO2: 96 % (05/28/23 1101) Vital Signs (24h Range):  Temp:  [97.7 °F (36.5 °C)-99.3 °F (37.4 °C)] 97.7 °F (36.5 °C)  Pulse:  [70-91] 82  Resp:  [12-29] 21  SpO2:  [91 %-100 %] 96 %  BP: (139-256)/() 193/74     Weight: 97.2 kg (214 lb 4.6 oz)  Body mass index is 36.78 kg/m².     SpO2: 96 %         Intake/Output Summary (Last 24 hours) at 5/28/2023 1119  Last data filed at 5/28/2023 0923  Gross per 24 hour   Intake 620 ml   Output 3800 ml   Net -3180 ml       Lines/Drains/Airways       Central Venous Catheter Line  Duration                  Hemodialysis AV Graft Left upper arm -- days              Drain  Duration                  Hemodialysis AV Fistula  Left upper arm -- days         Hemodialysis AV Fistula Left upper arm -- days              Peripheral Intravenous Line  Duration                  Peripheral IV - Single Lumen 05/27/23 2125 20 G Anterior;Right Other <1 day                       Physical Exam  Vitals and nursing note reviewed.   Constitutional:       General: She is not in acute distress.     Appearance: Normal appearance. She is well-developed. She is not diaphoretic.   HENT:      Head: Normocephalic and atraumatic.   Eyes:      General:         Right eye: No discharge.         Left eye: No discharge.      Pupils: Pupils are equal, round, and reactive to light.   Neck:      Thyroid: No thyromegaly.      Vascular: No JVD.      Trachea: No tracheal deviation.   Cardiovascular:      Rate and Rhythm: Normal rate and regular rhythm.      Heart sounds: Normal heart sounds, S1 normal and S2 normal. No murmur heard.  Pulmonary:      Effort: Pulmonary effort is normal. No respiratory distress.      Breath sounds: No wheezing.      Comments: Diminished BS at bases  Abdominal:      General: There is no distension.      Palpations:  Abdomen is soft.      Tenderness: There is no rebound.   Musculoskeletal:      Cervical back: Neck supple.      Right lower leg: No edema.      Left lower leg: No edema.   Skin:     General: Skin is warm and dry.      Findings: No erythema.   Neurological:      General: No focal deficit present.      Mental Status: She is alert and oriented to person, place, and time.   Psychiatric:         Mood and Affect: Mood normal.         Behavior: Behavior normal.         Thought Content: Thought content normal.          Significant Labs: ABG: No results for input(s): PH, PCO2, HCO3, POCSATURATED, BE in the last 48 hours., Blood Culture: No results for input(s): LABBLOO in the last 48 hours., BMP:   Recent Labs   Lab 05/27/23  0551 05/28/23 0417   GLU 83 86   * 131*   K 5.3* 4.2   CL 99 98   CO2 24 23   BUN 36* 25*   CREATININE 6.3* 5.0*   CALCIUM 8.2* 8.4*   , CMP   Recent Labs   Lab 05/27/23  0551 05/28/23 0417   * 131*   K 5.3* 4.2   CL 99 98   CO2 24 23   GLU 83 86   BUN 36* 25*   CREATININE 6.3* 5.0*   CALCIUM 8.2* 8.4*   ANIONGAP 10 10   , CBC   Recent Labs   Lab 05/27/23  0726 05/28/23 0417   WBC 7.33 7.88   HGB 7.6* 8.0*   HCT 24.8* 25.8*    231   , INR No results for input(s): INR, PROTIME in the last 48 hours., Lipid Panel No results for input(s): CHOL, HDL, LDLCALC, TRIG, CHOLHDL in the last 48 hours., and Troponin No results for input(s): TROPONINI in the last 48 hours.    Significant Imaging: EKG:

## 2023-05-28 NOTE — EICU
72 year old female admitted with hypertensive emergency.noncompliance to medications noted.  Past H/O ESRD on HD,HTN.CHF with preserved ejection fraction,PHT ,anemia,bilateral carotid stenosis,polycystic kidney disease,chronic pain,secondary hyperparathyroidism.    On camera assessment patient is awake and alert.  /83,PR88,SPO2 95%,RR28    Data  Troponin 0.288<-0.258<-0.111<-0.047  HB 7.6,MCHC 30.6  Sodium 133  Potassium 5.3  BUN/CR 36/6.3  BNP 1215  Chest xray 5/25 with bilateral basal alveolar and interstitial densities  EKG sinus rhythm 1st degree AV block,STT changes in anteroseptal leads.  ECHO 5/18/2023 EF 65%,Grade 2 diastolic dysfunction,Severe left atrial enlargement.mild aortic stenosis,moderate TR,mild IL.    Assessment and plan:  1.HTN emergency-most likely related to noncompliance with medications-restarted clonidine,coreg,and hydralazine may need to restart amlodipine tonight,Prn hydralazine IV in addition may be needed tonight and prazosin initiated in addition  2.ESRD on HD-HD as per nephrology.  3.Anemia-iron and erythropoietin as needed.  4.chronic pain -avoid narcotics if possible.  5.Troponin elevation-most likely demand ischemia,on enoxaparin ,anti lipid.  Cardiology recommendations if needed.      DVT prophylaxis-SQ enoxaparin.

## 2023-05-28 NOTE — ASSESSMENT & PLAN NOTE
BP as high as 249/115, was given multiple IV medications while in ED without much improvement. NTG infusion, started per ED due to mild chest pain along with elevated troponin but pt unable to tolerate due to headaches. Pt reports that she did not take her usual BP medications on HD day (Norvasc, clonidine, metoprolol) -- she states that dialysis nurses instruct her not to take it until after dialysis   - Pt weaned of cardene drip 05/26. Transferred back to ICU on 05/27 due to persistently elevated BP   - Nephrology following- BP regimen adjusted with plans to taper clonidine to avoid rebound HTN on HD days   - Continue Clonidine taper per renal   - Continue Imdur, prazosin, Coreg, hydral, Norvasc   - monitor BP

## 2023-05-28 NOTE — HOSPITAL COURSE
5/26/23: Patient weaned off of cardene drip this morning, restarted on her home medications. Stable for ICU downgrade to telemetry  05/27: Underwent HD, clonidine tapered per renal recs. Heparin changed to sq lovenox as pt refused PTT draws. Transferred back to ICU due to BP sustaining 220-240 systolic although pt was asymptomatic.   05/28: Did not require initiation of cardene, BP meds adjusted further: Norvasc, Imdur added. Prazosin dose increased. BP trends improved. Pt deemed stable for transfer back to floor. NM stress delayed per cardiology   5/29 BP  stable after medication adjustment . Plan for a cardiac stress test today . She is complaining of HA and Heartburn.   5/30 Pt was seen and examined at bedside . She was determined to be suitable for d/c   -BP  stable  after BP medication adjusted . Hydralazine increased to 100 mg po bid , metoprolol changed to  coreg , clonidine 0.1 mg po bid , Norvasc increased to 10 mg po qd , Imdur 30 mg po qd  and started on prazosin  0.2 mg po bid .   -Cardiology conuslted . Cardiac stress test show :There is a moderate intensity, moderate sized, fixed perfusion abnormality consistent with scar in the anterolateral wall. Cardiology rec to [facility] medical tx .   -She required ICU admission  for  BP control with ayse drip  x 2 . No acute complication since admission .    Pt was advised to f/u with her PCP , cardiology and  Nephrology on 1 to 2 weeks .

## 2023-05-28 NOTE — ASSESSMENT & PLAN NOTE
-BP uncontrolled upon admission  -Appreciate nephrology rec's-agree with transition to Coreg  -Continue hydralazine, titrate as needed  -Add nitropaste  -Continue Clonidine for now, wean as tolerated    05/27  BP high  Continue coreg hydralazine clonidine  Add amlodipine     05/28  Increase Amlodipine to 5 mg bid  Continue coreg hydralazine clonidine and imdur  [facility] stress test pending

## 2023-05-29 PROBLEM — J81.0 ACUTE PULMONARY EDEMA: Status: ACTIVE | Noted: 2023-05-29

## 2023-05-29 LAB
ANION GAP SERPL CALC-SCNC: 11 MMOL/L (ref 8–16)
BASOPHILS # BLD AUTO: 0.05 K/UL (ref 0–0.2)
BASOPHILS NFR BLD: 0.7 % (ref 0–1.9)
BUN SERPL-MCNC: 31 MG/DL (ref 8–23)
CALCIUM SERPL-MCNC: 8.2 MG/DL (ref 8.7–10.5)
CHLORIDE SERPL-SCNC: 96 MMOL/L (ref 95–110)
CO2 SERPL-SCNC: 23 MMOL/L (ref 23–29)
CREAT SERPL-MCNC: 7 MG/DL (ref 0.5–1.4)
CV STRESS BASE HR: 86 BPM
DIASTOLIC BLOOD PRESSURE: 76 MMHG
DIFFERENTIAL METHOD: ABNORMAL
EJECTION FRACTION- HIGH: 73 %
END DIASTOLIC INDEX-HIGH: 165 ML/M2
END DIASTOLIC INDEX-LOW: 101 ML/M2
END SYSTOLIC INDEX-HIGH: 64 ML/M2
END SYSTOLIC INDEX-LOW: 28 ML/M2
EOSINOPHIL # BLD AUTO: 0.4 K/UL (ref 0–0.5)
EOSINOPHIL NFR BLD: 5.6 % (ref 0–8)
ERYTHROCYTE [DISTWIDTH] IN BLOOD BY AUTOMATED COUNT: 15.3 % (ref 11.5–14.5)
EST. GFR  (NO RACE VARIABLE): 6 ML/MIN/1.73 M^2
GLUCOSE SERPL-MCNC: 129 MG/DL (ref 70–110)
HCT VFR BLD AUTO: 25.4 % (ref 37–48.5)
HGB BLD-MCNC: 7.8 G/DL (ref 12–16)
IMM GRANULOCYTES # BLD AUTO: 0.02 K/UL (ref 0–0.04)
IMM GRANULOCYTES NFR BLD AUTO: 0.3 % (ref 0–0.5)
LYMPHOCYTES # BLD AUTO: 1.8 K/UL (ref 1–4.8)
LYMPHOCYTES NFR BLD: 23.3 % (ref 18–48)
MAGNESIUM SERPL-MCNC: 2.2 MG/DL (ref 1.6–2.6)
MCH RBC QN AUTO: 29.4 PG (ref 27–31)
MCHC RBC AUTO-ENTMCNC: 30.7 G/DL (ref 32–36)
MCV RBC AUTO: 96 FL (ref 82–98)
MONOCYTES # BLD AUTO: 1 K/UL (ref 0.3–1)
MONOCYTES NFR BLD: 13 % (ref 4–15)
NEUTROPHILS # BLD AUTO: 4.4 K/UL (ref 1.8–7.7)
NEUTROPHILS NFR BLD: 57.1 % (ref 38–73)
NRBC BLD-RTO: 0 /100 WBC
NUC REST EJECTION FRACTION: 71
NUC STRESS EJECTION FRACTION: 68 %
OHS CV CPX 85 PERCENT MAX PREDICTED HEART RATE MALE: 122
OHS CV CPX MAX PREDICTED HEART RATE: 144
OHS CV CPX PATIENT IS FEMALE: 1
OHS CV CPX PATIENT IS MALE: 0
OHS CV CPX PEAK DIASTOLIC BLOOD PRESSURE: 59 MMHG
OHS CV CPX PEAK HEAR RATE: 98 BPM
OHS CV CPX PEAK RATE PRESSURE PRODUCT: NORMAL
OHS CV CPX PEAK SYSTOLIC BLOOD PRESSURE: 263 MMHG
OHS CV CPX PERCENT MAX PREDICTED HEART RATE ACHIEVED: 68
OHS CV CPX RATE PRESSURE PRODUCT PRESENTING: NORMAL
PLATELET # BLD AUTO: 213 K/UL (ref 150–450)
PMV BLD AUTO: 10.5 FL (ref 9.2–12.9)
POTASSIUM SERPL-SCNC: 4.3 MMOL/L (ref 3.5–5.1)
RBC # BLD AUTO: 2.65 M/UL (ref 4–5.4)
RETIRED EF AND QEF - SEE NOTES: 59 %
SODIUM SERPL-SCNC: 130 MMOL/L (ref 136–145)
SYSTOLIC BLOOD PRESSURE: 163 MMHG
WBC # BLD AUTO: 7.69 K/UL (ref 3.9–12.7)

## 2023-05-29 PROCEDURE — 63600175 PHARM REV CODE 636 W HCPCS: Performed by: INTERNAL MEDICINE

## 2023-05-29 PROCEDURE — 27000221 HC OXYGEN, UP TO 24 HOURS

## 2023-05-29 PROCEDURE — 25000003 PHARM REV CODE 250: Performed by: NURSE PRACTITIONER

## 2023-05-29 PROCEDURE — 63600175 PHARM REV CODE 636 W HCPCS: Performed by: NURSE PRACTITIONER

## 2023-05-29 PROCEDURE — 25000003 PHARM REV CODE 250: Performed by: INTERNAL MEDICINE

## 2023-05-29 PROCEDURE — 21400001 HC TELEMETRY ROOM

## 2023-05-29 PROCEDURE — 85025 COMPLETE CBC W/AUTO DIFF WBC: CPT | Performed by: INTERNAL MEDICINE

## 2023-05-29 PROCEDURE — 36415 COLL VENOUS BLD VENIPUNCTURE: CPT | Performed by: INTERNAL MEDICINE

## 2023-05-29 PROCEDURE — 25000242 PHARM REV CODE 250 ALT 637 W/ HCPCS: Performed by: NURSE PRACTITIONER

## 2023-05-29 PROCEDURE — 83735 ASSAY OF MAGNESIUM: CPT | Performed by: INTERNAL MEDICINE

## 2023-05-29 PROCEDURE — 99232 SBSQ HOSP IP/OBS MODERATE 35: CPT | Mod: ,,, | Performed by: PHYSICIAN ASSISTANT

## 2023-05-29 PROCEDURE — 80048 BASIC METABOLIC PNL TOTAL CA: CPT | Performed by: INTERNAL MEDICINE

## 2023-05-29 PROCEDURE — 99900035 HC TECH TIME PER 15 MIN (STAT)

## 2023-05-29 PROCEDURE — 25000003 PHARM REV CODE 250: Performed by: HOSPITALIST

## 2023-05-29 PROCEDURE — 99232 PR SUBSEQUENT HOSPITAL CARE,LEVL II: ICD-10-PCS | Mod: ,,, | Performed by: PHYSICIAN ASSISTANT

## 2023-05-29 PROCEDURE — 94761 N-INVAS EAR/PLS OXIMETRY MLT: CPT

## 2023-05-29 RX ORDER — BUTALBITAL, ACETAMINOPHEN AND CAFFEINE 50; 325; 40 MG/1; MG/1; MG/1
1 TABLET ORAL EVERY 6 HOURS PRN
Status: DISCONTINUED | OUTPATIENT
Start: 2023-05-30 | End: 2023-05-30 | Stop reason: HOSPADM

## 2023-05-29 RX ORDER — BUTALBITAL, ACETAMINOPHEN AND CAFFEINE 50; 325; 40 MG/1; MG/1; MG/1
1 TABLET ORAL ONCE
Status: COMPLETED | OUTPATIENT
Start: 2023-05-29 | End: 2023-05-29

## 2023-05-29 RX ORDER — AMLODIPINE BESYLATE 10 MG/1
10 TABLET ORAL DAILY
Status: DISCONTINUED | OUTPATIENT
Start: 2023-05-30 | End: 2023-05-30 | Stop reason: HOSPADM

## 2023-05-29 RX ORDER — CALCIUM CARBONATE 200(500)MG
500 TABLET,CHEWABLE ORAL 2 TIMES DAILY PRN
Status: DISCONTINUED | OUTPATIENT
Start: 2023-05-29 | End: 2023-05-30 | Stop reason: HOSPADM

## 2023-05-29 RX ORDER — REGADENOSON 0.08 MG/ML
0.4 INJECTION, SOLUTION INTRAVENOUS ONCE
Status: COMPLETED | OUTPATIENT
Start: 2023-05-29 | End: 2023-05-29

## 2023-05-29 RX ORDER — SODIUM CHLORIDE 9 MG/ML
INJECTION, SOLUTION INTRAVENOUS ONCE
Status: CANCELLED | OUTPATIENT
Start: 2023-05-29 | End: 2023-05-29

## 2023-05-29 RX ORDER — PANTOPRAZOLE SODIUM 40 MG/1
40 TABLET, DELAYED RELEASE ORAL DAILY
Status: DISCONTINUED | OUTPATIENT
Start: 2023-05-29 | End: 2023-05-30 | Stop reason: HOSPADM

## 2023-05-29 RX ADMIN — DOCUSATE SODIUM 200 MG: 100 CAPSULE, LIQUID FILLED ORAL at 09:05

## 2023-05-29 RX ADMIN — ENOXAPARIN SODIUM 100 MG: 100 INJECTION SUBCUTANEOUS at 11:05

## 2023-05-29 RX ADMIN — ACETAMINOPHEN 650 MG: 325 TABLET ORAL at 11:05

## 2023-05-29 RX ADMIN — ONDANSETRON 4 MG: 2 INJECTION INTRAMUSCULAR; INTRAVENOUS at 11:05

## 2023-05-29 RX ADMIN — FLUTICASONE PROPIONATE 100 MCG: 50 SPRAY, METERED NASAL at 09:05

## 2023-05-29 RX ADMIN — CLONIDINE HYDROCHLORIDE 0.1 MG: 0.1 TABLET ORAL at 11:05

## 2023-05-29 RX ADMIN — CALCIUM CARBONATE (ANTACID) CHEW TAB 500 MG 500 MG: 500 CHEW TAB at 05:05

## 2023-05-29 RX ADMIN — CARVEDILOL 25 MG: 12.5 TABLET, FILM COATED ORAL at 09:05

## 2023-05-29 RX ADMIN — SEVELAMER CARBONATE 1600 MG: 800 TABLET, FILM COATED ORAL at 04:05

## 2023-05-29 RX ADMIN — MUPIROCIN: 20 OINTMENT TOPICAL at 09:05

## 2023-05-29 RX ADMIN — ISOSORBIDE MONONITRATE 30 MG: 30 TABLET, EXTENDED RELEASE ORAL at 09:05

## 2023-05-29 RX ADMIN — ATORVASTATIN CALCIUM 40 MG: 40 TABLET, FILM COATED ORAL at 09:05

## 2023-05-29 RX ADMIN — PRAZOSIN HYDROCHLORIDE 2 MG: 1 CAPSULE ORAL at 09:05

## 2023-05-29 RX ADMIN — LACTULOSE 30 G: 20 SOLUTION ORAL at 11:05

## 2023-05-29 RX ADMIN — CARVEDILOL 25 MG: 12.5 TABLET, FILM COATED ORAL at 11:05

## 2023-05-29 RX ADMIN — HYDRALAZINE HYDROCHLORIDE 100 MG: 50 TABLET ORAL at 05:05

## 2023-05-29 RX ADMIN — PRAZOSIN HYDROCHLORIDE 2 MG: 1 CAPSULE ORAL at 02:05

## 2023-05-29 RX ADMIN — NEPHROCAP 1 CAPSULE: 1 CAP ORAL at 09:05

## 2023-05-29 RX ADMIN — HYDRALAZINE HYDROCHLORIDE 100 MG: 50 TABLET ORAL at 09:05

## 2023-05-29 RX ADMIN — HYDRALAZINE HYDROCHLORIDE 100 MG: 50 TABLET ORAL at 02:05

## 2023-05-29 RX ADMIN — BUTALBITAL, ACETAMINOPHEN, AND CAFFEINE 1 TABLET: 325; 50; 40 TABLET ORAL at 09:05

## 2023-05-29 RX ADMIN — PANTOPRAZOLE SODIUM 40 MG: 40 TABLET, DELAYED RELEASE ORAL at 11:05

## 2023-05-29 RX ADMIN — REGADENOSON 0.4 MG: 0.08 INJECTION, SOLUTION INTRAVENOUS at 02:05

## 2023-05-29 RX ADMIN — DOCUSATE SODIUM 200 MG: 100 CAPSULE, LIQUID FILLED ORAL at 11:05

## 2023-05-29 RX ADMIN — PRAZOSIN HYDROCHLORIDE 2 MG: 1 CAPSULE ORAL at 05:05

## 2023-05-29 RX ADMIN — CLONIDINE HYDROCHLORIDE 0.1 MG: 0.1 TABLET ORAL at 09:05
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
## 2023-05-29 NOTE — ASSESSMENT & PLAN NOTE
-BP uncontrolled upon admission  -Appreciate nephrology rec's-agree with transition to Coreg  -Continue hydralazine, titrate as needed  -Add nitropaste  -Continue Clonidine for now, wean as tolerated    05/27  BP high  Continue coreg hydralazine clonidine  Add amlodipine     05/28  Increase Amlodipine to 5 mg bid  Continue coreg hydralazine clonidine and imdur  Nuke stress test pending     5/9/23  -BP stable  -Continue current regimen

## 2023-05-29 NOTE — PLAN OF CARE
POC discussed with patient and family; verbalized understanding. VSS. Prn medication given for headache and intermittent nausea. No emesis episodes this shift. Tolerating diet. Purposeful rounding every 2 hours. Patient remains free from injury; safety precautions maintained. Chart review completed.

## 2023-05-29 NOTE — PROGRESS NOTES
[facility] - Med Surg  Cardiology  Progress Note    Patient Name: Gillian Jones  MRN: [id number]  Admission Date: 5/25/2023  Hospital Length of Stay: 3 days  Code Status: Full Code   Attending Physician: Dre Hayward, *   Primary Care Physician: Primary Doctor No  Expected Discharge Date:   Principal Problem:Hypertensive emergency    Subjective:     Hospital Course:   Ms. Jones is a 72 year old female whose conditions include ESRD on HD, HTN, diastolic CHF, anemia, bilateral carotid stenosis, PCKD, chronic pain, and secondary hyperaparathyroidsim who presented to [facility] ED yesterday due to chest pain that onset after dialysis. Patient complained of right-sided chest heaviness/pressure, intermittent since Tuesday. Associated symptoms included abdominal discomfort/constipation, nausea, and vomiting. Patient denied any associated fever, chills, SOB, palpitations, near syncope, or syncope. Initial workup in ED revealed uncontrolled HTN (240/115), and troponin of 0.047>0.111, BNP of 1215. CXR showed findings concerning for pulmonary edema and patient was subsequently placed on a Tridil drip and admitted to ICU. Cardiology consulted to assist with management. Patient seen and examined today, resting in bed. Feels ok. Still complains of right-sided chest discomfort. States it burns at times and is GERD-like in nature and she feels her severe constipation is the root of her symptoms. Denies any prior history of CAD or MI. Tridil drip provided relief but patient could not tolerate due to headache. She states her BP is generally well-controlled on her home medications. Chart reviewed. Troponin 0.047>0.111>0.258. H/H 7.3/22.9, transfusion planned tomorrow. Echo pending. Will need ischemic evaluation once BP controlled. EKG reviewed, no ST elevation, dynamic ischemic changes noted.    05/27 saw pt in the HD. BP high and no chest pain.SOB improved after HD.    05/28 transferred back to ICU due to high BOP > 220 mmHG. No  chest pain and dyspnea. Pt c/o nausea in the [facility] stress test room, will postpone stress part in AM.    5/29/23-Patient seen and examined today, resting in bed. Only complains of headache after stress test. No CP/SOB. Labs reviewed. H/H 7.8/25.4. MPI stress test pending.          Review of Systems   Constitutional: Negative.   HENT: Negative.     Eyes: Negative.    Cardiovascular: Negative.    Respiratory: Negative.     Endocrine: Negative.    Skin: Negative.    Musculoskeletal: Negative.    Gastrointestinal: Negative.    Genitourinary: Negative.    Neurological:  Positive for headaches.   Psychiatric/Behavioral: Negative.     Allergic/Immunologic: Negative.    Objective:     Vital Signs (Most Recent):  Temp: 98 °F (36.7 °C) (05/29/23 1231)  Pulse: 79 (05/29/23 1231)  Resp: 19 (05/29/23 1231)  BP: 139/62 (05/29/23 1231)  SpO2: 96 % (05/29/23 1231) Vital Signs (24h Range):  Temp:  [97.6 °F (36.4 °C)-98.7 °F (37.1 °C)] 98 °F (36.7 °C)  Pulse:  [59-88] 79  Resp:  [16-52] 19  SpO2:  [91 %-100 %] 96 %  BP: (106-181)/() 139/62     Weight: 95.3 kg (210 lb)  Body mass index is 36.05 kg/m².     SpO2: 96 %       No intake or output data in the 24 hours ending 05/29/23 1356    Lines/Drains/Airways       Central Venous Catheter Line  Duration                  Hemodialysis AV Graft Left upper arm -- days              Drain  Duration                  Hemodialysis AV Fistula  Left upper arm -- days         Hemodialysis AV Fistula Left upper arm -- days              Peripheral Intravenous Line  Duration                  Peripheral IV - Single Lumen 05/27/23 2125 20 G Anterior;Right Other 1 day                       Physical Exam  Vitals and nursing note reviewed.   Constitutional:       General: She is not in acute distress.     Appearance: Normal appearance. She is well-developed. She is not diaphoretic.   HENT:      Head: Normocephalic and atraumatic.   Eyes:      General:         Right eye: No discharge.         Left eye:  No discharge.      Pupils: Pupils are equal, round, and reactive to light.   Neck:      Thyroid: No thyromegaly.      Vascular: No JVD.      Trachea: No tracheal deviation.   Cardiovascular:      Rate and Rhythm: Normal rate and regular rhythm.      Heart sounds: Normal heart sounds, S1 normal and S2 normal. No murmur heard.  Pulmonary:      Effort: Pulmonary effort is normal. No respiratory distress.      Breath sounds: Normal breath sounds. No wheezing or rales.   Abdominal:      General: There is no distension.      Palpations: Abdomen is soft.      Tenderness: There is no rebound.   Musculoskeletal:      Cervical back: Neck supple.      Right lower leg: No edema.      Left lower leg: No edema.   Skin:     General: Skin is warm and dry.      Findings: No erythema.   Neurological:      General: No focal deficit present.      Mental Status: She is alert and oriented to person, place, and time.   Psychiatric:         Mood and Affect: Mood normal.         Behavior: Behavior normal.         Thought Content: Thought content normal.          Significant Labs: CMP   Recent Labs   Lab 05/28/23  0417 05/29/23  0704   * 130*   K 4.2 4.3   CL 98 96   CO2 23 23   GLU 86 129*   BUN 25* 31*   CREATININE 5.0* 7.0*   CALCIUM 8.4* 8.2*   ANIONGAP 10 11   , CBC   Recent Labs   Lab 05/28/23  0417 05/29/23  0704   WBC 7.88 7.69   HGB 8.0* 7.8*   HCT 25.8* 25.4*    213   , Troponin No results for input(s): TROPONINI in the last 48 hours., and All pertinent lab results from the last 24 hours have been reviewed.    Significant Imaging: Echocardiogram: Transthoracic echo (TTE) complete (Cupid Only):   Results for orders placed or performed during the hospital encounter of 05/18/23   Echo   Result Value Ref Range    BSA 2.09 m2    TDI SEPTAL 0.05 m/s    LV LATERAL E/E' RATIO 20.57 m/s    LV SEPTAL E/E' RATIO 28.80 m/s    IVC diameter 1.79 cm    Left Ventricular Outflow Tract Mean Velocity 0.98 cm/s    Left Ventricular Outflow  Tract Mean Gradient 4.30 mmHg    TDI LATERAL 0.07 m/s    PV PEAK VELOCITY 1.25 cm/s    LVIDd 4.78 3.5 - 6.0 cm    IVS 1.36 (A) 0.6 - 1.1 cm    Posterior Wall 1.08 0.6 - 1.1 cm    Ao root annulus 3.44 cm    LVIDs 3.09 2.1 - 4.0 cm    FS 35 28 - 44 %    STJ 2.76 cm    Ascending aorta 3.12 cm    LV mass 222.87 g    LA size 5.66 cm    RVDD 3.31 cm    Left Ventricle Relative Wall Thickness 0.45 cm    AV mean gradient 10 mmHg    AV valve area 2.02 cm2    AV Velocity Ratio 0.62     AV index (prosthetic) 0.65     MV mean gradient 5 mmHg    MV valve area p 1/2 method 3.11 cm2    MV valve area by continuity eq 1.97 cm2    E/A ratio 1.11     Mean e' 0.06 m/s    E wave deceleration time 228.79 msec    IVRT 83.73 msec    Pulm vein S/D ratio 0.85     LVOT diameter 1.99 cm    LVOT area 3.1 cm2    LVOT peak vincenzo 1.38 m/s    LVOT peak VTI 31.90 cm    Ao peak vincenzo 2.24 m/s    Ao VTI 49.2 cm    RVOT peak vincenzo 0.98 m/s    RVOT peak VTI 24.8 cm    LVOT stroke volume 99.17 cm3    AV peak gradient 20 mmHg    MV peak gradient 10 mmHg    PV mean gradient 2.40 mmHg    E/E' ratio 24.00 m/s    MV Peak E Vincenzo 1.44 m/s    TR Max Vincenzo 4.55 m/s    MV VTI 50.4 cm    MV stenosis pressure 1/2 time 70.74 ms    MV Peak A Vincenzo 1.30 m/s    PV Peak S Vincenzo 0.62 m/s    PV Peak D Vincenzo 0.73 m/s    LV Systolic Volume 37.49 mL    LV Systolic Volume Index 18.7 mL/m2    LV Diastolic Volume 106.24 mL    LV Diastolic Volume Index 52.86 mL/m2    LV Mass Index 111 g/m2    RA Major Axis 3.53 cm    Left Atrium Minor Axis 5.25 cm    Left Atrium Major Axis 4.92 cm    Triscuspid Valve Regurgitation Peak Gradient 83 mmHg    LA WIDTH 5.20 cm    LA volume 127.08 cm3    LA Volume Index 63.2 mL/m2    Right Atrial Pressure (from IVC) 3 mmHg    EF 65 %    TV rest pulmonary artery pressure 86 mmHg    Narrative    · The left ventricle is normal in size with concentric hypertrophy and   normal systolic function.  · Severe left atrial enlargement.  · Grade II left ventricular diastolic  dysfunction.  · The estimated PA systolic pressure is 86 mmHg.  · Normal right ventricular size with normal right ventricular systolic   function.  · There is pulmonary hypertension.  · Normal central venous pressure (3 mmHg).  · Moderate tricuspid regurgitation.  · Mild pulmonic regurgitation.  · The estimated ejection fraction is 65%.  · There is mild aortic valve stenosis.  · Aortic valve area is 2.02 cm2; peak velocity is 2.24 m/s; mean gradient   is 10 mmHg.      , EKG: Reviewed, and X-Ray: CXR: X-Ray Chest 1 View (CXR): No results found for this visit on 05/25/23. and X-Ray Chest PA and Lateral (CXR): No results found for this visit on 05/25/23.    Assessment and Plan:   Patient who presents with HTN emergency/elevated troponin/anemia. Stable CV wise. Continue OMT. MPI stress test pending.     * Hypertensive emergency  -BP uncontrolled upon admission  -Appreciate nephrology rec's-agree with transition to Coreg  -Continue hydralazine, titrate as needed  -Add nitropaste  -Continue Clonidine for now, wean as tolerated    05/27  BP high  Continue coreg hydralazine clonidine  Add amlodipine     05/28  Increase Amlodipine to 5 mg bid  Continue coreg hydralazine clonidine and imdur  Nuke stress test pending     5/9/23  -BP stable  -Continue current regimen    NSTEMI (non-ST elevated myocardial infarction)  -Patient who presents with atypical CP symptoms/troponin elevation  -Elevated BP likely contributing to clinical picture  -Will treat as NSTEMI   -Allergic to ASA  -Continue BB, hydralazine  -Add statin  -Start heparin gtt, monitor H/H closely  -Will consider ischemic workup once BP controlled-MPI stress test vs [facility]    05/27/2023  No recurrent rx. Off heparin gtt  Continue BP control  Nuke stress test in AM    05/28  Nuke stress test pending    5/29/23  -Continue amlodipine, statin, Imdur  -Allergic to aspirin?  -CP free  -MPI stress test pending      Atypical chest pain  -See plan under NSTEMI    ESRD (end stage  renal disease)  -Mgmt as per nephrology    Anemia in chronic kidney disease, on chronic dialysis  -H/H 7.3./22.9  -Transfusion planned tmw    5/29/23  -Monitor  -Mgmt as per hospital medicine        VTE Risk Mitigation (From admission, onward)         Ordered     enoxaparin injection 100 mg  Every 24 hours         05/28/23 0831     IP VTE HIGH RISK PATIENT  Once         05/26/23 0053     Place sequential compression device  Until discontinued         05/26/23 0053                Ayse Soria PA-C  Cardiology  [facility] - Med Surg

## 2023-05-29 NOTE — PLAN OF CARE
Problem: Adult Inpatient Plan of Care  Goal: Plan of Care Review  Outcome: Ongoing, Progressing     Problem: Adult Inpatient Plan of Care  Goal: Patient-Specific Goal (Individualized)  Outcome: Ongoing, Progressing  Flowsheets (Taken 5/29/2023 0223)  Anxieties, Fears or Concerns: room warm  Individualized Care Needs: blankets     Problem: Skin Injury Risk Increased  Goal: Skin Health and Integrity  Outcome: Ongoing, Progressing     Problem: Bariatric Environmental Safety  Goal: Safety Maintained with Care  Outcome: Ongoing, Progressing     Problem: Fluid Volume Excess  Goal: Fluid Balance  Outcome: Ongoing, Progressing     Problem: Fall Injury Risk  Goal: Absence of Fall and Fall-Related Injury  Outcome: Ongoing, Progressing       Pt transferred from ICU  Discussed POC with pt, verbalized understanding.  Patient remains free from injury. Safety precautions maintained. No s/s of acute distress.  Purposeful rounding every two hours.   Pain controlled per MD order. IVF infusing.   Cardiac monitoring in place, tele box number [id number]  Blood glucose monitoring continued this shift.   Diet orders continued, pt diet: renal  Vital signs continued per orders this shift.   Chart and orders review completed. Pt education about care completed.

## 2023-05-29 NOTE — ASSESSMENT & PLAN NOTE
-Patient who presents with atypical CP symptoms/troponin elevation  -Elevated BP likely contributing to clinical picture  -Will treat as NSTEMI   -Allergic to ASA  -Continue BB, hydralazine  -Add statin  -Start heparin gtt, monitor H/H closely  -Will consider ischemic workup once BP controlled-MPI stress test vs [place]    05/27/2023  No recurrent rx. Off heparin gtt  Continue BP control  Nuke stress test in AM    05/28  Nuke stress test pending    5/29/23  -Continue amlodipine, statin, Imdur  -Allergic to aspirin?  -CP free  -MPI stress test pending

## 2023-05-29 NOTE — SUBJECTIVE & OBJECTIVE
Interval History:     Review of Systems   Neurological:  Positive for headaches.   All other systems reviewed and are negative.  Objective:     Vital Signs (Most Recent):  Temp: 98 °F (36.7 °C) (05/29/23 1231)  Pulse: 79 (05/29/23 1231)  Resp: 19 (05/29/23 1231)  BP: 139/62 (05/29/23 1231)  SpO2: 96 % (05/29/23 1231) Vital Signs (24h Range):  Temp:  [97.6 °F (36.4 °C)-98.7 °F (37.1 °C)] 98 °F (36.7 °C)  Pulse:  [59-88] 79  Resp:  [16-52] 19  SpO2:  [91 %-100 %] 96 %  BP: (106-174)/() 139/62     Weight: 95.3 kg (210 lb)  Body mass index is 36.05 kg/m².  No intake or output data in the 24 hours ending 05/29/23 1425      Physical Exam  Vitals and nursing note reviewed.   Constitutional:       General: She is not in acute distress.     Appearance: She is obese.   Cardiovascular:      Rate and Rhythm: Normal rate and regular rhythm.      Heart sounds: No murmur heard.    No friction rub. No gallop.   Pulmonary:      Effort: Pulmonary effort is normal.      Breath sounds: Normal breath sounds. No wheezing, rhonchi or rales.      Comments: On RA   Abdominal:      General: Bowel sounds are normal. There is no distension.      Palpations: Abdomen is soft.      Tenderness: There is no abdominal tenderness. There is no guarding.   Musculoskeletal:      Right lower leg: No edema.      Left lower leg: No edema.   Skin:     General: Skin is warm and dry.      Comments: DONELL FISHERF in place    Neurological:      Mental Status: She is alert and oriented to person, place, and time. Mental status is at baseline.           Significant Labs: All pertinent labs within the past 24 hours have been reviewed.  Recent Lab Results         05/29/23  1327   05/29/23  0704        Systolic blood pressure 163         Diastolic blood pressure 76         Anion Gap   11       Baso #   0.05       Basophil %   0.7       BUN   31       Calcium   8.2       Chloride   96       CO2   23       Creatinine   7.0       HR at rest 86         Differential  Method   Automated       End diastolic index (mL/m2) 165         End diastolic index (mL/m2) 101         eGFR   6       Ejection Fraction- High Stress 73         Eos #   0.4       Eosinophil %   5.6       End systolic index (mL/m2) 64         End systolic index (mL/m2) 28         Glucose   129       Gran # (ANC)   4.4       Gran %   57.1       Hematocrit   25.4       Hemoglobin   7.8       Immature Grans (Abs)   0.02  Comment: Mild elevation in immature granulocytes is non specific and   can be seen in a variety of conditions including stress response,   acute inflammation, trauma and pregnancy. Correlation with other   laboratory and clinical findings is essential.         Immature Granulocytes   0.3       Lymph #   1.8       Lymph %   23.3       Magnesium   2.2       MCH   29.4       MCHC   30.7       MCV   96       Mono #   1.0       Mono %   13.0       MPV   10.5       nRBC   0       Nuc Rest EF 71         Nuc Stress EF 68         85% Max Predicted          Max Predicted          OHS CV CPX PATIENT IS FEMALE 1.0         OHS CV CPX PATIENT IS MALE 0.0         Peak Diatolic BP 59         Peak HR 98         Peak RPP 25,774         Peak Systolic          % Max HR Achieved 68         RPP 14,018         Platelets   213       Potassium   4.3       RBC   2.65       RDW   15.3       EF + QEF 59         Sodium   130       WBC   7.69               Significant Imaging: I have reviewed all pertinent imaging results/findings within the past 24 hours.

## 2023-05-29 NOTE — SUBJECTIVE & OBJECTIVE
Review of Systems   Constitutional: Negative.   HENT: Negative.     Eyes: Negative.    Cardiovascular: Negative.    Respiratory: Negative.     Endocrine: Negative.    Skin: Negative.    Musculoskeletal: Negative.    Gastrointestinal: Negative.    Genitourinary: Negative.    Neurological:  Positive for headaches.   Psychiatric/Behavioral: Negative.     Allergic/Immunologic: Negative.    Objective:     Vital Signs (Most Recent):  Temp: 98 °F (36.7 °C) (05/29/23 1231)  Pulse: 79 (05/29/23 1231)  Resp: 19 (05/29/23 1231)  BP: 139/62 (05/29/23 1231)  SpO2: 96 % (05/29/23 1231) Vital Signs (24h Range):  Temp:  [97.6 °F (36.4 °C)-98.7 °F (37.1 °C)] 98 °F (36.7 °C)  Pulse:  [59-88] 79  Resp:  [16-52] 19  SpO2:  [91 %-100 %] 96 %  BP: (106-181)/() 139/62     Weight: 95.3 kg (210 lb)  Body mass index is 36.05 kg/m².     SpO2: 96 %       No intake or output data in the 24 hours ending 05/29/23 1356    Lines/Drains/Airways       Central Venous Catheter Line  Duration                  Hemodialysis AV Graft Left upper arm -- days              Drain  Duration                  Hemodialysis AV Fistula  Left upper arm -- days         Hemodialysis AV Fistula Left upper arm -- days              Peripheral Intravenous Line  Duration                  Peripheral IV - Single Lumen 05/27/23 2125 20 G Anterior;Right Other 1 day                       Physical Exam  Vitals and nursing note reviewed.   Constitutional:       General: She is not in acute distress.     Appearance: Normal appearance. She is well-developed. She is not diaphoretic.   HENT:      Head: Normocephalic and atraumatic.   Eyes:      General:         Right eye: No discharge.         Left eye: No discharge.      Pupils: Pupils are equal, round, and reactive to light.   Neck:      Thyroid: No thyromegaly.      Vascular: No JVD.      Trachea: No tracheal deviation.   Cardiovascular:      Rate and Rhythm: Normal rate and regular rhythm.      Heart sounds: Normal heart  sounds, S1 normal and S2 normal. No murmur heard.  Pulmonary:      Effort: Pulmonary effort is normal. No respiratory distress.      Breath sounds: Normal breath sounds. No wheezing or rales.   Abdominal:      General: There is no distension.      Palpations: Abdomen is soft.      Tenderness: There is no rebound.   Musculoskeletal:      Cervical back: Neck supple.      Right lower leg: No edema.      Left lower leg: No edema.   Skin:     General: Skin is warm and dry.      Findings: No erythema.   Neurological:      General: No focal deficit present.      Mental Status: She is alert and oriented to person, place, and time.   Psychiatric:         Mood and Affect: Mood normal.         Behavior: Behavior normal.         Thought Content: Thought content normal.          Significant Labs: CMP   Recent Labs   Lab 05/28/23  0417 05/29/23  0704   * 130*   K 4.2 4.3   CL 98 96   CO2 23 23   GLU 86 129*   BUN 25* 31*   CREATININE 5.0* 7.0*   CALCIUM 8.4* 8.2*   ANIONGAP 10 11   , CBC   Recent Labs   Lab 05/28/23 0417 05/29/23  0704   WBC 7.88 7.69   HGB 8.0* 7.8*   HCT 25.8* 25.4*    213   , Troponin No results for input(s): TROPONINI in the last 48 hours., and All pertinent lab results from the last 24 hours have been reviewed.    Significant Imaging: Echocardiogram: Transthoracic echo (TTE) complete (Cupid Only):   Results for orders placed or performed during the hospital encounter of 05/18/23   Echo   Result Value Ref Range    BSA 2.09 m2    TDI SEPTAL 0.05 m/s    LV LATERAL E/E' RATIO 20.57 m/s    LV SEPTAL E/E' RATIO 28.80 m/s    IVC diameter 1.79 cm    Left Ventricular Outflow Tract Mean Velocity 0.98 cm/s    Left Ventricular Outflow Tract Mean Gradient 4.30 mmHg    TDI LATERAL 0.07 m/s    PV PEAK VELOCITY 1.25 cm/s    LVIDd 4.78 3.5 - 6.0 cm    IVS 1.36 (A) 0.6 - 1.1 cm    Posterior Wall 1.08 0.6 - 1.1 cm    Ao root annulus 3.44 cm    LVIDs 3.09 2.1 - 4.0 cm    FS 35 28 - 44 %    STJ 2.76 cm    Ascending  aorta 3.12 cm    LV mass 222.87 g    LA size 5.66 cm    RVDD 3.31 cm    Left Ventricle Relative Wall Thickness 0.45 cm    AV mean gradient 10 mmHg    AV valve area 2.02 cm2    AV Velocity Ratio 0.62     AV index (prosthetic) 0.65     MV mean gradient 5 mmHg    MV valve area p 1/2 method 3.11 cm2    MV valve area by continuity eq 1.97 cm2    E/A ratio 1.11     Mean e' 0.06 m/s    E wave deceleration time 228.79 msec    IVRT 83.73 msec    Pulm vein S/D ratio 0.85     LVOT diameter 1.99 cm    LVOT area 3.1 cm2    LVOT peak vincenzo 1.38 m/s    LVOT peak VTI 31.90 cm    Ao peak vincenzo 2.24 m/s    Ao VTI 49.2 cm    RVOT peak vincenzo 0.98 m/s    RVOT peak VTI 24.8 cm    LVOT stroke volume 99.17 cm3    AV peak gradient 20 mmHg    MV peak gradient 10 mmHg    PV mean gradient 2.40 mmHg    E/E' ratio 24.00 m/s    MV Peak E Vincenzo 1.44 m/s    TR Max Vincenzo 4.55 m/s    MV VTI 50.4 cm    MV stenosis pressure 1/2 time 70.74 ms    MV Peak A Vincenzo 1.30 m/s    PV Peak S Vincenzo 0.62 m/s    PV Peak D Vincenzo 0.73 m/s    LV Systolic Volume 37.49 mL    LV Systolic Volume Index 18.7 mL/m2    LV Diastolic Volume 106.24 mL    LV Diastolic Volume Index 52.86 mL/m2    LV Mass Index 111 g/m2    RA Major Axis 3.53 cm    Left Atrium Minor Axis 5.25 cm    Left Atrium Major Axis 4.92 cm    Triscuspid Valve Regurgitation Peak Gradient 83 mmHg    LA WIDTH 5.20 cm    LA volume 127.08 cm3    LA Volume Index 63.2 mL/m2    Right Atrial Pressure (from IVC) 3 mmHg    EF 65 %    TV rest pulmonary artery pressure 86 mmHg    Narrative    · The left ventricle is normal in size with concentric hypertrophy and   normal systolic function.  · Severe left atrial enlargement.  · Grade II left ventricular diastolic dysfunction.  · The estimated PA systolic pressure is 86 mmHg.  · Normal right ventricular size with normal right ventricular systolic   function.  · There is pulmonary hypertension.  · Normal central venous pressure (3 mmHg).  · Moderate tricuspid regurgitation.  · Mild pulmonic  regurgitation.  · The estimated ejection fraction is 65%.  · There is mild aortic valve stenosis.  · Aortic valve area is 2.02 cm2; peak velocity is 2.24 m/s; mean gradient   is 10 mmHg.      , EKG: Reviewed, and X-Ray: CXR: X-Ray Chest 1 View (CXR): No results found for this visit on 05/25/23. and X-Ray Chest PA and Lateral (CXR): No results found for this visit on 05/25/23.

## 2023-05-29 NOTE — PROGRESS NOTES
[facility]  Progress Note    Patient Name: Gillian Jones  MRN: [id number]  Patient Class: IP- Inpatient   Admission Date: 5/25/2023  Length of Stay: 3 days  Attending Physician: Dre Hayward, *  Primary Care Provider: Primary Doctor No        Subjective:     Principal Problem:Hypertensive emergency        HPI:  Patient is 72 year old female with past medical history of ESRD on dialysis T, Th, Sat, hypertension, CHF, anemia, bilateral carotid stenosis, polycystic kidney disease, chronic pain, and secondary hyperparathyroidism who presented to ED for evaluation of chest pain after dialysis. She reports that she had the same type of pain after dialysis on Tuesday as well -- states that it was relieved with oxygen. EMS gave her ASA, and she reports one episode of vomiting afterwards. She denies shortness of breath. She states that chest pain is moderate, pressure, located on right upper chest, with associated upper abdominal pain. She states that last BM was about one week ago. She was noted to be very hypertensive and was given multiple IV medications without much improvement. She was started on NTG infusion, which has helped BP but is causing a terrible headache. Troponin is mildly elevated, BNP is elevated, CXR reveals pulmonary edema, EKG SR 1st deg AVB - no ST elevation or concerning changes noted when compared to previous. She was given labetalol, Lasix 100 mg IV,   She was also given IV hydralazine, demerol, Zofran, Phenergan, and NTG ointment while in ED.  She is being admitted to ICU for management of hypertensive emergency.    5/26/23  Patient weaned off of cardene drip this morning, restarted on her home medications. Stable for ICU downgrade to telemetry.      Overview/Hospital Course:  5/26/23: Patient weaned off of cardene drip this morning, restarted on her home medications. Stable for ICU downgrade to telemetry  05/27: Underwent HD, clonidine tapered per renal recs.  Heparin changed to sq lovenox as pt refused PTT draws. Transferred back to ICU due to BP sustaining 220-240 systolic although pt was asymptomatic.   05/28: Did not require initiation of cardene, BP meds adjusted further: Norvasc, Imdur added. Prazosin dose increased. BP trends improved. Pt deemed stable for transfer back to floor. NM stress delayed per cardiology   5/29 BP  stable after medication adjustment . Plan for a cardiac stress test today . She is complaining of HA and Heartburn.       Interval History:     Review of Systems   Neurological:  Positive for headaches.   All other systems reviewed and are negative.  Objective:     Vital Signs (Most Recent):  Temp: 98 °F (36.7 °C) (05/29/23 1231)  Pulse: 79 (05/29/23 1231)  Resp: 19 (05/29/23 1231)  BP: 139/62 (05/29/23 1231)  SpO2: 96 % (05/29/23 1231) Vital Signs (24h Range):  Temp:  [97.6 °F (36.4 °C)-98.7 °F (37.1 °C)] 98 °F (36.7 °C)  Pulse:  [59-88] 79  Resp:  [16-52] 19  SpO2:  [91 %-100 %] 96 %  BP: (106-174)/() 139/62     Weight: 95.3 kg (210 lb)  Body mass index is 36.05 kg/m².  No intake or output data in the 24 hours ending 05/29/23 1425      Physical Exam  Vitals and nursing note reviewed.   Constitutional:       General: She is not in acute distress.     Appearance: She is obese.   Cardiovascular:      Rate and Rhythm: Normal rate and regular rhythm.      Heart sounds: No murmur heard.    No friction rub. No gallop.   Pulmonary:      Effort: Pulmonary effort is normal.      Breath sounds: Normal breath sounds. No wheezing, rhonchi or rales.      Comments: On RA   Abdominal:      General: Bowel sounds are normal. There is no distension.      Palpations: Abdomen is soft.      Tenderness: There is no abdominal tenderness. There is no guarding.   Musculoskeletal:      Right lower leg: No edema.      Left lower leg: No edema.   Skin:     General: Skin is warm and dry.      Comments: CLAUDIAE AVF in place    Neurological:      Mental Status: She is alert  and oriented to person, place, and time. Mental status is at baseline.           Significant Labs: All pertinent labs within the past 24 hours have been reviewed.  Recent Lab Results         05/29/23  1327   05/29/23  0704        Systolic blood pressure 163         Diastolic blood pressure 76         Anion Gap   11       Baso #   0.05       Basophil %   0.7       BUN   31       Calcium   8.2       Chloride   96       CO2   23       Creatinine   7.0       HR at rest 86         Differential Method   Automated       End diastolic index (mL/m2) 165         End diastolic index (mL/m2) 101         eGFR   6       Ejection Fraction- High Stress 73         Eos #   0.4       Eosinophil %   5.6       End systolic index (mL/m2) 64         End systolic index (mL/m2) 28         Glucose   129       Gran # (ANC)   4.4       Gran %   57.1       Hematocrit   25.4       Hemoglobin   7.8       Immature Grans (Abs)   0.02  Comment: Mild elevation in immature granulocytes is non specific and   can be seen in a variety of conditions including stress response,   acute inflammation, trauma and pregnancy. Correlation with other   laboratory and clinical findings is essential.         Immature Granulocytes   0.3       Lymph #   1.8       Lymph %   23.3       Magnesium   2.2       MCH   29.4       MCHC   30.7       MCV   96       Mono #   1.0       Mono %   13.0       MPV   10.5       nRBC   0       Nuc Rest EF 71         Nuc Stress EF 68         85% Max Predicted          Max Predicted          OHS CV CPX PATIENT IS FEMALE 1.0         OHS CV CPX PATIENT IS MALE 0.0         Peak Diatolic BP 59         Peak HR 98         Peak RPP 25,774         Peak Systolic          % Max HR Achieved 68         RPP 14,018         Platelets   213       Potassium   4.3       RBC   2.65       RDW   15.3       EF + QEF 59         Sodium   130       WBC   7.69               Significant Imaging: I have reviewed all pertinent imaging  results/findings within the past 24 hours.      Assessment/Plan:      * Hypertensive emergency  BP as high as 249/115, was given multiple IV medications while in ED without much improvement. NTG infusion, started per ED due to mild chest pain along with elevated troponin but pt unable to tolerate due to headaches. Pt reports that she did not take her usual BP medications on HD day (Norvasc, clonidine, metoprolol) -- she states that dialysis nurses instruct her not to take it until after dialysis   - Pt weaned of cardene drip 05/26. Transferred back to ICU on 05/27 due to persistently elevated BP   - Nephrology following- BP regimen adjusted with plans to taper clonidine to avoid rebound HTN on HD days   - Continue Clonidine taper per renal   - Continue Imdur, prazosin, Coreg, hydral, Norvasc   - monitor BP     NSTEMI (non-ST elevated myocardial infarction)  Pt with chest pain in setting of HTN emergency. Trop 0.047--> 0.111--> 0.258--> 0.288  Cardiology consulted and following   Heparing drip was ordered but not started as pt refused sticks for PTT monitoring.Transitioned to subcu lovenox in the interim   Pt allergic to ASA  Continue Atorva 40 qhs   Nuclear stress test pending            Atypical chest pain  Resolved, see plan above     Chronic back pain  PRN pain medication ordered, adjust regimen as needed for pain control  One dose IV fentanyl ordered    ESRD (end stage renal disease)  ESRD on Dialysis on T, Th, Sat  Nephrology following- continue HD per schedule  Monitor BMP, strict Is and OS   Renally dose meds; avoid nephrotoxic agents     Anemia in chronic kidney disease, on chronic dialysis  05/27- S/p 1 u PRBC transfusion ordered today for Hgb 7.6  EPO per renal   Monitor CBC       VTE Risk Mitigation (From admission, onward)         Ordered     enoxaparin injection 100 mg  Every 24 hours         05/28/23 0831     IP VTE HIGH RISK PATIENT  Once         05/26/23 0053     Place sequential compression device   Until discontinued         05/26/23 0053                Discharge Planning   DARRIN:      Code Status: Full Code   Is the patient medically ready for discharge?: No    Reason for patient still in hospital (select all that apply): Treatment  Discharge Plan A: Home                  Dre Hayward MD  Department of Hospital Medicine   [facility] [facility]

## 2023-05-30 VITALS
DIASTOLIC BLOOD PRESSURE: 60 MMHG | SYSTOLIC BLOOD PRESSURE: 134 MMHG | RESPIRATION RATE: 17 BRPM | OXYGEN SATURATION: 93 % | TEMPERATURE: 98 F | WEIGHT: 217.13 LBS | HEIGHT: 64 IN | HEART RATE: 78 BPM | BODY MASS INDEX: 37.07 KG/M2

## 2023-05-30 PROBLEM — N18.6 ESRD (END STAGE RENAL DISEASE) ON DIALYSIS: Status: ACTIVE | Noted: 2023-05-30

## 2023-05-30 PROBLEM — Z99.2 ESRD (END STAGE RENAL DISEASE) ON DIALYSIS: Status: ACTIVE | Noted: 2023-05-30

## 2023-05-30 LAB
ANION GAP SERPL CALC-SCNC: 13 MMOL/L (ref 8–16)
BASOPHILS # BLD AUTO: 0.05 K/UL (ref 0–0.2)
BASOPHILS NFR BLD: 0.7 % (ref 0–1.9)
BNP SERPL-MCNC: 309 PG/ML (ref 0–99)
BUN SERPL-MCNC: 37 MG/DL (ref 8–23)
CALCIUM SERPL-MCNC: 8.3 MG/DL (ref 8.7–10.5)
CHLORIDE SERPL-SCNC: 95 MMOL/L (ref 95–110)
CO2 SERPL-SCNC: 23 MMOL/L (ref 23–29)
CREAT SERPL-MCNC: 8.3 MG/DL (ref 0.5–1.4)
DIFFERENTIAL METHOD: ABNORMAL
EOSINOPHIL # BLD AUTO: 0.6 K/UL (ref 0–0.5)
EOSINOPHIL NFR BLD: 8.4 % (ref 0–8)
ERYTHROCYTE [DISTWIDTH] IN BLOOD BY AUTOMATED COUNT: 15.3 % (ref 11.5–14.5)
EST. GFR  (NO RACE VARIABLE): 5 ML/MIN/1.73 M^2
GLUCOSE SERPL-MCNC: 109 MG/DL (ref 70–110)
HCT VFR BLD AUTO: 24.9 % (ref 37–48.5)
HGB BLD-MCNC: 7.7 G/DL (ref 12–16)
IMM GRANULOCYTES # BLD AUTO: 0.02 K/UL (ref 0–0.04)
IMM GRANULOCYTES NFR BLD AUTO: 0.3 % (ref 0–0.5)
LYMPHOCYTES # BLD AUTO: 2.4 K/UL (ref 1–4.8)
LYMPHOCYTES NFR BLD: 31.4 % (ref 18–48)
MAGNESIUM SERPL-MCNC: 2.3 MG/DL (ref 1.6–2.6)
MCH RBC QN AUTO: 29.8 PG (ref 27–31)
MCHC RBC AUTO-ENTMCNC: 30.9 G/DL (ref 32–36)
MCV RBC AUTO: 97 FL (ref 82–98)
MONOCYTES # BLD AUTO: 1 K/UL (ref 0.3–1)
MONOCYTES NFR BLD: 13.1 % (ref 4–15)
NEUTROPHILS # BLD AUTO: 3.5 K/UL (ref 1.8–7.7)
NEUTROPHILS NFR BLD: 46.1 % (ref 38–73)
NRBC BLD-RTO: 0 /100 WBC
PLATELET # BLD AUTO: 218 K/UL (ref 150–450)
PMV BLD AUTO: 10.5 FL (ref 9.2–12.9)
POTASSIUM SERPL-SCNC: 4.3 MMOL/L (ref 3.5–5.1)
RBC # BLD AUTO: 2.58 M/UL (ref 4–5.4)
SODIUM SERPL-SCNC: 131 MMOL/L (ref 136–145)
TROPONIN I SERPL DL<=0.01 NG/ML-MCNC: 0.07 NG/ML (ref 0–0.03)
WBC # BLD AUTO: 7.58 K/UL (ref 3.9–12.7)

## 2023-05-30 PROCEDURE — 25000003 PHARM REV CODE 250: Performed by: INTERNAL MEDICINE

## 2023-05-30 PROCEDURE — 86706 HEP B SURFACE ANTIBODY: CPT | Performed by: INTERNAL MEDICINE

## 2023-05-30 PROCEDURE — 25000003 PHARM REV CODE 250: Performed by: NURSE PRACTITIONER

## 2023-05-30 PROCEDURE — 94761 N-INVAS EAR/PLS OXIMETRY MLT: CPT

## 2023-05-30 PROCEDURE — 36415 COLL VENOUS BLD VENIPUNCTURE: CPT | Performed by: INTERNAL MEDICINE

## 2023-05-30 PROCEDURE — 83880 ASSAY OF NATRIURETIC PEPTIDE: CPT | Performed by: INTERNAL MEDICINE

## 2023-05-30 PROCEDURE — 80048 BASIC METABOLIC PNL TOTAL CA: CPT | Performed by: INTERNAL MEDICINE

## 2023-05-30 PROCEDURE — 99232 SBSQ HOSP IP/OBS MODERATE 35: CPT | Mod: ,,,

## 2023-05-30 PROCEDURE — 99232 PR SUBSEQUENT HOSPITAL CARE,LEVL II: ICD-10-PCS | Mod: ,,,

## 2023-05-30 PROCEDURE — 80074 ACUTE HEPATITIS PANEL: CPT | Performed by: INTERNAL MEDICINE

## 2023-05-30 PROCEDURE — 84484 ASSAY OF TROPONIN QUANT: CPT | Performed by: INTERNAL MEDICINE

## 2023-05-30 PROCEDURE — 80100016 HC MAINTENANCE HEMODIALYSIS

## 2023-05-30 PROCEDURE — 63600175 PHARM REV CODE 636 W HCPCS: Mod: JZ | Performed by: INTERNAL MEDICINE

## 2023-05-30 PROCEDURE — 25000003 PHARM REV CODE 250: Performed by: HOSPITALIST

## 2023-05-30 PROCEDURE — 85025 COMPLETE CBC W/AUTO DIFF WBC: CPT | Performed by: INTERNAL MEDICINE

## 2023-05-30 PROCEDURE — 27000221 HC OXYGEN, UP TO 24 HOURS

## 2023-05-30 PROCEDURE — 83735 ASSAY OF MAGNESIUM: CPT | Performed by: INTERNAL MEDICINE

## 2023-05-30 RX ORDER — PRAZOSIN HYDROCHLORIDE 2 MG/1
2 CAPSULE ORAL 2 TIMES DAILY
Qty: 60 CAPSULE | Refills: 2 | Status: SHIPPED | OUTPATIENT
Start: 2023-05-30 | End: 2024-01-08

## 2023-05-30 RX ORDER — CLONIDINE HYDROCHLORIDE 0.1 MG/1
0.1 TABLET ORAL 2 TIMES DAILY
Qty: 60 TABLET | Refills: 11 | Status: SHIPPED | OUTPATIENT
Start: 2023-05-30 | End: 2023-07-03

## 2023-05-30 RX ORDER — HYDRALAZINE HYDROCHLORIDE 100 MG/1
100 TABLET, FILM COATED ORAL EVERY 8 HOURS
Qty: 90 TABLET | Refills: 3 | Status: SHIPPED | OUTPATIENT
Start: 2023-05-30 | End: 2023-07-03 | Stop reason: SDUPTHER

## 2023-05-30 RX ORDER — CARVEDILOL 25 MG/1
25 TABLET ORAL 2 TIMES DAILY
Qty: 60 TABLET | Refills: 3 | Status: SHIPPED | OUTPATIENT
Start: 2023-05-30 | End: 2023-07-03 | Stop reason: SDUPTHER

## 2023-05-30 RX ORDER — SEVELAMER CARBONATE 800 MG/1
1600 TABLET, FILM COATED ORAL
Qty: 180 TABLET | Refills: 3 | Status: SHIPPED | OUTPATIENT
Start: 2023-05-30 | End: 2024-05-29

## 2023-05-30 RX ORDER — ATORVASTATIN CALCIUM 40 MG/1
40 TABLET, FILM COATED ORAL NIGHTLY
Qty: 90 TABLET | Refills: 3 | Status: SHIPPED | OUTPATIENT
Start: 2023-05-30 | End: 2023-07-03 | Stop reason: ALTCHOICE

## 2023-05-30 RX ORDER — ISOSORBIDE MONONITRATE 30 MG/1
30 TABLET, EXTENDED RELEASE ORAL DAILY
Qty: 30 TABLET | Refills: 3 | Status: SHIPPED | OUTPATIENT
Start: 2023-05-30 | End: 2023-07-03 | Stop reason: SDUPTHER

## 2023-05-30 RX ADMIN — PANTOPRAZOLE SODIUM 40 MG: 40 TABLET, DELAYED RELEASE ORAL at 01:05

## 2023-05-30 RX ADMIN — DOCUSATE SODIUM 200 MG: 100 CAPSULE, LIQUID FILLED ORAL at 01:05

## 2023-05-30 RX ADMIN — HYDRALAZINE HYDROCHLORIDE 100 MG: 50 TABLET ORAL at 05:05

## 2023-05-30 RX ADMIN — EPOETIN ALFA-EPBX 10000 UNITS: 10000 INJECTION, SOLUTION INTRAVENOUS; SUBCUTANEOUS at 12:05

## 2023-05-30 RX ADMIN — CARVEDILOL 25 MG: 12.5 TABLET, FILM COATED ORAL at 01:05

## 2023-05-30 RX ADMIN — CETIRIZINE HYDROCHLORIDE 5 MG: 5 TABLET ORAL at 01:05

## 2023-05-30 RX ADMIN — MUPIROCIN: 20 OINTMENT TOPICAL at 01:05

## 2023-05-30 RX ADMIN — ISOSORBIDE MONONITRATE 30 MG: 30 TABLET, EXTENDED RELEASE ORAL at 01:05

## 2023-05-30 RX ADMIN — PRAZOSIN HYDROCHLORIDE 2 MG: 1 CAPSULE ORAL at 05:05

## 2023-05-30 RX ADMIN — CLONIDINE HYDROCHLORIDE 0.1 MG: 0.1 TABLET ORAL at 01:05

## 2023-05-30 RX ADMIN — NEPHROCAP 1 CAPSULE: 1 CAP ORAL at 01:05

## 2023-05-30 RX ADMIN — AMLODIPINE BESYLATE 10 MG: 10 TABLET ORAL at 01:05

## 2023-05-30 NOTE — DISCHARGE SUMMARY
[facility] Medicine  Discharge Summary      Patient Name: Gillian Jones  MRN: [id number]  VILLA: [id number]  Patient Class: IP- Inpatient  Admission Date: 5/25/2023  Hospital Length of Stay: 4 days  Discharge Date and Time:  05/30/2023 12:04 PM  Attending Physician: Dre Hayward, *   Discharging Provider: Dre Hayward MD  Primary Care Provider: Primary Doctor No    Primary Care Team: Networked reference to record PCT     HPI:   Patient is 72 year old female with past medical history of ESRD on dialysis T, Th, Sat, hypertension, CHF, anemia, bilateral carotid stenosis, polycystic kidney disease, chronic pain, and secondary hyperparathyroidism who presented to ED for evaluation of chest pain after dialysis. She reports that she had the same type of pain after dialysis on Tuesday as well -- states that it was relieved with oxygen. EMS gave her ASA, and she reports one episode of vomiting afterwards. She denies shortness of breath. She states that chest pain is moderate, pressure, located on right upper chest, with associated upper abdominal pain. She states that last BM was about one week ago. She was noted to be very hypertensive and was given multiple IV medications without much improvement. She was started on NTG infusion, which has helped BP but is causing a terrible headache. Troponin is mildly elevated, BNP is elevated, CXR reveals pulmonary edema, EKG SR 1st deg AVB - no ST elevation or concerning changes noted when compared to previous. She was given labetalol, Lasix 100 mg IV,   She was also given IV hydralazine, demerol, Zofran, Phenergan, and NTG ointment while in ED.  She is being admitted to ICU for management of hypertensive emergency.    5/26/23  Patient weaned off of cardene drip this morning, restarted on her home medications. Stable for ICU downgrade to telemetry.      * No surgery found *      Hospital Course:   5/26/23: Patient weaned off of cardene drip this  morning, restarted on her home medications. Stable for ICU downgrade to telemetry  05/27: Underwent HD, clonidine tapered per renal recs. Heparin changed to sq lovenox as pt refused PTT draws. Transferred back to ICU due to BP sustaining 220-240 systolic although pt was asymptomatic.   05/28: Did not require initiation of cardene, BP meds adjusted further: Norvasc, Imdur added. Prazosin dose increased. BP trends improved. Pt deemed stable for transfer back to floor. NM stress delayed per cardiology   5/29 BP  stable after medication adjustment . Plan for a cardiac stress test today . She is complaining of HA and Heartburn.   5/30 Pt was seen and examined at bedside . She was determined to be suitable for d/c   -BP  stable  after BP medication adjusted . Hydralazine increased to 100 mg po bid , metoprolol changed to  coreg , clonidine 0.1 mg po bid , Norvasc increased to 10 mg po qd , Imdur 30 mg po qd  and started on prazosin  0.2 mg po bid .   -Cardiology conuslted . Cardiac stress test show :There is a moderate intensity, moderate sized, fixed perfusion abnormality consistent with scar in the anterolateral wall. Cardiology rec to [facility] .   -She required ICU admission  for  BP control with ayse drip  x 2 . No acute complication since admission .    Pt was advised to f/u with her PCP , cardiology and  Nephrology on 1 to 2 weeks .          Goals of Care Treatment Preferences:  Code Status: Full Code      Consults:   Consults (From admission, onward)        Status Ordering Provider     Inpatient consult to Hospitalist  Once        Provider:  Zach Washington MD    Acknowledged SHAUNA COSTA     Inpatient consult to Critical Care Medicine  Once        Provider:  Chris Osorio MD    Acknowledged ZACH WASHINGTON     IP consult to case management  Once        Provider:  (Not yet assigned)    Completed NEHEMIAS ONTIVEROS     Inpatient consult to Cardiology  Once        Provider:  Guzman Looney MD     Completed RUBIA HORN     Inpatient consult to Nephrology  Once        Provider:  Sana Kinsey MD    Completed RUBIA HORN     Inpatient consult to Critical Care Medicine  Once        Provider:  Faheem Harrell MD    Acknowledged CHANELLE JACKSON.          Pulmonary  Acute pulmonary edema  Cont HD       Cardiac/Vascular  * Hypertensive emergency  BP as high as 249/115, was given multiple IV medications while in ED without much improvement. NTG infusion, started per ED due to mild chest pain along with elevated troponin but pt unable to tolerate due to headaches. Pt reports that she did not take her usual BP medications on HD day (Norvasc, clonidine, metoprolol) -- she states that dialysis nurses instruct her not to take it until after dialysis   - Pt weaned of cardene drip 05/26. Transferred back to ICU on 05/27 due to persistently elevated BP   - Nephrology following- BP regimen adjusted with plans to taper clonidine to avoid rebound HTN on HD days   - Continue Clonidine taper per renal   - Continue Imdur, prazosin, Coreg, hydral, Norvasc   - monitor BP     NSTEMI (non-ST elevated myocardial infarction)  Pt with chest pain in setting of HTN emergency. Trop 0.047--> 0.111--> 0.258--> 0.288  Cardiology consulted and following   Heparing drip was ordered but not started as pt refused sticks for PTT monitoring.Transitioned to subcu lovenox in the interim   Pt allergic to ASA  Continue Atorva 40 qhs   Nuclear stress test pending            Renal/  ESRD (end stage renal disease)  ESRD on Dialysis on T, Th, Sat  Nephrology following- continue HD per schedule  Monitor BMP, strict Is and OS   Renally dose meds; avoid nephrotoxic agents     Oncology  Anemia in chronic kidney disease, on chronic dialysis  05/27- S/p 1 u PRBC transfusion ordered today for Hgb 7.6  EPO per renal   Monitor CBC     Orthopedic  Chronic back pain  PRN pain medication ordered, adjust regimen as needed for pain control  One dose IV  fentanyl ordered    Other  Atypical chest pain  Resolved, see plan above       Final Active Diagnoses:    Diagnosis Date Noted POA    PRINCIPAL PROBLEM:  Hypertensive emergency [I16.1] 01/26/2019 Yes    Acute pulmonary edema [J81.0] 05/29/2023 Yes    NSTEMI (non-ST elevated myocardial infarction) [I21.4]  Yes    Atypical chest pain [R07.89] 03/13/2020 Yes    Chronic back pain [M54.9, G89.29] 01/26/2019 Yes    ESRD (end stage renal disease) [N18.6]  Yes    Anemia in chronic kidney disease, on chronic dialysis [N18.6, D63.1, Z99.2]  Not Applicable      Problems Resolved During this Admission:       Discharged Condition: stable    Disposition: Home or Self Care    Follow Up:   Follow-up Information     Primary Doctor No Follow up in 1 week(s).           Nikunj Acuña MD Follow up.    Specialty: Nephrology  Contact information:  [address]  [place] [address]  [phone]             Yann Sims MD Follow up in 2 week(s).    Specialties: Interventional Cardiology, Cardiology  Contact information:  [address] [address]  [phone]                       Patient Instructions:      Diet renal     Activity as tolerated       Significant Diagnostic Studies: Labs:   BMP:   Recent Labs   Lab 05/29/23  0704 05/30/23  0534   * 109   * 131*   K 4.3 4.3   CL 96 95   CO2 23 23   BUN 31* 37*   CREATININE 7.0* 8.3*   CALCIUM 8.2* 8.3*   MG 2.2 2.3   , CMP   Recent Labs   Lab 05/29/23  0704 05/30/23  0534   * 131*   K 4.3 4.3   CL 96 95   CO2 23 23   * 109   BUN 31* 37*   CREATININE 7.0* 8.3*   CALCIUM 8.2* 8.3*   ANIONGAP 11 13    and CBC   Recent Labs   Lab 05/29/23  0704 05/30/23  0534   WBC 7.69 7.58   HGB 7.8* 7.7*   HCT 25.4* 24.9*    218     Microbiology:   Blood Culture   Lab Results   Component Value Date    LABBLOO No growth after 5 days. 05/18/2023       Pending Diagnostic Studies:     Procedure Component Value Units Date/Time    Hepatitis B  Surface Antibody, Qual/Quant [[id number]] Collected: 05/30/23 1023    Order Status: Sent Lab Status: In process Updated: 05/30/23 1023    Specimen: Blood     Hepatitis panel, acute [[id number]] Collected: 05/30/23 1023    Order Status: Sent Lab Status: In process Updated: 05/30/23 1023    Specimen: Blood          Medications:  Reconciled Home Medications:      Medication List      START taking these medications    atorvastatin 40 MG tablet  Commonly known as: LIPITOR  Take 1 tablet (40 mg total) by mouth every evening.     carvediloL 25 MG tablet  Commonly known as: COREG  Take 1 tablet (25 mg total) by mouth 2 (two) times daily.     isosorbide mononitrate 30 MG 24 hr tablet  Commonly known as: IMDUR  Take 1 tablet (30 mg total) by mouth once daily.     prazosin 2 MG Cap  Commonly known as: MINIPRESS  Take 1 capsule (2 mg total) by mouth 2 (two) times daily.        CHANGE how you take these medications    cloNIDine 0.1 MG tablet  Commonly known as: CATAPRES  Take 1 tablet (0.1 mg total) by mouth 2 (two) times daily.  What changed:   · how much to take  · how to take this  · when to take this     hydrALAZINE 100 MG tablet  Commonly known as: APRESOLINE  Take 1 tablet (100 mg total) by mouth every 8 (eight) hours.  What changed:   · medication strength  · how much to take  · how to take this  · when to take this     sevelamer carbonate 800 mg Tab  Commonly known as: RENVELA  Take 2 tablets (1,600 mg total) by mouth 3 (three) times daily with meals.  What changed:   · how much to take  · when to take this        CONTINUE taking these medications    acetaminophen 325 MG tablet  Commonly known as: TYLENOL  Take 650 mg by mouth every 4 (four) hours as needed.     albuterol 90 mcg/actuation inhaler  Commonly known as: PROVENTIL/VENTOLIN HFA  Inhale 1-2 puffs into the lungs every 6 (six) hours as needed for Wheezing. Rescue     amLODIPine 10 MG tablet  Commonly known as: NORVASC  TAKE 1 TABLET BY MOUTH THREE TIMES DAILY      cetirizine 10 MG tablet  Commonly known as: ZYRTEC  Take 1 tablet (10 mg total) by mouth once daily.     cyanocobalamin 1000 MCG tablet  Commonly known as: VITAMIN B-12  Take 1,000 mcg by mouth every 7 days.     fluticasone 27.5 mcg/actuation nasal spray  Commonly known as: VERAMYST  2 sprays by Nasal route daily as needed.     methocarbamoL 500 MG Tab  Commonly known as: ROBAXIN  Take 1 tablet (500 mg total) by mouth 3 (three) times daily as needed (muscle spasms).     promethazine 25 MG suppository  Commonly known as: PHENERGAN  Place 1 suppository (25 mg total) rectally every 6 (six) hours as needed for Nausea.     PATEL-LISSA 0.8 mg Tab  Generic drug: B complex-vitamin C-folic acid  Take 1 tablet by mouth once daily.     sodium polystyrene sulfonate 15 gram/60 mL Susp 0.25 g/mL oral liquid  Commonly known as: Kayexalate  Take two 15-gram bottles and ADD 60 mL of water into EACH bottle of powder and shake well. Drink both bottles immediately upon mixing. Do this every 6 hours as needed for high potassium.     VELTASSA 8.4 gram Pwpk  Generic drug: patiromer calcium sorbitex  Take 1 packet everyday as directed.        STOP taking these medications    metoprolol tartrate 50 MG tablet  Commonly known as: LOPRESSOR     VELPHORO 500 mg Chew  Generic drug: sucroferric oxyhydroxide            Indwelling Lines/Drains at time of discharge:   Lines/Drains/Airways     Central Venous Catheter Line  Duration                Hemodialysis AV Graft Left upper arm -- days          Drain  Duration                Hemodialysis AV Fistula  Left upper arm -- days         Hemodialysis AV Fistula Left upper arm -- days                Time spent on the discharge of patient: 30 minutes         Dre Hayward MD  Department of Hospital Medicine  [facility]

## 2023-05-30 NOTE — PROGRESS NOTES
[facility]  Cardiology  Progress Note    Patient Name: Gillian Jones  MRN: [id number]  Admission Date: 5/25/2023  Hospital Length of Stay: 4 days  Code Status: Full Code   Attending Physician: Dre Hayward, *   Primary Care Physician: Primary Doctor No  Expected Discharge Date: 5/30/2023  Principal Problem:Hypertensive emergency    Subjective:     Hospital Course:   Ms. Jones is a 72 year old female whose conditions include ESRD on HD, HTN, diastolic CHF, anemia, bilateral carotid stenosis, PCKD, chronic pain, and secondary hyperaparathyroidsim who presented to [facility] ED yesterday due to chest pain that onset after dialysis. Patient complained of right-sided chest heaviness/pressure, intermittent since Tuesday. Associated symptoms included abdominal discomfort/constipation, nausea, and vomiting. Patient denied any associated fever, chills, SOB, palpitations, near syncope, or syncope. Initial workup in ED revealed uncontrolled HTN (240/115), and troponin of 0.047>0.111, BNP of 1215. CXR showed findings concerning for pulmonary edema and patient was subsequently placed on a Tridil drip and admitted to ICU. Cardiology consulted to assist with management. Patient seen and examined today, resting in bed. Feels ok. Still complains of right-sided chest discomfort. States it burns at times and is GERD-like in nature and she feels her severe constipation is the root of her symptoms. Denies any prior history of CAD or MI. Tridil drip provided relief but patient could not tolerate due to headache. She states her BP is generally well-controlled on her home medications. Chart reviewed. Troponin 0.047>0.111>0.258. H/H 7.3/22.9, transfusion planned tomorrow. Echo pending. Will need ischemic evaluation once BP controlled. EKG reviewed, no ST elevation, dynamic ischemic changes noted.    05/27 saw pt in the HD. BP high and no chest pain.SOB improved after HD.    05/28 transferred back to ICU due to high BOP > 220  mmHG. No chest pain and dyspnea. Pt c/o nausea in the [facility] stress test room, will postpone stress part in AM.    5/29/23-Patient seen and examined today, resting in bed. Only complains of headache after stress test. No CP/SOB. Labs reviewed. H/H 7.8/25.4. MPI stress test pending.    5/30/23 Pt seen and examined today, in dialysis. Denies any Cp, sob at this time. Nuclear stress test significant for scarring. labs reviewed, chart reviewed.           Review of Systems   Constitutional: Negative.   HENT: Negative.     Eyes: Negative.    Cardiovascular: Negative.    Respiratory: Negative.     Skin: Negative.    Musculoskeletal: Negative.    Gastrointestinal: Negative.    Genitourinary: Negative.    Neurological: Negative.    Psychiatric/Behavioral: Negative.     Objective:     Vital Signs (Most Recent):  Temp: 98.4 °F (36.9 °C) (05/30/23 1517)  Pulse: 78 (05/30/23 1517)  Resp: 17 (05/30/23 1517)  BP: 134/60 (05/30/23 1517)  SpO2: (!) 93 % (05/30/23 1517) Vital Signs (24h Range):  Temp:  [98.1 °F (36.7 °C)-98.5 °F (36.9 °C)] 98.4 °F (36.9 °C)  Pulse:  [66-86] 78  Resp:  [16-19] 17  SpO2:  [90 %-96 %] 93 %  BP: ()/(48-63) 134/60     Weight: 98.5 kg (217 lb 2.5 oz)  Body mass index is 37.27 kg/m².     SpO2: (!) 93 %       No intake or output data in the 24 hours ending 05/30/23 1551    Lines/Drains/Airways       Central Venous Catheter Line  Duration                  Hemodialysis AV Graft Left upper arm -- days              Drain  Duration                  Hemodialysis AV Fistula  Left upper arm -- days         Hemodialysis AV Fistula Left upper arm -- days              Peripheral Intravenous Line  Duration                  Peripheral IV - Single Lumen 05/27/23 2125 20 G Anterior;Right Other 2 days                       Physical Exam  Vitals and nursing note reviewed.   Constitutional:       Appearance: Normal appearance. She is obese.   HENT:      Head: Normocephalic.   Eyes:      Pupils: Pupils are equal, round,  and reactive to light.   Cardiovascular:      Rate and Rhythm: Normal rate and regular rhythm.      Heart sounds: Normal heart sounds, S1 normal and S2 normal. No murmur heard.    No S3 or S4 sounds.   Pulmonary:      Effort: Pulmonary effort is normal.      Breath sounds: Normal breath sounds.   Abdominal:      General: Bowel sounds are normal.      Palpations: Abdomen is soft.   Musculoskeletal:         General: Normal range of motion.      Cervical back: Normal range of motion.   Skin:     Capillary Refill: Capillary refill takes less than 2 seconds.   Neurological:      General: No focal deficit present.      Mental Status: She is alert and oriented to person, place, and time.   Psychiatric:         Mood and Affect: Mood normal.         Behavior: Behavior normal.         Thought Content: Thought content normal.          Significant Labs: BMP:   Recent Labs   Lab 05/29/23  0704 05/30/23  0534   * 109   * 131*   K 4.3 4.3   CL 96 95   CO2 23 23   BUN 31* 37*   CREATININE 7.0* 8.3*   CALCIUM 8.2* 8.3*   MG 2.2 2.3   , CMP   Recent Labs   Lab 05/29/23  0704 05/30/23  0534   * 131*   K 4.3 4.3   CL 96 95   CO2 23 23   * 109   BUN 31* 37*   CREATININE 7.0* 8.3*   CALCIUM 8.2* 8.3*   ANIONGAP 11 13   , CBC   Recent Labs   Lab 05/29/23  0704 05/30/23  0534   WBC 7.69 7.58   HGB 7.8* 7.7*   HCT 25.4* 24.9*    218   , INR No results for input(s): INR, PROTIME in the last 48 hours., Lipid Panel No results for input(s): CHOL, HDL, LDLCALC, TRIG, CHOLHDL in the last 48 hours., Troponin   Recent Labs   Lab 05/30/23  0529   TROPONINI 0.075*   , and All pertinent lab results from the last 24 hours have been reviewed.    Significant Imaging: Echocardiogram: Transthoracic echo (TTE) complete ([facility] Only):   Results for orders placed or performed during the hospital encounter of 05/18/23   Echo   Result Value Ref Range    BSA 2.09 m2    TDI SEPTAL 0.05 m/s    LV LATERAL E/E' RATIO 20.57 m/s    LV  SEPTAL E/E' RATIO 28.80 m/s    IVC diameter 1.79 cm    Left Ventricular Outflow Tract Mean Velocity 0.98 cm/s    Left Ventricular Outflow Tract Mean Gradient 4.30 mmHg    TDI LATERAL 0.07 m/s    PV PEAK VELOCITY 1.25 cm/s    LVIDd 4.78 3.5 - 6.0 cm    IVS 1.36 (A) 0.6 - 1.1 cm    Posterior Wall 1.08 0.6 - 1.1 cm    Ao root annulus 3.44 cm    LVIDs 3.09 2.1 - 4.0 cm    FS 35 28 - 44 %    STJ 2.76 cm    Ascending aorta 3.12 cm    LV mass 222.87 g    LA size 5.66 cm    RVDD 3.31 cm    Left Ventricle Relative Wall Thickness 0.45 cm    AV mean gradient 10 mmHg    AV valve area 2.02 cm2    AV Velocity Ratio 0.62     AV index (prosthetic) 0.65     MV mean gradient 5 mmHg    MV valve area p 1/2 method 3.11 cm2    MV valve area by continuity eq 1.97 cm2    E/A ratio 1.11     Mean e' 0.06 m/s    E wave deceleration time 228.79 msec    IVRT 83.73 msec    Pulm vein S/D ratio 0.85     LVOT diameter 1.99 cm    LVOT area 3.1 cm2    LVOT peak vincenzo 1.38 m/s    LVOT peak VTI 31.90 cm    Ao peak vincenzo 2.24 m/s    Ao VTI 49.2 cm    RVOT peak vincenzo 0.98 m/s    RVOT peak VTI 24.8 cm    LVOT stroke volume 99.17 cm3    AV peak gradient 20 mmHg    MV peak gradient 10 mmHg    PV mean gradient 2.40 mmHg    E/E' ratio 24.00 m/s    MV Peak E Vincenzo 1.44 m/s    TR Max Vincenzo 4.55 m/s    MV VTI 50.4 cm    MV stenosis pressure 1/2 time 70.74 ms    MV Peak A Vincenzo 1.30 m/s    PV Peak S Vincenzo 0.62 m/s    PV Peak D Vincenzo 0.73 m/s    LV Systolic Volume 37.49 mL    LV Systolic Volume Index 18.7 mL/m2    LV Diastolic Volume 106.24 mL    LV Diastolic Volume Index 52.86 mL/m2    LV Mass Index 111 g/m2    RA Major Axis 3.53 cm    Left Atrium Minor Axis 5.25 cm    Left Atrium Major Axis 4.92 cm    Triscuspid Valve Regurgitation Peak Gradient 83 mmHg    LA WIDTH 5.20 cm    LA volume 127.08 cm3    LA Volume Index 63.2 mL/m2    Right Atrial Pressure (from IVC) 3 mmHg    EF 65 %    TV rest pulmonary artery pressure 86 mmHg    Narrative    · The left ventricle is normal in size  with concentric hypertrophy and   normal systolic function.  · Severe left atrial enlargement.  · Grade II left ventricular diastolic dysfunction.  · The estimated PA systolic pressure is 86 mmHg.  · Normal right ventricular size with normal right ventricular systolic   function.  · There is pulmonary hypertension.  · Normal central venous pressure (3 mmHg).  · Moderate tricuspid regurgitation.  · Mild pulmonic regurgitation.  · The estimated ejection fraction is 65%.  · There is mild aortic valve stenosis.  · Aortic valve area is 2.02 cm2; peak velocity is 2.24 m/s; mean gradient   is 10 mmHg.      , EKG: reviewed, Stress Test: reviewed, and X-Ray: CXR: X-Ray Chest 1 View (CXR): No results found for this visit on 05/25/23.    Assessment and Plan:         * Hypertensive emergency  -BP uncontrolled upon admission  -Appreciate nephrology rec's-agree with transition to Coreg  -Continue hydralazine, titrate as needed  -Add nitropaste  -Continue Clonidine for now, wean as tolerated    05/27  BP high  Continue coreg hydralazine clonidine  Add amlodipine     05/28  Increase Amlodipine to 5 mg bid  Continue coreg hydralazine clonidine and imdur  [facility] stress test pending     5/29/23  -BP stable  -Continue current regimen    NSTEMI (non-ST elevated myocardial infarction)  -Patient who presents with atypical CP symptoms/troponin elevation  -Elevated BP likely contributing to clinical picture  -Will treat as NSTEMI   -Allergic to ASA  -Continue BB, hydralazine  -Add statin  -Start heparin gtt, monitor H/H closely  -Will consider ischemic workup once BP controlled-MPI stress test vs [facility]    05/27/2023  No recurrent rx. Off heparin gtt  Continue BP control  [facility] stress test in AM    05/28  [facility] stress test pending    5/29/23  -Continue amlodipine, statin, Imdur  -Allergic to aspirin?  -CP free  -MPI stress test pending    5/30/23  Cont CCB, statin, Imdur  Denies CP at this time  Nuclear stress test abnormal for  scarring    Atypical chest pain  -See plan under NSTEMI    ESRD (end stage renal disease)  -Mgmt as per nephrology    Anemia in chronic kidney disease, on chronic dialysis  -H/H 7.3./22.9  -Transfusion planned tmw    5/29/23  -Monitor  -Mgmt as per hospital medicine        VTE Risk Mitigation (From admission, onward)         Ordered     enoxaparin injection 100 mg  Every 24 hours         05/28/23 0831     IP VTE HIGH RISK PATIENT  Once         05/26/23 0053     Place sequential compression device  Until discontinued         05/26/23 0053                Regina Glaser, NP  Cardiology  [facility]

## 2023-05-30 NOTE — PLAN OF CARE
Problem: Adult Inpatient Plan of Care  Goal: Plan of Care Review  Outcome: Ongoing, Progressing     Problem: Adult Inpatient Plan of Care  Goal: Patient-Specific Goal (Individualized)  Outcome: Ongoing, Progressing  Flowsheets (Taken 5/30/2023 0135)  Anxieties, Fears or Concerns: headache  Individualized Care Needs: blankets     Problem: Skin Injury Risk Increased  Goal: Skin Health and Integrity  Outcome: Ongoing, Progressing     Problem: Hypertension Acute  Goal: Blood Pressure Within Desired Range  Outcome: Ongoing, Progressing     Problem: Fluid Volume Excess  Goal: Fluid Balance  Outcome: Ongoing, Progressing     Problem: Fall Injury Risk  Goal: Absence of Fall and Fall-Related Injury  Outcome: Ongoing, Progressing    Discussed POC with pt, verbalized understanding.  Patient remains free from injury. Safety precautions maintained. No s/s of acute distress.  Purposeful rounding every two hours.   Pain controlled per MD order.   Cardiac monitoring in place, tele box number [id number]  Diet orders continued, pt diet: renal  Vital signs continued per orders this shift.   Q2 turning refused, pt educated  Chart and orders review completed. Pt education about care completed.     Pt reported headache that tylenol did not alleviate. Messaged on call hospital medicine who ordered Fioricet. Pt reports headache is now under control, updated hospital medicine.

## 2023-05-30 NOTE — PROGRESS NOTES
05/30/23 0844   Treatment Type   Treatment Type Acute   Vital Signs   Temp 98.5 °F (36.9 °C)   Temp Source Axillary   Pulse 79   Heart Rate Source Monitor   Resp 18   SpO2 96 %   Pulse Oximetry Type Intermittent   Oximetry Probe Site Intact   Device (Oxygen Therapy) room air   BP (!) 176/63   MAP (mmHg) 101   BP Location Right arm   BP Method Automatic   Patient Position Lying        Hemodialysis AV Fistula  Left upper arm   Placement Date: (c)    Present Prior to Hospital Arrival?: Yes  Location: Left upper arm   Needle Size 15ga   Site Assessment Clean;Dry;Intact;No redness;No swelling;No warmth;No drainage   Patency Bruit;Thrill;Present   Status Accessed   Flows Good   Dressing Intervention Integrity maintained   Dressing Status Clean;Dry;Intact   Site Condition No complications   Dressing Gauze   Drainage Description Other (Comment)  (no drainage noted)     3.5 hr hd tx started

## 2023-05-30 NOTE — PLAN OF CARE
POC discussed with patient; Patient verbalized understanding. VSS. 0900 medications given following dialysis per patient request. Denies pain. IV removed per policy. Discharge instructions reviewed with patient; Patient verbalized understanding.No further concerns at this time. Chart review completed.
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
## 2023-05-30 NOTE — PROGRESS NOTES
05/30/23 1245   Required for all Hemodialysis Patients   Hepatitis Status other (see comments)   Handoff Report   Received From Kye Christina RN   Given To Maurizio Cano RN   Treatment Type   Treatment Type Acute   Vital Signs   Temp 97.7 °F (36.5 °C)   Temp Source Axillary   Pulse 80   Heart Rate Source Monitor   Resp 18   SpO2 98 %   Pulse Oximetry Type Intermittent   Oximetry Probe Site Intact   Device (Oxygen Therapy) room air   BP (!) 247/87   MAP (mmHg) 146   BP Location Right arm   BP Method Automatic   Patient Position Lying   Post-Hemodialysis Assessment   Rinseback Volume (mL) 250 mL   Blood Volume Processed (Liters) 82.8 L   Dialyzer Clearance Moderately streaked   Duration of Treatment 210 minutes   Additional Fluid Intake (mL) 0 mL   Total UF (mL) 4505 mL   Net Fluid Removal 4005   Patient Response to Treatment well tolerated   Post-Hemodialysis Comments 3.5 hr hd tx completed as planned; Net UF goal reached without difficulty; no somatic complaints. Blood reperfused and pt de accessed according to P&P.RTF.

## 2023-05-30 NOTE — SUBJECTIVE & OBJECTIVE
Review of Systems   Constitutional: Negative.   HENT: Negative.     Eyes: Negative.    Cardiovascular: Negative.    Respiratory: Negative.     Skin: Negative.    Musculoskeletal: Negative.    Gastrointestinal: Negative.    Genitourinary: Negative.    Neurological: Negative.    Psychiatric/Behavioral: Negative.     Objective:     Vital Signs (Most Recent):  Temp: 98.4 °F (36.9 °C) (05/30/23 1517)  Pulse: 78 (05/30/23 1517)  Resp: 17 (05/30/23 1517)  BP: 134/60 (05/30/23 1517)  SpO2: (!) 93 % (05/30/23 1517) Vital Signs (24h Range):  Temp:  [98.1 °F (36.7 °C)-98.5 °F (36.9 °C)] 98.4 °F (36.9 °C)  Pulse:  [66-86] 78  Resp:  [16-19] 17  SpO2:  [90 %-96 %] 93 %  BP: ()/(48-63) 134/60     Weight: 98.5 kg (217 lb 2.5 oz)  Body mass index is 37.27 kg/m².     SpO2: (!) 93 %       No intake or output data in the 24 hours ending 05/30/23 1551    Lines/Drains/Airways       Central Venous Catheter Line  Duration                  Hemodialysis AV Graft Left upper arm -- days              Drain  Duration                  Hemodialysis AV Fistula  Left upper arm -- days         Hemodialysis AV Fistula Left upper arm -- days              Peripheral Intravenous Line  Duration                  Peripheral IV - Single Lumen 05/27/23 2125 20 G Anterior;Right Other 2 days                       Physical Exam  Vitals and nursing note reviewed.   Constitutional:       Appearance: Normal appearance. She is obese.   HENT:      Head: Normocephalic.   Eyes:      Pupils: Pupils are equal, round, and reactive to light.   Cardiovascular:      Rate and Rhythm: Normal rate and regular rhythm.      Heart sounds: Normal heart sounds, S1 normal and S2 normal. No murmur heard.    No S3 or S4 sounds.   Pulmonary:      Effort: Pulmonary effort is normal.      Breath sounds: Normal breath sounds.   Abdominal:      General: Bowel sounds are normal.      Palpations: Abdomen is soft.   Musculoskeletal:         General: Normal range of motion.       Cervical back: Normal range of motion.   Skin:     Capillary Refill: Capillary refill takes less than 2 seconds.   Neurological:      General: No focal deficit present.      Mental Status: She is alert and oriented to person, place, and time.   Psychiatric:         Mood and Affect: Mood normal.         Behavior: Behavior normal.         Thought Content: Thought content normal.          Significant Labs: BMP:   Recent Labs   Lab 05/29/23  0704 05/30/23  0534   * 109   * 131*   K 4.3 4.3   CL 96 95   CO2 23 23   BUN 31* 37*   CREATININE 7.0* 8.3*   CALCIUM 8.2* 8.3*   MG 2.2 2.3   , CMP   Recent Labs   Lab 05/29/23  0704 05/30/23  0534   * 131*   K 4.3 4.3   CL 96 95   CO2 23 23   * 109   BUN 31* 37*   CREATININE 7.0* 8.3*   CALCIUM 8.2* 8.3*   ANIONGAP 11 13   , CBC   Recent Labs   Lab 05/29/23  0704 05/30/23  0534   WBC 7.69 7.58   HGB 7.8* 7.7*   HCT 25.4* 24.9*    218   , INR No results for input(s): INR, PROTIME in the last 48 hours., Lipid Panel No results for input(s): CHOL, HDL, LDLCALC, TRIG, CHOLHDL in the last 48 hours., Troponin   Recent Labs   Lab 05/30/23  0529   TROPONINI 0.075*   , and All pertinent lab results from the last 24 hours have been reviewed.    Significant Imaging: Echocardiogram: Transthoracic echo (TTE) complete (Cupid Only):   Results for orders placed or performed during the hospital encounter of 05/18/23   Echo   Result Value Ref Range    BSA 2.09 m2    TDI SEPTAL 0.05 m/s    LV LATERAL E/E' RATIO 20.57 m/s    LV SEPTAL E/E' RATIO 28.80 m/s    IVC diameter 1.79 cm    Left Ventricular Outflow Tract Mean Velocity 0.98 cm/s    Left Ventricular Outflow Tract Mean Gradient 4.30 mmHg    TDI LATERAL 0.07 m/s    PV PEAK VELOCITY 1.25 cm/s    LVIDd 4.78 3.5 - 6.0 cm    IVS 1.36 (A) 0.6 - 1.1 cm    Posterior Wall 1.08 0.6 - 1.1 cm    Ao root annulus 3.44 cm    LVIDs 3.09 2.1 - 4.0 cm    FS 35 28 - 44 %    STJ 2.76 cm    Ascending aorta 3.12 cm    LV mass 222.87 g     LA size 5.66 cm    RVDD 3.31 cm    Left Ventricle Relative Wall Thickness 0.45 cm    AV mean gradient 10 mmHg    AV valve area 2.02 cm2    AV Velocity Ratio 0.62     AV index (prosthetic) 0.65     MV mean gradient 5 mmHg    MV valve area p 1/2 method 3.11 cm2    MV valve area by continuity eq 1.97 cm2    E/A ratio 1.11     Mean e' 0.06 m/s    E wave deceleration time 228.79 msec    IVRT 83.73 msec    Pulm vein S/D ratio 0.85     LVOT diameter 1.99 cm    LVOT area 3.1 cm2    LVOT peak vincenzo 1.38 m/s    LVOT peak VTI 31.90 cm    Ao peak vincenzo 2.24 m/s    Ao VTI 49.2 cm    RVOT peak vincenzo 0.98 m/s    RVOT peak VTI 24.8 cm    LVOT stroke volume 99.17 cm3    AV peak gradient 20 mmHg    MV peak gradient 10 mmHg    PV mean gradient 2.40 mmHg    E/E' ratio 24.00 m/s    MV Peak E Vincenzo 1.44 m/s    TR Max Vincenzo 4.55 m/s    MV VTI 50.4 cm    MV stenosis pressure 1/2 time 70.74 ms    MV Peak A Vincenzo 1.30 m/s    PV Peak S Vincenzo 0.62 m/s    PV Peak D Vincenzo 0.73 m/s    LV Systolic Volume 37.49 mL    LV Systolic Volume Index 18.7 mL/m2    LV Diastolic Volume 106.24 mL    LV Diastolic Volume Index 52.86 mL/m2    LV Mass Index 111 g/m2    RA Major Axis 3.53 cm    Left Atrium Minor Axis 5.25 cm    Left Atrium Major Axis 4.92 cm    Triscuspid Valve Regurgitation Peak Gradient 83 mmHg    LA WIDTH 5.20 cm    LA volume 127.08 cm3    LA Volume Index 63.2 mL/m2    Right Atrial Pressure (from IVC) 3 mmHg    EF 65 %    TV rest pulmonary artery pressure 86 mmHg    Narrative    · The left ventricle is normal in size with concentric hypertrophy and   normal systolic function.  · Severe left atrial enlargement.  · Grade II left ventricular diastolic dysfunction.  · The estimated PA systolic pressure is 86 mmHg.  · Normal right ventricular size with normal right ventricular systolic   function.  · There is pulmonary hypertension.  · Normal central venous pressure (3 mmHg).  · Moderate tricuspid regurgitation.  · Mild pulmonic regurgitation.  · The estimated  ejection fraction is 65%.  · There is mild aortic valve stenosis.  · Aortic valve area is 2.02 cm2; peak velocity is 2.24 m/s; mean gradient   is 10 mmHg.      , EKG: reviewed, Stress Test: reviewed, and X-Ray: CXR: X-Ray Chest 1 View (CXR): No results found for this visit on 05/25/23.

## 2023-05-30 NOTE — PLAN OF CARE
3.5 hour I-HDTx initiated and in progress as ordered for Dr Kinsey; Planned UF = up to 3.5L as tolerated; will plan to monitor for changes and trends.

## 2023-05-30 NOTE — PLAN OF CARE
05/30/23 1056   Final Note   Assessment Type Final Discharge Note   Anticipated Discharge Disposition Home   Hospital Resources/Appts/Education Provided Appointments scheduled by Navigator/Coordinator   Post-Acute Status   Discharge Delays None known at this time     Cards follow up scheduled for 6/9. No other CM needs.

## 2023-05-30 NOTE — ASSESSMENT & PLAN NOTE
-Patient who presents with atypical CP symptoms/troponin elevation  -Elevated BP likely contributing to clinical picture  -Will treat as NSTEMI   -Allergic to ASA  -Continue BB, hydralazine  -Add statin  -Start heparin gtt, monitor H/H closely  -Will consider ischemic workup once BP controlled-MPI stress test vs [facility]    05/27/2023  No recurrent rx. Off heparin gtt  Continue BP control  Nuke stress test in AM    05/28  Nuke stress test pending    5/29/23  -Continue amlodipine, statin, Imdur  -Allergic to aspirin?  -CP free  -MPI stress test pending    5/30/23  Cont CCB, statin, Imdur  Denies CP at this time  Nuclear stress test abnormal for scarring

## 2023-05-30 NOTE — ASSESSMENT & PLAN NOTE
-BP uncontrolled upon admission  -Appreciate nephrology rec's-agree with transition to Coreg  -Continue hydralazine, titrate as needed  -Add nitropaste  -Continue Clonidine for now, wean as tolerated    05/27  BP high  Continue coreg hydralazine clonidine  Add amlodipine     05/28  Increase Amlodipine to 5 mg bid  Continue coreg hydralazine clonidine and imdur  Nuke stress test pending     5/29/23  -BP stable  -Continue current regimen

## 2023-05-30 NOTE — PLAN OF CARE
Case Management  spoke with patient via phone. Hospitals are required to deliver the Important Message from Medicare (IMM) to all Medicare beneficiaries who are hospital inpatients. The IMM informs hospitalized inpatient beneficiaries of their hospital discharge appeal rights. Explained IMM appeal process and answered all questions. Pt referred to  if appeal is sought.

## 2023-05-31 ENCOUNTER — PATIENT OUTREACH (OUTPATIENT)
Dept: ADMINISTRATIVE | Facility: CLINIC | Age: 73
End: 2023-05-31
Payer: MEDICARE

## 2023-05-31 ENCOUNTER — PES CALL (OUTPATIENT)
Dept: ADMINISTRATIVE | Facility: CLINIC | Age: 73
End: 2023-05-31
Payer: MEDICARE

## 2023-05-31 DIAGNOSIS — I16.1 HYPERTENSIVE EMERGENCY: Primary | ICD-10-CM

## 2023-05-31 LAB
HAV IGM SERPL QL IA: ABNORMAL
HBV CORE IGM SERPL QL IA: ABNORMAL
HBV SURFACE AG SERPL QL IA: ABNORMAL
HCV AB SERPL QL IA: REACTIVE

## 2023-05-31 NOTE — PROGRESS NOTES
C3 nurse spoke with Gillian Jones for a TCC post hospital discharge follow up call. The patient has a scheduled HOSFU appointment with NP home referral placed

## 2023-06-01 ENCOUNTER — PES CALL (OUTPATIENT)
Dept: ADMINISTRATIVE | Facility: CLINIC | Age: 73
End: 2023-06-01
Payer: MEDICARE

## 2023-06-01 ENCOUNTER — NURSE TRIAGE (OUTPATIENT)
Dept: ADMINISTRATIVE | Facility: CLINIC | Age: 73
End: 2023-06-01
Payer: MEDICARE

## 2023-06-01 NOTE — TELEPHONE ENCOUNTER
Patient states she was discharged on Monday 5/29/23 and c/o constipation x approximately 2 weeks. Patient states she has digitally removed a small amount of hard stool but remains constipated at this time.     Care Advice given per Constipation (Adult) Guideline. Patient advised to See PCP or visit an Urgent Care Center today for evaluation/treatment. Patient states understanding of care advice.      Reason for Disposition   Last bowel movement (BM) > 4 days ago    Additional Information   Negative: Abdomen pain is main symptom and male   Negative: Abdomen pain is main symptom and female   Negative: Rectal bleeding or blood in stool is main symptom   Negative: Vomiting bile (green color)   Negative: Patient sounds very sick or weak to the triager   Negative: Constant abdominal pain lasting > 2 hours   Negative: Vomiting and abdomen looks much more swollen than usual   Negative: Rectal pain or fullness from fecal impaction (rectum full of stool) and NOT better after SITZ bath, suppository or enema   Negative: Abdomen is more swollen than usual    Protocols used: Constipation-A-OH

## 2023-06-02 LAB
HBV SURFACE AB SER QL IA: POSITIVE
HBV SURFACE AB SERPL IA-ACNC: 329 MIU/ML

## 2023-06-05 NOTE — PHYSICIAN QUERY
PT Name: Gillian Jones  MR #: [id number]     DOCUMENTATION CLARIFICATION     CDS/: Adolfo Montoya Jr, RN CCDS             Contact information:[email or web address]   This form is a permanent document in the medical record.     Query Date: June 5, 2023    By submitting this query, we are merely seeking further clarification of documentation.  Please utilize your independent clinical judgment when addressing the question(s) below.    The Medical Record contains the following   Indicators Supporting Clinical Findings Location in Medical Record   x Heart Failure documented 72 year old female whose conditions include ESRD on HD, HTN, diastolic CHF    72 year old female with past medical history of ESRD on dialysis T, Th, Sat, hypertension, CHF   5/29 Cardiology Progress Note      5/29 Hospital Medicine Progress Note   x BNP 1215-->309 5/25-5/30 Labs     x EF/Echo Summary    ? The left ventricle is normal in size with concentric hypertrophy and normal systolic function.  ? Severe left atrial enlargement.  ? Grade II left ventricular diastolic dysfunction.  ? The estimated PA systolic pressure is 86 mmHg.  ? Normal right ventricular size with normal right ventricular systolic function.  ? There is pulmonary hypertension.  ? Normal central venous pressure (3 mmHg).  ? Moderate tricuspid regurgitation.  ? Mild pulmonic regurgitation.  ? The estimated ejection fraction is 65%.  ? There is mild aortic valve stenosis.  ? Aortic valve area is 2.02 cm2; peak velocity is 2.24 m/s; mean gradient is 10 mmHg.   5/19/2023 Echo (Previous Encounter)   x Radiology findings FINDINGS:  Cardiomegaly with perihilar edema suggestive of CHF.    Cervical hardware noted.  Atypical infectious process not excluded.     Bones are intact.     Impression:     As above   5/25 chest X ray   x Subjective/Objective Respiratory Conditions Respiratory:  Negative for shortness of breath    Acute pulmonary edema    5/25 ED Provider Note    5/25  ED Provider Ntoe    Recent/Current MI      Heart Transplant, LVAD     x Edema, JVD Musculoskeletal: Moves all extremities. No obvious deformities. No edema. 5/25 ED Provider Note    Ascites     x Diuretics/Meds furosemide injection 100 mg Once 5/25 MAR    Other Treatment     x Other -ESRD: On TTS schedule. Next HD on 5/30/23. 5/28 Nephrology Progress Note     Heart failure is a clinical diagnosis which includes symptomatic fluid retention, elevated intracardiac pressures, and/or the inability of the heart to deliver adequate blood flow.    Heart Failure with reduced Ejection Fraction (HFrEF) or Systolic Heart Failure (loses ability to contract normally, EF is <40%)    Heart Failure with preserved Ejection Fraction (HFpEF) or Diastolic Heart Failure (stiff ventricles, does not relax properly, EF is >50%)     Heart Failure with Combined Systolic and Diastolic Failure (stiff ventricles, does not relax properly and EF is <50%)    Mid-range or mildly reduced ejection fraction (HFmrEF) is classified as systolic heart failure.  Congestive heart failure with a recovered EF is classified as Diastolic Heart Failure.  Common clues to acute exacerbation:  Rapidly progressive symptoms (w/in 2 weeks of presentation), using IV diuretics, using supplemental O2, pulmonary edema on Xray, new or worsening pleural effusion, +JVD or other signs of volume overload, MI w/in 4 weeks, and/or BNP >500  The clinical guidelines noted are only system guidelines, and do not replace the providers clinical judgment.    Provider, please specify the             Acuity of Diastolic Heart Failure                 [  x ]  Acute on Chronic Diastolic Heart Failure (HFpEF) - worsening of CHF signs/symptoms in preexisting CHF   [   ]  Chronic Diastolic Heart Failure (HFpEF) - preexisting and stable   [   ]  Other (please specify): ___________________________________       Please document in your progress notes daily for the duration of treatment until  resolved and include in your discharge summary.    References:  American Heart Association editorial staff. (2017, May). Ejection Fraction Heart Failure Measurement. American Heart Association. https://www.heart.org/en/health-topics/heart-failure/diagnosing-heart-failure/ejection-fraction-heart-failure-measurement#:~:text=Ejection%20fraction%20(EF)%20is%20a,pushed%20out%20with%20each%20heartbeat  DONOVAN Allen (2020, December 15). Heart failure with preserved ejection fraction: Clinical manifestations and diagnosis. [employer]. https://www.Basic-[employer].Summly/contents/heart-failure-with-preserved-ejection-fraction-clinical-manifestations-and-diagnosis.  ICD-10-CM/PCS Coding Clinic Third Quarter ICD-10, Effective with discharges: September 8, 2020 [facility] § Heart failure with mid-range or mildly reduced ejection fraction (2020).  ICD-10-CM/PCS Coding Clinic Third Quarter ICD-10, Effective with discharges: September 8, 2020 [facility] § Heart failure with recovered ejection fraction (2020).  Form No. [id number]

## 2023-06-05 NOTE — PHYSICIAN QUERY
PT Name: Gillian Jones  MR #: [id number]     DOCUMENTATION CLARIFICATION      CDS/: Adolfo Montoya Jr, RN CCDS             Contact information:[email or web address]   This form is a permanent document in the medical record.    Query Date: June 5, 2023    By submitting this query, we are merely seeking further clarification of documentation to reflect the severity of illness of your patient. Please utilize your independent clinical judgment when addressing the question(s) below.     The Medical Record contains the following:   Indicators   Supporting Clinical Findings Location in Medical Record   x Chest Pain, Angina Chest Pain       Pt presents to ed via ems, c/o chest pain that started after dialysis today, pt states she had the same pain after dialysis Tuesday and was relieved with O2, pt had 1 episode of vomiting after ems gave ASA, denies sob, pt also c/o some upper abdominal pain and states her LBM was last thursday      Atypical chest pain  Reports chest pain was on left upper chest near IV site  She denies chest pain altogether at this time  PRN pain medication ordered  Trending troponin level   5/25 ED provider Note                  5/26 H&P   x Coronary Artery Disease Denies any prior history of CAD or MI 5/30 Cardiology Progress Note     x EKG Sinus rhythm with 1st degree A-V block   Cannot rule out Anteroseptal infarct ,age undetermined   Abnormal ECG   When compared with ECG of 18-MAY-2023 08:24,   Minimal criteria for Anteroseptal infarct are now Present    5/25 EKG   x Troponin 0.047-->0.111-->0.288-->0.075 5/25-5/30 Labs     x Echo Results Conclusion         Equivocal myocardial perfusion scan.    There is a moderate intensity, moderate sized, fixed perfusion abnormality consistent with scar in the anterolateral wall(s).    There are no other significant perfusion abnormalities.    There is a moderate intensity perfusion abnormality in the anterior wall of the left ventricle, secondary to  breast attenuation.    The gated perfusion images showed an ejection fraction of 71% at rest. The gated perfusion images showed an ejection fraction of 68% post stress. Normal ejection fraction is greater than 59%.    There is normal wall motion at rest and post stress.    The ECG portion of the study is negative for ischemia   5/29 Nuclear Stress echo    Angiography     x Documentation of acute cardiac condition Hypertensive emergency  BP as high as 249/115, was given multiple IV medications while in ED without much improvement  NTG infusion, started per ED due to mild chest pain along with elevated troponin, wean as able -- it is causing her a severe headache, she is not having any chest pain currently  Did not take her usual BP medications today (Norvasc, clonidine, metoprolol) -- she states that dialysis nurses instruct her not to take it until after dialysis, she apparently came to ED after dialysis   Will give clonidine now and resume the rest of her BP medications in AM  Troponin mildly elevated, 0.047 -> 0.111 -- likely related to demand ischemia  Cardiac monitoring ordered    NSTEMI (non-ST elevated myocardial infarction)  -Patient who presents with atypical CP symptoms/troponin elevation  -Elevated BP likely contributing to clinical picture  -Will treat as NSTEMI   -Allergic to ASA  -Continue BB, hydralazine  -Add statin  -Start heparin gtt, monitor H/H closely  -Will consider ischemic workup once BP controlled-MPI stress test vs [facility]    NSTEMI (non-ST elevated myocardial infarction)  ? Likely due to demand ischemia    NTG infusion, started per ED due to mild chest pain along with elevated troponin but pt unable to tolerate due to headaches    NSTEMI (non-ST elevated myocardial infarction)  Pt with chest pain in setting of HTN emergency. Trop 0.047--> 0.111--> 0.258--> 0.288 5/26 H&P                                5/26 Cardiology Consult Note                        5/27 Critical Care Progress Note      5/30  Discharge Summary        5/30 Discharge Summary         x Medication/Treatment Troponin elevation-most likely demand ischemia,on enoxaparin ,anti lipid.  Cardiology recommendations if needed.   5/27 Critical Care eICU Note   x Other Patient has ESRD for the last 7 years and is on HD on TTS.  Had a complete treatment on 5/25/23. 5/26 Nephrology Consult       Provider, please specify the                  NSTEMI              Type    [   ] NSTEMI,type Unspecified    [ x  ] NSTEMI/Myocardial Infarction Type 2 due to Demand Ischemia from Hypertensive Emergency    [   ] NSTEMI/Myocardial Infarction Type 2 due to (please specify): __________   [   ] Demand Ischemia without NSTEMI   [   ] Elevated troponin only,NSTEMI Ruled out   [   ] Other  (please specify): _______________         Please document in your progress notes daily for the duration of treatment until resolved, and include in your discharge summary.    Form No. [id number]

## 2023-07-02 PROBLEM — I27.20 PULMONARY HYPERTENSION: Status: ACTIVE | Noted: 2023-07-02

## 2023-07-02 PROBLEM — I35.0 NONRHEUMATIC AORTIC VALVE STENOSIS: Status: ACTIVE | Noted: 2023-07-02
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
## 2023-07-03 ENCOUNTER — OFFICE VISIT (OUTPATIENT)
Dept: CARDIOLOGY | Facility: CLINIC | Age: 73
End: 2023-07-03
Payer: MEDICARE

## 2023-07-03 VITALS
BODY MASS INDEX: 36.92 KG/M2 | SYSTOLIC BLOOD PRESSURE: 132 MMHG | DIASTOLIC BLOOD PRESSURE: 90 MMHG | WEIGHT: 216.25 LBS | HEART RATE: 59 BPM | HEIGHT: 64 IN | OXYGEN SATURATION: 97 %

## 2023-07-03 DIAGNOSIS — I27.20 PULMONARY HYPERTENSION: ICD-10-CM

## 2023-07-03 DIAGNOSIS — I12.9 HYPERTENSION, RENAL DISEASE: ICD-10-CM

## 2023-07-03 DIAGNOSIS — R06.02 SHORTNESS OF BREATH: ICD-10-CM

## 2023-07-03 DIAGNOSIS — I65.23 BILATERAL CAROTID ARTERY STENOSIS: ICD-10-CM

## 2023-07-03 DIAGNOSIS — N18.6 ESRD (END STAGE RENAL DISEASE): ICD-10-CM

## 2023-07-03 DIAGNOSIS — I70.0 AORTIC ATHEROSCLEROSIS: ICD-10-CM

## 2023-07-03 DIAGNOSIS — I44.0 FIRST DEGREE AV BLOCK: ICD-10-CM

## 2023-07-03 DIAGNOSIS — R79.89 ELEVATED TROPONIN: ICD-10-CM

## 2023-07-03 DIAGNOSIS — I35.0 NONRHEUMATIC AORTIC VALVE STENOSIS: ICD-10-CM

## 2023-07-03 DIAGNOSIS — I73.9 PERIPHERAL VASCULAR DISEASE: ICD-10-CM

## 2023-07-03 DIAGNOSIS — R94.31 ABNORMAL ECG: ICD-10-CM

## 2023-07-03 DIAGNOSIS — E78.2 MIXED HYPERLIPIDEMIA: ICD-10-CM

## 2023-07-03 DIAGNOSIS — R07.89 ATYPICAL CHEST PAIN: Primary | ICD-10-CM

## 2023-07-03 DIAGNOSIS — I47.19 ATRIAL TACHYCARDIA: ICD-10-CM

## 2023-07-03 PROCEDURE — 99214 OFFICE O/P EST MOD 30 MIN: CPT | Mod: S$PBB,,, | Performed by: INTERNAL MEDICINE

## 2023-07-03 PROCEDURE — 99213 OFFICE O/P EST LOW 20 MIN: CPT | Mod: PBBFAC | Performed by: INTERNAL MEDICINE

## 2023-07-03 PROCEDURE — 99999 PR PBB SHADOW E&M-EST. PATIENT-LVL III: ICD-10-PCS | Mod: PBBFAC,,, | Performed by: INTERNAL MEDICINE

## 2023-07-03 PROCEDURE — 99214 PR OFFICE/OUTPT VISIT, EST, LEVL IV, 30-39 MIN: ICD-10-PCS | Mod: S$PBB,,, | Performed by: INTERNAL MEDICINE

## 2023-07-03 PROCEDURE — 99999 PR PBB SHADOW E&M-EST. PATIENT-LVL III: CPT | Mod: PBBFAC,,, | Performed by: INTERNAL MEDICINE

## 2023-07-03 RX ORDER — ISOSORBIDE MONONITRATE 30 MG/1
30 TABLET, EXTENDED RELEASE ORAL DAILY
Qty: 90 TABLET | Refills: 3 | Status: SHIPPED | OUTPATIENT
Start: 2023-07-03 | End: 2024-01-08

## 2023-07-03 RX ORDER — HYDRALAZINE HYDROCHLORIDE 100 MG/1
100 TABLET, FILM COATED ORAL EVERY 8 HOURS
Qty: 90 TABLET | Refills: 3 | Status: SHIPPED | OUTPATIENT
Start: 2023-07-03 | End: 2024-01-08 | Stop reason: SDUPTHER

## 2023-07-03 RX ORDER — ATORVASTATIN CALCIUM 20 MG/1
20 TABLET, FILM COATED ORAL NIGHTLY
Qty: 90 TABLET | Refills: 3 | Status: SHIPPED | OUTPATIENT
Start: 2023-07-03 | End: 2024-01-08 | Stop reason: SDUPTHER

## 2023-07-03 RX ORDER — CARVEDILOL 25 MG/1
25 TABLET ORAL 2 TIMES DAILY
Qty: 180 TABLET | Refills: 3 | Status: SHIPPED | OUTPATIENT
Start: 2023-07-03 | End: 2024-01-08

## 2023-07-03 RX ORDER — CLONIDINE HYDROCHLORIDE 0.3 MG/1
0.3 TABLET ORAL 3 TIMES DAILY
Qty: 90 TABLET | Refills: 11 | Status: SHIPPED | OUTPATIENT
Start: 2023-07-03 | End: 2024-01-08 | Stop reason: SDUPTHER

## 2023-07-03 RX ORDER — AMLODIPINE BESYLATE 10 MG/1
10 TABLET ORAL DAILY
Qty: 90 TABLET | Refills: 3 | Status: SHIPPED | OUTPATIENT
Start: 2023-07-03 | End: 2023-09-05 | Stop reason: SDUPTHER

## 2023-08-28 PROBLEM — J81.0 ACUTE PULMONARY EDEMA: Status: RESOLVED | Noted: 2023-05-29 | Resolved: 2023-08-28

## 2023-09-05 RX ORDER — AMLODIPINE BESYLATE 10 MG/1
10 TABLET ORAL DAILY
Qty: 90 TABLET | Refills: 3 | Status: SHIPPED | OUTPATIENT
Start: 2023-09-05

## 2023-09-13 ENCOUNTER — HOSPITAL ENCOUNTER (EMERGENCY)
Facility: HOSPITAL | Age: 73
Discharge: HOME | End: 2023-09-13
Attending: EMERGENCY MEDICINE
Payer: MEDICARE

## 2023-09-13 VITALS
HEIGHT: 64 IN | DIASTOLIC BLOOD PRESSURE: 73 MMHG | OXYGEN SATURATION: 100 % | TEMPERATURE: 98 F | HEART RATE: 63 BPM | BODY MASS INDEX: 35 KG/M2 | WEIGHT: 205 LBS | RESPIRATION RATE: 20 BRPM | SYSTOLIC BLOOD PRESSURE: 168 MMHG

## 2023-09-13 DIAGNOSIS — I10 HTN (HYPERTENSION): ICD-10-CM

## 2023-09-13 DIAGNOSIS — E87.5 HYPERKALEMIA: ICD-10-CM

## 2023-09-13 DIAGNOSIS — Z99.2 ESRD (END STAGE RENAL DISEASE) ON DIALYSIS: ICD-10-CM

## 2023-09-13 DIAGNOSIS — I16.0 HYPERTENSIVE URGENCY: Primary | ICD-10-CM

## 2023-09-13 DIAGNOSIS — N18.6 ESRD (END STAGE RENAL DISEASE) ON DIALYSIS: ICD-10-CM

## 2023-09-13 LAB
ALBUMIN SERPL BCP-MCNC: 3.8 G/DL (ref 3.5–5.2)
ALP SERPL-CCNC: 95 U/L (ref 55–135)
ALT SERPL W/O P-5'-P-CCNC: 12 U/L (ref 10–44)
ANION GAP SERPL CALC-SCNC: 13 MMOL/L (ref 8–16)
ANISOCYTOSIS BLD QL SMEAR: SLIGHT
AST SERPL-CCNC: 28 U/L (ref 10–40)
BASOPHILS # BLD AUTO: 0.05 K/UL (ref 0–0.2)
BASOPHILS NFR BLD: 0.7 % (ref 0–1.9)
BILIRUB SERPL-MCNC: 0.4 MG/DL (ref 0.1–1)
BNP SERPL-MCNC: 664 PG/ML (ref 0–99)
BUN SERPL-MCNC: 32 MG/DL (ref 8–23)
CALCIUM SERPL-MCNC: 9.3 MG/DL (ref 8.7–10.5)
CHLORIDE SERPL-SCNC: 102 MMOL/L (ref 95–110)
CO2 SERPL-SCNC: 21 MMOL/L (ref 23–29)
CREAT SERPL-MCNC: 6.6 MG/DL (ref 0.5–1.4)
DIFFERENTIAL METHOD: ABNORMAL
EOSINOPHIL # BLD AUTO: 0.1 K/UL (ref 0–0.5)
EOSINOPHIL NFR BLD: 1.4 % (ref 0–8)
ERYTHROCYTE [DISTWIDTH] IN BLOOD BY AUTOMATED COUNT: 14.7 % (ref 11.5–14.5)
EST. GFR  (NO RACE VARIABLE): 6 ML/MIN/1.73 M^2
GLUCOSE SERPL-MCNC: 95 MG/DL (ref 70–110)
HCT VFR BLD AUTO: 39.3 % (ref 37–48.5)
HGB BLD-MCNC: 12.6 G/DL (ref 12–16)
IMM GRANULOCYTES # BLD AUTO: 0.02 K/UL (ref 0–0.04)
IMM GRANULOCYTES NFR BLD AUTO: 0.3 % (ref 0–0.5)
LYMPHOCYTES # BLD AUTO: 1.6 K/UL (ref 1–4.8)
LYMPHOCYTES NFR BLD: 23.5 % (ref 18–48)
MAGNESIUM SERPL-MCNC: 2.1 MG/DL (ref 1.6–2.6)
MCH RBC QN AUTO: 30.2 PG (ref 27–31)
MCHC RBC AUTO-ENTMCNC: 32.1 G/DL (ref 32–36)
MCV RBC AUTO: 94 FL (ref 82–98)
MONOCYTES # BLD AUTO: 0.9 K/UL (ref 0.3–1)
MONOCYTES NFR BLD: 13.1 % (ref 4–15)
NEUTROPHILS # BLD AUTO: 4.2 K/UL (ref 1.8–7.7)
NEUTROPHILS NFR BLD: 61 % (ref 38–73)
NRBC BLD-RTO: 0 /100 WBC
OVALOCYTES BLD QL SMEAR: ABNORMAL
PHOSPHATE SERPL-MCNC: 4 MG/DL (ref 2.7–4.5)
PLATELET # BLD AUTO: 171 K/UL (ref 150–450)
PLATELET BLD QL SMEAR: ABNORMAL
PMV BLD AUTO: 11.5 FL (ref 9.2–12.9)
POTASSIUM SERPL-SCNC: 5.5 MMOL/L (ref 3.5–5.1)
PROT SERPL-MCNC: 7.2 G/DL (ref 6–8.4)
RBC # BLD AUTO: 4.17 M/UL (ref 4–5.4)
SODIUM SERPL-SCNC: 136 MMOL/L (ref 136–145)
TROPONIN I SERPL DL<=0.01 NG/ML-MCNC: 0.06 NG/ML (ref 0–0.03)
WBC # BLD AUTO: 6.95 K/UL (ref 3.9–12.7)

## 2023-09-13 PROCEDURE — 25000003 PHARM REV CODE 250: Mod: HCNC | Performed by: EMERGENCY MEDICINE

## 2023-09-13 PROCEDURE — 83880 ASSAY OF NATRIURETIC PEPTIDE: CPT | Mod: HCNC | Performed by: EMERGENCY MEDICINE

## 2023-09-13 PROCEDURE — 63600175 PHARM REV CODE 636 W HCPCS: Mod: HCNC | Performed by: EMERGENCY MEDICINE

## 2023-09-13 PROCEDURE — 96374 THER/PROPH/DIAG INJ IV PUSH: CPT | Mod: HCNC

## 2023-09-13 PROCEDURE — 80053 COMPREHEN METABOLIC PANEL: CPT | Mod: HCNC | Performed by: EMERGENCY MEDICINE

## 2023-09-13 PROCEDURE — 84100 ASSAY OF PHOSPHORUS: CPT | Mod: HCNC | Performed by: EMERGENCY MEDICINE

## 2023-09-13 PROCEDURE — 99284 EMERGENCY DEPT VISIT MOD MDM: CPT | Mod: 25,HCNC

## 2023-09-13 PROCEDURE — 85025 COMPLETE CBC W/AUTO DIFF WBC: CPT | Mod: HCNC | Performed by: EMERGENCY MEDICINE

## 2023-09-13 PROCEDURE — 93010 ELECTROCARDIOGRAM REPORT: CPT | Mod: HCNC,,, | Performed by: INTERNAL MEDICINE

## 2023-09-13 PROCEDURE — 96376 TX/PRO/DX INJ SAME DRUG ADON: CPT | Mod: HCNC

## 2023-09-13 PROCEDURE — 83735 ASSAY OF MAGNESIUM: CPT | Mod: HCNC | Performed by: EMERGENCY MEDICINE

## 2023-09-13 PROCEDURE — 96375 TX/PRO/DX INJ NEW DRUG ADDON: CPT | Mod: HCNC

## 2023-09-13 PROCEDURE — 93005 ELECTROCARDIOGRAM TRACING: CPT | Mod: HCNC

## 2023-09-13 PROCEDURE — 93010 EKG 12-LEAD: ICD-10-PCS | Mod: HCNC,,, | Performed by: INTERNAL MEDICINE

## 2023-09-13 PROCEDURE — 84484 ASSAY OF TROPONIN QUANT: CPT | Mod: HCNC | Performed by: EMERGENCY MEDICINE

## 2023-09-13 RX ORDER — HYDRALAZINE HYDROCHLORIDE 20 MG/ML
20 INJECTION INTRAMUSCULAR; INTRAVENOUS
Status: COMPLETED | OUTPATIENT
Start: 2023-09-13 | End: 2023-09-13

## 2023-09-13 RX ORDER — AMLODIPINE BESYLATE 5 MG/1
10 TABLET ORAL
Status: COMPLETED | OUTPATIENT
Start: 2023-09-13 | End: 2023-09-13

## 2023-09-13 RX ORDER — LABETALOL HYDROCHLORIDE 5 MG/ML
20 INJECTION, SOLUTION INTRAVENOUS
Status: COMPLETED | OUTPATIENT
Start: 2023-09-13 | End: 2023-09-13

## 2023-09-13 RX ORDER — ASPIRIN 325 MG
325 TABLET ORAL
Status: COMPLETED | OUTPATIENT
Start: 2023-09-13 | End: 2023-09-13

## 2023-09-13 RX ORDER — CARVEDILOL 12.5 MG/1
25 TABLET ORAL ONCE
Status: COMPLETED | OUTPATIENT
Start: 2023-09-13 | End: 2023-09-13

## 2023-09-13 RX ORDER — CLONIDINE HYDROCHLORIDE 0.2 MG/1
0.2 TABLET ORAL
Status: COMPLETED | OUTPATIENT
Start: 2023-09-13 | End: 2023-09-13

## 2023-09-13 RX ORDER — CARVEDILOL 12.5 MG/1
25 TABLET ORAL 2 TIMES DAILY
Status: DISCONTINUED | OUTPATIENT
Start: 2023-09-13 | End: 2023-09-13

## 2023-09-13 RX ORDER — ISOSORBIDE MONONITRATE 30 MG/1
30 TABLET, EXTENDED RELEASE ORAL DAILY
Status: DISCONTINUED | OUTPATIENT
Start: 2023-09-13 | End: 2023-09-13

## 2023-09-13 RX ORDER — ISOSORBIDE MONONITRATE 30 MG/1
30 TABLET, EXTENDED RELEASE ORAL ONCE
Status: COMPLETED | OUTPATIENT
Start: 2023-09-13 | End: 2023-09-13

## 2023-09-13 RX ORDER — HYDRALAZINE HYDROCHLORIDE 25 MG/1
100 TABLET, FILM COATED ORAL EVERY 8 HOURS
Status: DISCONTINUED | OUTPATIENT
Start: 2023-09-13 | End: 2023-09-13

## 2023-09-13 RX ORDER — HYDRALAZINE HYDROCHLORIDE 25 MG/1
100 TABLET, FILM COATED ORAL ONCE
Status: COMPLETED | OUTPATIENT
Start: 2023-09-13 | End: 2023-09-13

## 2023-09-13 RX ORDER — HYDRALAZINE HYDROCHLORIDE 20 MG/ML
10 INJECTION INTRAMUSCULAR; INTRAVENOUS
Status: COMPLETED | OUTPATIENT
Start: 2023-09-13 | End: 2023-09-13

## 2023-09-13 RX ADMIN — AMLODIPINE BESYLATE 10 MG: 5 TABLET ORAL at 12:09

## 2023-09-13 RX ADMIN — CARVEDILOL 25 MG: 12.5 TABLET, FILM COATED ORAL at 12:09

## 2023-09-13 RX ADMIN — LABETALOL HYDROCHLORIDE 20 MG: 5 INJECTION INTRAVENOUS at 02:09

## 2023-09-13 RX ADMIN — HYDRALAZINE HYDROCHLORIDE 10 MG: 20 INJECTION, SOLUTION INTRAMUSCULAR; INTRAVENOUS at 12:09

## 2023-09-13 RX ADMIN — HYDRALAZINE HYDROCHLORIDE 20 MG: 20 INJECTION, SOLUTION INTRAMUSCULAR; INTRAVENOUS at 03:09

## 2023-09-13 RX ADMIN — ISOSORBIDE MONONITRATE 30 MG: 30 TABLET, EXTENDED RELEASE ORAL at 12:09

## 2023-09-13 RX ADMIN — SODIUM ZIRCONIUM CYCLOSILICATE 5 G: 5 POWDER, FOR SUSPENSION ORAL at 03:09

## 2023-09-13 RX ADMIN — HYDRALAZINE HYDROCHLORIDE 100 MG: 25 TABLET, FILM COATED ORAL at 12:09

## 2023-09-13 RX ADMIN — CLONIDINE HYDROCHLORIDE 0.2 MG: 0.2 TABLET ORAL at 07:09

## 2023-09-13 RX ADMIN — ASPIRIN 325 MG ORAL TABLET 325 MG: 325 PILL ORAL at 07:09

## 2023-09-13 RX ADMIN — HYDRALAZINE HYDROCHLORIDE 20 MG: 20 INJECTION, SOLUTION INTRAMUSCULAR; INTRAVENOUS at 07:09
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
## 2023-09-13 NOTE — PROVIDER PROGRESS NOTES - EMERGENCY DEPT.
Encounter Date: 9/13/2023    ED Physician Progress Notes        Physician Note:   Pt was seen by Dr. White for dialysis with bp of 230/113 and asymptomatic. She needed hydralazine twice by IV and was then was given her home meds please see Nurses Mar; bp then dropped to 160/80. Pt is now ready for discharge and will need a follow up with PCP and resume dialysis.         Critical Care    Date/Time: 9/13/2023 2:14 PM    Performed by: Zachary Noble MD  Authorized by: Zachary Noble MD  Direct patient critical care time: 15 minutes  Additional history critical care time: 5 minutes  Ordering / reviewing critical care time: 10 minutes  Documentation critical care time: 5 minutes  Consulting other physicians critical care time: 5 minutes  Total critical care time (exclusive of procedural time) : 40 minutes  Critical care was necessary to treat or prevent imminent or life-threatening deterioration of the following conditions: HTN requiring IV hydralazine.  Critical care was time spent personally by me on the following activities: blood draw for specimens, development of treatment plan with patient or surrogate, discussions with consultants, interpretation of cardiac output measurements, evaluation of patient's response to treatment, examination of patient, obtaining history from patient or surrogate, ordering and performing treatments and interventions, ordering and review of laboratory studies, ordering and review of radiographic studies, pulse oximetry, re-evaluation of patient's condition and review of old charts.

## 2023-09-18 ENCOUNTER — TELEPHONE (OUTPATIENT)
Dept: ORTHOPEDICS | Facility: CLINIC | Age: 73
End: 2023-09-18
Payer: MEDICARE

## 2023-09-18 DIAGNOSIS — M25.561 PAIN IN BOTH KNEES, UNSPECIFIED CHRONICITY: Primary | ICD-10-CM

## 2023-09-18 DIAGNOSIS — M25.562 PAIN IN BOTH KNEES, UNSPECIFIED CHRONICITY: Primary | ICD-10-CM

## 2023-09-18 NOTE — TELEPHONE ENCOUNTER
Returned the patient's phone call in regards to their message. I got the patient schedule to see Dr. Aguilar on 09/25/23 at 7:00am with x-rays. Patient verbalized understanding.             ----- Message from Carmen Louise sent at 9/18/2023  3:32 PM CDT -----  Contact: Gillian French is needing a call back in regards to scheduling a sooner appt. The next available is 12/01. Please give her a call back at [phone]

## 2023-09-21 NOTE — TELEPHONE ENCOUNTER
Discussed ED visit and that she will need to be evaluated by Orthopedics before an MRI can be ordered.     She verbalized understanding.    We were happy to see you today    For your Testing  Please have your labs and/or imaging test done  in 3-4 months         For your Medication   Lets increase your jardaince for the urine   For more information about side effects please visit medlineplus.gov        For your Referrals        For your Vaccinations  We gave your the following vaccines    Please obtain the following vaccines at the pharmacy          Please return to clinic in    No follow-ups on file.        Extra resources

## 2023-10-18 ENCOUNTER — TELEPHONE (OUTPATIENT)
Dept: ORTHOPEDICS | Facility: CLINIC | Age: 73
End: 2023-10-18
Payer: MEDICARE

## 2023-10-18 NOTE — TELEPHONE ENCOUNTER
Returned the patient's phone call in regards to their message. Informed the patient that Dr. Aguilar's next appointment is 10/20/23 at 10:20. Patient states that they can not come that day due to transportation. Informed the patient that after that it will put is in the new year. Informed the patient that I can get them schedule with Emily Aguilar's PA. I got the patient schedule to see Emily on 10/23/23 at 11:00 with a 10:30 x-ray. Patient verbalized understanding.            ----- Message from Jenni Louise sent at 10/18/2023  3:45 PM CDT -----  Contact: [phone]  Type:  Sooner Apoointment Request    Caller is requesting a sooner appointment.  Caller declined first available appointment listed below.  Caller will not accept being placed on the waitlist and is requesting a message be sent to doctor.  Name of Caller:Gillian  When is the first available appointment?01/22/2024  Symptoms:knee popping out   Would the patient rather a call back or a response via [facility]? Call back   Best Call Back Number:[phone]   Additional Information: pt is requesting appt sooner than jan2024 , but after 10/20 (transportation issue)

## 2023-11-08 NOTE — TELEPHONE ENCOUNTER
----- Message from Sanjeev Blank sent at 11/12/2018 12:50 PM CST -----  Contact: self  Requesting call back regarding order for MRI. Please call back at [phone].    Thanks,  Sanjeev Blank     ___________________________  ASSESSMENT AND PLAN  #  ddx:     Recommendations:   -   - Dermatology will continue to follow.     The patient's chart was reviewed in addition to being seen and examined at bedside with the dermatology attending Dr. Rudd.  Recommendations were communicated with the primary team.  Please page [phone] with a 10-digit call-back number for further related questions.    Rolanda Rader MD  Resident Physician, PGY-3  [facility] Dermatology  Pager: [phone]  Office: [phone] ___________________________  ASSESSMENT AND PLAN  #  ddx:     Recommendations:   -   - Dermatology will continue to follow.     The patient's chart was reviewed in addition to being seen and examined at bedside with the dermatology attending Dr. Rudd.  Recommendations were communicated with the primary team.  Please page [phone] with a 10-digit call-back number for further related questions.    Rolanda Rader MD  Resident Physician, PGY-3  [facility] Dermatology  Pager: [phone]  Office: [phone] ___________________________  ASSESSMENT AND PLAN  #  ddx:     Recommendations:   -   - Dermatology will continue to follow.     The patient's chart was reviewed in addition to being seen and examined at bedside with the dermatology attending Dr. Rudd.  Recommendations were communicated with the primary team.  Please page [phone] with a 10-digit call-back number for further related questions.    Rolanda Rader MD  Resident Physician, PGY-3  [facility] Dermatology  Pager: [phone]  Office: [phone] ___________________________  ASSESSMENT AND PLAN  #Traumatic ulceration with background chronic lipodermatosclerosis 2/2 stasis dermatitis   ** Dopplers negative for DVT 11/7     Recommendations:   - Consider vascular surgery consult for further imaging recommendations.   - Consider abdominal imaging (CT vs other) to rule out other etiologies of unilateral swelling in this young patient as the extent of his vascular disease at his age/ unilateral nature is atypical.   - Agree with continued wound care.   - Dermatology will continue to follow.     The patient's chart was reviewed in addition to being seen and examined at bedside with the dermatology attending Dr. Rudd.  Recommendations were communicated with the primary team.  Please page [phone] with a 10-digit call-back number for further related questions.    Rolanda Rader MD  Resident Physician, PGY-3  [facility] Dermatology  Pager: [phone]  Office: [phone] ___________________________  ASSESSMENT AND PLAN  #Traumatic ulceration with background chronic lipodermatosclerosis 2/2 stasis dermatitis   ** Dopplers negative for DVT 11/7     Recommendations:   - Consider vascular surgery consult for further imaging recommendations.   - Consider abdominal imaging (CT vs other) to rule out other etiologies of unilateral swelling in this young patient as the extent of his vascular disease at his age/ unilateral nature is atypical.   - Agree with continued wound care.   - Dermatology will continue to follow.     The patient's chart was reviewed in addition to being seen and examined at bedside with the dermatology attending Dr. Rudd.  Recommendations were communicated with the primary team.  Please page [phone] with a 10-digit call-back number for further related questions.    Rolanda Rader MD  Resident Physician, PGY-3  [facility] Dermatology  Pager: [phone]  Office: [phone] ___________________________  ASSESSMENT AND PLAN  #Traumatic ulceration with background chronic lipodermatosclerosis 2/2 stasis dermatitis   ** Dopplers negative for DVT 11/7     Recommendations:   - Consider vascular surgery consult for further imaging recommendations.   - Consider abdominal imaging (CT vs other) to rule out other etiologies of unilateral swelling in this young patient as the extent of his vascular disease at his age/ unilateral nature is atypical.   - Agree with continued wound care.   - Dermatology will continue to follow.     The patient's chart was reviewed in addition to being seen and examined at bedside with the dermatology attending Dr. Rudd.  Recommendations were communicated with the primary team.  Please page [phone] with a 10-digit call-back number for further related questions.    Rolanda Rader MD  Resident Physician, PGY-3  [facility] Dermatology  Pager: [phone]  Office: [phone]

## 2023-11-29 ENCOUNTER — PATIENT OUTREACH (OUTPATIENT)
Dept: ADMINISTRATIVE | Facility: CLINIC | Age: 73
End: 2023-11-29
Payer: MEDICARE

## 2023-11-29 NOTE — PROGRESS NOTES
C3 nurse attempted to contact Gillian Jones for a TCC post hospital discharge follow up call. No answer. Left voicemail with callback information. The patient does not have a scheduled HOSFU appointment, and the pt does not have an [facility] PCP. Next appointment that C3 nurse can locate is with Yann Sims MD (cardiology) on 1/8/24 @ 9:40am. Patient was instructed to see Jeremy Meyer MD (vascular surgeon) for close follow up.

## 2023-12-01 RX ORDER — HYDROCODONE BITARTRATE AND ACETAMINOPHEN 5; 325 MG/1; MG/1
1 TABLET ORAL EVERY 6 HOURS PRN
COMMUNITY
Start: 2023-11-24 | End: 2023-12-01

## 2023-12-01 RX ORDER — METOPROLOL TARTRATE 50 MG/1
50 TABLET ORAL 3 TIMES DAILY
COMMUNITY
End: 2024-01-08

## 2023-12-01 RX ORDER — ONDANSETRON 4 MG/1
4 TABLET, FILM COATED ORAL EVERY 8 HOURS PRN
COMMUNITY
Start: 2023-11-24

## 2023-12-01 NOTE — PROGRESS NOTES
C3 nurse spoke with Gillian Jones for a TCC post hospital discharge follow up call. The patient has a pending HOS appointment with Jeremy Meyer MD (vascular surgeon) in 2-3 weeks post-op to remove the clamps, once the surgical site is healed. The patient has a scheduled follow up with Yann Sims MD (cardiology) on 1/8/24 @ 9:40am. She does not have a PCP since moving to [place], and has asked for recommendations and assistance in getting established with an [facility] PCP. Message sent to Dr. Sims's staff to see if they can assist in this process?

## 2023-12-18 ENCOUNTER — TELEPHONE (OUTPATIENT)
Dept: CARDIOLOGY | Facility: CLINIC | Age: 73
End: 2023-12-18
Payer: MEDICARE

## 2023-12-18 NOTE — TELEPHONE ENCOUNTER
Patt Warren Staff  Caller: Unspecified (Today, 11:05 AM)  Pt is requesting a call back regarding a sooner appt because she is having trouble with left arm. Call back number is [phone]. Thx.EL    Spoke to patient, states that her left arm is swollen and believes it has something to do with her heart. No active chest pain dizziness or SOB. States that he dialysis doctor told her its an infection and put her on antibiotics, requesting sooner appt. Then 11/08/2023 only wants to see Dr. Sims pr placed on wait list.

## 2023-12-23 ENCOUNTER — HOSPITAL ENCOUNTER (EMERGENCY)
Facility: HOSPITAL | Age: 73
Discharge: HOME | End: 2023-12-23
Attending: EMERGENCY MEDICINE
Payer: MEDICARE

## 2023-12-23 VITALS
HEART RATE: 92 BPM | HEIGHT: 64 IN | SYSTOLIC BLOOD PRESSURE: 194 MMHG | BODY MASS INDEX: 35.19 KG/M2 | DIASTOLIC BLOOD PRESSURE: 164 MMHG | OXYGEN SATURATION: 100 % | TEMPERATURE: 98 F | RESPIRATION RATE: 23 BRPM

## 2023-12-23 DIAGNOSIS — R00.0 TACHYCARDIA: Primary | ICD-10-CM

## 2023-12-23 DIAGNOSIS — R00.2 PALPITATIONS: ICD-10-CM

## 2023-12-23 PROCEDURE — 99283 EMERGENCY DEPT VISIT LOW MDM: CPT | Mod: HCNC

## 2023-12-23 PROCEDURE — 25000003 PHARM REV CODE 250: Mod: HCNC | Performed by: EMERGENCY MEDICINE

## 2023-12-23 RX ORDER — HYDRALAZINE HYDROCHLORIDE 25 MG/1
100 TABLET, FILM COATED ORAL
Status: COMPLETED | OUTPATIENT
Start: 2023-12-23 | End: 2023-12-23

## 2023-12-23 RX ORDER — HYDRALAZINE HYDROCHLORIDE 20 MG/ML
INJECTION INTRAMUSCULAR; INTRAVENOUS
Status: DISCONTINUED
Start: 2023-12-23 | End: 2023-12-23 | Stop reason: WASHOUT

## 2023-12-23 RX ORDER — LABETALOL 200 MG/1
200 TABLET, FILM COATED ORAL
Status: COMPLETED | OUTPATIENT
Start: 2023-12-23 | End: 2023-12-23

## 2023-12-23 RX ORDER — CLONIDINE HYDROCHLORIDE 0.3 MG/1
0.3 TABLET ORAL
Status: COMPLETED | OUTPATIENT
Start: 2023-12-23 | End: 2023-12-23

## 2023-12-23 RX ADMIN — LABETALOL HYDROCHLORIDE 200 MG: 200 TABLET, FILM COATED ORAL at 02:12

## 2023-12-23 RX ADMIN — HYDRALAZINE HYDROCHLORIDE 100 MG: 25 TABLET, FILM COATED ORAL at 02:12

## 2023-12-23 RX ADMIN — CLONIDINE HYDROCHLORIDE 0.3 MG: 0.3 TABLET ORAL at 02:12
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
## 2023-12-26 ENCOUNTER — PATIENT OUTREACH (OUTPATIENT)
Dept: EMERGENCY MEDICINE | Facility: HOSPITAL | Age: 73
End: 2023-12-26

## 2024-01-07 PROBLEM — I50.32 CHRONIC DIASTOLIC HEART FAILURE: Status: ACTIVE | Noted: 2024-01-07

## 2024-01-08 ENCOUNTER — OFFICE VISIT (OUTPATIENT)
Dept: CARDIOLOGY | Facility: CLINIC | Age: 74
End: 2024-01-08
Payer: MEDICARE

## 2024-01-08 VITALS
BODY MASS INDEX: 35.31 KG/M2 | HEIGHT: 64 IN | DIASTOLIC BLOOD PRESSURE: 70 MMHG | OXYGEN SATURATION: 98 % | WEIGHT: 206.81 LBS | SYSTOLIC BLOOD PRESSURE: 108 MMHG | HEART RATE: 70 BPM

## 2024-01-08 DIAGNOSIS — R06.02 SHORTNESS OF BREATH: ICD-10-CM

## 2024-01-08 DIAGNOSIS — R07.89 ATYPICAL CHEST PAIN: ICD-10-CM

## 2024-01-08 DIAGNOSIS — E78.2 MIXED HYPERLIPIDEMIA: ICD-10-CM

## 2024-01-08 DIAGNOSIS — N18.6 ESRD (END STAGE RENAL DISEASE) ON DIALYSIS: ICD-10-CM

## 2024-01-08 DIAGNOSIS — I20.9 AP (ANGINA PECTORIS): ICD-10-CM

## 2024-01-08 DIAGNOSIS — I12.9 HYPERTENSION, RENAL DISEASE: Primary | ICD-10-CM

## 2024-01-08 DIAGNOSIS — Z99.2 ESRD (END STAGE RENAL DISEASE) ON DIALYSIS: ICD-10-CM

## 2024-01-08 DIAGNOSIS — I47.10 PSVT (PAROXYSMAL SUPRAVENTRICULAR TACHYCARDIA): ICD-10-CM

## 2024-01-08 DIAGNOSIS — I65.23 BILATERAL CAROTID ARTERY STENOSIS: ICD-10-CM

## 2024-01-08 DIAGNOSIS — R94.31 ABNORMAL ECG: ICD-10-CM

## 2024-01-08 DIAGNOSIS — I35.0 NONRHEUMATIC AORTIC VALVE STENOSIS: ICD-10-CM

## 2024-01-08 DIAGNOSIS — I70.0 AORTIC ATHEROSCLEROSIS: ICD-10-CM

## 2024-01-08 DIAGNOSIS — I27.20 PULMONARY HYPERTENSION: ICD-10-CM

## 2024-01-08 DIAGNOSIS — I50.32 CHRONIC DIASTOLIC HEART FAILURE: ICD-10-CM

## 2024-01-08 DIAGNOSIS — E66.01 MORBID OBESITY: ICD-10-CM

## 2024-01-08 DIAGNOSIS — I47.10 SVT (SUPRAVENTRICULAR TACHYCARDIA): ICD-10-CM

## 2024-01-08 DIAGNOSIS — I47.19 OTHER SUPRAVENTRICULAR TACHYCARDIA: ICD-10-CM

## 2024-01-08 DIAGNOSIS — I73.9 PERIPHERAL VASCULAR DISEASE: ICD-10-CM

## 2024-01-08 DIAGNOSIS — I44.0 FIRST DEGREE AV BLOCK: ICD-10-CM

## 2024-01-08 DIAGNOSIS — I47.19 ATRIAL TACHYCARDIA: ICD-10-CM

## 2024-01-08 PROCEDURE — 3008F BODY MASS INDEX DOCD: CPT | Mod: HCNC,CPTII,S$GLB, | Performed by: INTERNAL MEDICINE

## 2024-01-08 PROCEDURE — 1159F MED LIST DOCD IN RCRD: CPT | Mod: HCNC,CPTII,S$GLB, | Performed by: INTERNAL MEDICINE

## 2024-01-08 PROCEDURE — 3078F DIAST BP <80 MM HG: CPT | Mod: HCNC,CPTII,S$GLB, | Performed by: INTERNAL MEDICINE

## 2024-01-08 PROCEDURE — 1126F AMNT PAIN NOTED NONE PRSNT: CPT | Mod: HCNC,CPTII,S$GLB, | Performed by: INTERNAL MEDICINE

## 2024-01-08 PROCEDURE — 99999 PR PBB SHADOW E&M-EST. PATIENT-LVL III: CPT | Mod: PBBFAC,HCNC,, | Performed by: INTERNAL MEDICINE

## 2024-01-08 PROCEDURE — 3074F SYST BP LT 130 MM HG: CPT | Mod: HCNC,CPTII,S$GLB, | Performed by: INTERNAL MEDICINE

## 2024-01-08 PROCEDURE — 99214 OFFICE O/P EST MOD 30 MIN: CPT | Mod: HCNC,S$GLB,, | Performed by: INTERNAL MEDICINE

## 2024-01-08 PROCEDURE — 1160F RVW MEDS BY RX/DR IN RCRD: CPT | Mod: HCNC,CPTII,S$GLB, | Performed by: INTERNAL MEDICINE

## 2024-01-08 RX ORDER — ATORVASTATIN CALCIUM 20 MG/1
20 TABLET, FILM COATED ORAL NIGHTLY
Qty: 90 TABLET | Refills: 3 | Status: SHIPPED | OUTPATIENT
Start: 2024-01-08 | End: 2025-01-07

## 2024-01-08 RX ORDER — LABETALOL 200 MG/1
200 TABLET, FILM COATED ORAL 3 TIMES DAILY
Qty: 120 TABLET | Refills: 11
Start: 2024-01-08

## 2024-01-08 NOTE — PROGRESS NOTES
Subjective:    Patient ID:  Gillian Jones is a 73 y.o. female who presents for evaluation of Congestive Heart Failure, Peripheral Arterial Disease, Hypertension, and Hyperlipidemia        HPI pt presents for preop eval.  Her current med conditions include chronic diastolic CHF, HTN, RBBB, chronic CP, carotid disease (s/p left carotid endarterectomy Dec 2022), ESRD/dialysis, AS, PHTN, 1 av block, aortic atherosclerosis, peripheral vasc disease.   Nonsmoker.  Past hx pertinent for following:  Iodine allergy.  ech 10/18 normal LVEF, LVH, mitral annular calcification, aortic valve sclerosis.  F/u with Dr. Meyer, [facility] surgery, for PAD and dialysis access.  Stress MPI 8/20 no ischemia.  Echo 7/20 normal LV function, LVH, mitral annular calcification.  Echo 12/20 [facility] normal LV function.  S/p TIA Nov 2022, then had left carotid surgery Dec 2022 @ [facility], with Dr. Meyer.  Pt admitted May 2023 [facility] with HTN emergency, chest pain, troponin leak, anemia requiring PRBC.  Meds adjusted.  Nuclear stress test: no ischemia, anterolateral scar noted.  Echo 5/23: normal EF, DD, LVH, LAE, mild AS, mod TR, PAP 86 mmHg.  ECG 5/25/23 NSR, 1 av block, possible septal infarct.  Now here.  Pt seen in [facility] ER Dec 2023 for tachycardia during dialysis; SVT and resolved w adenosine en route w EMS.  Pt released from ER.  Also has been working with her Vas Surgeon Dr. Mabry, on clotted fistula issues for dialysis; had declot and  repeat fistulogram for repeat balloon angioplasty of SVC and left subclavian vein.   No angina.  Denies chest pain sxs.  On Labetalol.  States her HR is controlled.  No syncope.  Denies dyspnea.  Lipids above goal.  Off statin for some time.  BP stable.  She might need repeat knee surgery.        Past Medical History:   Diagnosis Date    Anemia in CKD (chronic kidney disease)     Bilateral carotid artery stenosis 12/22/2022    Burn 1971    Chronic diastolic heart failure 01/07/2024    Encounter for blood  transfusion     ESRD on dialysis since 2/24/16     Essential hypertension     Hyperlipemia 10/07/2020    Nonrheumatic aortic valve stenosis 07/02/2023    Ovarian cyst     Peripheral vascular disease 11/15/2018    Polycystic kidney disease     dialysis Mon./Wed./Fri.    Pulmonary hypertension 07/02/2023    Secondary hyperparathyroidism of renal origin     SVT (supraventricular tachycardia) 01/08/2024         Current Outpatient Medications:     acetaminophen (TYLENOL) 325 MG tablet, Take 650 mg by mouth every 4 (four) hours as needed. , Disp: , Rfl:     albuterol (PROVENTIL/VENTOLIN HFA) 90 mcg/actuation inhaler, Inhale 1-2 puffs into the lungs every 6 (six) hours as needed for Wheezing. Rescue, Disp: 18 g, Rfl: 0    amLODIPine (NORVASC) 10 MG tablet, Take 1 tablet (10 mg total) by mouth once daily., Disp: 90 tablet, Rfl: 3    atorvastatin (LIPITOR) 20 MG tablet, Take 1 tablet (20 mg total) by mouth every evening., Disp: 90 tablet, Rfl: 3    cetirizine (ZYRTEC) 10 MG tablet, Take 1 tablet (10 mg total) by mouth once daily., Disp: 90 tablet, Rfl: 3    cloNIDine (CATAPRES) 0.3 MG tablet, Take 1 tablet (0.3 mg total) by mouth 3 (three) times daily., Disp: 270 tablet, Rfl: 3    cyanocobalamin (VITAMIN B-12) 1000 MCG tablet, Take 1,000 mcg by mouth every 7 days., Disp: , Rfl:     fluticasone (VERAMYST) 27.5 mcg/actuation nasal spray, 2 sprays by Nasal route daily as needed. , Disp: , Rfl:     guaiFENesin (MUCINEX) 600 mg 12 hr tablet, Take 1 tablet by mouth daily, Disp: 30 tablet, Rfl: 1    hydrALAZINE (APRESOLINE) 100 MG tablet, Take 1 tablet (100 mg total) by mouth 3 (three) times daily., Disp: 270 tablet, Rfl: 3    labetaloL (NORMODYNE) 200 MG tablet, Take 1 tablet (200 mg total) by mouth 3 (three) times daily., Disp: 120 tablet, Rfl: 11    methocarbamoL (ROBAXIN) 500 MG Tab, Take 1 tablet (500 mg total) by mouth 3 (three) times daily as needed (muscle spasms). (Patient not taking: Reported on 5/31/2023), Disp: 60  "tablet, Rfl: 0    ondansetron (ZOFRAN) 4 MG tablet, Take 4 mg by mouth every 8 (eight) hours as needed for Nausea., Disp: , Rfl:     patiromer calcium sorbitex (VELTASSA) 8.4 gram PwPk, Take 1 packet everyday as directed., Disp: 30 packet, Rfl: 6    PATEL-LISSA 0.8 mg Tab, Take 1 tablet by mouth once daily., Disp: , Rfl:     sevelamer carbonate (RENVELA) 800 mg Tab, Take 2 tablets (1,600 mg total) by mouth 3 (three) times daily with meals., Disp: 180 tablet, Rfl: 3    sevelamer carbonate (RENVELA) 800 mg Tab, Take 3 tablets (2,400 mg total) by mouth 3 (three) times daily with meals and one tablet with snack, Disp: 330 tablet, Rfl: 1  No current facility-administered medications for this visit.    Facility-Administered Medications Ordered in Other Visits:     sodium chloride 0.9% flush 10 mL, 10 mL, Intravenous, Q6H, Adilson Aguilar MD, 10 mL at 10/18/20 0010      Review of Systems   Constitutional: Negative.   HENT: Negative.     Eyes: Negative.    Cardiovascular: Negative.    Respiratory: Negative.     Endocrine: Negative.    Hematologic/Lymphatic: Negative.    Skin: Negative.    Musculoskeletal:  Positive for arthritis, joint pain and stiffness.   Gastrointestinal: Negative.    Genitourinary: Negative.    Neurological: Negative.    Psychiatric/Behavioral: Negative.     Allergic/Immunologic: Negative.        /70 (BP Location: Left arm, Patient Position: Sitting, BP Method: Large (Manual))   Pulse 70   Ht 5' 4" (1.626 m)   Wt 93.8 kg (206 lb 12.7 oz)   LMP  (LMP Unknown)   SpO2 98%   BMI 35.50 kg/m²       Wt Readings from Last 3 Encounters:   01/08/24 93.8 kg (206 lb 12.7 oz)   09/13/23 93 kg (205 lb 0.4 oz)   07/03/23 98.1 kg (216 lb 4.3 oz)     Temp Readings from Last 3 Encounters:   12/23/23 97.9 °F (36.6 °C) (Oral)   09/13/23 98.2 °F (36.8 °C) (Oral)   05/30/23 98.4 °F (36.9 °C) (Oral)     BP Readings from Last 3 Encounters:   01/08/24 108/70   12/23/23 (!) 194/164   09/13/23 (!) 168/73     Pulse " Readings from Last 3 Encounters:   01/08/24 70   12/23/23 92   09/13/23 63          Objective:    Physical Exam  Vitals and nursing note reviewed.   Constitutional:       General: She is not in acute distress.     Appearance: Normal appearance. She is well-developed and overweight. She is not ill-appearing or diaphoretic.   HENT:      Head: Normocephalic.   Neck:      Thyroid: No thyromegaly.      Vascular: No carotid bruit or JVD.   Cardiovascular:      Rate and Rhythm: Normal rate and regular rhythm.      Pulses: Normal pulses.      Heart sounds: Normal heart sounds, S1 normal and S2 normal. No murmur heard.     No friction rub. No gallop.   Pulmonary:      Effort: Pulmonary effort is normal.      Breath sounds: Normal breath sounds. No wheezing or rales.   Abdominal:      General: Bowel sounds are normal. There is no abdominal bruit.      Palpations: Abdomen is soft.      Tenderness: There is no abdominal tenderness.   Musculoskeletal:      Cervical back: Neck supple.   Lymphadenopathy:      Cervical: No cervical adenopathy.   Skin:     General: Skin is warm.   Neurological:      Mental Status: She is alert and oriented to person, place, and time.   Psychiatric:         Behavior: Behavior normal. Behavior is cooperative.         I have reviewed all pertinent labs and cardiac studies.        Chemistry        Component Value Date/Time     12/09/2023 0935     09/13/2023 0235    K 4.0 12/09/2023 0935    K 5.5 (H) 09/13/2023 0235     12/09/2023 0935     09/13/2023 0235    CO2 24 12/09/2023 0935    CO2 21 (L) 09/13/2023 0235    BUN 27 (H) 12/09/2023 0935    BUN 32 (H) 09/13/2023 0235    CREATININE 6.19 (H) 12/09/2023 0935    CREATININE 6.6 (H) 09/13/2023 0235    GLU 95 09/13/2023 0235        Component Value Date/Time    CALCIUM 9.1 12/09/2023 0935    CALCIUM 9.3 09/13/2023 0235    ALKPHOS 95 09/13/2023 0235    AST 28 09/13/2023 0235    ALT 12 09/13/2023 0235    BILITOT 0.4 09/13/2023 0235     ESTGFRAFRICA 7 12/09/2023 0935    ESTGFRAFRICA 3 (A) 07/04/2022 1922    EGFRNONAA 2 (A) 07/04/2022 1922        Lab Results   Component Value Date    WBC 6.95 09/13/2023    HGB 12.6 09/13/2023    HCT 39.3 09/13/2023    MCV 94 09/13/2023     09/13/2023       Lab Results   Component Value Date    HGBA1C 4.8 11/12/2022     Lab Results   Component Value Date    CHOL 230 (H) 11/12/2022    CHOL 169 03/22/2018     Lab Results   Component Value Date    HDL 41 (L) 11/12/2022    HDL 56 03/22/2018     Lab Results   Component Value Date    LDLCALC 156 (H) 11/12/2022    LDLCALC 87.0 03/22/2018     Lab Results   Component Value Date    TRIG 166 (H) 11/12/2022    TRIG 130 03/22/2018     Lab Results   Component Value Date    CHOLHDL 33.1 03/22/2018         Results for orders placed during the hospital encounter of 05/18/23    Echo    Interpretation Summary  · The left ventricle is normal in size with concentric hypertrophy and normal systolic function.  · Severe left atrial enlargement.  · Grade II left ventricular diastolic dysfunction.  · The estimated PA systolic pressure is 86 mmHg.  · Normal right ventricular size with normal right ventricular systolic function.  · There is pulmonary hypertension.  · Normal central venous pressure (3 mmHg).  · Moderate tricuspid regurgitation.  · Mild pulmonic regurgitation.  · The estimated ejection fraction is 65%.  · There is mild aortic valve stenosis.  · Aortic valve area is 2.02 cm2; peak velocity is 2.24 m/s; mean gradient is 10 mmHg.        Results for orders placed during the hospital encounter of 05/25/23    Nuclear Stress - Cardiology Interpreted    Interpretation Summary    Equivocal myocardial perfusion scan.    There is a moderate intensity, moderate sized, fixed perfusion abnormality consistent with scar in the anterolateral wall(s).    There are no other significant perfusion abnormalities.    There is a moderate intensity perfusion abnormality in the anterior wall of the  left ventricle, secondary to breast attenuation.    The gated perfusion images showed an ejection fraction of 71% at rest. The gated perfusion images showed an ejection fraction of 68% post stress. Normal ejection fraction is greater than 59%.    There is normal wall motion at rest and post stress.    The ECG portion of the study is negative for ischemia.        Assessment:       1. Hypertension, renal disease    2. Abnormal ECG    3. Bilateral carotid artery stenosis    4. Atrial tachycardia    5. Atypical chest pain    6. Aortic atherosclerosis    7. ESRD (end stage renal disease) on dialysis    8. First degree AV block    9. Mixed hyperlipidemia    10. Nonrheumatic aortic valve stenosis    11. Peripheral vascular disease    12. Pulmonary hypertension    13. Shortness of breath    14. Chronic diastolic heart failure    15. SVT (supraventricular tachycardia)    16. Morbid obesity    17. AP (angina pectoris)    18. PSVT (paroxysmal supraventricular tachycardia)    19. PSVT (paroxysmal supraventricular tachycardia)    20. Other supraventricular tachycardia         Plan:             Stable cardiovascular conditions at present time on current medical treatment.  Reviewed all tests and above medical conditions with patient in detail and formulated treatment plan.  Continue optimal medical treatment for cardiovascular conditions.  Cardiac low salt diet advised.  Diastolic CHF: Compensated.  Daily exercise encouraged, as tolerated.  Maintaining healthy weight and weight loss goals (if needed) were discussed in clinic.  Need for BP control and HTN goals (if needed) were discussed and tx plan formulated.  Goal < 130/80 if possible.  Monitor at home and at dialysis.  SVT/Tachycardia: Continue beta-blocker tx.  Consider EP consult in future as needed.  Importance of optimal lipid control were discussed in detail as well as possible pharmacologic and lifestyle changes that may be needed.  Restart statin tx.  F/u w Nephrology  for ESRD.  Valvular heart disease:  monitor with f/u echo in future.  PHTN: recheck on echo in future.  PAD/carotid disease:  F/u with Vasc Surgery, Dr. Meyer, as advised.  Abnl ecg: monitor.    Recheck fasting lipids 3 months.  Echocardiogram.    Phone review.    F/u in 6 months.      I have reviewed all pertinent labs and cardiac studies independently. Plans and recommendations have been formulated under my direct supervision. All questions answered and patient voiced understanding.

## 2024-01-16 ENCOUNTER — TELEPHONE (OUTPATIENT)
Dept: CARDIOLOGY | Facility: HOSPITAL | Age: 74
End: 2024-01-16
Payer: MEDICARE

## 2024-01-16 PROBLEM — I47.19 OTHER SUPRAVENTRICULAR TACHYCARDIA: Status: ACTIVE | Noted: 2024-01-16

## 2024-01-16 PROBLEM — E66.01 MORBID OBESITY: Status: ACTIVE | Noted: 2024-01-16

## 2024-01-16 PROBLEM — I20.9 AP (ANGINA PECTORIS): Status: ACTIVE | Noted: 2024-01-16

## 2024-01-31 ENCOUNTER — PATIENT OUTREACH (OUTPATIENT)
Dept: ADMINISTRATIVE | Facility: CLINIC | Age: 74
End: 2024-01-31
Payer: MEDICARE

## 2024-01-31 RX ORDER — FLUTICASONE PROPIONATE 50 MCG
SPRAY, SUSPENSION (ML) NASAL
Qty: 16 G | Refills: 2 | Status: SHIPPED | OUTPATIENT
Start: 2024-01-31

## 2024-02-01 NOTE — PROGRESS NOTES
C3 nurse attempted to contact patient. The following occurred:   C3 nurse attempted to contact Gillian Jones for a TCC post hospital discharge follow up call. The patient is unable to conduct the call @ this time. The patient requested a callback.     The patient has a scheduled HOSFU appointment with Aniceto Gonzales MD (oncology) on 2/1/24 @ 2:00pm.

## 2024-02-04 NOTE — ED NOTES
Pt lying in bed in NAD,VSS,RR equal and unlabored. Bed is low, locked, and call light in reach. Side rails up x 2.   Mother

## 2024-02-20 DIAGNOSIS — N18.6 ESRD ON DIALYSIS: ICD-10-CM

## 2024-02-20 DIAGNOSIS — E87.5 HYPERKALEMIA: Primary | ICD-10-CM

## 2024-02-20 DIAGNOSIS — Z99.2 ESRD ON DIALYSIS: ICD-10-CM

## 2024-02-23 RX ORDER — OFLOXACIN 3 MG/ML
SOLUTION AURICULAR (OTIC)
Qty: 5 ML | Refills: 0 | OUTPATIENT
Start: 2024-02-23

## 2024-03-28 ENCOUNTER — PATIENT OUTREACH (OUTPATIENT)
Dept: ADMINISTRATIVE | Facility: CLINIC | Age: 74
End: 2024-03-28
Payer: MEDICARE

## 2024-03-28 NOTE — PROGRESS NOTES
"C3 nurse attempted to contact patient for a TCC post hospital discharge follow-up call. The patient declined call at this time, stating "I am not able to right now. I am waiting to get the staples out and get this taken care of", and hung up. Patient has a follow up with Elle Carter MD (ID) on 5/1/24 @ 9:00am. C3 nurse is unsure when the patient has a FU with Jeremy Meyer MD (Vascular surgeon).        "
Previously Declined (within the last year)

## 2024-04-10 ENCOUNTER — PATIENT OUTREACH (OUTPATIENT)
Dept: ADMINISTRATIVE | Facility: CLINIC | Age: 74
End: 2024-04-10
Payer: MEDICARE

## 2024-08-08 NOTE — PLAN OF CARE
06/29/20 1712   Final Note   Assessment Type Final Discharge Note   Anticipated Discharge Disposition Home   Right Care Referral Info   Post Acute Recommendation No Care      [Chaperone Present] : A chaperone was present in the examining room during all aspects of the physical examination [51448] : A chaperone was present during the pelvic exam. [FreeTextEntry2] : Hellen FRANCO [Appropriately responsive] : appropriately responsive [Alert] : alert [No Acute Distress] : no acute distress [Regular Rate Rhythm] : regular rate rhythm [Oriented x3] : oriented x3 [FreeTextEntry5] : non labored breathing [Labia Majora] : normal [Labia Minora] : normal [Normal] : normal

## (undated) DEVICE — APPLICATOR CHLORAPREP ORN 26ML

## (undated) DEVICE — SEE MEDLINE ITEM 157131

## (undated) DEVICE — INTERPULSE SET

## (undated) DEVICE — PAD ABD 8X10 STERILE

## (undated) DEVICE — GOWN SURG 2XL DISP TIE BACK

## (undated) DEVICE — SEE MEDLINE ITEM 152622

## (undated) DEVICE — COVER OVERHEAD SURG LT BLUE

## (undated) DEVICE — STAPLER SKIN PROXIMATE WIDE

## (undated) DEVICE — BLADE SAG 18.0X1.27X100

## (undated) DEVICE — SUPPORT ULNA NERVE PROTECTOR

## (undated) DEVICE — GAUZE SPONGE 4X4 12PLY

## (undated) DEVICE — SEE MEDLINE ITEM 146298

## (undated) DEVICE — SEE MEDLINE ITEM 157027

## (undated) DEVICE — ELECTRODE BLADE E-Z CLEAN 4IN

## (undated) DEVICE — SEE MEDLINE ITEM 157166

## (undated) DEVICE — PENCIL ELECTROCAUTERY W/ HLSTR

## (undated) DEVICE — SYR 30CC LUER LOCK

## (undated) DEVICE — MIXER BONE CEMENT

## (undated) DEVICE — GLOVE SURG BIOGEL LATEX SZ 7.5

## (undated) DEVICE — SYRINGE 0.9% NACL 10MIL PREFIL

## (undated) DEVICE — SEE MEDLINE ITEM 157216

## (undated) DEVICE — DRAPE INCISE IOBAN 2 23X33IN

## (undated) DEVICE — SEE MEDLINE ITEM 152529

## (undated) DEVICE — SEE MEDLINE ITEM 157117

## (undated) DEVICE — KIT IRR SUCTION HND PIECE

## (undated) DEVICE — CONTAINER SPECIMEN STRL 4OZ

## (undated) DEVICE — BANDAGE ACE ELASTIC 6"

## (undated) DEVICE — SPONGE LAP 18X18 PREWASHED

## (undated) DEVICE — TIP SUCTION YANKAUER

## (undated) DEVICE — SUT VICRYL CP-1 VCP268H

## (undated) DEVICE — MANIFOLD 4 PORT

## (undated) DEVICE — DRAPE STERI INSTRUMENT 1018

## (undated) DEVICE — SEE MEDLINE ITEM 157125

## (undated) DEVICE — ELECTRODE REM PLYHSV RETURN 9

## (undated) DEVICE — DRAPE PLASTIC U 60X72

## (undated) DEVICE — SEE MEDLINE ITEM 152523

## (undated) DEVICE — GLOVE BIOGEL PI ORTHO PRO SZ 8

## (undated) DEVICE — SUT VICRYL 1 OB 36 CTX

## (undated) DEVICE — PICO 7

## (undated) DEVICE — SOL IRR NACL .9% 3000ML

## (undated) DEVICE — SEE MEDLINE ITEM 152739

## (undated) DEVICE — ALCOHOL 70% ISOP RUBBING 4OZ

## (undated) DEVICE — BLADE SURG CARBON STEEL #10

## (undated) DEVICE — SKIN MARKER DEVON 160

## (undated) DEVICE — HOOD FLYTE PEELWY STERISHIELD

## (undated) DEVICE — TOURNIQUET SB QC DP 44X4IN

## (undated) DEVICE — GLOVE SURGICAL LATEX SZ 8

## (undated) DEVICE — DRESSING AQUACEL AG 3.5X10IN

## (undated) DEVICE — SEE MEDLINE ITEM 146231

## (undated) DEVICE — NDL HYPODERMIC BLUNT 18G 1.5IN

## (undated) DEVICE — POSITIONER HEAD DONUT 9IN FOAM